# Patient Record
Sex: FEMALE | Race: WHITE | NOT HISPANIC OR LATINO | Employment: OTHER | URBAN - METROPOLITAN AREA
[De-identification: names, ages, dates, MRNs, and addresses within clinical notes are randomized per-mention and may not be internally consistent; named-entity substitution may affect disease eponyms.]

---

## 2017-01-11 ENCOUNTER — ALLSCRIPTS OFFICE VISIT (OUTPATIENT)
Dept: OTHER | Facility: OTHER | Age: 72
End: 2017-01-11

## 2017-01-11 LAB
BILIRUB UR QL STRIP: NEGATIVE
CLARITY UR: NORMAL
COLOR UR: YELLOW
GLUCOSE (HISTORICAL): NEGATIVE
HGB UR QL STRIP.AUTO: NEGATIVE
KETONES UR STRIP-MCNC: NEGATIVE MG/DL
LEUKOCYTE ESTERASE UR QL STRIP: NEGATIVE
NITRITE UR QL STRIP: NEGATIVE
PH UR STRIP.AUTO: 6.5 [PH]
PROT UR STRIP-MCNC: NEGATIVE MG/DL
SP GR UR STRIP.AUTO: 1.01
UROBILINOGEN UR QL STRIP.AUTO: 0.2

## 2017-01-26 ENCOUNTER — ALLSCRIPTS OFFICE VISIT (OUTPATIENT)
Dept: OTHER | Facility: OTHER | Age: 72
End: 2017-01-26

## 2017-01-26 ENCOUNTER — APPOINTMENT (OUTPATIENT)
Dept: LAB | Facility: HOSPITAL | Age: 72
End: 2017-01-26
Attending: INTERNAL MEDICINE
Payer: COMMERCIAL

## 2017-01-26 DIAGNOSIS — E87.6 HYPOKALEMIA: ICD-10-CM

## 2017-01-26 LAB
ANION GAP SERPL CALCULATED.3IONS-SCNC: 10 MMOL/L (ref 4–13)
BUN SERPL-MCNC: 24 MG/DL (ref 5–25)
CALCIUM SERPL-MCNC: 9.6 MG/DL (ref 8.3–10.1)
CHLORIDE SERPL-SCNC: 98 MMOL/L (ref 100–108)
CO2 SERPL-SCNC: 32 MMOL/L (ref 21–32)
CREAT SERPL-MCNC: 1.18 MG/DL (ref 0.6–1.3)
GFR SERPL CREATININE-BSD FRML MDRD: 45.3 ML/MIN/1.73SQ M
GLUCOSE SERPL-MCNC: 125 MG/DL (ref 65–140)
POTASSIUM SERPL-SCNC: 3.7 MMOL/L (ref 3.5–5.3)
SODIUM SERPL-SCNC: 140 MMOL/L (ref 136–145)

## 2017-01-26 PROCEDURE — 80048 BASIC METABOLIC PNL TOTAL CA: CPT

## 2017-01-26 PROCEDURE — 36415 COLL VENOUS BLD VENIPUNCTURE: CPT

## 2017-03-23 DIAGNOSIS — M54.31 SCIATICA OF RIGHT SIDE: ICD-10-CM

## 2017-03-23 DIAGNOSIS — E87.6 HYPOKALEMIA: ICD-10-CM

## 2017-03-23 DIAGNOSIS — M51.36 OTHER INTERVERTEBRAL DISC DEGENERATION, LUMBAR REGION: ICD-10-CM

## 2017-04-11 ENCOUNTER — APPOINTMENT (EMERGENCY)
Dept: CT IMAGING | Facility: HOSPITAL | Age: 72
End: 2017-04-11
Payer: COMMERCIAL

## 2017-04-11 ENCOUNTER — HOSPITAL ENCOUNTER (EMERGENCY)
Facility: HOSPITAL | Age: 72
Discharge: HOME | End: 2017-04-11
Attending: EMERGENCY MEDICINE | Admitting: EMERGENCY MEDICINE
Payer: COMMERCIAL

## 2017-04-11 VITALS
RESPIRATION RATE: 18 BRPM | HEART RATE: 99 BPM | WEIGHT: 274.25 LBS | SYSTOLIC BLOOD PRESSURE: 144 MMHG | OXYGEN SATURATION: 93 % | TEMPERATURE: 98.2 F | DIASTOLIC BLOOD PRESSURE: 88 MMHG

## 2017-04-11 DIAGNOSIS — M54.31 SCIATICA OF RIGHT SIDE: ICD-10-CM

## 2017-04-11 DIAGNOSIS — S39.012A STRAIN OF LUMBAR PARASPINAL MUSCLE, INITIAL ENCOUNTER: Primary | ICD-10-CM

## 2017-04-11 PROCEDURE — 99284 EMERGENCY DEPT VISIT MOD MDM: CPT

## 2017-04-11 PROCEDURE — 96374 THER/PROPH/DIAG INJ IV PUSH: CPT

## 2017-04-11 PROCEDURE — 72131 CT LUMBAR SPINE W/O DYE: CPT

## 2017-04-11 RX ORDER — OMEPRAZOLE 40 MG/1
40 CAPSULE, DELAYED RELEASE ORAL 2 TIMES DAILY
COMMUNITY
End: 2018-05-23 | Stop reason: SDUPTHER

## 2017-04-11 RX ORDER — SPIRONOLACTONE 25 MG/1
25 TABLET ORAL DAILY
COMMUNITY
End: 2018-05-23 | Stop reason: SDUPTHER

## 2017-04-11 RX ORDER — ALLOPURINOL 100 MG/1
100 TABLET ORAL 2 TIMES DAILY
COMMUNITY
End: 2018-05-23 | Stop reason: SDUPTHER

## 2017-04-11 RX ORDER — ATORVASTATIN CALCIUM 40 MG/1
40 TABLET, FILM COATED ORAL DAILY
COMMUNITY
End: 2018-05-23 | Stop reason: SDUPTHER

## 2017-04-11 RX ORDER — OXYCODONE HYDROCHLORIDE AND ACETAMINOPHEN 5; 325 MG/1; MG/1
1 TABLET ORAL EVERY 4 HOURS PRN
Qty: 10 TABLET | Refills: 0 | Status: SHIPPED | OUTPATIENT
Start: 2017-04-11 | End: 2017-04-11

## 2017-04-11 RX ORDER — WARFARIN SODIUM 5 MG/1
5 TABLET ORAL DAILY
COMMUNITY
Start: 2013-10-10 | End: 2019-12-30 | Stop reason: SDUPTHER

## 2017-04-11 RX ORDER — FUROSEMIDE 80 MG
1 TABLET ORAL DAILY
COMMUNITY
End: 2018-05-23 | Stop reason: SDUPTHER

## 2017-04-11 RX ORDER — PROPRANOLOL/HYDROCHLOROTHIAZID 40 MG-25MG
500 TABLET ORAL 4 TIMES DAILY
COMMUNITY
Start: 2016-04-15 | End: 2017-07-15 | Stop reason: HOSPADM

## 2017-04-11 RX ORDER — IRON POLYSACCHARIDE COMPLEX 150 MG
150 CAPSULE ORAL 2 TIMES DAILY
COMMUNITY
End: 2018-05-23 | Stop reason: SDUPTHER

## 2017-04-11 RX ORDER — OXYCODONE HYDROCHLORIDE AND ACETAMINOPHEN 5; 325 MG/1; MG/1
1 TABLET ORAL ONCE
Status: COMPLETED | OUTPATIENT
Start: 2017-04-11 | End: 2017-04-11

## 2017-04-11 RX ORDER — ALBUTEROL SULFATE 90 UG/1
2 AEROSOL, METERED RESPIRATORY (INHALATION) AS NEEDED
COMMUNITY
Start: 2014-01-23 | End: 2018-03-01 | Stop reason: SDUPTHER

## 2017-04-11 RX ORDER — METOLAZONE 5 MG/1
2.5 TABLET ORAL SEE ADMIN INSTRUCTIONS
COMMUNITY
End: 2017-07-15 | Stop reason: HOSPADM

## 2017-04-11 RX ORDER — MORPHINE SULFATE 10 MG/ML
6 INJECTION, SOLUTION INTRAMUSCULAR; INTRAVENOUS ONCE
Status: COMPLETED | OUTPATIENT
Start: 2017-04-11 | End: 2017-04-11

## 2017-04-11 RX ORDER — POTASSIUM CHLORIDE 20 MEQ/1
40 TABLET, EXTENDED RELEASE ORAL 2 TIMES DAILY
COMMUNITY
Start: 2013-10-10 | End: 2018-08-23 | Stop reason: SDDI

## 2017-04-11 RX ORDER — CITALOPRAM 20 MG/1
20 TABLET ORAL DAILY
COMMUNITY
End: 2018-05-23 | Stop reason: SDUPTHER

## 2017-04-11 RX ORDER — OXYCODONE HYDROCHLORIDE AND ACETAMINOPHEN 5; 325 MG/1; MG/1
1 TABLET ORAL EVERY 6 HOURS PRN
Qty: 15 TABLET | Refills: 0 | Status: SHIPPED | OUTPATIENT
Start: 2017-04-11 | End: 2017-04-21

## 2017-04-11 RX ORDER — ASCORBIC ACID 500 MG
500 TABLET ORAL DAILY
COMMUNITY
End: 2017-07-15 | Stop reason: HOSPADM

## 2017-04-11 RX ORDER — ERGOCALCIFEROL (VITAMIN D2) 1250 MCG
1 CAPSULE ORAL WEEKLY
COMMUNITY
End: 2018-02-08 | Stop reason: SDUPTHER

## 2017-04-11 RX ADMIN — OXYCODONE HYDROCHLORIDE AND ACETAMINOPHEN 1 TABLET: 5; 325 TABLET ORAL at 13:02

## 2017-04-11 RX ADMIN — MORPHINE SULFATE 6 MG: 10 INJECTION, SOLUTION INTRAMUSCULAR; INTRAVENOUS at 15:03

## 2017-04-13 ENCOUNTER — GENERIC CONVERSION - ENCOUNTER (OUTPATIENT)
Dept: OTHER | Facility: OTHER | Age: 72
End: 2017-04-13

## 2017-04-14 ENCOUNTER — ALLSCRIPTS OFFICE VISIT (OUTPATIENT)
Dept: OTHER | Facility: OTHER | Age: 72
End: 2017-04-14

## 2017-04-24 ENCOUNTER — APPOINTMENT (OUTPATIENT)
Dept: PHYSICAL THERAPY | Facility: REHABILITATION | Age: 72
End: 2017-04-24
Payer: COMMERCIAL

## 2017-04-24 ENCOUNTER — GENERIC CONVERSION - ENCOUNTER (OUTPATIENT)
Dept: OTHER | Facility: OTHER | Age: 72
End: 2017-04-24

## 2017-04-24 PROCEDURE — G8991 OTHER PT/OT GOAL STATUS: HCPCS

## 2017-04-24 PROCEDURE — G8990 OTHER PT/OT CURRENT STATUS: HCPCS

## 2017-04-24 PROCEDURE — 97162 PT EVAL MOD COMPLEX 30 MIN: CPT

## 2017-04-24 PROCEDURE — 97014 ELECTRIC STIMULATION THERAPY: CPT

## 2017-04-25 ENCOUNTER — GENERIC CONVERSION - ENCOUNTER (OUTPATIENT)
Dept: OTHER | Facility: OTHER | Age: 72
End: 2017-04-25

## 2017-04-27 ENCOUNTER — APPOINTMENT (EMERGENCY)
Dept: RADIOLOGY | Facility: HOSPITAL | Age: 72
DRG: 292 | End: 2017-04-27
Payer: COMMERCIAL

## 2017-04-27 ENCOUNTER — GENERIC CONVERSION - ENCOUNTER (OUTPATIENT)
Dept: OTHER | Facility: OTHER | Age: 72
End: 2017-04-27

## 2017-04-27 ENCOUNTER — HOSPITAL ENCOUNTER (INPATIENT)
Facility: HOSPITAL | Age: 72
LOS: 4 days | Discharge: HOME | DRG: 292 | End: 2017-05-01
Attending: EMERGENCY MEDICINE | Admitting: INTERNAL MEDICINE
Payer: COMMERCIAL

## 2017-04-27 DIAGNOSIS — I50.9 CHF (CONGESTIVE HEART FAILURE) (HCC): Primary | ICD-10-CM

## 2017-04-27 LAB
ANION GAP SERPL CALCULATED.3IONS-SCNC: 6 MMOL/L (ref 4–13)
ANISOCYTOSIS BLD QL SMEAR: PRESENT
APTT PPP: 66 SECONDS (ref 23–35)
BACTERIA UR QL AUTO: ABNORMAL /HPF
BASOPHILS # BLD MANUAL: 0 THOUSAND/UL (ref 0–0.1)
BASOPHILS NFR MAR MANUAL: 0 % (ref 0–1)
BILIRUB UR QL STRIP: NEGATIVE
BUN SERPL-MCNC: 13 MG/DL (ref 5–25)
CALCIUM SERPL-MCNC: 9.7 MG/DL (ref 8.3–10.1)
CAOX CRY URNS QL MICRO: ABNORMAL /HPF
CHLORIDE SERPL-SCNC: 99 MMOL/L (ref 100–108)
CLARITY UR: CLEAR
CO2 SERPL-SCNC: 29 MMOL/L (ref 21–32)
COLOR UR: YELLOW
CREAT SERPL-MCNC: 0.9 MG/DL (ref 0.6–1.3)
EOSINOPHIL # BLD MANUAL: 0.15 THOUSAND/UL (ref 0–0.4)
EOSINOPHIL NFR BLD MANUAL: 1 % (ref 0–6)
ERYTHROCYTE [DISTWIDTH] IN BLOOD BY AUTOMATED COUNT: 16.1 % (ref 11.6–15.1)
GFR SERPL CREATININE-BSD FRML MDRD: >60 ML/MIN/1.73SQ M
GLUCOSE SERPL-MCNC: 140 MG/DL (ref 65–140)
GLUCOSE SERPL-MCNC: 150 MG/DL (ref 65–140)
GLUCOSE UR STRIP-MCNC: NEGATIVE MG/DL
HCT VFR BLD AUTO: 30 % (ref 34.8–46.1)
HGB BLD-MCNC: 10 G/DL (ref 11.5–15.4)
HGB UR QL STRIP.AUTO: ABNORMAL
INR PPP: 2.11 (ref 0.86–1.16)
KETONES UR STRIP-MCNC: NEGATIVE MG/DL
LEUKOCYTE ESTERASE UR QL STRIP: NEGATIVE
LYMPHOCYTES # BLD AUTO: 0.46 THOUSAND/UL (ref 0.6–4.47)
LYMPHOCYTES # BLD AUTO: 3 % (ref 14–44)
MCH RBC QN AUTO: 31 PG (ref 26.8–34.3)
MCHC RBC AUTO-ENTMCNC: 33.3 G/DL (ref 31.4–37.4)
MCV RBC AUTO: 93 FL (ref 82–98)
MONOCYTES # BLD AUTO: 1.07 THOUSAND/UL (ref 0–1.22)
MONOCYTES NFR BLD: 7 % (ref 4–12)
MYELOCYTES NFR BLD MANUAL: 1 % (ref 0–1)
NEUTROPHILS # BLD MANUAL: 13.49 THOUSAND/UL (ref 1.85–7.62)
NEUTS BAND NFR BLD MANUAL: 2 % (ref 0–8)
NEUTS SEG NFR BLD AUTO: 86 % (ref 43–75)
NITRITE UR QL STRIP: NEGATIVE
NON-SQ EPI CELLS URNS QL MICRO: ABNORMAL /HPF
NRBC BLD AUTO-RTO: 0 /100 WBCS
NT-PROBNP SERPL-MCNC: 700 PG/ML
PH UR STRIP.AUTO: 5.5 [PH] (ref 4.5–8)
PLATELET # BLD AUTO: 350 THOUSANDS/UL (ref 149–390)
PLATELET BLD QL SMEAR: ADEQUATE
PMV BLD AUTO: 8.4 FL (ref 8.9–12.7)
POTASSIUM SERPL-SCNC: 4.4 MMOL/L (ref 3.5–5.3)
PROT UR STRIP-MCNC: ABNORMAL MG/DL
PROTHROMBIN TIME: 23.9 SECONDS (ref 12.1–14.4)
RBC # BLD AUTO: 3.23 MILLION/UL (ref 3.81–5.12)
RBC #/AREA URNS AUTO: ABNORMAL /HPF
SODIUM SERPL-SCNC: 134 MMOL/L (ref 136–145)
SP GR UR STRIP.AUTO: 1.01 (ref 1–1.03)
SPECIMEN SOURCE: NORMAL
TOTAL CELLS COUNTED SPEC: 100
TROPONIN I BLD-MCNC: 0 NG/ML (ref 0–0.08)
UROBILINOGEN UR QL STRIP.AUTO: 1 E.U./DL
WBC # BLD AUTO: 15.33 THOUSAND/UL (ref 4.31–10.16)
WBC #/AREA URNS AUTO: ABNORMAL /HPF

## 2017-04-27 PROCEDURE — 81001 URINALYSIS AUTO W/SCOPE: CPT

## 2017-04-27 PROCEDURE — 85610 PROTHROMBIN TIME: CPT | Performed by: EMERGENCY MEDICINE

## 2017-04-27 PROCEDURE — 84484 ASSAY OF TROPONIN QUANT: CPT

## 2017-04-27 PROCEDURE — 83880 ASSAY OF NATRIURETIC PEPTIDE: CPT | Performed by: EMERGENCY MEDICINE

## 2017-04-27 PROCEDURE — 80048 BASIC METABOLIC PNL TOTAL CA: CPT

## 2017-04-27 PROCEDURE — 82948 REAGENT STRIP/BLOOD GLUCOSE: CPT

## 2017-04-27 PROCEDURE — 36415 COLL VENOUS BLD VENIPUNCTURE: CPT | Performed by: EMERGENCY MEDICINE

## 2017-04-27 PROCEDURE — 81002 URINALYSIS NONAUTO W/O SCOPE: CPT | Performed by: EMERGENCY MEDICINE

## 2017-04-27 PROCEDURE — 85730 THROMBOPLASTIN TIME PARTIAL: CPT | Performed by: EMERGENCY MEDICINE

## 2017-04-27 PROCEDURE — 87086 URINE CULTURE/COLONY COUNT: CPT

## 2017-04-27 PROCEDURE — 99285 EMERGENCY DEPT VISIT HI MDM: CPT

## 2017-04-27 PROCEDURE — 85027 COMPLETE CBC AUTOMATED: CPT | Performed by: EMERGENCY MEDICINE

## 2017-04-27 PROCEDURE — 93005 ELECTROCARDIOGRAM TRACING: CPT

## 2017-04-27 PROCEDURE — 71020 HB CHEST X-RAY 2VW FRONTAL&LATL: CPT

## 2017-04-27 PROCEDURE — 85007 BL SMEAR W/DIFF WBC COUNT: CPT | Performed by: EMERGENCY MEDICINE

## 2017-04-27 RX ORDER — IRON POLYSACCHARIDE COMPLEX 150 MG
150 CAPSULE ORAL 2 TIMES DAILY
Status: DISCONTINUED | OUTPATIENT
Start: 2017-04-27 | End: 2017-04-28

## 2017-04-27 RX ORDER — WARFARIN SODIUM 5 MG/1
5 TABLET ORAL
Status: DISCONTINUED | OUTPATIENT
Start: 2017-04-27 | End: 2017-04-28

## 2017-04-27 RX ORDER — SPIRONOLACTONE 25 MG/1
25 TABLET ORAL DAILY
Status: DISCONTINUED | OUTPATIENT
Start: 2017-04-28 | End: 2017-05-01 | Stop reason: HOSPADM

## 2017-04-27 RX ORDER — MAGNESIUM HYDROXIDE/ALUMINUM HYDROXICE/SIMETHICONE 120; 1200; 1200 MG/30ML; MG/30ML; MG/30ML
30 SUSPENSION ORAL EVERY 6 HOURS PRN
Status: DISCONTINUED | OUTPATIENT
Start: 2017-04-27 | End: 2017-05-01 | Stop reason: HOSPADM

## 2017-04-27 RX ORDER — PANTOPRAZOLE SODIUM 40 MG/1
40 TABLET, DELAYED RELEASE ORAL
Status: DISCONTINUED | OUTPATIENT
Start: 2017-04-28 | End: 2017-05-01 | Stop reason: HOSPADM

## 2017-04-27 RX ORDER — CITALOPRAM 20 MG/1
20 TABLET ORAL DAILY
Status: DISCONTINUED | OUTPATIENT
Start: 2017-04-28 | End: 2017-05-01 | Stop reason: HOSPADM

## 2017-04-27 RX ORDER — FUROSEMIDE 10 MG/ML
40 INJECTION INTRAMUSCULAR; INTRAVENOUS 2 TIMES DAILY
Status: DISCONTINUED | OUTPATIENT
Start: 2017-04-27 | End: 2017-04-29

## 2017-04-27 RX ORDER — POTASSIUM CHLORIDE 20 MEQ/1
20 TABLET, EXTENDED RELEASE ORAL DAILY
Status: DISCONTINUED | OUTPATIENT
Start: 2017-04-28 | End: 2017-04-28

## 2017-04-27 RX ORDER — WARFARIN SODIUM 5 MG/1
5 TABLET ORAL DAILY
Status: DISCONTINUED | OUTPATIENT
Start: 2017-04-28 | End: 2017-04-27

## 2017-04-27 RX ORDER — ALBUTEROL SULFATE 90 UG/1
2 AEROSOL, METERED RESPIRATORY (INHALATION) EVERY 4 HOURS PRN
Status: DISCONTINUED | OUTPATIENT
Start: 2017-04-27 | End: 2017-05-01 | Stop reason: HOSPADM

## 2017-04-27 RX ORDER — ATORVASTATIN CALCIUM 40 MG/1
40 TABLET, FILM COATED ORAL
Status: DISCONTINUED | OUTPATIENT
Start: 2017-04-27 | End: 2017-05-01 | Stop reason: HOSPADM

## 2017-04-27 RX ORDER — DOCUSATE SODIUM 100 MG/1
100 CAPSULE, LIQUID FILLED ORAL 2 TIMES DAILY
Status: DISCONTINUED | OUTPATIENT
Start: 2017-04-27 | End: 2017-05-01 | Stop reason: HOSPADM

## 2017-04-27 RX ORDER — ASCORBIC ACID 500 MG
500 TABLET ORAL DAILY
Status: DISCONTINUED | OUTPATIENT
Start: 2017-04-28 | End: 2017-04-28

## 2017-04-27 RX ORDER — ACETAMINOPHEN 325 MG/1
650 TABLET ORAL EVERY 4 HOURS PRN
Status: DISCONTINUED | OUTPATIENT
Start: 2017-04-27 | End: 2017-05-01 | Stop reason: HOSPADM

## 2017-04-27 RX ORDER — ONDANSETRON 2 MG/ML
4 INJECTION INTRAMUSCULAR; INTRAVENOUS EVERY 6 HOURS PRN
Status: DISCONTINUED | OUTPATIENT
Start: 2017-04-27 | End: 2017-05-01 | Stop reason: HOSPADM

## 2017-04-27 RX ORDER — ALLOPURINOL 100 MG/1
100 TABLET ORAL 2 TIMES DAILY
Status: DISCONTINUED | OUTPATIENT
Start: 2017-04-27 | End: 2017-05-01 | Stop reason: HOSPADM

## 2017-04-27 RX ADMIN — ACETAMINOPHEN 650 MG: 325 TABLET ORAL at 21:40

## 2017-04-27 RX ADMIN — ATORVASTATIN CALCIUM 40 MG: 40 TABLET, FILM COATED ORAL at 21:40

## 2017-04-27 RX ADMIN — FUROSEMIDE 40 MG: 10 INJECTION, SOLUTION INTRAMUSCULAR; INTRAVENOUS at 21:39

## 2017-04-27 RX ADMIN — WARFARIN SODIUM 5 MG: 5 TABLET ORAL at 22:49

## 2017-04-27 RX ADMIN — DOCUSATE SODIUM 100 MG: 100 CAPSULE, LIQUID FILLED ORAL at 21:40

## 2017-04-27 RX ADMIN — Medication 150 MG: at 22:49

## 2017-04-27 RX ADMIN — ALLOPURINOL 100 MG: 100 TABLET ORAL at 21:40

## 2017-04-28 ENCOUNTER — GENERIC CONVERSION - ENCOUNTER (OUTPATIENT)
Dept: OTHER | Facility: OTHER | Age: 72
End: 2017-04-28

## 2017-04-28 ENCOUNTER — APPOINTMENT (INPATIENT)
Dept: NON INVASIVE DIAGNOSTICS | Facility: HOSPITAL | Age: 72
DRG: 292 | End: 2017-04-28
Payer: COMMERCIAL

## 2017-04-28 ENCOUNTER — APPOINTMENT (OUTPATIENT)
Dept: PHYSICAL THERAPY | Facility: REHABILITATION | Age: 72
End: 2017-04-28
Payer: COMMERCIAL

## 2017-04-28 PROBLEM — I50.31 ACUTE DIASTOLIC CONGESTIVE HEART FAILURE (HCC): Status: ACTIVE | Noted: 2017-04-27

## 2017-04-28 LAB
ANION GAP SERPL CALCULATED.3IONS-SCNC: 9 MMOL/L (ref 4–13)
ATRIAL RATE: 103 BPM
BACTERIA UR CULT: NORMAL
BUN SERPL-MCNC: 12 MG/DL (ref 5–25)
CALCIUM SERPL-MCNC: 8.9 MG/DL (ref 8.3–10.1)
CHLORIDE SERPL-SCNC: 102 MMOL/L (ref 100–108)
CO2 SERPL-SCNC: 27 MMOL/L (ref 21–32)
CREAT SERPL-MCNC: 0.83 MG/DL (ref 0.6–1.3)
EST. AVERAGE GLUCOSE BLD GHB EST-MCNC: 146 MG/DL
GFR SERPL CREATININE-BSD FRML MDRD: >60 ML/MIN/1.73SQ M
GLUCOSE SERPL-MCNC: 122 MG/DL (ref 65–140)
GLUCOSE SERPL-MCNC: 143 MG/DL (ref 65–140)
GLUCOSE SERPL-MCNC: 187 MG/DL (ref 65–140)
GLUCOSE SERPL-MCNC: 193 MG/DL (ref 65–140)
GLUCOSE SERPL-MCNC: 194 MG/DL (ref 65–140)
HBA1C MFR BLD: 6.7 % (ref 4.2–6.3)
INR PPP: 2.26 (ref 0.86–1.16)
P AXIS: 60 DEGREES
POTASSIUM SERPL-SCNC: 3.3 MMOL/L (ref 3.5–5.3)
PR INTERVAL: 176 MS
PROTHROMBIN TIME: 25.2 SECONDS (ref 12.1–14.4)
QRS AXIS: 26 DEGREES
QRSD INTERVAL: 146 MS
QT INTERVAL: 358 MS
QTC INTERVAL: 468 MS
SODIUM SERPL-SCNC: 138 MMOL/L (ref 136–145)
T WAVE AXIS: 31 DEGREES
TSH SERPL DL<=0.05 MIU/L-ACNC: 0.48 UIU/ML (ref 0.36–3.74)
VENTRICULAR RATE: 103 BPM

## 2017-04-28 PROCEDURE — 80048 BASIC METABOLIC PNL TOTAL CA: CPT | Performed by: PHYSICIAN ASSISTANT

## 2017-04-28 PROCEDURE — 82948 REAGENT STRIP/BLOOD GLUCOSE: CPT

## 2017-04-28 PROCEDURE — 97163 PT EVAL HIGH COMPLEX 45 MIN: CPT

## 2017-04-28 PROCEDURE — 97530 THERAPEUTIC ACTIVITIES: CPT

## 2017-04-28 PROCEDURE — G8978 MOBILITY CURRENT STATUS: HCPCS

## 2017-04-28 PROCEDURE — 83036 HEMOGLOBIN GLYCOSYLATED A1C: CPT | Performed by: PHYSICIAN ASSISTANT

## 2017-04-28 PROCEDURE — 93306 TTE W/DOPPLER COMPLETE: CPT

## 2017-04-28 PROCEDURE — 84443 ASSAY THYROID STIM HORMONE: CPT | Performed by: PHYSICIAN ASSISTANT

## 2017-04-28 PROCEDURE — G8979 MOBILITY GOAL STATUS: HCPCS

## 2017-04-28 PROCEDURE — 85610 PROTHROMBIN TIME: CPT | Performed by: PHYSICIAN ASSISTANT

## 2017-04-28 RX ORDER — WARFARIN SODIUM 7.5 MG/1
7.5 TABLET ORAL
Status: DISCONTINUED | OUTPATIENT
Start: 2017-04-28 | End: 2017-04-30

## 2017-04-28 RX ORDER — POTASSIUM CHLORIDE 20 MEQ/1
40 TABLET, EXTENDED RELEASE ORAL 2 TIMES DAILY WITH MEALS
Status: DISCONTINUED | OUTPATIENT
Start: 2017-04-28 | End: 2017-05-01 | Stop reason: HOSPADM

## 2017-04-28 RX ORDER — POTASSIUM CHLORIDE 20 MEQ/1
40 TABLET, EXTENDED RELEASE ORAL 2 TIMES DAILY WITH MEALS
Status: DISCONTINUED | OUTPATIENT
Start: 2017-04-29 | End: 2017-04-28

## 2017-04-28 RX ADMIN — FUROSEMIDE 40 MG: 10 INJECTION, SOLUTION INTRAMUSCULAR; INTRAVENOUS at 17:15

## 2017-04-28 RX ADMIN — POTASSIUM CHLORIDE 40 MEQ: 1500 TABLET, EXTENDED RELEASE ORAL at 18:41

## 2017-04-28 RX ADMIN — DOCUSATE SODIUM 100 MG: 100 CAPSULE, LIQUID FILLED ORAL at 09:33

## 2017-04-28 RX ADMIN — ATORVASTATIN CALCIUM 40 MG: 40 TABLET, FILM COATED ORAL at 17:15

## 2017-04-28 RX ADMIN — CITALOPRAM HYDROBROMIDE 20 MG: 20 TABLET ORAL at 09:33

## 2017-04-28 RX ADMIN — DOCUSATE SODIUM 100 MG: 100 CAPSULE, LIQUID FILLED ORAL at 17:15

## 2017-04-28 RX ADMIN — PANTOPRAZOLE SODIUM 40 MG: 40 TABLET, DELAYED RELEASE ORAL at 05:27

## 2017-04-28 RX ADMIN — Medication 150 MG: at 09:34

## 2017-04-28 RX ADMIN — SPIRONOLACTONE 25 MG: 25 TABLET, FILM COATED ORAL at 09:33

## 2017-04-28 RX ADMIN — Medication 1 TABLET: at 09:33

## 2017-04-28 RX ADMIN — INSULIN LISPRO 2 UNITS: 100 INJECTION, SOLUTION INTRAVENOUS; SUBCUTANEOUS at 22:02

## 2017-04-28 RX ADMIN — INSULIN LISPRO 2 UNITS: 100 INJECTION, SOLUTION INTRAVENOUS; SUBCUTANEOUS at 17:18

## 2017-04-28 RX ADMIN — OXYCODONE HYDROCHLORIDE AND ACETAMINOPHEN 500 MG: 500 TABLET ORAL at 09:33

## 2017-04-28 RX ADMIN — ACETAMINOPHEN 650 MG: 325 TABLET ORAL at 22:01

## 2017-04-28 RX ADMIN — INSULIN LISPRO 2 UNITS: 100 INJECTION, SOLUTION INTRAVENOUS; SUBCUTANEOUS at 09:39

## 2017-04-28 RX ADMIN — WARFARIN SODIUM 7.5 MG: 7.5 TABLET ORAL at 17:15

## 2017-04-28 RX ADMIN — FUROSEMIDE 40 MG: 10 INJECTION, SOLUTION INTRAMUSCULAR; INTRAVENOUS at 09:34

## 2017-04-28 RX ADMIN — POTASSIUM CHLORIDE 20 MEQ: 1500 TABLET, EXTENDED RELEASE ORAL at 09:33

## 2017-04-28 RX ADMIN — ACETAMINOPHEN 650 MG: 325 TABLET ORAL at 13:46

## 2017-04-28 RX ADMIN — ALLOPURINOL 100 MG: 100 TABLET ORAL at 17:15

## 2017-04-28 RX ADMIN — ALLOPURINOL 100 MG: 100 TABLET ORAL at 09:33

## 2017-04-29 PROBLEM — G57.11 MERALGIA PARESTHETICA OF RIGHT SIDE: Status: ACTIVE | Noted: 2017-04-29

## 2017-04-29 LAB
ANION GAP SERPL CALCULATED.3IONS-SCNC: 7 MMOL/L (ref 4–13)
BUN SERPL-MCNC: 14 MG/DL (ref 5–25)
CALCIUM SERPL-MCNC: 9.1 MG/DL (ref 8.3–10.1)
CHLORIDE SERPL-SCNC: 101 MMOL/L (ref 100–108)
CO2 SERPL-SCNC: 29 MMOL/L (ref 21–32)
CREAT SERPL-MCNC: 0.82 MG/DL (ref 0.6–1.3)
GFR SERPL CREATININE-BSD FRML MDRD: >60 ML/MIN/1.73SQ M
GLUCOSE SERPL-MCNC: 135 MG/DL (ref 65–140)
GLUCOSE SERPL-MCNC: 146 MG/DL (ref 65–140)
GLUCOSE SERPL-MCNC: 147 MG/DL (ref 65–140)
GLUCOSE SERPL-MCNC: 150 MG/DL (ref 65–140)
GLUCOSE SERPL-MCNC: 182 MG/DL (ref 65–140)
INR PPP: 2.21 (ref 0.86–1.16)
POTASSIUM SERPL-SCNC: 3.4 MMOL/L (ref 3.5–5.3)
PROTHROMBIN TIME: 24.8 SECONDS (ref 12.1–14.4)
SODIUM SERPL-SCNC: 137 MMOL/L (ref 136–145)

## 2017-04-29 PROCEDURE — 82948 REAGENT STRIP/BLOOD GLUCOSE: CPT

## 2017-04-29 PROCEDURE — 80048 BASIC METABOLIC PNL TOTAL CA: CPT | Performed by: INTERNAL MEDICINE

## 2017-04-29 PROCEDURE — 85610 PROTHROMBIN TIME: CPT | Performed by: INTERNAL MEDICINE

## 2017-04-29 RX ORDER — FUROSEMIDE 80 MG
80 TABLET ORAL DAILY
Status: DISCONTINUED | OUTPATIENT
Start: 2017-04-30 | End: 2017-05-01 | Stop reason: HOSPADM

## 2017-04-29 RX ADMIN — POTASSIUM CHLORIDE 40 MEQ: 1500 TABLET, EXTENDED RELEASE ORAL at 16:39

## 2017-04-29 RX ADMIN — ACETAMINOPHEN 650 MG: 325 TABLET ORAL at 06:12

## 2017-04-29 RX ADMIN — POTASSIUM CHLORIDE 40 MEQ: 1500 TABLET, EXTENDED RELEASE ORAL at 09:21

## 2017-04-29 RX ADMIN — ATORVASTATIN CALCIUM 40 MG: 40 TABLET, FILM COATED ORAL at 17:11

## 2017-04-29 RX ADMIN — WARFARIN SODIUM 7.5 MG: 7.5 TABLET ORAL at 17:11

## 2017-04-29 RX ADMIN — CITALOPRAM HYDROBROMIDE 20 MG: 20 TABLET ORAL at 09:21

## 2017-04-29 RX ADMIN — FUROSEMIDE 40 MG: 10 INJECTION, SOLUTION INTRAMUSCULAR; INTRAVENOUS at 09:21

## 2017-04-29 RX ADMIN — INSULIN LISPRO 2 UNITS: 100 INJECTION, SOLUTION INTRAVENOUS; SUBCUTANEOUS at 09:15

## 2017-04-29 RX ADMIN — ALLOPURINOL 100 MG: 100 TABLET ORAL at 09:21

## 2017-04-29 RX ADMIN — INSULIN LISPRO 2 UNITS: 100 INJECTION, SOLUTION INTRAVENOUS; SUBCUTANEOUS at 13:33

## 2017-04-29 RX ADMIN — DOCUSATE SODIUM 100 MG: 100 CAPSULE, LIQUID FILLED ORAL at 09:21

## 2017-04-29 RX ADMIN — DOCUSATE SODIUM 100 MG: 100 CAPSULE, LIQUID FILLED ORAL at 17:12

## 2017-04-29 RX ADMIN — ALLOPURINOL 100 MG: 100 TABLET ORAL at 17:12

## 2017-04-29 RX ADMIN — PANTOPRAZOLE SODIUM 40 MG: 40 TABLET, DELAYED RELEASE ORAL at 06:12

## 2017-04-29 RX ADMIN — ACETAMINOPHEN 650 MG: 325 TABLET ORAL at 16:36

## 2017-04-29 RX ADMIN — ACETAMINOPHEN 650 MG: 325 TABLET ORAL at 20:44

## 2017-04-29 RX ADMIN — SPIRONOLACTONE 25 MG: 25 TABLET, FILM COATED ORAL at 09:21

## 2017-04-29 RX ADMIN — FUROSEMIDE 40 MG: 10 INJECTION, SOLUTION INTRAMUSCULAR; INTRAVENOUS at 17:11

## 2017-04-30 ENCOUNTER — APPOINTMENT (INPATIENT)
Dept: RADIOLOGY | Facility: HOSPITAL | Age: 72
DRG: 292 | End: 2017-04-30
Payer: COMMERCIAL

## 2017-04-30 PROBLEM — M79.604 RIGHT LEG PAIN: Status: ACTIVE | Noted: 2017-04-30

## 2017-04-30 LAB
ANION GAP SERPL CALCULATED.3IONS-SCNC: 6 MMOL/L (ref 4–13)
BASOPHILS # BLD MANUAL: 0 THOUSAND/UL (ref 0–0.1)
BASOPHILS NFR MAR MANUAL: 0 % (ref 0–1)
BUN SERPL-MCNC: 15 MG/DL (ref 5–25)
CALCIUM SERPL-MCNC: 9.4 MG/DL (ref 8.3–10.1)
CHLORIDE SERPL-SCNC: 102 MMOL/L (ref 100–108)
CO2 SERPL-SCNC: 30 MMOL/L (ref 21–32)
CREAT SERPL-MCNC: 0.88 MG/DL (ref 0.6–1.3)
EOSINOPHIL # BLD MANUAL: 0.23 THOUSAND/UL (ref 0–0.4)
EOSINOPHIL NFR BLD MANUAL: 2 % (ref 0–6)
ERYTHROCYTE [DISTWIDTH] IN BLOOD BY AUTOMATED COUNT: 15.7 % (ref 11.6–15.1)
GFR SERPL CREATININE-BSD FRML MDRD: >60 ML/MIN/1.73SQ M
GLUCOSE SERPL-MCNC: 116 MG/DL (ref 65–140)
GLUCOSE SERPL-MCNC: 127 MG/DL (ref 65–140)
GLUCOSE SERPL-MCNC: 138 MG/DL (ref 65–140)
GLUCOSE SERPL-MCNC: 159 MG/DL (ref 65–140)
GLUCOSE SERPL-MCNC: 163 MG/DL (ref 65–140)
HCT VFR BLD AUTO: 30.8 % (ref 34.8–46.1)
HGB BLD-MCNC: 9.8 G/DL (ref 11.5–15.4)
LYMPHOCYTES # BLD AUTO: 0.7 THOUSAND/UL (ref 0.6–4.47)
LYMPHOCYTES # BLD AUTO: 6 % (ref 14–44)
MCH RBC QN AUTO: 30.2 PG (ref 26.8–34.3)
MCHC RBC AUTO-ENTMCNC: 31.8 G/DL (ref 31.4–37.4)
MCV RBC AUTO: 95 FL (ref 82–98)
METAMYELOCYTES NFR BLD MANUAL: 1 % (ref 0–1)
MONOCYTES # BLD AUTO: 0.81 THOUSAND/UL (ref 0–1.22)
MONOCYTES NFR BLD: 7 % (ref 4–12)
NEUTROPHILS # BLD MANUAL: 9.76 THOUSAND/UL (ref 1.85–7.62)
NEUTS BAND NFR BLD MANUAL: 13 % (ref 0–8)
NEUTS SEG NFR BLD AUTO: 71 % (ref 43–75)
PLATELET # BLD AUTO: 417 THOUSANDS/UL (ref 149–390)
PLATELET BLD QL SMEAR: ABNORMAL
PMV BLD AUTO: 8.3 FL (ref 8.9–12.7)
POTASSIUM SERPL-SCNC: 4.1 MMOL/L (ref 3.5–5.3)
RBC # BLD AUTO: 3.24 MILLION/UL (ref 3.81–5.12)
RBC MORPH BLD: NORMAL
SODIUM SERPL-SCNC: 138 MMOL/L (ref 136–145)
TOTAL CELLS COUNTED SPEC: 100
WBC # BLD AUTO: 11.62 THOUSAND/UL (ref 4.31–10.16)

## 2017-04-30 PROCEDURE — 85027 COMPLETE CBC AUTOMATED: CPT | Performed by: INTERNAL MEDICINE

## 2017-04-30 PROCEDURE — 72170 X-RAY EXAM OF PELVIS: CPT

## 2017-04-30 PROCEDURE — 82948 REAGENT STRIP/BLOOD GLUCOSE: CPT

## 2017-04-30 PROCEDURE — 80048 BASIC METABOLIC PNL TOTAL CA: CPT | Performed by: INTERNAL MEDICINE

## 2017-04-30 PROCEDURE — 73551 X-RAY EXAM OF FEMUR 1: CPT

## 2017-04-30 PROCEDURE — 85007 BL SMEAR W/DIFF WBC COUNT: CPT | Performed by: INTERNAL MEDICINE

## 2017-04-30 RX ORDER — WARFARIN SODIUM 5 MG/1
10 TABLET ORAL
Status: DISCONTINUED | OUTPATIENT
Start: 2017-04-30 | End: 2017-04-30

## 2017-04-30 RX ORDER — WARFARIN SODIUM 7.5 MG/1
7.5 TABLET ORAL
Status: DISCONTINUED | OUTPATIENT
Start: 2017-04-30 | End: 2017-05-01 | Stop reason: HOSPADM

## 2017-04-30 RX ADMIN — ALLOPURINOL 100 MG: 100 TABLET ORAL at 18:30

## 2017-04-30 RX ADMIN — ACETAMINOPHEN 650 MG: 325 TABLET ORAL at 02:36

## 2017-04-30 RX ADMIN — DOCUSATE SODIUM 100 MG: 100 CAPSULE, LIQUID FILLED ORAL at 09:15

## 2017-04-30 RX ADMIN — ACETAMINOPHEN 650 MG: 325 TABLET ORAL at 14:26

## 2017-04-30 RX ADMIN — DOCUSATE SODIUM 100 MG: 100 CAPSULE, LIQUID FILLED ORAL at 18:30

## 2017-04-30 RX ADMIN — ATORVASTATIN CALCIUM 40 MG: 40 TABLET, FILM COATED ORAL at 18:30

## 2017-04-30 RX ADMIN — PANTOPRAZOLE SODIUM 40 MG: 40 TABLET, DELAYED RELEASE ORAL at 05:58

## 2017-04-30 RX ADMIN — POTASSIUM CHLORIDE 40 MEQ: 1500 TABLET, EXTENDED RELEASE ORAL at 18:30

## 2017-04-30 RX ADMIN — INSULIN LISPRO 2 UNITS: 100 INJECTION, SOLUTION INTRAVENOUS; SUBCUTANEOUS at 11:44

## 2017-04-30 RX ADMIN — CITALOPRAM HYDROBROMIDE 20 MG: 20 TABLET ORAL at 09:15

## 2017-04-30 RX ADMIN — ACETAMINOPHEN 650 MG: 325 TABLET ORAL at 18:36

## 2017-04-30 RX ADMIN — SPIRONOLACTONE 25 MG: 25 TABLET, FILM COATED ORAL at 09:15

## 2017-04-30 RX ADMIN — INSULIN LISPRO 2 UNITS: 100 INJECTION, SOLUTION INTRAVENOUS; SUBCUTANEOUS at 22:07

## 2017-04-30 RX ADMIN — POTASSIUM CHLORIDE 40 MEQ: 1500 TABLET, EXTENDED RELEASE ORAL at 09:15

## 2017-04-30 RX ADMIN — ALLOPURINOL 100 MG: 100 TABLET ORAL at 09:15

## 2017-04-30 RX ADMIN — WARFARIN SODIUM 7.5 MG: 7.5 TABLET ORAL at 18:32

## 2017-04-30 RX ADMIN — FUROSEMIDE 80 MG: 80 TABLET ORAL at 09:15

## 2017-05-01 ENCOUNTER — APPOINTMENT (INPATIENT)
Dept: PHYSICAL THERAPY | Facility: HOSPITAL | Age: 72
DRG: 292 | End: 2017-05-01
Payer: COMMERCIAL

## 2017-05-01 VITALS
BODY MASS INDEX: 51.64 KG/M2 | WEIGHT: 263.01 LBS | SYSTOLIC BLOOD PRESSURE: 125 MMHG | RESPIRATION RATE: 18 BRPM | DIASTOLIC BLOOD PRESSURE: 88 MMHG | HEIGHT: 60 IN | HEART RATE: 91 BPM | TEMPERATURE: 97.8 F | OXYGEN SATURATION: 95 %

## 2017-05-01 LAB
ANION GAP SERPL CALCULATED.3IONS-SCNC: 6 MMOL/L (ref 4–13)
BUN SERPL-MCNC: 12 MG/DL (ref 5–25)
CALCIUM SERPL-MCNC: 9.7 MG/DL (ref 8.3–10.1)
CHLORIDE SERPL-SCNC: 101 MMOL/L (ref 100–108)
CO2 SERPL-SCNC: 29 MMOL/L (ref 21–32)
CREAT SERPL-MCNC: 0.83 MG/DL (ref 0.6–1.3)
GFR SERPL CREATININE-BSD FRML MDRD: >60 ML/MIN/1.73SQ M
GLUCOSE SERPL-MCNC: 137 MG/DL (ref 65–140)
GLUCOSE SERPL-MCNC: 150 MG/DL (ref 65–140)
GLUCOSE SERPL-MCNC: 165 MG/DL (ref 65–140)
INR PPP: 3.78 (ref 0.86–1.16)
POTASSIUM SERPL-SCNC: 4.9 MMOL/L (ref 3.5–5.3)
PROTHROMBIN TIME: 37.9 SECONDS (ref 12.1–14.4)
SODIUM SERPL-SCNC: 136 MMOL/L (ref 136–145)

## 2017-05-01 PROCEDURE — 97110 THERAPEUTIC EXERCISES: CPT

## 2017-05-01 PROCEDURE — G8988 SELF CARE GOAL STATUS: HCPCS

## 2017-05-01 PROCEDURE — 97530 THERAPEUTIC ACTIVITIES: CPT

## 2017-05-01 PROCEDURE — 85610 PROTHROMBIN TIME: CPT | Performed by: INTERNAL MEDICINE

## 2017-05-01 PROCEDURE — 97116 GAIT TRAINING THERAPY: CPT

## 2017-05-01 PROCEDURE — 80048 BASIC METABOLIC PNL TOTAL CA: CPT | Performed by: INTERNAL MEDICINE

## 2017-05-01 PROCEDURE — G8987 SELF CARE CURRENT STATUS: HCPCS

## 2017-05-01 PROCEDURE — 82948 REAGENT STRIP/BLOOD GLUCOSE: CPT

## 2017-05-01 PROCEDURE — 97166 OT EVAL MOD COMPLEX 45 MIN: CPT

## 2017-05-01 RX ADMIN — INSULIN LISPRO 2 UNITS: 100 INJECTION, SOLUTION INTRAVENOUS; SUBCUTANEOUS at 09:17

## 2017-05-01 RX ADMIN — SPIRONOLACTONE 25 MG: 25 TABLET, FILM COATED ORAL at 09:16

## 2017-05-01 RX ADMIN — DOCUSATE SODIUM 100 MG: 100 CAPSULE, LIQUID FILLED ORAL at 09:16

## 2017-05-01 RX ADMIN — POTASSIUM CHLORIDE 40 MEQ: 1500 TABLET, EXTENDED RELEASE ORAL at 09:16

## 2017-05-01 RX ADMIN — ACETAMINOPHEN 650 MG: 325 TABLET ORAL at 01:53

## 2017-05-01 RX ADMIN — FUROSEMIDE 80 MG: 80 TABLET ORAL at 09:16

## 2017-05-01 RX ADMIN — CITALOPRAM HYDROBROMIDE 20 MG: 20 TABLET ORAL at 09:16

## 2017-05-01 RX ADMIN — INSULIN LISPRO 2 UNITS: 100 INJECTION, SOLUTION INTRAVENOUS; SUBCUTANEOUS at 13:41

## 2017-05-01 RX ADMIN — ALLOPURINOL 100 MG: 100 TABLET ORAL at 09:16

## 2017-05-01 RX ADMIN — PANTOPRAZOLE SODIUM 40 MG: 40 TABLET, DELAYED RELEASE ORAL at 06:28

## 2017-05-01 RX ADMIN — ACETAMINOPHEN 650 MG: 325 TABLET ORAL at 06:28

## 2017-05-02 ENCOUNTER — GENERIC CONVERSION - ENCOUNTER (OUTPATIENT)
Dept: OTHER | Facility: OTHER | Age: 72
End: 2017-05-02

## 2017-05-15 ENCOUNTER — ALLSCRIPTS OFFICE VISIT (OUTPATIENT)
Dept: OTHER | Facility: OTHER | Age: 72
End: 2017-05-15

## 2017-05-17 ENCOUNTER — GENERIC CONVERSION - ENCOUNTER (OUTPATIENT)
Dept: OTHER | Facility: OTHER | Age: 72
End: 2017-05-17

## 2017-05-18 ENCOUNTER — TRANSCRIBE ORDERS (OUTPATIENT)
Dept: ADMINISTRATIVE | Facility: HOSPITAL | Age: 72
End: 2017-05-18

## 2017-05-18 DIAGNOSIS — M51.36 DEGENERATION OF LUMBAR INTERVERTEBRAL DISC: Primary | ICD-10-CM

## 2017-05-23 ENCOUNTER — HOSPITAL ENCOUNTER (INPATIENT)
Facility: HOSPITAL | Age: 72
LOS: 13 days | Discharge: HOME HEALTH | DRG: 871 | End: 2017-06-05
Attending: EMERGENCY MEDICINE | Admitting: INTERNAL MEDICINE
Payer: COMMERCIAL

## 2017-05-23 ENCOUNTER — APPOINTMENT (EMERGENCY)
Dept: RADIOLOGY | Facility: HOSPITAL | Age: 72
DRG: 871 | End: 2017-05-23
Payer: COMMERCIAL

## 2017-05-23 ENCOUNTER — GENERIC CONVERSION - ENCOUNTER (OUTPATIENT)
Dept: OTHER | Facility: OTHER | Age: 72
End: 2017-05-23

## 2017-05-23 ENCOUNTER — APPOINTMENT (EMERGENCY)
Dept: CT IMAGING | Facility: HOSPITAL | Age: 72
DRG: 871 | End: 2017-05-23
Payer: COMMERCIAL

## 2017-05-23 DIAGNOSIS — M54.9 BACK PAIN: ICD-10-CM

## 2017-05-23 DIAGNOSIS — D72.829 LEUKOCYTOSIS: ICD-10-CM

## 2017-05-23 DIAGNOSIS — S32.009A FRACTURE OF LUMBAR SPINE (HCC): ICD-10-CM

## 2017-05-23 DIAGNOSIS — R79.89 ELEVATED LACTIC ACID LEVEL: ICD-10-CM

## 2017-05-23 DIAGNOSIS — A41.9 SEPTIC SHOCK (HCC): Primary | ICD-10-CM

## 2017-05-23 DIAGNOSIS — A40.9 STREPTOCOCCAL SEPSIS (HCC): ICD-10-CM

## 2017-05-23 DIAGNOSIS — N17.9 ACUTE KIDNEY INJURY (HCC): ICD-10-CM

## 2017-05-23 DIAGNOSIS — R09.02 HYPOXIA: ICD-10-CM

## 2017-05-23 DIAGNOSIS — G93.40 ENCEPHALOPATHY ACUTE: ICD-10-CM

## 2017-05-23 DIAGNOSIS — S12.9XXA: ICD-10-CM

## 2017-05-23 DIAGNOSIS — R00.0 SINUS TACHYCARDIA: ICD-10-CM

## 2017-05-23 DIAGNOSIS — R65.21 SEPTIC SHOCK (HCC): Primary | ICD-10-CM

## 2017-05-23 PROBLEM — E87.2 LACTIC ACIDOSIS: Status: ACTIVE | Noted: 2017-05-23

## 2017-05-23 LAB
ALBUMIN SERPL BCP-MCNC: 2.2 G/DL (ref 3.5–5)
ALP SERPL-CCNC: 244 U/L (ref 46–116)
ALT SERPL W P-5'-P-CCNC: 35 U/L (ref 12–78)
ANION GAP SERPL CALCULATED.3IONS-SCNC: 14 MMOL/L (ref 4–13)
APTT PPP: 77 SECONDS (ref 23–35)
AST SERPL W P-5'-P-CCNC: 77 U/L (ref 5–45)
BACTERIA UR QL AUTO: ABNORMAL /HPF
BASOPHILS # BLD MANUAL: 0 THOUSAND/UL (ref 0–0.1)
BASOPHILS NFR MAR MANUAL: 0 % (ref 0–1)
BILIRUB DIRECT SERPL-MCNC: 0.46 MG/DL (ref 0–0.2)
BILIRUB SERPL-MCNC: 0.94 MG/DL (ref 0.2–1)
BILIRUB UR QL STRIP: ABNORMAL
BUN SERPL-MCNC: 14 MG/DL (ref 5–25)
CALCIUM SERPL-MCNC: 9.4 MG/DL (ref 8.3–10.1)
CHLORIDE SERPL-SCNC: 96 MMOL/L (ref 100–108)
CLARITY UR: ABNORMAL
CO2 SERPL-SCNC: 24 MMOL/L (ref 21–32)
COLOR UR: ABNORMAL
CREAT SERPL-MCNC: 1.29 MG/DL (ref 0.6–1.3)
EOSINOPHIL # BLD MANUAL: 0 THOUSAND/UL (ref 0–0.4)
EOSINOPHIL NFR BLD MANUAL: 0 % (ref 0–6)
ERYTHROCYTE [DISTWIDTH] IN BLOOD BY AUTOMATED COUNT: 16.9 % (ref 11.6–15.1)
FINE GRAN CASTS URNS QL MICRO: ABNORMAL /LPF
GFR SERPL CREATININE-BSD FRML MDRD: 40.9 ML/MIN/1.73SQ M
GLUCOSE SERPL-MCNC: 174 MG/DL (ref 65–140)
GLUCOSE UR STRIP-MCNC: NEGATIVE MG/DL
HCT VFR BLD AUTO: 35.3 % (ref 34.8–46.1)
HGB BLD-MCNC: 11.4 G/DL (ref 11.5–15.4)
HGB UR QL STRIP.AUTO: ABNORMAL
HYALINE CASTS #/AREA URNS LPF: ABNORMAL /LPF
INR PPP: 3.42 (ref 0.86–1.16)
KETONES UR STRIP-MCNC: NEGATIVE MG/DL
LACTATE SERPL-SCNC: 1.5 MMOL/L (ref 0.5–2)
LACTATE SERPL-SCNC: 2.5 MMOL/L (ref 0.5–2)
LACTATE SERPL-SCNC: 4.7 MMOL/L (ref 0.5–2)
LEUKOCYTE ESTERASE UR QL STRIP: NEGATIVE
LYMPHOCYTES # BLD AUTO: 0.22 THOUSAND/UL (ref 0.6–4.47)
LYMPHOCYTES # BLD AUTO: 1 % (ref 14–44)
MCH RBC QN AUTO: 29.2 PG (ref 26.8–34.3)
MCHC RBC AUTO-ENTMCNC: 32.3 G/DL (ref 31.4–37.4)
MCV RBC AUTO: 90 FL (ref 82–98)
MONOCYTES # BLD AUTO: 0.66 THOUSAND/UL (ref 0–1.22)
MONOCYTES NFR BLD: 3 % (ref 4–12)
MYELOCYTES NFR BLD MANUAL: 1 % (ref 0–1)
NEUTROPHILS # BLD MANUAL: 20.76 THOUSAND/UL (ref 1.85–7.62)
NEUTS BAND NFR BLD MANUAL: 12 % (ref 0–8)
NEUTS SEG NFR BLD AUTO: 83 % (ref 43–75)
NITRITE UR QL STRIP: NEGATIVE
NON-SQ EPI CELLS URNS QL MICRO: ABNORMAL /HPF
NRBC BLD AUTO-RTO: 0 /100 WBCS
NT-PROBNP SERPL-MCNC: 999 PG/ML
PH UR STRIP.AUTO: 5 [PH] (ref 4.5–8)
PLATELET # BLD AUTO: 273 THOUSANDS/UL (ref 149–390)
PLATELET BLD QL SMEAR: ADEQUATE
PMV BLD AUTO: 8.7 FL (ref 8.9–12.7)
POTASSIUM SERPL-SCNC: 3.7 MMOL/L (ref 3.5–5.3)
PROT SERPL-MCNC: 8.1 G/DL (ref 6.4–8.2)
PROT UR STRIP-MCNC: ABNORMAL MG/DL
PROTHROMBIN TIME: 35 SECONDS (ref 12.1–14.4)
RBC # BLD AUTO: 3.91 MILLION/UL (ref 3.81–5.12)
RBC #/AREA URNS AUTO: ABNORMAL /HPF
RBC MORPH BLD: NORMAL
SODIUM SERPL-SCNC: 134 MMOL/L (ref 136–145)
SP GR UR STRIP.AUTO: 1.01 (ref 1–1.03)
SPECIMEN SOURCE: ABNORMAL
TOTAL CELLS COUNTED SPEC: 100
TROPONIN I BLD-MCNC: 0.33 NG/ML (ref 0–0.08)
UROBILINOGEN UR QL STRIP.AUTO: 1 E.U./DL
WBC # BLD AUTO: 21.85 THOUSAND/UL (ref 4.31–10.16)
WBC #/AREA URNS AUTO: ABNORMAL /HPF

## 2017-05-23 PROCEDURE — 84484 ASSAY OF TROPONIN QUANT: CPT

## 2017-05-23 PROCEDURE — 74176 CT ABD & PELVIS W/O CONTRAST: CPT

## 2017-05-23 PROCEDURE — 96365 THER/PROPH/DIAG IV INF INIT: CPT

## 2017-05-23 PROCEDURE — 80076 HEPATIC FUNCTION PANEL: CPT | Performed by: EMERGENCY MEDICINE

## 2017-05-23 PROCEDURE — 80048 BASIC METABOLIC PNL TOTAL CA: CPT | Performed by: EMERGENCY MEDICINE

## 2017-05-23 PROCEDURE — 87077 CULTURE AEROBIC IDENTIFY: CPT | Performed by: EMERGENCY MEDICINE

## 2017-05-23 PROCEDURE — 85610 PROTHROMBIN TIME: CPT | Performed by: EMERGENCY MEDICINE

## 2017-05-23 PROCEDURE — 81001 URINALYSIS AUTO W/SCOPE: CPT

## 2017-05-23 PROCEDURE — 36415 COLL VENOUS BLD VENIPUNCTURE: CPT | Performed by: EMERGENCY MEDICINE

## 2017-05-23 PROCEDURE — 83880 ASSAY OF NATRIURETIC PEPTIDE: CPT | Performed by: EMERGENCY MEDICINE

## 2017-05-23 PROCEDURE — 93005 ELECTROCARDIOGRAM TRACING: CPT

## 2017-05-23 PROCEDURE — 70450 CT HEAD/BRAIN W/O DYE: CPT

## 2017-05-23 PROCEDURE — 71250 CT THORAX DX C-: CPT

## 2017-05-23 PROCEDURE — 72125 CT NECK SPINE W/O DYE: CPT

## 2017-05-23 PROCEDURE — 96361 HYDRATE IV INFUSION ADD-ON: CPT

## 2017-05-23 PROCEDURE — 71010 HB CHEST X-RAY 1 VIEW FRONTAL: CPT

## 2017-05-23 PROCEDURE — 85730 THROMBOPLASTIN TIME PARTIAL: CPT | Performed by: EMERGENCY MEDICINE

## 2017-05-23 PROCEDURE — 85007 BL SMEAR W/DIFF WBC COUNT: CPT | Performed by: EMERGENCY MEDICINE

## 2017-05-23 PROCEDURE — 87186 SC STD MICRODIL/AGAR DIL: CPT | Performed by: EMERGENCY MEDICINE

## 2017-05-23 PROCEDURE — 81002 URINALYSIS NONAUTO W/O SCOPE: CPT | Performed by: EMERGENCY MEDICINE

## 2017-05-23 PROCEDURE — 83605 ASSAY OF LACTIC ACID: CPT | Performed by: EMERGENCY MEDICINE

## 2017-05-23 PROCEDURE — 85027 COMPLETE CBC AUTOMATED: CPT | Performed by: EMERGENCY MEDICINE

## 2017-05-23 PROCEDURE — 87040 BLOOD CULTURE FOR BACTERIA: CPT | Performed by: EMERGENCY MEDICINE

## 2017-05-23 RX ORDER — WARFARIN SODIUM 5 MG/1
5 TABLET ORAL
Status: DISCONTINUED | OUTPATIENT
Start: 2017-05-24 | End: 2017-05-24

## 2017-05-23 RX ORDER — ACETAMINOPHEN 160 MG/5ML
650 SUSPENSION, ORAL (FINAL DOSE FORM) ORAL ONCE
Status: COMPLETED | OUTPATIENT
Start: 2017-05-23 | End: 2017-05-23

## 2017-05-23 RX ADMIN — ACETAMINOPHEN 650 MG: 160 SUSPENSION ORAL at 20:35

## 2017-05-23 RX ADMIN — CEFEPIME 2000 MG: 2 INJECTION, POWDER, FOR SOLUTION INTRAMUSCULAR; INTRAVENOUS at 20:35

## 2017-05-23 RX ADMIN — SODIUM CHLORIDE 1000 ML: 0.9 INJECTION, SOLUTION INTRAVENOUS at 20:14

## 2017-05-23 RX ADMIN — SODIUM CHLORIDE 1000 ML: 0.9 INJECTION, SOLUTION INTRAVENOUS at 20:31

## 2017-05-24 ENCOUNTER — ANESTHESIA EVENT (INPATIENT)
Dept: NON INVASIVE DIAGNOSTICS | Facility: HOSPITAL | Age: 72
DRG: 871 | End: 2017-05-24
Payer: COMMERCIAL

## 2017-05-24 LAB
ALBUMIN SERPL BCP-MCNC: 1.8 G/DL (ref 3.5–5)
ALP SERPL-CCNC: 204 U/L (ref 46–116)
ALT SERPL W P-5'-P-CCNC: 35 U/L (ref 12–78)
ANION GAP SERPL CALCULATED.3IONS-SCNC: 8 MMOL/L (ref 4–13)
AST SERPL W P-5'-P-CCNC: 116 U/L (ref 5–45)
ATRIAL RATE: 140 BPM
BASOPHILS # BLD MANUAL: 0 THOUSAND/UL (ref 0–0.1)
BASOPHILS NFR MAR MANUAL: 0 % (ref 0–1)
BILIRUB SERPL-MCNC: 0.49 MG/DL (ref 0.2–1)
BUN SERPL-MCNC: 13 MG/DL (ref 5–25)
CA-I BLD-SCNC: 1.06 MMOL/L (ref 1.12–1.32)
CALCIUM SERPL-MCNC: 8.8 MG/DL (ref 8.3–10.1)
CHLORIDE SERPL-SCNC: 102 MMOL/L (ref 100–108)
CO2 SERPL-SCNC: 27 MMOL/L (ref 21–32)
CREAT SERPL-MCNC: 0.91 MG/DL (ref 0.6–1.3)
EOSINOPHIL # BLD MANUAL: 0 THOUSAND/UL (ref 0–0.4)
EOSINOPHIL NFR BLD MANUAL: 0 % (ref 0–6)
ERYTHROCYTE [DISTWIDTH] IN BLOOD BY AUTOMATED COUNT: 16.9 % (ref 11.6–15.1)
GFR SERPL CREATININE-BSD FRML MDRD: >60 ML/MIN/1.73SQ M
GLUCOSE SERPL-MCNC: 110 MG/DL (ref 65–140)
GLUCOSE SERPL-MCNC: 126 MG/DL (ref 65–140)
GLUCOSE SERPL-MCNC: 132 MG/DL (ref 65–140)
GLUCOSE SERPL-MCNC: 142 MG/DL (ref 65–140)
HCT VFR BLD AUTO: 32.9 % (ref 34.8–46.1)
HGB BLD-MCNC: 10.2 G/DL (ref 11.5–15.4)
INR PPP: 3.5 (ref 0.86–1.16)
LYMPHOCYTES # BLD AUTO: 1.83 THOUSAND/UL (ref 0.6–4.47)
LYMPHOCYTES # BLD AUTO: 7 % (ref 14–44)
MAGNESIUM SERPL-MCNC: 1.9 MG/DL (ref 1.6–2.6)
MCH RBC QN AUTO: 28.7 PG (ref 26.8–34.3)
MCHC RBC AUTO-ENTMCNC: 31 G/DL (ref 31.4–37.4)
MCV RBC AUTO: 92 FL (ref 82–98)
MONOCYTES # BLD AUTO: 1.05 THOUSAND/UL (ref 0–1.22)
MONOCYTES NFR BLD: 4 % (ref 4–12)
NEUTROPHILS # BLD MANUAL: 22.76 THOUSAND/UL (ref 1.85–7.62)
NEUTS BAND NFR BLD MANUAL: 15 % (ref 0–8)
NEUTS SEG NFR BLD AUTO: 72 % (ref 43–75)
P AXIS: 56 DEGREES
PHOSPHATE SERPL-MCNC: 3.7 MG/DL (ref 2.3–4.1)
PLATELET # BLD AUTO: 285 THOUSANDS/UL (ref 149–390)
PLATELET BLD QL SMEAR: ADEQUATE
PMV BLD AUTO: 8.5 FL (ref 8.9–12.7)
POTASSIUM SERPL-SCNC: 3.4 MMOL/L (ref 3.5–5.3)
PR INTERVAL: 140 MS
PROT SERPL-MCNC: 7.1 G/DL (ref 6.4–8.2)
PROTHROMBIN TIME: 35.7 SECONDS (ref 12.1–14.4)
QRS AXIS: 89 DEGREES
QRSD INTERVAL: 132 MS
QT INTERVAL: 348 MS
QTC INTERVAL: 531 MS
RBC # BLD AUTO: 3.56 MILLION/UL (ref 3.81–5.12)
RBC MORPH BLD: NORMAL
SODIUM SERPL-SCNC: 137 MMOL/L (ref 136–145)
T WAVE AXIS: 26 DEGREES
TOTAL CELLS COUNTED SPEC: 100
VARIANT LYMPHS # BLD AUTO: 2 %
VENTRICULAR RATE: 140 BPM
WBC # BLD AUTO: 26.16 THOUSAND/UL (ref 4.31–10.16)

## 2017-05-24 PROCEDURE — 85610 PROTHROMBIN TIME: CPT | Performed by: NURSE PRACTITIONER

## 2017-05-24 PROCEDURE — 83735 ASSAY OF MAGNESIUM: CPT | Performed by: NURSE PRACTITIONER

## 2017-05-24 PROCEDURE — 84100 ASSAY OF PHOSPHORUS: CPT | Performed by: NURSE PRACTITIONER

## 2017-05-24 PROCEDURE — 80053 COMPREHEN METABOLIC PANEL: CPT | Performed by: NURSE PRACTITIONER

## 2017-05-24 PROCEDURE — 99285 EMERGENCY DEPT VISIT HI MDM: CPT

## 2017-05-24 PROCEDURE — 85007 BL SMEAR W/DIFF WBC COUNT: CPT | Performed by: NURSE PRACTITIONER

## 2017-05-24 PROCEDURE — 82948 REAGENT STRIP/BLOOD GLUCOSE: CPT

## 2017-05-24 PROCEDURE — 85027 COMPLETE CBC AUTOMATED: CPT | Performed by: NURSE PRACTITIONER

## 2017-05-24 PROCEDURE — 82330 ASSAY OF CALCIUM: CPT | Performed by: NURSE PRACTITIONER

## 2017-05-24 RX ORDER — ERGOCALCIFEROL 1.25 MG/1
50000 CAPSULE ORAL WEEKLY
Status: DISCONTINUED | OUTPATIENT
Start: 2017-05-24 | End: 2017-06-05 | Stop reason: HOSPADM

## 2017-05-24 RX ORDER — ALBUTEROL SULFATE 90 UG/1
2 AEROSOL, METERED RESPIRATORY (INHALATION) 4 TIMES DAILY
Status: DISCONTINUED | OUTPATIENT
Start: 2017-05-24 | End: 2017-05-28

## 2017-05-24 RX ORDER — POTASSIUM CHLORIDE 20 MEQ/1
20 TABLET, EXTENDED RELEASE ORAL ONCE
Status: COMPLETED | OUTPATIENT
Start: 2017-05-24 | End: 2017-05-24

## 2017-05-24 RX ORDER — MORPHINE SULFATE 2 MG/ML
1 INJECTION, SOLUTION INTRAMUSCULAR; INTRAVENOUS EVERY 4 HOURS PRN
Status: DISCONTINUED | OUTPATIENT
Start: 2017-05-24 | End: 2017-06-05 | Stop reason: HOSPADM

## 2017-05-24 RX ORDER — CITALOPRAM 20 MG/1
20 TABLET ORAL DAILY
Status: DISCONTINUED | OUTPATIENT
Start: 2017-05-24 | End: 2017-06-05 | Stop reason: HOSPADM

## 2017-05-24 RX ORDER — SODIUM CHLORIDE 9 MG/ML
75 INJECTION, SOLUTION INTRAVENOUS CONTINUOUS
Status: DISCONTINUED | OUTPATIENT
Start: 2017-05-24 | End: 2017-05-25

## 2017-05-24 RX ORDER — ALLOPURINOL 100 MG/1
100 TABLET ORAL 2 TIMES DAILY
Status: DISCONTINUED | OUTPATIENT
Start: 2017-05-24 | End: 2017-06-05 | Stop reason: HOSPADM

## 2017-05-24 RX ORDER — POTASSIUM CHLORIDE 14.9 MG/ML
20 INJECTION INTRAVENOUS ONCE
Status: DISCONTINUED | OUTPATIENT
Start: 2017-05-24 | End: 2017-05-24

## 2017-05-24 RX ORDER — ATORVASTATIN CALCIUM 40 MG/1
40 TABLET, FILM COATED ORAL
Status: DISCONTINUED | OUTPATIENT
Start: 2017-05-24 | End: 2017-06-05 | Stop reason: HOSPADM

## 2017-05-24 RX ORDER — NYSTATIN 100000 [USP'U]/G
1 POWDER TOPICAL
Status: DISCONTINUED | OUTPATIENT
Start: 2017-05-24 | End: 2017-06-05 | Stop reason: HOSPADM

## 2017-05-24 RX ORDER — IRON POLYSACCHARIDE COMPLEX 150 MG
150 CAPSULE ORAL 2 TIMES DAILY
Status: DISCONTINUED | OUTPATIENT
Start: 2017-05-24 | End: 2017-05-26

## 2017-05-24 RX ADMIN — CEFEPIME 2000 MG: 2 INJECTION, POWDER, FOR SOLUTION INTRAMUSCULAR; INTRAVENOUS at 20:30

## 2017-05-24 RX ADMIN — ALLOPURINOL 100 MG: 100 TABLET ORAL at 17:48

## 2017-05-24 RX ADMIN — ALBUTEROL SULFATE 2 PUFF: 90 AEROSOL, METERED RESPIRATORY (INHALATION) at 17:47

## 2017-05-24 RX ADMIN — ALBUTEROL SULFATE 2 PUFF: 90 AEROSOL, METERED RESPIRATORY (INHALATION) at 13:38

## 2017-05-24 RX ADMIN — Medication 150 MG: at 08:46

## 2017-05-24 RX ADMIN — ATORVASTATIN CALCIUM 40 MG: 40 TABLET, FILM COATED ORAL at 17:48

## 2017-05-24 RX ADMIN — SODIUM CHLORIDE 75 ML/HR: 0.9 INJECTION, SOLUTION INTRAVENOUS at 06:07

## 2017-05-24 RX ADMIN — Medication 150 MG: at 17:49

## 2017-05-24 RX ADMIN — CITALOPRAM HYDROBROMIDE 20 MG: 20 TABLET ORAL at 08:46

## 2017-05-24 RX ADMIN — ALLOPURINOL 100 MG: 100 TABLET ORAL at 08:46

## 2017-05-24 RX ADMIN — CEFEPIME 2000 MG: 2 INJECTION, POWDER, FOR SOLUTION INTRAMUSCULAR; INTRAVENOUS at 08:46

## 2017-05-24 RX ADMIN — SODIUM CHLORIDE 75 ML/HR: 0.9 INJECTION, SOLUTION INTRAVENOUS at 20:00

## 2017-05-24 RX ADMIN — VANCOMYCIN HYDROCHLORIDE 1750 MG: 1 INJECTION, POWDER, LYOPHILIZED, FOR SOLUTION INTRAVENOUS at 01:28

## 2017-05-24 RX ADMIN — MORPHINE SULFATE 1 MG: 2 INJECTION, SOLUTION INTRAMUSCULAR; INTRAVENOUS at 13:37

## 2017-05-24 RX ADMIN — ALBUTEROL SULFATE 2 PUFF: 90 AEROSOL, METERED RESPIRATORY (INHALATION) at 22:52

## 2017-05-24 RX ADMIN — ALBUTEROL SULFATE 2 PUFF: 90 AEROSOL, METERED RESPIRATORY (INHALATION) at 01:28

## 2017-05-24 RX ADMIN — ERGOCALCIFEROL 50000 UNITS: 1.25 CAPSULE ORAL at 08:46

## 2017-05-24 RX ADMIN — MORPHINE SULFATE 1 MG: 2 INJECTION, SOLUTION INTRAMUSCULAR; INTRAVENOUS at 20:30

## 2017-05-24 RX ADMIN — POTASSIUM CHLORIDE 20 MEQ: 1500 TABLET, EXTENDED RELEASE ORAL at 13:37

## 2017-05-25 ENCOUNTER — APPOINTMENT (INPATIENT)
Dept: NON INVASIVE DIAGNOSTICS | Facility: HOSPITAL | Age: 72
DRG: 871 | End: 2017-05-25
Payer: COMMERCIAL

## 2017-05-25 ENCOUNTER — GENERIC CONVERSION - ENCOUNTER (OUTPATIENT)
Dept: OTHER | Facility: OTHER | Age: 72
End: 2017-05-25

## 2017-05-25 LAB
ANION GAP SERPL CALCULATED.3IONS-SCNC: 7 MMOL/L (ref 4–13)
BUN SERPL-MCNC: 9 MG/DL (ref 5–25)
CA-I BLD-SCNC: 1.15 MMOL/L (ref 1.12–1.32)
CALCIUM SERPL-MCNC: 8.6 MG/DL (ref 8.3–10.1)
CHLORIDE SERPL-SCNC: 106 MMOL/L (ref 100–108)
CO2 SERPL-SCNC: 24 MMOL/L (ref 21–32)
CREAT SERPL-MCNC: 0.71 MG/DL (ref 0.6–1.3)
ERYTHROCYTE [DISTWIDTH] IN BLOOD BY AUTOMATED COUNT: 16.8 % (ref 11.6–15.1)
GFR SERPL CREATININE-BSD FRML MDRD: >60 ML/MIN/1.73SQ M
GLUCOSE SERPL-MCNC: 122 MG/DL (ref 65–140)
GLUCOSE SERPL-MCNC: 125 MG/DL (ref 65–140)
GLUCOSE SERPL-MCNC: 127 MG/DL (ref 65–140)
GLUCOSE SERPL-MCNC: 145 MG/DL (ref 65–140)
GLUCOSE SERPL-MCNC: 146 MG/DL (ref 65–140)
HCT VFR BLD AUTO: 29.5 % (ref 34.8–46.1)
HGB BLD-MCNC: 9.2 G/DL (ref 11.5–15.4)
INR PPP: 2.7 (ref 0.86–1.16)
MAGNESIUM SERPL-MCNC: 1.6 MG/DL (ref 1.6–2.6)
MCH RBC QN AUTO: 28.8 PG (ref 26.8–34.3)
MCHC RBC AUTO-ENTMCNC: 31.2 G/DL (ref 31.4–37.4)
MCV RBC AUTO: 92 FL (ref 82–98)
PHOSPHATE SERPL-MCNC: 2.8 MG/DL (ref 2.3–4.1)
PLATELET # BLD AUTO: 278 THOUSANDS/UL (ref 149–390)
PMV BLD AUTO: 8.3 FL (ref 8.9–12.7)
POTASSIUM SERPL-SCNC: 4 MMOL/L (ref 3.5–5.3)
PROTHROMBIN TIME: 29 SECONDS (ref 12.1–14.4)
RBC # BLD AUTO: 3.2 MILLION/UL (ref 3.81–5.12)
SODIUM SERPL-SCNC: 137 MMOL/L (ref 136–145)
WBC # BLD AUTO: 11.56 THOUSAND/UL (ref 4.31–10.16)

## 2017-05-25 PROCEDURE — 82948 REAGENT STRIP/BLOOD GLUCOSE: CPT

## 2017-05-25 PROCEDURE — 82330 ASSAY OF CALCIUM: CPT | Performed by: NURSE PRACTITIONER

## 2017-05-25 PROCEDURE — 83735 ASSAY OF MAGNESIUM: CPT | Performed by: NURSE PRACTITIONER

## 2017-05-25 PROCEDURE — 84100 ASSAY OF PHOSPHORUS: CPT | Performed by: NURSE PRACTITIONER

## 2017-05-25 PROCEDURE — 87040 BLOOD CULTURE FOR BACTERIA: CPT | Performed by: NURSE PRACTITIONER

## 2017-05-25 PROCEDURE — 85610 PROTHROMBIN TIME: CPT | Performed by: NURSE PRACTITIONER

## 2017-05-25 PROCEDURE — 85027 COMPLETE CBC AUTOMATED: CPT | Performed by: NURSE PRACTITIONER

## 2017-05-25 PROCEDURE — 93312 ECHO TRANSESOPHAGEAL: CPT

## 2017-05-25 PROCEDURE — 80048 BASIC METABOLIC PNL TOTAL CA: CPT | Performed by: NURSE PRACTITIONER

## 2017-05-25 RX ORDER — LIDOCAINE HYDROCHLORIDE 40 MG/ML
INJECTION, SOLUTION RETROBULBAR; TOPICAL AS NEEDED
Status: DISCONTINUED | OUTPATIENT
Start: 2017-05-25 | End: 2017-05-25 | Stop reason: SURG

## 2017-05-25 RX ORDER — PANTOPRAZOLE SODIUM 40 MG/1
40 TABLET, DELAYED RELEASE ORAL
Status: DISCONTINUED | OUTPATIENT
Start: 2017-05-25 | End: 2017-06-05 | Stop reason: HOSPADM

## 2017-05-25 RX ORDER — PROPOFOL 10 MG/ML
INJECTION, EMULSION INTRAVENOUS AS NEEDED
Status: DISCONTINUED | OUTPATIENT
Start: 2017-05-25 | End: 2017-05-25 | Stop reason: SURG

## 2017-05-25 RX ORDER — MAGNESIUM SULFATE HEPTAHYDRATE 40 MG/ML
2 INJECTION, SOLUTION INTRAVENOUS ONCE
Status: COMPLETED | OUTPATIENT
Start: 2017-05-25 | End: 2017-05-25

## 2017-05-25 RX ORDER — ASCORBIC ACID 500 MG
500 TABLET ORAL DAILY
Status: DISCONTINUED | OUTPATIENT
Start: 2017-05-25 | End: 2017-06-05 | Stop reason: HOSPADM

## 2017-05-25 RX ORDER — OXYCODONE HYDROCHLORIDE AND ACETAMINOPHEN 5; 325 MG/1; MG/1
2 TABLET ORAL EVERY 6 HOURS PRN
Status: DISCONTINUED | OUTPATIENT
Start: 2017-05-25 | End: 2017-05-29

## 2017-05-25 RX ADMIN — ATORVASTATIN CALCIUM 40 MG: 40 TABLET, FILM COATED ORAL at 17:06

## 2017-05-25 RX ADMIN — NYSTATIN 1 APPLICATION: 100000 POWDER TOPICAL at 17:06

## 2017-05-25 RX ADMIN — PANTOPRAZOLE SODIUM 40 MG: 40 TABLET, DELAYED RELEASE ORAL at 05:17

## 2017-05-25 RX ADMIN — OXYCODONE HYDROCHLORIDE AND ACETAMINOPHEN 2 TABLET: 5; 325 TABLET ORAL at 18:29

## 2017-05-25 RX ADMIN — CITALOPRAM HYDROBROMIDE 20 MG: 20 TABLET ORAL at 12:08

## 2017-05-25 RX ADMIN — VANCOMYCIN HYDROCHLORIDE 1750 MG: 1 INJECTION, POWDER, LYOPHILIZED, FOR SOLUTION INTRAVENOUS at 00:43

## 2017-05-25 RX ADMIN — PROPOFOL 20 MG: 10 INJECTION, EMULSION INTRAVENOUS at 11:31

## 2017-05-25 RX ADMIN — PROPOFOL 40 MG: 10 INJECTION, EMULSION INTRAVENOUS at 11:30

## 2017-05-25 RX ADMIN — LIDOCAINE HYDROCHLORIDE 2 ML: 40 INJECTION, SOLUTION RETROBULBAR; TOPICAL at 11:26

## 2017-05-25 RX ADMIN — ALBUTEROL SULFATE 2 PUFF: 90 AEROSOL, METERED RESPIRATORY (INHALATION) at 21:25

## 2017-05-25 RX ADMIN — OXYCODONE HYDROCHLORIDE AND ACETAMINOPHEN 500 MG: 500 TABLET ORAL at 12:08

## 2017-05-25 RX ADMIN — PROPOFOL 20 MG: 10 INJECTION, EMULSION INTRAVENOUS at 11:40

## 2017-05-25 RX ADMIN — ALBUTEROL SULFATE 2 PUFF: 90 AEROSOL, METERED RESPIRATORY (INHALATION) at 12:09

## 2017-05-25 RX ADMIN — PROPOFOL 10 MG: 10 INJECTION, EMULSION INTRAVENOUS at 11:42

## 2017-05-25 RX ADMIN — SODIUM CHLORIDE: 0.9 INJECTION, SOLUTION INTRAVENOUS at 11:21

## 2017-05-25 RX ADMIN — Medication 150 MG: at 17:06

## 2017-05-25 RX ADMIN — CEFAZOLIN SODIUM 1000 MG: 1 SOLUTION INTRAVENOUS at 21:25

## 2017-05-25 RX ADMIN — MORPHINE SULFATE 1 MG: 2 INJECTION, SOLUTION INTRAMUSCULAR; INTRAVENOUS at 14:20

## 2017-05-25 RX ADMIN — CEFEPIME 2000 MG: 2 INJECTION, POWDER, FOR SOLUTION INTRAMUSCULAR; INTRAVENOUS at 09:00

## 2017-05-25 RX ADMIN — PROPOFOL 20 MG: 10 INJECTION, EMULSION INTRAVENOUS at 11:35

## 2017-05-25 RX ADMIN — CEFAZOLIN SODIUM 1000 MG: 1 SOLUTION INTRAVENOUS at 14:27

## 2017-05-25 RX ADMIN — PROPOFOL 20 MG: 10 INJECTION, EMULSION INTRAVENOUS at 11:33

## 2017-05-25 RX ADMIN — MORPHINE SULFATE 1 MG: 2 INJECTION, SOLUTION INTRAMUSCULAR; INTRAVENOUS at 02:25

## 2017-05-25 RX ADMIN — ALBUTEROL SULFATE 2 PUFF: 90 AEROSOL, METERED RESPIRATORY (INHALATION) at 09:01

## 2017-05-25 RX ADMIN — ALBUTEROL SULFATE 2 PUFF: 90 AEROSOL, METERED RESPIRATORY (INHALATION) at 17:05

## 2017-05-25 RX ADMIN — MAGNESIUM SULFATE HEPTAHYDRATE 2 G: 40 INJECTION, SOLUTION INTRAVENOUS at 06:46

## 2017-05-25 RX ADMIN — NYSTATIN 1 APPLICATION: 100000 POWDER TOPICAL at 12:09

## 2017-05-25 RX ADMIN — ALLOPURINOL 100 MG: 100 TABLET ORAL at 17:08

## 2017-05-25 RX ADMIN — PROPOFOL 20 MG: 10 INJECTION, EMULSION INTRAVENOUS at 11:38

## 2017-05-25 RX ADMIN — ALLOPURINOL 100 MG: 100 TABLET ORAL at 12:08

## 2017-05-26 PROBLEM — A40.9 STREPTOCOCCAL SEPSIS (HCC): Status: ACTIVE | Noted: 2017-05-23

## 2017-05-26 PROBLEM — I50.32 CHRONIC DIASTOLIC CONGESTIVE HEART FAILURE (HCC): Status: ACTIVE | Noted: 2017-04-27

## 2017-05-26 LAB
ANION GAP SERPL CALCULATED.3IONS-SCNC: 5 MMOL/L (ref 4–13)
BACTERIA BLD CULT: NORMAL
BACTERIA BLD CULT: NORMAL
BUN SERPL-MCNC: 5 MG/DL (ref 5–25)
CA-I BLD-SCNC: 1.17 MMOL/L (ref 1.12–1.32)
CALCIUM SERPL-MCNC: 9 MG/DL (ref 8.3–10.1)
CHLORIDE SERPL-SCNC: 106 MMOL/L (ref 100–108)
CO2 SERPL-SCNC: 28 MMOL/L (ref 21–32)
CREAT SERPL-MCNC: 0.74 MG/DL (ref 0.6–1.3)
ERYTHROCYTE [DISTWIDTH] IN BLOOD BY AUTOMATED COUNT: 16.9 % (ref 11.6–15.1)
GFR SERPL CREATININE-BSD FRML MDRD: >60 ML/MIN/1.73SQ M
GLUCOSE SERPL-MCNC: 101 MG/DL (ref 65–140)
GLUCOSE SERPL-MCNC: 112 MG/DL (ref 65–140)
GLUCOSE SERPL-MCNC: 116 MG/DL (ref 65–140)
GLUCOSE SERPL-MCNC: 119 MG/DL (ref 65–140)
GRAM STN SPEC: NORMAL
GRAM STN SPEC: NORMAL
HCT VFR BLD AUTO: 29.3 % (ref 34.8–46.1)
HGB BLD-MCNC: 9.2 G/DL (ref 11.5–15.4)
INR PPP: 2.18 (ref 0.86–1.16)
MAGNESIUM SERPL-MCNC: 2.1 MG/DL (ref 1.6–2.6)
MCH RBC QN AUTO: 29.2 PG (ref 26.8–34.3)
MCHC RBC AUTO-ENTMCNC: 31.4 G/DL (ref 31.4–37.4)
MCV RBC AUTO: 93 FL (ref 82–98)
PHOSPHATE SERPL-MCNC: 3.1 MG/DL (ref 2.3–4.1)
PLATELET # BLD AUTO: 277 THOUSANDS/UL (ref 149–390)
PMV BLD AUTO: 8.2 FL (ref 8.9–12.7)
POTASSIUM SERPL-SCNC: 3.1 MMOL/L (ref 3.5–5.3)
PROTHROMBIN TIME: 24.5 SECONDS (ref 12.1–14.4)
RBC # BLD AUTO: 3.15 MILLION/UL (ref 3.81–5.12)
SODIUM SERPL-SCNC: 139 MMOL/L (ref 136–145)
WBC # BLD AUTO: 8.13 THOUSAND/UL (ref 4.31–10.16)

## 2017-05-26 PROCEDURE — G8979 MOBILITY GOAL STATUS: HCPCS

## 2017-05-26 PROCEDURE — G8978 MOBILITY CURRENT STATUS: HCPCS

## 2017-05-26 PROCEDURE — 82330 ASSAY OF CALCIUM: CPT | Performed by: NURSE PRACTITIONER

## 2017-05-26 PROCEDURE — 97167 OT EVAL HIGH COMPLEX 60 MIN: CPT

## 2017-05-26 PROCEDURE — 83735 ASSAY OF MAGNESIUM: CPT | Performed by: NURSE PRACTITIONER

## 2017-05-26 PROCEDURE — 97163 PT EVAL HIGH COMPLEX 45 MIN: CPT

## 2017-05-26 PROCEDURE — 85027 COMPLETE CBC AUTOMATED: CPT | Performed by: NURSE PRACTITIONER

## 2017-05-26 PROCEDURE — 84100 ASSAY OF PHOSPHORUS: CPT | Performed by: NURSE PRACTITIONER

## 2017-05-26 PROCEDURE — 85610 PROTHROMBIN TIME: CPT | Performed by: NURSE PRACTITIONER

## 2017-05-26 PROCEDURE — 82948 REAGENT STRIP/BLOOD GLUCOSE: CPT

## 2017-05-26 PROCEDURE — 80048 BASIC METABOLIC PNL TOTAL CA: CPT | Performed by: NURSE PRACTITIONER

## 2017-05-26 PROCEDURE — G8987 SELF CARE CURRENT STATUS: HCPCS

## 2017-05-26 PROCEDURE — G8988 SELF CARE GOAL STATUS: HCPCS

## 2017-05-26 RX ORDER — ONDANSETRON 2 MG/ML
4 INJECTION INTRAMUSCULAR; INTRAVENOUS EVERY 4 HOURS PRN
Status: DISCONTINUED | OUTPATIENT
Start: 2017-05-26 | End: 2017-06-05 | Stop reason: HOSPADM

## 2017-05-26 RX ORDER — WARFARIN SODIUM 2 MG/1
2 TABLET ORAL
Status: DISCONTINUED | OUTPATIENT
Start: 2017-05-26 | End: 2017-05-26

## 2017-05-26 RX ORDER — ACETAMINOPHEN 325 MG/1
650 TABLET ORAL EVERY 6 HOURS PRN
Status: DISCONTINUED | OUTPATIENT
Start: 2017-05-26 | End: 2017-06-05 | Stop reason: HOSPADM

## 2017-05-26 RX ORDER — WARFARIN SODIUM 5 MG/1
5 TABLET ORAL
Status: DISCONTINUED | OUTPATIENT
Start: 2017-05-26 | End: 2017-05-27

## 2017-05-26 RX ORDER — WARFARIN SODIUM 5 MG/1
5 TABLET ORAL
Status: DISCONTINUED | OUTPATIENT
Start: 2017-05-26 | End: 2017-05-26

## 2017-05-26 RX ADMIN — CEFAZOLIN SODIUM 1000 MG: 1 SOLUTION INTRAVENOUS at 12:13

## 2017-05-26 RX ADMIN — CITALOPRAM HYDROBROMIDE 20 MG: 20 TABLET ORAL at 08:50

## 2017-05-26 RX ADMIN — ATORVASTATIN CALCIUM 40 MG: 40 TABLET, FILM COATED ORAL at 16:09

## 2017-05-26 RX ADMIN — CEFAZOLIN SODIUM 1000 MG: 1 SOLUTION INTRAVENOUS at 05:08

## 2017-05-26 RX ADMIN — NYSTATIN 1 APPLICATION: 100000 POWDER TOPICAL at 08:50

## 2017-05-26 RX ADMIN — ALBUTEROL SULFATE 2 PUFF: 90 AEROSOL, METERED RESPIRATORY (INHALATION) at 12:13

## 2017-05-26 RX ADMIN — OXYCODONE HYDROCHLORIDE AND ACETAMINOPHEN 2 TABLET: 5; 325 TABLET ORAL at 05:07

## 2017-05-26 RX ADMIN — ALBUTEROL SULFATE 2 PUFF: 90 AEROSOL, METERED RESPIRATORY (INHALATION) at 18:03

## 2017-05-26 RX ADMIN — OXYCODONE HYDROCHLORIDE AND ACETAMINOPHEN 2 TABLET: 5; 325 TABLET ORAL at 18:40

## 2017-05-26 RX ADMIN — CEFTRIAXONE 2000 MG: 2 INJECTION, POWDER, FOR SOLUTION INTRAMUSCULAR; INTRAVENOUS at 15:49

## 2017-05-26 RX ADMIN — ALBUTEROL SULFATE 2 PUFF: 90 AEROSOL, METERED RESPIRATORY (INHALATION) at 21:20

## 2017-05-26 RX ADMIN — ALLOPURINOL 100 MG: 100 TABLET ORAL at 18:03

## 2017-05-26 RX ADMIN — NYSTATIN 1 APPLICATION: 100000 POWDER TOPICAL at 16:09

## 2017-05-26 RX ADMIN — PANTOPRAZOLE SODIUM 40 MG: 40 TABLET, DELAYED RELEASE ORAL at 05:07

## 2017-05-26 RX ADMIN — OXYCODONE HYDROCHLORIDE AND ACETAMINOPHEN 2 TABLET: 5; 325 TABLET ORAL at 12:13

## 2017-05-26 RX ADMIN — OXYCODONE HYDROCHLORIDE AND ACETAMINOPHEN 500 MG: 500 TABLET ORAL at 08:51

## 2017-05-26 RX ADMIN — Medication 150 MG: at 08:51

## 2017-05-26 RX ADMIN — WARFARIN SODIUM 5 MG: 5 TABLET ORAL at 18:03

## 2017-05-26 RX ADMIN — ALBUTEROL SULFATE 2 PUFF: 90 AEROSOL, METERED RESPIRATORY (INHALATION) at 08:50

## 2017-05-26 RX ADMIN — ALLOPURINOL 100 MG: 100 TABLET ORAL at 08:51

## 2017-05-27 PROBLEM — E87.2 LACTIC ACIDOSIS: Status: RESOLVED | Noted: 2017-05-23 | Resolved: 2017-05-27

## 2017-05-27 PROBLEM — E87.6 HYPOKALEMIA: Status: ACTIVE | Noted: 2017-05-27

## 2017-05-27 LAB
ANION GAP SERPL CALCULATED.3IONS-SCNC: 4 MMOL/L (ref 4–13)
BUN SERPL-MCNC: 4 MG/DL (ref 5–25)
CALCIUM SERPL-MCNC: 9 MG/DL (ref 8.3–10.1)
CHLORIDE SERPL-SCNC: 106 MMOL/L (ref 100–108)
CO2 SERPL-SCNC: 29 MMOL/L (ref 21–32)
CREAT SERPL-MCNC: 0.75 MG/DL (ref 0.6–1.3)
ERYTHROCYTE [DISTWIDTH] IN BLOOD BY AUTOMATED COUNT: 17.3 % (ref 11.6–15.1)
GFR SERPL CREATININE-BSD FRML MDRD: >60 ML/MIN/1.73SQ M
GLUCOSE SERPL-MCNC: 101 MG/DL (ref 65–140)
GLUCOSE SERPL-MCNC: 110 MG/DL (ref 65–140)
GLUCOSE SERPL-MCNC: 125 MG/DL (ref 65–140)
GLUCOSE SERPL-MCNC: 220 MG/DL (ref 65–140)
GLUCOSE SERPL-MCNC: 305 MG/DL (ref 65–140)
HCT VFR BLD AUTO: 31.3 % (ref 34.8–46.1)
HGB BLD-MCNC: 9.6 G/DL (ref 11.5–15.4)
INR PPP: 2.24 (ref 0.86–1.16)
MCH RBC QN AUTO: 28.3 PG (ref 26.8–34.3)
MCHC RBC AUTO-ENTMCNC: 30.7 G/DL (ref 31.4–37.4)
MCV RBC AUTO: 92 FL (ref 82–98)
PLATELET # BLD AUTO: 274 THOUSANDS/UL (ref 149–390)
PMV BLD AUTO: 9 FL (ref 8.9–12.7)
POTASSIUM SERPL-SCNC: 3.5 MMOL/L (ref 3.5–5.3)
PROTHROMBIN TIME: 25 SECONDS (ref 12.1–14.4)
RBC # BLD AUTO: 3.39 MILLION/UL (ref 3.81–5.12)
SODIUM SERPL-SCNC: 139 MMOL/L (ref 136–145)
WBC # BLD AUTO: 7.87 THOUSAND/UL (ref 4.31–10.16)

## 2017-05-27 PROCEDURE — 82948 REAGENT STRIP/BLOOD GLUCOSE: CPT

## 2017-05-27 PROCEDURE — 85027 COMPLETE CBC AUTOMATED: CPT | Performed by: INTERNAL MEDICINE

## 2017-05-27 PROCEDURE — 85610 PROTHROMBIN TIME: CPT | Performed by: INTERNAL MEDICINE

## 2017-05-27 PROCEDURE — 80048 BASIC METABOLIC PNL TOTAL CA: CPT | Performed by: INTERNAL MEDICINE

## 2017-05-27 RX ORDER — FUROSEMIDE 40 MG/1
80 TABLET ORAL DAILY
Status: DISCONTINUED | OUTPATIENT
Start: 2017-05-28 | End: 2017-06-05 | Stop reason: HOSPADM

## 2017-05-27 RX ADMIN — PANTOPRAZOLE SODIUM 40 MG: 40 TABLET, DELAYED RELEASE ORAL at 05:12

## 2017-05-27 RX ADMIN — CEFTRIAXONE 2000 MG: 2 INJECTION, POWDER, FOR SOLUTION INTRAMUSCULAR; INTRAVENOUS at 15:07

## 2017-05-27 RX ADMIN — OXYCODONE HYDROCHLORIDE AND ACETAMINOPHEN 2 TABLET: 5; 325 TABLET ORAL at 23:23

## 2017-05-27 RX ADMIN — ALBUTEROL SULFATE 2 PUFF: 90 AEROSOL, METERED RESPIRATORY (INHALATION) at 12:23

## 2017-05-27 RX ADMIN — CITALOPRAM HYDROBROMIDE 20 MG: 20 TABLET ORAL at 08:54

## 2017-05-27 RX ADMIN — OXYCODONE HYDROCHLORIDE AND ACETAMINOPHEN 2 TABLET: 5; 325 TABLET ORAL at 08:56

## 2017-05-27 RX ADMIN — ALLOPURINOL 100 MG: 100 TABLET ORAL at 08:54

## 2017-05-27 RX ADMIN — ATORVASTATIN CALCIUM 40 MG: 40 TABLET, FILM COATED ORAL at 16:35

## 2017-05-27 RX ADMIN — ALLOPURINOL 100 MG: 100 TABLET ORAL at 17:17

## 2017-05-27 RX ADMIN — ALBUTEROL SULFATE 2 PUFF: 90 AEROSOL, METERED RESPIRATORY (INHALATION) at 17:17

## 2017-05-27 RX ADMIN — OXYCODONE HYDROCHLORIDE AND ACETAMINOPHEN 2 TABLET: 5; 325 TABLET ORAL at 00:43

## 2017-05-27 RX ADMIN — INSULIN LISPRO 4 UNITS: 100 INJECTION, SOLUTION INTRAVENOUS; SUBCUTANEOUS at 12:23

## 2017-05-27 RX ADMIN — NYSTATIN 1 APPLICATION: 100000 POWDER TOPICAL at 08:53

## 2017-05-27 RX ADMIN — ALBUTEROL SULFATE 2 PUFF: 90 AEROSOL, METERED RESPIRATORY (INHALATION) at 21:02

## 2017-05-27 RX ADMIN — OXYCODONE HYDROCHLORIDE AND ACETAMINOPHEN 500 MG: 500 TABLET ORAL at 08:54

## 2017-05-27 RX ADMIN — OXYCODONE HYDROCHLORIDE AND ACETAMINOPHEN 2 TABLET: 5; 325 TABLET ORAL at 17:17

## 2017-05-27 RX ADMIN — INSULIN LISPRO 8 UNITS: 100 INJECTION, SOLUTION INTRAVENOUS; SUBCUTANEOUS at 17:17

## 2017-05-27 RX ADMIN — NYSTATIN 1 APPLICATION: 100000 POWDER TOPICAL at 16:36

## 2017-05-27 RX ADMIN — ALBUTEROL SULFATE 2 PUFF: 90 AEROSOL, METERED RESPIRATORY (INHALATION) at 08:54

## 2017-05-28 PROBLEM — N83.201 OVARIAN CYST, RIGHT: Status: ACTIVE | Noted: 2017-05-28

## 2017-05-28 LAB
ANION GAP SERPL CALCULATED.3IONS-SCNC: 6 MMOL/L (ref 4–13)
BUN SERPL-MCNC: 5 MG/DL (ref 5–25)
CALCIUM SERPL-MCNC: 9.2 MG/DL (ref 8.3–10.1)
CHLORIDE SERPL-SCNC: 105 MMOL/L (ref 100–108)
CO2 SERPL-SCNC: 30 MMOL/L (ref 21–32)
CREAT SERPL-MCNC: 0.76 MG/DL (ref 0.6–1.3)
ERYTHROCYTE [DISTWIDTH] IN BLOOD BY AUTOMATED COUNT: 17.5 % (ref 11.6–15.1)
GFR SERPL CREATININE-BSD FRML MDRD: >60 ML/MIN/1.73SQ M
GLUCOSE SERPL-MCNC: 108 MG/DL (ref 65–140)
GLUCOSE SERPL-MCNC: 149 MG/DL (ref 65–140)
GLUCOSE SERPL-MCNC: 189 MG/DL (ref 65–140)
GLUCOSE SERPL-MCNC: 96 MG/DL (ref 65–140)
HCT VFR BLD AUTO: 30.4 % (ref 34.8–46.1)
HGB BLD-MCNC: 9.3 G/DL (ref 11.5–15.4)
INR PPP: 2.24 (ref 0.86–1.16)
MCH RBC QN AUTO: 28.5 PG (ref 26.8–34.3)
MCHC RBC AUTO-ENTMCNC: 30.6 G/DL (ref 31.4–37.4)
MCV RBC AUTO: 93 FL (ref 82–98)
PLATELET # BLD AUTO: 304 THOUSANDS/UL (ref 149–390)
PMV BLD AUTO: 9 FL (ref 8.9–12.7)
POTASSIUM SERPL-SCNC: 3.8 MMOL/L (ref 3.5–5.3)
PROTHROMBIN TIME: 25 SECONDS (ref 12.1–14.4)
RBC # BLD AUTO: 3.26 MILLION/UL (ref 3.81–5.12)
SODIUM SERPL-SCNC: 141 MMOL/L (ref 136–145)
WBC # BLD AUTO: 10.19 THOUSAND/UL (ref 4.31–10.16)

## 2017-05-28 PROCEDURE — 80048 BASIC METABOLIC PNL TOTAL CA: CPT | Performed by: INTERNAL MEDICINE

## 2017-05-28 PROCEDURE — 85610 PROTHROMBIN TIME: CPT | Performed by: INTERNAL MEDICINE

## 2017-05-28 PROCEDURE — 82948 REAGENT STRIP/BLOOD GLUCOSE: CPT

## 2017-05-28 PROCEDURE — 85027 COMPLETE CBC AUTOMATED: CPT | Performed by: INTERNAL MEDICINE

## 2017-05-28 RX ORDER — ALBUTEROL SULFATE 90 UG/1
2 AEROSOL, METERED RESPIRATORY (INHALATION) EVERY 4 HOURS PRN
Status: DISCONTINUED | OUTPATIENT
Start: 2017-05-28 | End: 2017-06-05 | Stop reason: HOSPADM

## 2017-05-28 RX ADMIN — INSULIN LISPRO 2 UNITS: 100 INJECTION, SOLUTION INTRAVENOUS; SUBCUTANEOUS at 12:09

## 2017-05-28 RX ADMIN — OXYCODONE HYDROCHLORIDE AND ACETAMINOPHEN 2 TABLET: 5; 325 TABLET ORAL at 11:39

## 2017-05-28 RX ADMIN — CITALOPRAM HYDROBROMIDE 20 MG: 20 TABLET ORAL at 09:13

## 2017-05-28 RX ADMIN — ALLOPURINOL 100 MG: 100 TABLET ORAL at 09:12

## 2017-05-28 RX ADMIN — PANTOPRAZOLE SODIUM 40 MG: 40 TABLET, DELAYED RELEASE ORAL at 05:24

## 2017-05-28 RX ADMIN — ATORVASTATIN CALCIUM 40 MG: 40 TABLET, FILM COATED ORAL at 16:28

## 2017-05-28 RX ADMIN — ALBUTEROL SULFATE 2 PUFF: 90 AEROSOL, METERED RESPIRATORY (INHALATION) at 12:09

## 2017-05-28 RX ADMIN — OXYCODONE HYDROCHLORIDE AND ACETAMINOPHEN 2 TABLET: 5; 325 TABLET ORAL at 17:33

## 2017-05-28 RX ADMIN — CEFTRIAXONE 2000 MG: 2 INJECTION, POWDER, FOR SOLUTION INTRAMUSCULAR; INTRAVENOUS at 14:33

## 2017-05-28 RX ADMIN — OXYCODONE HYDROCHLORIDE AND ACETAMINOPHEN 500 MG: 500 TABLET ORAL at 09:12

## 2017-05-28 RX ADMIN — NYSTATIN 1 APPLICATION: 100000 POWDER TOPICAL at 16:28

## 2017-05-28 RX ADMIN — FUROSEMIDE 80 MG: 40 TABLET ORAL at 09:12

## 2017-05-28 RX ADMIN — MORPHINE SULFATE 1 MG: 2 INJECTION, SOLUTION INTRAMUSCULAR; INTRAVENOUS at 13:52

## 2017-05-28 RX ADMIN — MORPHINE SULFATE 1 MG: 2 INJECTION, SOLUTION INTRAMUSCULAR; INTRAVENOUS at 19:55

## 2017-05-28 RX ADMIN — NYSTATIN 1 APPLICATION: 100000 POWDER TOPICAL at 08:12

## 2017-05-28 RX ADMIN — OXYCODONE HYDROCHLORIDE AND ACETAMINOPHEN 2 TABLET: 5; 325 TABLET ORAL at 23:38

## 2017-05-28 RX ADMIN — ALLOPURINOL 100 MG: 100 TABLET ORAL at 17:31

## 2017-05-28 RX ADMIN — ALBUTEROL SULFATE 2 PUFF: 90 AEROSOL, METERED RESPIRATORY (INHALATION) at 09:12

## 2017-05-28 RX ADMIN — OXYCODONE HYDROCHLORIDE AND ACETAMINOPHEN 2 TABLET: 5; 325 TABLET ORAL at 05:24

## 2017-05-29 LAB
ANION GAP SERPL CALCULATED.3IONS-SCNC: 4 MMOL/L (ref 4–13)
BUN SERPL-MCNC: 7 MG/DL (ref 5–25)
CALCIUM SERPL-MCNC: 8.9 MG/DL (ref 8.3–10.1)
CHLORIDE SERPL-SCNC: 105 MMOL/L (ref 100–108)
CO2 SERPL-SCNC: 31 MMOL/L (ref 21–32)
CREAT SERPL-MCNC: 0.72 MG/DL (ref 0.6–1.3)
ERYTHROCYTE [DISTWIDTH] IN BLOOD BY AUTOMATED COUNT: 18.1 % (ref 11.6–15.1)
GFR SERPL CREATININE-BSD FRML MDRD: >60 ML/MIN/1.73SQ M
GLUCOSE SERPL-MCNC: 104 MG/DL (ref 65–140)
GLUCOSE SERPL-MCNC: 149 MG/DL (ref 65–140)
GLUCOSE SERPL-MCNC: 152 MG/DL (ref 65–140)
GLUCOSE SERPL-MCNC: 155 MG/DL (ref 65–140)
GLUCOSE SERPL-MCNC: 98 MG/DL (ref 65–140)
HCT VFR BLD AUTO: 29.9 % (ref 34.8–46.1)
HGB BLD-MCNC: 9.3 G/DL (ref 11.5–15.4)
INR PPP: 2.21 (ref 0.86–1.16)
MCH RBC QN AUTO: 28.9 PG (ref 26.8–34.3)
MCHC RBC AUTO-ENTMCNC: 31.1 G/DL (ref 31.4–37.4)
MCV RBC AUTO: 93 FL (ref 82–98)
PLATELET # BLD AUTO: 265 THOUSANDS/UL (ref 149–390)
PMV BLD AUTO: 8.5 FL (ref 8.9–12.7)
POTASSIUM SERPL-SCNC: 3.5 MMOL/L (ref 3.5–5.3)
PROTHROMBIN TIME: 24.8 SECONDS (ref 12.1–14.4)
RBC # BLD AUTO: 3.22 MILLION/UL (ref 3.81–5.12)
SODIUM SERPL-SCNC: 140 MMOL/L (ref 136–145)
WBC # BLD AUTO: 8.71 THOUSAND/UL (ref 4.31–10.16)

## 2017-05-29 PROCEDURE — 82948 REAGENT STRIP/BLOOD GLUCOSE: CPT

## 2017-05-29 PROCEDURE — 85027 COMPLETE CBC AUTOMATED: CPT | Performed by: INTERNAL MEDICINE

## 2017-05-29 PROCEDURE — 80048 BASIC METABOLIC PNL TOTAL CA: CPT | Performed by: INTERNAL MEDICINE

## 2017-05-29 PROCEDURE — 85610 PROTHROMBIN TIME: CPT | Performed by: INTERNAL MEDICINE

## 2017-05-29 RX ORDER — OXYCODONE HYDROCHLORIDE AND ACETAMINOPHEN 5; 325 MG/1; MG/1
2 TABLET ORAL EVERY 4 HOURS PRN
Status: DISCONTINUED | OUTPATIENT
Start: 2017-05-29 | End: 2017-06-05 | Stop reason: HOSPADM

## 2017-05-29 RX ADMIN — INSULIN LISPRO 2 UNITS: 100 INJECTION, SOLUTION INTRAVENOUS; SUBCUTANEOUS at 12:32

## 2017-05-29 RX ADMIN — OXYCODONE HYDROCHLORIDE AND ACETAMINOPHEN 500 MG: 500 TABLET ORAL at 08:33

## 2017-05-29 RX ADMIN — OXYCODONE HYDROCHLORIDE AND ACETAMINOPHEN 2 TABLET: 5; 325 TABLET ORAL at 05:38

## 2017-05-29 RX ADMIN — NYSTATIN 1 APPLICATION: 100000 POWDER TOPICAL at 16:15

## 2017-05-29 RX ADMIN — PANTOPRAZOLE SODIUM 40 MG: 40 TABLET, DELAYED RELEASE ORAL at 05:12

## 2017-05-29 RX ADMIN — OXYCODONE HYDROCHLORIDE AND ACETAMINOPHEN 2 TABLET: 5; 325 TABLET ORAL at 16:15

## 2017-05-29 RX ADMIN — NYSTATIN 1 APPLICATION: 100000 POWDER TOPICAL at 08:32

## 2017-05-29 RX ADMIN — INSULIN LISPRO 2 UNITS: 100 INJECTION, SOLUTION INTRAVENOUS; SUBCUTANEOUS at 16:15

## 2017-05-29 RX ADMIN — OXYCODONE HYDROCHLORIDE AND ACETAMINOPHEN 2 TABLET: 5; 325 TABLET ORAL at 20:40

## 2017-05-29 RX ADMIN — CITALOPRAM HYDROBROMIDE 20 MG: 20 TABLET ORAL at 08:32

## 2017-05-29 RX ADMIN — FUROSEMIDE 80 MG: 40 TABLET ORAL at 08:32

## 2017-05-29 RX ADMIN — ALBUTEROL SULFATE 2 PUFF: 90 AEROSOL, METERED RESPIRATORY (INHALATION) at 08:32

## 2017-05-29 RX ADMIN — OXYCODONE HYDROCHLORIDE AND ACETAMINOPHEN 2 TABLET: 5; 325 TABLET ORAL at 11:46

## 2017-05-29 RX ADMIN — ALLOPURINOL 100 MG: 100 TABLET ORAL at 08:32

## 2017-05-29 RX ADMIN — ATORVASTATIN CALCIUM 40 MG: 40 TABLET, FILM COATED ORAL at 16:15

## 2017-05-29 RX ADMIN — ALLOPURINOL 100 MG: 100 TABLET ORAL at 18:04

## 2017-05-29 RX ADMIN — MORPHINE SULFATE 1 MG: 2 INJECTION, SOLUTION INTRAMUSCULAR; INTRAVENOUS at 03:36

## 2017-05-29 RX ADMIN — CEFTRIAXONE 2000 MG: 2 INJECTION, POWDER, FOR SOLUTION INTRAMUSCULAR; INTRAVENOUS at 14:33

## 2017-05-30 ENCOUNTER — ANESTHESIA EVENT (INPATIENT)
Dept: GASTROENTEROLOGY | Facility: HOSPITAL | Age: 72
DRG: 871 | End: 2017-05-30
Payer: COMMERCIAL

## 2017-05-30 LAB
ANION GAP SERPL CALCULATED.3IONS-SCNC: 4 MMOL/L (ref 4–13)
APTT PPP: 44 SECONDS (ref 23–35)
APTT PPP: 47 SECONDS (ref 23–35)
BACTERIA BLD CULT: NORMAL
BACTERIA BLD CULT: NORMAL
BUN SERPL-MCNC: 7 MG/DL (ref 5–25)
CALCIUM SERPL-MCNC: 8.9 MG/DL (ref 8.3–10.1)
CHLORIDE SERPL-SCNC: 101 MMOL/L (ref 100–108)
CO2 SERPL-SCNC: 32 MMOL/L (ref 21–32)
CREAT SERPL-MCNC: 0.79 MG/DL (ref 0.6–1.3)
GFR SERPL CREATININE-BSD FRML MDRD: >60 ML/MIN/1.73SQ M
GLUCOSE SERPL-MCNC: 105 MG/DL (ref 65–140)
GLUCOSE SERPL-MCNC: 109 MG/DL (ref 65–140)
GLUCOSE SERPL-MCNC: 142 MG/DL (ref 65–140)
GLUCOSE SERPL-MCNC: 201 MG/DL (ref 65–140)
GLUCOSE SERPL-MCNC: 95 MG/DL (ref 65–140)
INR PPP: 1.73 (ref 0.86–1.16)
POTASSIUM SERPL-SCNC: 3 MMOL/L (ref 3.5–5.3)
PROTHROMBIN TIME: 20.4 SECONDS (ref 12.1–14.4)
SODIUM SERPL-SCNC: 137 MMOL/L (ref 136–145)

## 2017-05-30 PROCEDURE — 85610 PROTHROMBIN TIME: CPT | Performed by: HOSPITALIST

## 2017-05-30 PROCEDURE — 80048 BASIC METABOLIC PNL TOTAL CA: CPT | Performed by: HOSPITALIST

## 2017-05-30 PROCEDURE — 82948 REAGENT STRIP/BLOOD GLUCOSE: CPT

## 2017-05-30 PROCEDURE — 97116 GAIT TRAINING THERAPY: CPT

## 2017-05-30 PROCEDURE — 97110 THERAPEUTIC EXERCISES: CPT

## 2017-05-30 PROCEDURE — 97535 SELF CARE MNGMENT TRAINING: CPT

## 2017-05-30 PROCEDURE — 85730 THROMBOPLASTIN TIME PARTIAL: CPT | Performed by: HOSPITALIST

## 2017-05-30 PROCEDURE — 97530 THERAPEUTIC ACTIVITIES: CPT

## 2017-05-30 RX ORDER — POLYETHYLENE GLYCOL 3350 17 G/17G
238 POWDER, FOR SOLUTION ORAL ONCE
Status: COMPLETED | OUTPATIENT
Start: 2017-05-30 | End: 2017-05-30

## 2017-05-30 RX ORDER — HEPARIN SODIUM 10000 [USP'U]/100ML
3-20 INJECTION, SOLUTION INTRAVENOUS
Status: DISCONTINUED | OUTPATIENT
Start: 2017-05-30 | End: 2017-06-02

## 2017-05-30 RX ADMIN — OXYCODONE HYDROCHLORIDE AND ACETAMINOPHEN 2 TABLET: 5; 325 TABLET ORAL at 23:01

## 2017-05-30 RX ADMIN — CITALOPRAM HYDROBROMIDE 20 MG: 20 TABLET ORAL at 08:15

## 2017-05-30 RX ADMIN — OXYCODONE HYDROCHLORIDE AND ACETAMINOPHEN 2 TABLET: 5; 325 TABLET ORAL at 09:26

## 2017-05-30 RX ADMIN — BISACODYL 10 MG: 5 TABLET, COATED ORAL at 12:46

## 2017-05-30 RX ADMIN — FUROSEMIDE 40 MG: 40 TABLET ORAL at 12:53

## 2017-05-30 RX ADMIN — OXYCODONE HYDROCHLORIDE AND ACETAMINOPHEN 2 TABLET: 5; 325 TABLET ORAL at 18:16

## 2017-05-30 RX ADMIN — PANTOPRAZOLE SODIUM 40 MG: 40 TABLET, DELAYED RELEASE ORAL at 05:20

## 2017-05-30 RX ADMIN — OXYCODONE HYDROCHLORIDE AND ACETAMINOPHEN 2 TABLET: 5; 325 TABLET ORAL at 13:53

## 2017-05-30 RX ADMIN — CEFTRIAXONE 2000 MG: 2 INJECTION, POWDER, FOR SOLUTION INTRAMUSCULAR; INTRAVENOUS at 16:16

## 2017-05-30 RX ADMIN — ALLOPURINOL 100 MG: 100 TABLET ORAL at 08:15

## 2017-05-30 RX ADMIN — OXYCODONE HYDROCHLORIDE AND ACETAMINOPHEN 500 MG: 500 TABLET ORAL at 08:15

## 2017-05-30 RX ADMIN — HEPARIN SODIUM AND DEXTROSE 11.1 UNITS/KG/HR: 10000; 5 INJECTION INTRAVENOUS at 11:06

## 2017-05-30 RX ADMIN — INSULIN LISPRO 4 UNITS: 100 INJECTION, SOLUTION INTRAVENOUS; SUBCUTANEOUS at 12:36

## 2017-05-30 RX ADMIN — OXYCODONE HYDROCHLORIDE AND ACETAMINOPHEN 2 TABLET: 5; 325 TABLET ORAL at 00:40

## 2017-05-30 RX ADMIN — ATORVASTATIN CALCIUM 40 MG: 40 TABLET, FILM COATED ORAL at 16:18

## 2017-05-30 RX ADMIN — POLYETHYLENE GLYCOL 3350 238 G: 17 POWDER, FOR SOLUTION ORAL at 19:51

## 2017-05-30 RX ADMIN — NYSTATIN 1 APPLICATION: 100000 POWDER TOPICAL at 08:16

## 2017-05-30 RX ADMIN — ALLOPURINOL 100 MG: 100 TABLET ORAL at 17:36

## 2017-05-30 RX ADMIN — OXYCODONE HYDROCHLORIDE AND ACETAMINOPHEN 2 TABLET: 5; 325 TABLET ORAL at 05:21

## 2017-05-31 ENCOUNTER — ANESTHESIA (INPATIENT)
Dept: GASTROENTEROLOGY | Facility: HOSPITAL | Age: 72
DRG: 871 | End: 2017-05-31
Payer: COMMERCIAL

## 2017-05-31 LAB
ANION GAP SERPL CALCULATED.3IONS-SCNC: 5 MMOL/L (ref 4–13)
ANISOCYTOSIS BLD QL SMEAR: PRESENT
APTT PPP: 63 SECONDS (ref 23–35)
APTT PPP: 67 SECONDS (ref 23–35)
BASOPHILS # BLD MANUAL: 0 THOUSAND/UL (ref 0–0.1)
BASOPHILS NFR MAR MANUAL: 0 % (ref 0–1)
BUN SERPL-MCNC: 7 MG/DL (ref 5–25)
CALCIUM SERPL-MCNC: 8.7 MG/DL (ref 8.3–10.1)
CHLORIDE SERPL-SCNC: 102 MMOL/L (ref 100–108)
CO2 SERPL-SCNC: 32 MMOL/L (ref 21–32)
CREAT SERPL-MCNC: 0.71 MG/DL (ref 0.6–1.3)
EOSINOPHIL # BLD MANUAL: 0 THOUSAND/UL (ref 0–0.4)
EOSINOPHIL NFR BLD MANUAL: 0 % (ref 0–6)
ERYTHROCYTE [DISTWIDTH] IN BLOOD BY AUTOMATED COUNT: 18.5 % (ref 11.6–15.1)
GFR SERPL CREATININE-BSD FRML MDRD: >60 ML/MIN/1.73SQ M
GLUCOSE SERPL-MCNC: 121 MG/DL (ref 65–140)
GLUCOSE SERPL-MCNC: 125 MG/DL (ref 65–140)
GLUCOSE SERPL-MCNC: 180 MG/DL (ref 65–140)
GLUCOSE SERPL-MCNC: 87 MG/DL (ref 65–140)
GLUCOSE SERPL-MCNC: 92 MG/DL (ref 65–140)
HCT VFR BLD AUTO: 33.4 % (ref 34.8–46.1)
HGB BLD-MCNC: 10.3 G/DL (ref 11.5–15.4)
HYPERCHROMIA BLD QL SMEAR: PRESENT
INR PPP: 1.52 (ref 0.86–1.16)
LYMPHOCYTES # BLD AUTO: 0.5 THOUSAND/UL (ref 0.6–4.47)
LYMPHOCYTES # BLD AUTO: 6 % (ref 14–44)
MCH RBC QN AUTO: 28.9 PG (ref 26.8–34.3)
MCHC RBC AUTO-ENTMCNC: 30.8 G/DL (ref 31.4–37.4)
MCV RBC AUTO: 94 FL (ref 82–98)
MONOCYTES # BLD AUTO: 0.58 THOUSAND/UL (ref 0–1.22)
MONOCYTES NFR BLD: 7 % (ref 4–12)
MYELOCYTES NFR BLD MANUAL: 1 % (ref 0–1)
NEUTROPHILS # BLD MANUAL: 7.14 THOUSAND/UL (ref 1.85–7.62)
NEUTS BAND NFR BLD MANUAL: 4 % (ref 0–8)
NEUTS SEG NFR BLD AUTO: 82 % (ref 43–75)
NRBC BLD AUTO-RTO: 1 /100 WBCS
PLATELET # BLD AUTO: 279 THOUSANDS/UL (ref 149–390)
PLATELET BLD QL SMEAR: ADEQUATE
PMV BLD AUTO: 8.9 FL (ref 8.9–12.7)
POLYCHROMASIA BLD QL SMEAR: PRESENT
POTASSIUM SERPL-SCNC: 2.8 MMOL/L (ref 3.5–5.3)
PROTHROMBIN TIME: 18.4 SECONDS (ref 12.1–14.4)
RBC # BLD AUTO: 3.57 MILLION/UL (ref 3.81–5.12)
SODIUM SERPL-SCNC: 139 MMOL/L (ref 136–145)
TOTAL CELLS COUNTED SPEC: 100
WBC # BLD AUTO: 8.3 THOUSAND/UL (ref 4.31–10.16)

## 2017-05-31 PROCEDURE — 97535 SELF CARE MNGMENT TRAINING: CPT

## 2017-05-31 PROCEDURE — 85007 BL SMEAR W/DIFF WBC COUNT: CPT | Performed by: HOSPITALIST

## 2017-05-31 PROCEDURE — 80048 BASIC METABOLIC PNL TOTAL CA: CPT | Performed by: HOSPITALIST

## 2017-05-31 PROCEDURE — 85027 COMPLETE CBC AUTOMATED: CPT | Performed by: HOSPITALIST

## 2017-05-31 PROCEDURE — 85730 THROMBOPLASTIN TIME PARTIAL: CPT | Performed by: HOSPITALIST

## 2017-05-31 PROCEDURE — 97530 THERAPEUTIC ACTIVITIES: CPT

## 2017-05-31 PROCEDURE — 82948 REAGENT STRIP/BLOOD GLUCOSE: CPT

## 2017-05-31 PROCEDURE — 85610 PROTHROMBIN TIME: CPT | Performed by: HOSPITALIST

## 2017-05-31 PROCEDURE — 97116 GAIT TRAINING THERAPY: CPT

## 2017-05-31 PROCEDURE — 97110 THERAPEUTIC EXERCISES: CPT

## 2017-05-31 RX ADMIN — OXYCODONE HYDROCHLORIDE AND ACETAMINOPHEN 2 TABLET: 5; 325 TABLET ORAL at 02:48

## 2017-05-31 RX ADMIN — CEFTRIAXONE 2000 MG: 2 INJECTION, POWDER, FOR SOLUTION INTRAMUSCULAR; INTRAVENOUS at 14:41

## 2017-05-31 RX ADMIN — FUROSEMIDE 80 MG: 40 TABLET ORAL at 11:15

## 2017-05-31 RX ADMIN — INSULIN LISPRO 2 UNITS: 100 INJECTION, SOLUTION INTRAVENOUS; SUBCUTANEOUS at 16:53

## 2017-05-31 RX ADMIN — OXYCODONE HYDROCHLORIDE AND ACETAMINOPHEN 2 TABLET: 5; 325 TABLET ORAL at 06:40

## 2017-05-31 RX ADMIN — PANTOPRAZOLE SODIUM 40 MG: 40 TABLET, DELAYED RELEASE ORAL at 06:06

## 2017-05-31 RX ADMIN — ATORVASTATIN CALCIUM 40 MG: 40 TABLET, FILM COATED ORAL at 16:53

## 2017-05-31 RX ADMIN — OXYCODONE HYDROCHLORIDE AND ACETAMINOPHEN 2 TABLET: 5; 325 TABLET ORAL at 16:54

## 2017-05-31 RX ADMIN — OXYCODONE HYDROCHLORIDE AND ACETAMINOPHEN 2 TABLET: 5; 325 TABLET ORAL at 21:39

## 2017-05-31 RX ADMIN — ALLOPURINOL 100 MG: 100 TABLET ORAL at 08:33

## 2017-05-31 RX ADMIN — NYSTATIN 1 APPLICATION: 100000 POWDER TOPICAL at 08:37

## 2017-05-31 RX ADMIN — POLYETHYLENE GLYCOL 3350, SODIUM SULFATE ANHYDROUS, SODIUM BICARBONATE, SODIUM CHLORIDE, POTASSIUM CHLORIDE 4000 ML: 236; 22.74; 6.74; 5.86; 2.97 POWDER, FOR SOLUTION ORAL at 11:45

## 2017-05-31 RX ADMIN — ALLOPURINOL 100 MG: 100 TABLET ORAL at 17:33

## 2017-05-31 RX ADMIN — OXYCODONE HYDROCHLORIDE AND ACETAMINOPHEN 500 MG: 500 TABLET ORAL at 08:32

## 2017-05-31 RX ADMIN — OXYCODONE HYDROCHLORIDE AND ACETAMINOPHEN 2 TABLET: 5; 325 TABLET ORAL at 11:14

## 2017-05-31 RX ADMIN — ERGOCALCIFEROL 50000 UNITS: 1.25 CAPSULE ORAL at 08:33

## 2017-05-31 RX ADMIN — CITALOPRAM HYDROBROMIDE 20 MG: 20 TABLET ORAL at 08:33

## 2017-05-31 RX ADMIN — NYSTATIN 1 APPLICATION: 100000 POWDER TOPICAL at 16:54

## 2017-05-31 RX ADMIN — HEPARIN SODIUM AND DEXTROSE 13.1 UNITS/KG/HR: 10000; 5 INJECTION INTRAVENOUS at 10:04

## 2017-06-01 ENCOUNTER — GENERIC CONVERSION - ENCOUNTER (OUTPATIENT)
Dept: OTHER | Facility: OTHER | Age: 72
End: 2017-06-01

## 2017-06-01 LAB
APTT PPP: 42 SECONDS (ref 23–35)
APTT PPP: 47 SECONDS (ref 23–35)
APTT PPP: 80 SECONDS (ref 23–35)
GLUCOSE SERPL-MCNC: 118 MG/DL (ref 65–140)
GLUCOSE SERPL-MCNC: 120 MG/DL (ref 65–140)
GLUCOSE SERPL-MCNC: 125 MG/DL (ref 65–140)
GLUCOSE SERPL-MCNC: 195 MG/DL (ref 65–140)

## 2017-06-01 PROCEDURE — 82948 REAGENT STRIP/BLOOD GLUCOSE: CPT

## 2017-06-01 PROCEDURE — 85730 THROMBOPLASTIN TIME PARTIAL: CPT | Performed by: HOSPITALIST

## 2017-06-01 PROCEDURE — 0DBN8ZZ EXCISION OF SIGMOID COLON, VIA NATURAL OR ARTIFICIAL OPENING ENDOSCOPIC: ICD-10-PCS | Performed by: INTERNAL MEDICINE

## 2017-06-01 PROCEDURE — 0DBP8ZZ EXCISION OF RECTUM, VIA NATURAL OR ARTIFICIAL OPENING ENDOSCOPIC: ICD-10-PCS | Performed by: INTERNAL MEDICINE

## 2017-06-01 PROCEDURE — 88305 TISSUE EXAM BY PATHOLOGIST: CPT | Performed by: INTERNAL MEDICINE

## 2017-06-01 RX ORDER — ONDANSETRON 2 MG/ML
4 INJECTION INTRAMUSCULAR; INTRAVENOUS ONCE
Status: DISCONTINUED | OUTPATIENT
Start: 2017-06-01 | End: 2017-06-01 | Stop reason: HOSPADM

## 2017-06-01 RX ORDER — SODIUM CHLORIDE 9 MG/ML
INJECTION, SOLUTION INTRAVENOUS CONTINUOUS PRN
Status: DISCONTINUED | OUTPATIENT
Start: 2017-06-01 | End: 2017-06-01 | Stop reason: SURG

## 2017-06-01 RX ORDER — PROPOFOL 10 MG/ML
INJECTION, EMULSION INTRAVENOUS AS NEEDED
Status: DISCONTINUED | OUTPATIENT
Start: 2017-06-01 | End: 2017-06-01 | Stop reason: SURG

## 2017-06-01 RX ADMIN — CEFTRIAXONE 2000 MG: 2 INJECTION, POWDER, FOR SOLUTION INTRAMUSCULAR; INTRAVENOUS at 15:20

## 2017-06-01 RX ADMIN — NYSTATIN 1 APPLICATION: 100000 POWDER TOPICAL at 09:51

## 2017-06-01 RX ADMIN — ALLOPURINOL 100 MG: 100 TABLET ORAL at 17:14

## 2017-06-01 RX ADMIN — PROPOFOL 50 MG: 10 INJECTION, EMULSION INTRAVENOUS at 13:36

## 2017-06-01 RX ADMIN — OXYCODONE HYDROCHLORIDE AND ACETAMINOPHEN 2 TABLET: 5; 325 TABLET ORAL at 21:05

## 2017-06-01 RX ADMIN — PROPOFOL 50 MG: 10 INJECTION, EMULSION INTRAVENOUS at 13:47

## 2017-06-01 RX ADMIN — ATORVASTATIN CALCIUM 40 MG: 40 TABLET, FILM COATED ORAL at 16:30

## 2017-06-01 RX ADMIN — OXYCODONE HYDROCHLORIDE AND ACETAMINOPHEN 2 TABLET: 5; 325 TABLET ORAL at 15:20

## 2017-06-01 RX ADMIN — PROPOFOL 50 MG: 10 INJECTION, EMULSION INTRAVENOUS at 13:41

## 2017-06-01 RX ADMIN — OXYCODONE HYDROCHLORIDE AND ACETAMINOPHEN 2 TABLET: 5; 325 TABLET ORAL at 01:39

## 2017-06-01 RX ADMIN — SODIUM CHLORIDE: 0.9 INJECTION, SOLUTION INTRAVENOUS at 13:31

## 2017-06-01 RX ADMIN — PROPOFOL 50 MG: 10 INJECTION, EMULSION INTRAVENOUS at 13:50

## 2017-06-01 RX ADMIN — NYSTATIN 1 APPLICATION: 100000 POWDER TOPICAL at 15:21

## 2017-06-01 RX ADMIN — HEPARIN SODIUM AND DEXTROSE 19.1 UNITS/KG/HR: 10000; 5 INJECTION INTRAVENOUS at 16:53

## 2017-06-01 RX ADMIN — PROPOFOL 50 MG: 10 INJECTION, EMULSION INTRAVENOUS at 13:33

## 2017-06-02 ENCOUNTER — GENERIC CONVERSION - ENCOUNTER (OUTPATIENT)
Dept: OTHER | Facility: OTHER | Age: 72
End: 2017-06-02

## 2017-06-02 ENCOUNTER — APPOINTMENT (INPATIENT)
Dept: CT IMAGING | Facility: HOSPITAL | Age: 72
DRG: 871 | End: 2017-06-02
Payer: COMMERCIAL

## 2017-06-02 LAB
ALBUMIN SERPL BCP-MCNC: 1.5 G/DL (ref 3.5–5)
ALP SERPL-CCNC: 191 U/L (ref 46–116)
ALT SERPL W P-5'-P-CCNC: 30 U/L (ref 12–78)
ANION GAP SERPL CALCULATED.3IONS-SCNC: 4 MMOL/L (ref 4–13)
APTT PPP: 125 SECONDS (ref 23–35)
AST SERPL W P-5'-P-CCNC: 85 U/L (ref 5–45)
BILIRUB SERPL-MCNC: 0.46 MG/DL (ref 0.2–1)
BUN SERPL-MCNC: 5 MG/DL (ref 5–25)
CALCIUM SERPL-MCNC: 8.6 MG/DL (ref 8.3–10.1)
CHLORIDE SERPL-SCNC: 101 MMOL/L (ref 100–108)
CO2 SERPL-SCNC: 35 MMOL/L (ref 21–32)
CREAT SERPL-MCNC: 0.65 MG/DL (ref 0.6–1.3)
CRP SERPL QL: >90 MG/L
ERYTHROCYTE [SEDIMENTATION RATE] IN BLOOD: 110 MM/HOUR (ref 0–20)
GFR SERPL CREATININE-BSD FRML MDRD: >60 ML/MIN/1.73SQ M
GLUCOSE SERPL-MCNC: 111 MG/DL (ref 65–140)
GLUCOSE SERPL-MCNC: 118 MG/DL (ref 65–140)
GLUCOSE SERPL-MCNC: 123 MG/DL (ref 65–140)
GLUCOSE SERPL-MCNC: 124 MG/DL (ref 65–140)
GLUCOSE SERPL-MCNC: 195 MG/DL (ref 65–140)
HGB BLD-MCNC: 8.9 G/DL (ref 11.5–15.4)
POTASSIUM SERPL-SCNC: 3 MMOL/L (ref 3.5–5.3)
PROT SERPL-MCNC: 6.5 G/DL (ref 6.4–8.2)
SODIUM SERPL-SCNC: 140 MMOL/L (ref 136–145)

## 2017-06-02 PROCEDURE — 97535 SELF CARE MNGMENT TRAINING: CPT

## 2017-06-02 PROCEDURE — 85730 THROMBOPLASTIN TIME PARTIAL: CPT | Performed by: HOSPITALIST

## 2017-06-02 PROCEDURE — 05H333Z INSERTION OF INFUSION DEVICE INTO RIGHT INNOMINATE VEIN, PERCUTANEOUS APPROACH: ICD-10-PCS | Performed by: INTERNAL MEDICINE

## 2017-06-02 PROCEDURE — 86140 C-REACTIVE PROTEIN: CPT | Performed by: INTERNAL MEDICINE

## 2017-06-02 PROCEDURE — 80053 COMPREHEN METABOLIC PANEL: CPT | Performed by: INTERNAL MEDICINE

## 2017-06-02 PROCEDURE — C1751 CATH, INF, PER/CENT/MIDLINE: HCPCS

## 2017-06-02 PROCEDURE — 97530 THERAPEUTIC ACTIVITIES: CPT

## 2017-06-02 PROCEDURE — 36569 INSJ PICC 5 YR+ W/O IMAGING: CPT

## 2017-06-02 PROCEDURE — 85018 HEMOGLOBIN: CPT | Performed by: HOSPITALIST

## 2017-06-02 PROCEDURE — 85652 RBC SED RATE AUTOMATED: CPT | Performed by: INTERNAL MEDICINE

## 2017-06-02 PROCEDURE — 72132 CT LUMBAR SPINE W/DYE: CPT

## 2017-06-02 PROCEDURE — 82948 REAGENT STRIP/BLOOD GLUCOSE: CPT

## 2017-06-02 PROCEDURE — 97116 GAIT TRAINING THERAPY: CPT

## 2017-06-02 RX ORDER — WARFARIN SODIUM 5 MG/1
5 TABLET ORAL
Status: DISCONTINUED | OUTPATIENT
Start: 2017-06-02 | End: 2017-06-05 | Stop reason: HOSPADM

## 2017-06-02 RX ORDER — METOLAZONE 5 MG/1
2.5 TABLET ORAL SEE ADMIN INSTRUCTIONS
Status: DISCONTINUED | OUTPATIENT
Start: 2017-06-02 | End: 2017-06-02 | Stop reason: ALTCHOICE

## 2017-06-02 RX ADMIN — NYSTATIN 1 APPLICATION: 100000 POWDER TOPICAL at 16:10

## 2017-06-02 RX ADMIN — METFORMIN HYDROCHLORIDE 500 MG: 500 TABLET, FILM COATED ORAL at 17:23

## 2017-06-02 RX ADMIN — NYSTATIN 1 APPLICATION: 100000 POWDER TOPICAL at 08:31

## 2017-06-02 RX ADMIN — OXYCODONE HYDROCHLORIDE AND ACETAMINOPHEN 2 TABLET: 5; 325 TABLET ORAL at 10:19

## 2017-06-02 RX ADMIN — INSULIN LISPRO 2 UNITS: 100 INJECTION, SOLUTION INTRAVENOUS; SUBCUTANEOUS at 13:45

## 2017-06-02 RX ADMIN — OXYCODONE HYDROCHLORIDE AND ACETAMINOPHEN 2 TABLET: 5; 325 TABLET ORAL at 19:47

## 2017-06-02 RX ADMIN — ENOXAPARIN SODIUM 100 MG: 100 INJECTION SUBCUTANEOUS at 22:05

## 2017-06-02 RX ADMIN — ATORVASTATIN CALCIUM 40 MG: 40 TABLET, FILM COATED ORAL at 17:23

## 2017-06-02 RX ADMIN — OXYCODONE HYDROCHLORIDE AND ACETAMINOPHEN 2 TABLET: 5; 325 TABLET ORAL at 15:13

## 2017-06-02 RX ADMIN — WARFARIN SODIUM 5 MG: 5 TABLET ORAL at 17:23

## 2017-06-02 RX ADMIN — OXYCODONE HYDROCHLORIDE AND ACETAMINOPHEN 500 MG: 500 TABLET ORAL at 08:31

## 2017-06-02 RX ADMIN — PANTOPRAZOLE SODIUM 40 MG: 40 TABLET, DELAYED RELEASE ORAL at 06:42

## 2017-06-02 RX ADMIN — ALLOPURINOL 100 MG: 100 TABLET ORAL at 17:23

## 2017-06-02 RX ADMIN — CITALOPRAM HYDROBROMIDE 20 MG: 20 TABLET ORAL at 08:30

## 2017-06-02 RX ADMIN — METFORMIN HYDROCHLORIDE 500 MG: 500 TABLET, FILM COATED ORAL at 10:19

## 2017-06-02 RX ADMIN — FUROSEMIDE 80 MG: 40 TABLET ORAL at 08:30

## 2017-06-02 RX ADMIN — ALLOPURINOL 100 MG: 100 TABLET ORAL at 08:31

## 2017-06-02 RX ADMIN — CEFTRIAXONE 2000 MG: 2 INJECTION, POWDER, FOR SOLUTION INTRAMUSCULAR; INTRAVENOUS at 16:07

## 2017-06-02 RX ADMIN — IOHEXOL 100 ML: 350 INJECTION, SOLUTION INTRAVENOUS at 11:40

## 2017-06-03 LAB
ANION GAP SERPL CALCULATED.3IONS-SCNC: 2 MMOL/L (ref 4–13)
BASOPHILS # BLD AUTO: 0.01 THOUSANDS/ΜL (ref 0–0.1)
BASOPHILS NFR BLD AUTO: 0 % (ref 0–1)
BUN SERPL-MCNC: 5 MG/DL (ref 5–25)
CALCIUM SERPL-MCNC: 8.5 MG/DL (ref 8.3–10.1)
CHLORIDE SERPL-SCNC: 102 MMOL/L (ref 100–108)
CO2 SERPL-SCNC: 37 MMOL/L (ref 21–32)
CREAT SERPL-MCNC: 0.79 MG/DL (ref 0.6–1.3)
EOSINOPHIL # BLD AUTO: 0.19 THOUSAND/ΜL (ref 0–0.61)
EOSINOPHIL NFR BLD AUTO: 3 % (ref 0–6)
ERYTHROCYTE [DISTWIDTH] IN BLOOD BY AUTOMATED COUNT: 19.1 % (ref 11.6–15.1)
GFR SERPL CREATININE-BSD FRML MDRD: >60 ML/MIN/1.73SQ M
GLUCOSE SERPL-MCNC: 106 MG/DL (ref 65–140)
GLUCOSE SERPL-MCNC: 112 MG/DL (ref 65–140)
GLUCOSE SERPL-MCNC: 116 MG/DL (ref 65–140)
GLUCOSE SERPL-MCNC: 135 MG/DL (ref 65–140)
GLUCOSE SERPL-MCNC: 93 MG/DL (ref 65–140)
HCT VFR BLD AUTO: 29.2 % (ref 34.8–46.1)
HGB BLD-MCNC: 8.8 G/DL (ref 11.5–15.4)
INR PPP: 1.65 (ref 0.86–1.16)
LYMPHOCYTES # BLD AUTO: 0.48 THOUSANDS/ΜL (ref 0.6–4.47)
LYMPHOCYTES NFR BLD AUTO: 8 % (ref 14–44)
MCH RBC QN AUTO: 28.6 PG (ref 26.8–34.3)
MCHC RBC AUTO-ENTMCNC: 30.1 G/DL (ref 31.4–37.4)
MCV RBC AUTO: 95 FL (ref 82–98)
MONOCYTES # BLD AUTO: 0.95 THOUSAND/ΜL (ref 0.17–1.22)
MONOCYTES NFR BLD AUTO: 16 % (ref 4–12)
NEUTROPHILS # BLD AUTO: 4.24 THOUSANDS/ΜL (ref 1.85–7.62)
NEUTS SEG NFR BLD AUTO: 73 % (ref 43–75)
NRBC BLD AUTO-RTO: 0 /100 WBCS
PLATELET # BLD AUTO: 225 THOUSANDS/UL (ref 149–390)
PMV BLD AUTO: 8.9 FL (ref 8.9–12.7)
POTASSIUM SERPL-SCNC: 2.1 MMOL/L (ref 3.5–5.3)
PROTHROMBIN TIME: 19.6 SECONDS (ref 12.1–14.4)
RBC # BLD AUTO: 3.08 MILLION/UL (ref 3.81–5.12)
SODIUM SERPL-SCNC: 141 MMOL/L (ref 136–145)
WBC # BLD AUTO: 5.87 THOUSAND/UL (ref 4.31–10.16)

## 2017-06-03 PROCEDURE — 85025 COMPLETE CBC W/AUTO DIFF WBC: CPT | Performed by: HOSPITALIST

## 2017-06-03 PROCEDURE — 80048 BASIC METABOLIC PNL TOTAL CA: CPT | Performed by: HOSPITALIST

## 2017-06-03 PROCEDURE — 82948 REAGENT STRIP/BLOOD GLUCOSE: CPT

## 2017-06-03 PROCEDURE — 85610 PROTHROMBIN TIME: CPT | Performed by: HOSPITALIST

## 2017-06-03 RX ORDER — POTASSIUM CHLORIDE 29.8 MG/ML
40 INJECTION INTRAVENOUS ONCE
Status: COMPLETED | OUTPATIENT
Start: 2017-06-03 | End: 2017-06-03

## 2017-06-03 RX ORDER — POTASSIUM CHLORIDE 20 MEQ/1
40 TABLET, EXTENDED RELEASE ORAL ONCE
Status: COMPLETED | OUTPATIENT
Start: 2017-06-03 | End: 2017-06-03

## 2017-06-03 RX ADMIN — OXYCODONE HYDROCHLORIDE AND ACETAMINOPHEN 2 TABLET: 5; 325 TABLET ORAL at 02:16

## 2017-06-03 RX ADMIN — ALLOPURINOL 100 MG: 100 TABLET ORAL at 17:10

## 2017-06-03 RX ADMIN — POTASSIUM CHLORIDE 40 MEQ: 1500 TABLET, EXTENDED RELEASE ORAL at 10:02

## 2017-06-03 RX ADMIN — ALLOPURINOL 100 MG: 100 TABLET ORAL at 10:02

## 2017-06-03 RX ADMIN — OXYCODONE HYDROCHLORIDE AND ACETAMINOPHEN 2 TABLET: 5; 325 TABLET ORAL at 06:01

## 2017-06-03 RX ADMIN — ENOXAPARIN SODIUM 100 MG: 100 INJECTION SUBCUTANEOUS at 10:01

## 2017-06-03 RX ADMIN — OXYCODONE HYDROCHLORIDE AND ACETAMINOPHEN 2 TABLET: 5; 325 TABLET ORAL at 20:21

## 2017-06-03 RX ADMIN — FUROSEMIDE 80 MG: 40 TABLET ORAL at 10:02

## 2017-06-03 RX ADMIN — NYSTATIN 1 APPLICATION: 100000 POWDER TOPICAL at 10:03

## 2017-06-03 RX ADMIN — CEFTRIAXONE 2000 MG: 2 INJECTION, POWDER, FOR SOLUTION INTRAMUSCULAR; INTRAVENOUS at 14:31

## 2017-06-03 RX ADMIN — OXYCODONE HYDROCHLORIDE AND ACETAMINOPHEN 2 TABLET: 5; 325 TABLET ORAL at 13:43

## 2017-06-03 RX ADMIN — WARFARIN SODIUM 5 MG: 5 TABLET ORAL at 17:10

## 2017-06-03 RX ADMIN — CITALOPRAM HYDROBROMIDE 20 MG: 20 TABLET ORAL at 10:02

## 2017-06-03 RX ADMIN — METFORMIN HYDROCHLORIDE 500 MG: 500 TABLET, FILM COATED ORAL at 10:02

## 2017-06-03 RX ADMIN — METFORMIN HYDROCHLORIDE 500 MG: 500 TABLET, FILM COATED ORAL at 17:10

## 2017-06-03 RX ADMIN — OXYCODONE HYDROCHLORIDE AND ACETAMINOPHEN 500 MG: 500 TABLET ORAL at 10:02

## 2017-06-03 RX ADMIN — PANTOPRAZOLE SODIUM 40 MG: 40 TABLET, DELAYED RELEASE ORAL at 05:42

## 2017-06-03 RX ADMIN — POTASSIUM CHLORIDE 40 MEQ: 400 INJECTION, SOLUTION INTRAVENOUS at 10:39

## 2017-06-03 RX ADMIN — ATORVASTATIN CALCIUM 40 MG: 40 TABLET, FILM COATED ORAL at 17:10

## 2017-06-03 RX ADMIN — ENOXAPARIN SODIUM 100 MG: 100 INJECTION SUBCUTANEOUS at 20:21

## 2017-06-04 LAB
ANION GAP SERPL CALCULATED.3IONS-SCNC: 4 MMOL/L (ref 4–13)
BUN SERPL-MCNC: 6 MG/DL (ref 5–25)
CALCIUM SERPL-MCNC: 8.1 MG/DL (ref 8.3–10.1)
CHLORIDE SERPL-SCNC: 101 MMOL/L (ref 100–108)
CO2 SERPL-SCNC: 37 MMOL/L (ref 21–32)
CREAT SERPL-MCNC: 0.75 MG/DL (ref 0.6–1.3)
GFR SERPL CREATININE-BSD FRML MDRD: >60 ML/MIN/1.73SQ M
GLUCOSE SERPL-MCNC: 101 MG/DL (ref 65–140)
GLUCOSE SERPL-MCNC: 140 MG/DL (ref 65–140)
GLUCOSE SERPL-MCNC: 142 MG/DL (ref 65–140)
GLUCOSE SERPL-MCNC: 158 MG/DL (ref 65–140)
GLUCOSE SERPL-MCNC: 88 MG/DL (ref 65–140)
INR PPP: 2.06 (ref 0.86–1.16)
POTASSIUM SERPL-SCNC: 2.8 MMOL/L (ref 3.5–5.3)
PROTHROMBIN TIME: 23.4 SECONDS (ref 12.1–14.4)
SODIUM SERPL-SCNC: 142 MMOL/L (ref 136–145)

## 2017-06-04 PROCEDURE — 80048 BASIC METABOLIC PNL TOTAL CA: CPT | Performed by: HOSPITALIST

## 2017-06-04 PROCEDURE — 85610 PROTHROMBIN TIME: CPT | Performed by: HOSPITALIST

## 2017-06-04 PROCEDURE — 82948 REAGENT STRIP/BLOOD GLUCOSE: CPT

## 2017-06-04 RX ORDER — POTASSIUM CHLORIDE 29.8 MG/ML
40 INJECTION INTRAVENOUS ONCE
Status: COMPLETED | OUTPATIENT
Start: 2017-06-04 | End: 2017-06-04

## 2017-06-04 RX ORDER — POTASSIUM CHLORIDE 20 MEQ/1
40 TABLET, EXTENDED RELEASE ORAL ONCE
Status: COMPLETED | OUTPATIENT
Start: 2017-06-04 | End: 2017-06-04

## 2017-06-04 RX ADMIN — ALLOPURINOL 100 MG: 100 TABLET ORAL at 07:50

## 2017-06-04 RX ADMIN — WARFARIN SODIUM 5 MG: 5 TABLET ORAL at 16:58

## 2017-06-04 RX ADMIN — ENOXAPARIN SODIUM 100 MG: 100 INJECTION SUBCUTANEOUS at 07:50

## 2017-06-04 RX ADMIN — POTASSIUM CHLORIDE 40 MEQ: 1500 TABLET, EXTENDED RELEASE ORAL at 11:44

## 2017-06-04 RX ADMIN — ATORVASTATIN CALCIUM 40 MG: 40 TABLET, FILM COATED ORAL at 16:58

## 2017-06-04 RX ADMIN — CEFTRIAXONE 2000 MG: 2 INJECTION, POWDER, FOR SOLUTION INTRAMUSCULAR; INTRAVENOUS at 16:57

## 2017-06-04 RX ADMIN — OXYCODONE HYDROCHLORIDE AND ACETAMINOPHEN 2 TABLET: 5; 325 TABLET ORAL at 14:40

## 2017-06-04 RX ADMIN — METFORMIN HYDROCHLORIDE 500 MG: 500 TABLET, FILM COATED ORAL at 07:50

## 2017-06-04 RX ADMIN — OXYCODONE HYDROCHLORIDE AND ACETAMINOPHEN 2 TABLET: 5; 325 TABLET ORAL at 21:30

## 2017-06-04 RX ADMIN — ALLOPURINOL 100 MG: 100 TABLET ORAL at 16:58

## 2017-06-04 RX ADMIN — CITALOPRAM HYDROBROMIDE 20 MG: 20 TABLET ORAL at 07:50

## 2017-06-04 RX ADMIN — OXYCODONE HYDROCHLORIDE AND ACETAMINOPHEN 500 MG: 500 TABLET ORAL at 07:50

## 2017-06-04 RX ADMIN — PANTOPRAZOLE SODIUM 40 MG: 40 TABLET, DELAYED RELEASE ORAL at 05:05

## 2017-06-04 RX ADMIN — METFORMIN HYDROCHLORIDE 500 MG: 500 TABLET, FILM COATED ORAL at 16:58

## 2017-06-04 RX ADMIN — OXYCODONE HYDROCHLORIDE AND ACETAMINOPHEN 2 TABLET: 5; 325 TABLET ORAL at 05:18

## 2017-06-04 RX ADMIN — FUROSEMIDE 80 MG: 40 TABLET ORAL at 07:50

## 2017-06-04 RX ADMIN — OXYCODONE HYDROCHLORIDE AND ACETAMINOPHEN 2 TABLET: 5; 325 TABLET ORAL at 10:33

## 2017-06-04 RX ADMIN — ENOXAPARIN SODIUM 100 MG: 100 INJECTION SUBCUTANEOUS at 21:27

## 2017-06-04 RX ADMIN — POTASSIUM CHLORIDE 40 MEQ: 400 INJECTION, SOLUTION INTRAVENOUS at 12:47

## 2017-06-05 VITALS
DIASTOLIC BLOOD PRESSURE: 57 MMHG | RESPIRATION RATE: 18 BRPM | HEIGHT: 59 IN | SYSTOLIC BLOOD PRESSURE: 128 MMHG | OXYGEN SATURATION: 93 % | WEIGHT: 257.28 LBS | BODY MASS INDEX: 51.87 KG/M2 | HEART RATE: 93 BPM | TEMPERATURE: 98.1 F

## 2017-06-05 PROBLEM — M46.20 VERTEBRAL OSTEOMYELITIS (HCC): Status: ACTIVE | Noted: 2017-05-23

## 2017-06-05 LAB
ANION GAP SERPL CALCULATED.3IONS-SCNC: 5 MMOL/L (ref 4–13)
BUN SERPL-MCNC: 9 MG/DL (ref 5–25)
CALCIUM SERPL-MCNC: 8.1 MG/DL (ref 8.3–10.1)
CHLORIDE SERPL-SCNC: 102 MMOL/L (ref 100–108)
CO2 SERPL-SCNC: 35 MMOL/L (ref 21–32)
CREAT SERPL-MCNC: 0.85 MG/DL (ref 0.6–1.3)
GFR SERPL CREATININE-BSD FRML MDRD: >60 ML/MIN/1.73SQ M
GLUCOSE SERPL-MCNC: 104 MG/DL (ref 65–140)
GLUCOSE SERPL-MCNC: 139 MG/DL (ref 65–140)
GLUCOSE SERPL-MCNC: 170 MG/DL (ref 65–140)
GLUCOSE SERPL-MCNC: 99 MG/DL (ref 65–140)
INR PPP: 2.6 (ref 0.86–1.16)
POTASSIUM SERPL-SCNC: 3.2 MMOL/L (ref 3.5–5.3)
PROTHROMBIN TIME: 28.2 SECONDS (ref 12.1–14.4)
SODIUM SERPL-SCNC: 142 MMOL/L (ref 136–145)

## 2017-06-05 PROCEDURE — 97530 THERAPEUTIC ACTIVITIES: CPT

## 2017-06-05 PROCEDURE — 97110 THERAPEUTIC EXERCISES: CPT

## 2017-06-05 PROCEDURE — 97116 GAIT TRAINING THERAPY: CPT

## 2017-06-05 PROCEDURE — 82948 REAGENT STRIP/BLOOD GLUCOSE: CPT

## 2017-06-05 PROCEDURE — 85610 PROTHROMBIN TIME: CPT | Performed by: HOSPITALIST

## 2017-06-05 PROCEDURE — 80048 BASIC METABOLIC PNL TOTAL CA: CPT | Performed by: HOSPITALIST

## 2017-06-05 RX ORDER — OXYCODONE HYDROCHLORIDE 10 MG/1
10 TABLET ORAL EVERY 6 HOURS PRN
Qty: 30 TABLET | Refills: 0 | Status: ON HOLD | OUTPATIENT
Start: 2017-06-05 | End: 2017-07-15

## 2017-06-05 RX ADMIN — ENOXAPARIN SODIUM 100 MG: 100 INJECTION SUBCUTANEOUS at 08:22

## 2017-06-05 RX ADMIN — NYSTATIN 1 APPLICATION: 100000 POWDER TOPICAL at 08:22

## 2017-06-05 RX ADMIN — OXYCODONE HYDROCHLORIDE AND ACETAMINOPHEN 2 TABLET: 5; 325 TABLET ORAL at 06:20

## 2017-06-05 RX ADMIN — CEFTRIAXONE 2000 MG: 2 INJECTION, POWDER, FOR SOLUTION INTRAMUSCULAR; INTRAVENOUS at 14:45

## 2017-06-05 RX ADMIN — FUROSEMIDE 80 MG: 40 TABLET ORAL at 08:22

## 2017-06-05 RX ADMIN — OXYCODONE HYDROCHLORIDE AND ACETAMINOPHEN 2 TABLET: 5; 325 TABLET ORAL at 14:12

## 2017-06-05 RX ADMIN — METFORMIN HYDROCHLORIDE 500 MG: 500 TABLET, FILM COATED ORAL at 08:22

## 2017-06-05 RX ADMIN — PANTOPRAZOLE SODIUM 40 MG: 40 TABLET, DELAYED RELEASE ORAL at 06:20

## 2017-06-05 RX ADMIN — CITALOPRAM HYDROBROMIDE 20 MG: 20 TABLET ORAL at 08:22

## 2017-06-05 RX ADMIN — OXYCODONE HYDROCHLORIDE AND ACETAMINOPHEN 2 TABLET: 5; 325 TABLET ORAL at 01:25

## 2017-06-05 RX ADMIN — ALLOPURINOL 100 MG: 100 TABLET ORAL at 08:22

## 2017-06-05 RX ADMIN — OXYCODONE HYDROCHLORIDE AND ACETAMINOPHEN 500 MG: 500 TABLET ORAL at 08:22

## 2017-06-06 ENCOUNTER — GENERIC CONVERSION - ENCOUNTER (OUTPATIENT)
Dept: OTHER | Facility: OTHER | Age: 72
End: 2017-06-06

## 2017-06-07 ENCOUNTER — GENERIC CONVERSION - ENCOUNTER (OUTPATIENT)
Dept: OTHER | Facility: OTHER | Age: 72
End: 2017-06-07

## 2017-06-12 ENCOUNTER — ALLSCRIPTS OFFICE VISIT (OUTPATIENT)
Dept: OTHER | Facility: OTHER | Age: 72
End: 2017-06-12

## 2017-06-12 DIAGNOSIS — A40.9 STREPTOCOCCAL SEPSIS (HCC): ICD-10-CM

## 2017-06-12 DIAGNOSIS — M46.20 OSTEOMYELITIS OF VERTEBRA (HCC): ICD-10-CM

## 2017-06-12 DIAGNOSIS — A49.1 STREPTOCOCCUS INFECTION: ICD-10-CM

## 2017-06-12 DIAGNOSIS — E87.6 HYPOKALEMIA: ICD-10-CM

## 2017-06-12 DIAGNOSIS — N18.30 CHRONIC KIDNEY DISEASE, STAGE III (MODERATE) (HCC): ICD-10-CM

## 2017-06-12 DIAGNOSIS — E11.9 TYPE 2 DIABETES MELLITUS WITHOUT COMPLICATIONS (HCC): ICD-10-CM

## 2017-06-13 ENCOUNTER — GENERIC CONVERSION - ENCOUNTER (OUTPATIENT)
Dept: OTHER | Facility: OTHER | Age: 72
End: 2017-06-13

## 2017-06-19 ENCOUNTER — GENERIC CONVERSION - ENCOUNTER (OUTPATIENT)
Dept: OTHER | Facility: OTHER | Age: 72
End: 2017-06-19

## 2017-06-21 ENCOUNTER — GENERIC CONVERSION - ENCOUNTER (OUTPATIENT)
Dept: OTHER | Facility: OTHER | Age: 72
End: 2017-06-21

## 2017-06-22 ENCOUNTER — ALLSCRIPTS OFFICE VISIT (OUTPATIENT)
Dept: OTHER | Facility: OTHER | Age: 72
End: 2017-06-22

## 2017-06-23 ENCOUNTER — GENERIC CONVERSION - ENCOUNTER (OUTPATIENT)
Dept: OTHER | Facility: OTHER | Age: 72
End: 2017-06-23

## 2017-06-28 ENCOUNTER — GENERIC CONVERSION - ENCOUNTER (OUTPATIENT)
Dept: OTHER | Facility: OTHER | Age: 72
End: 2017-06-28

## 2017-06-28 ENCOUNTER — APPOINTMENT (EMERGENCY)
Dept: RADIOLOGY | Facility: HOSPITAL | Age: 72
DRG: 871 | End: 2017-06-28
Payer: COMMERCIAL

## 2017-06-28 ENCOUNTER — HOSPITAL ENCOUNTER (INPATIENT)
Facility: HOSPITAL | Age: 72
LOS: 17 days | Discharge: NURSING, REHAB OR LONG-TERM CARE | DRG: 871 | End: 2017-07-15
Attending: EMERGENCY MEDICINE | Admitting: INTERNAL MEDICINE
Payer: COMMERCIAL
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
## 2017-06-28 DIAGNOSIS — R26.2 AMBULATORY DYSFUNCTION: ICD-10-CM

## 2017-06-28 DIAGNOSIS — B35.1 ONYCHOMYCOSIS: ICD-10-CM

## 2017-06-28 DIAGNOSIS — A40.9 STREPTOCOCCAL SEPSIS (HCC): ICD-10-CM

## 2017-06-28 DIAGNOSIS — R79.89 ELEVATED BRAIN NATRIURETIC PEPTIDE (BNP) LEVEL: ICD-10-CM

## 2017-06-28 DIAGNOSIS — M86.9 OSTEOMYELITIS (HCC): ICD-10-CM

## 2017-06-28 DIAGNOSIS — I50.9 CHF EXACERBATION (HCC): ICD-10-CM

## 2017-06-28 PROBLEM — R53.1 ASTHENIA: Status: ACTIVE | Noted: 2017-06-28

## 2017-06-28 PROBLEM — I50.33 ACUTE ON CHRONIC DIASTOLIC CONGESTIVE HEART FAILURE (HCC): Status: ACTIVE | Noted: 2017-06-28

## 2017-06-28 LAB
ALBUMIN SERPL BCP-MCNC: 1.5 G/DL (ref 3.5–5)
ALP SERPL-CCNC: 214 U/L (ref 46–116)
ALT SERPL W P-5'-P-CCNC: 17 U/L (ref 12–78)
ANION GAP SERPL CALCULATED.3IONS-SCNC: 5 MMOL/L (ref 4–13)
APTT PPP: 71 SECONDS (ref 23–35)
AST SERPL W P-5'-P-CCNC: 36 U/L (ref 5–45)
ATRIAL RATE: 110 BPM
ATRIAL RATE: 92 BPM
ATRIAL RATE: 97 BPM
BACTERIA UR QL AUTO: ABNORMAL /HPF
BASOPHILS # BLD AUTO: 0.03 THOUSANDS/ΜL (ref 0–0.1)
BASOPHILS NFR BLD AUTO: 0 % (ref 0–1)
BILIRUB SERPL-MCNC: 0.52 MG/DL (ref 0.2–1)
BILIRUB UR QL STRIP: NEGATIVE
BUN SERPL-MCNC: 14 MG/DL (ref 5–25)
CALCIUM SERPL-MCNC: 8.8 MG/DL (ref 8.3–10.1)
CHLORIDE SERPL-SCNC: 100 MMOL/L (ref 100–108)
CLARITY UR: CLEAR
CO2 SERPL-SCNC: 29 MMOL/L (ref 21–32)
COARSE GRAN CASTS URNS QL MICRO: ABNORMAL /LPF
COLOR UR: YELLOW
CREAT SERPL-MCNC: 0.99 MG/DL (ref 0.6–1.3)
EOSINOPHIL # BLD AUTO: 0.16 THOUSAND/ΜL (ref 0–0.61)
EOSINOPHIL NFR BLD AUTO: 1 % (ref 0–6)
ERYTHROCYTE [DISTWIDTH] IN BLOOD BY AUTOMATED COUNT: 19.8 % (ref 11.6–15.1)
GFR SERPL CREATININE-BSD FRML MDRD: 55.3 ML/MIN/1.73SQ M
GLUCOSE SERPL-MCNC: 114 MG/DL (ref 65–140)
GLUCOSE SERPL-MCNC: 117 MG/DL (ref 65–140)
GLUCOSE UR STRIP-MCNC: NEGATIVE MG/DL
HCT VFR BLD AUTO: 31 % (ref 34.8–46.1)
HGB BLD-MCNC: 9.8 G/DL (ref 11.5–15.4)
HGB UR QL STRIP.AUTO: ABNORMAL
INR PPP: 2.01 (ref 0.86–1.16)
KETONES UR STRIP-MCNC: ABNORMAL MG/DL
LACTATE SERPL-SCNC: 1 MMOL/L (ref 0.5–2)
LEUKOCYTE ESTERASE UR QL STRIP: ABNORMAL
LIPASE SERPL-CCNC: 111 U/L (ref 73–393)
LYMPHOCYTES # BLD AUTO: 1.01 THOUSANDS/ΜL (ref 0.6–4.47)
LYMPHOCYTES NFR BLD AUTO: 8 % (ref 14–44)
MCH RBC QN AUTO: 29.6 PG (ref 26.8–34.3)
MCHC RBC AUTO-ENTMCNC: 31.6 G/DL (ref 31.4–37.4)
MCV RBC AUTO: 94 FL (ref 82–98)
MONOCYTES # BLD AUTO: 1.11 THOUSAND/ΜL (ref 0.17–1.22)
MONOCYTES NFR BLD AUTO: 9 % (ref 4–12)
NEUTROPHILS # BLD AUTO: 10.73 THOUSANDS/ΜL (ref 1.85–7.62)
NEUTS SEG NFR BLD AUTO: 82 % (ref 43–75)
NITRITE UR QL STRIP: NEGATIVE
NON-SQ EPI CELLS URNS QL MICRO: ABNORMAL /HPF
NRBC BLD AUTO-RTO: 0 /100 WBCS
NT-PROBNP SERPL-MCNC: 8005 PG/ML
P AXIS: 0 DEGREES
P AXIS: 29 DEGREES
P AXIS: 54 DEGREES
PH UR STRIP.AUTO: 6 [PH] (ref 4.5–8)
PLATELET # BLD AUTO: 406 THOUSANDS/UL (ref 149–390)
PMV BLD AUTO: 8.9 FL (ref 8.9–12.7)
POTASSIUM SERPL-SCNC: 3.6 MMOL/L (ref 3.5–5.3)
PR INTERVAL: 148 MS
PR INTERVAL: 176 MS
PR INTERVAL: 472 MS
PROT SERPL-MCNC: 7.9 G/DL (ref 6.4–8.2)
PROT UR STRIP-MCNC: ABNORMAL MG/DL
PROTHROMBIN TIME: 23 SECONDS (ref 12.1–14.4)
QRS AXIS: -55 DEGREES
QRS AXIS: -66 DEGREES
QRS AXIS: 231 DEGREES
QRSD INTERVAL: 124 MS
QRSD INTERVAL: 150 MS
QRSD INTERVAL: 150 MS
QT INTERVAL: 326 MS
QT INTERVAL: 360 MS
QT INTERVAL: 372 MS
QTC INTERVAL: 441 MS
QTC INTERVAL: 445 MS
QTC INTERVAL: 472 MS
RBC # BLD AUTO: 3.31 MILLION/UL (ref 3.81–5.12)
RBC #/AREA URNS AUTO: ABNORMAL /HPF
SODIUM SERPL-SCNC: 134 MMOL/L (ref 136–145)
SP GR UR STRIP.AUTO: 1.01 (ref 1–1.03)
SPECIMEN SOURCE: NORMAL
T WAVE AXIS: 15 DEGREES
T WAVE AXIS: 182 DEGREES
T WAVE AXIS: 21 DEGREES
TROPONIN I BLD-MCNC: 0.04 NG/ML (ref 0–0.08)
UROBILINOGEN UR QL STRIP.AUTO: 0.2 E.U./DL
VENTRICULAR RATE: 110 BPM
VENTRICULAR RATE: 92 BPM
VENTRICULAR RATE: 97 BPM
WBC # BLD AUTO: 13.04 THOUSAND/UL (ref 4.31–10.16)
WBC #/AREA URNS AUTO: ABNORMAL /HPF

## 2017-06-28 PROCEDURE — 81002 URINALYSIS NONAUTO W/O SCOPE: CPT | Performed by: EMERGENCY MEDICINE

## 2017-06-28 PROCEDURE — 80053 COMPREHEN METABOLIC PANEL: CPT | Performed by: EMERGENCY MEDICINE

## 2017-06-28 PROCEDURE — 83880 ASSAY OF NATRIURETIC PEPTIDE: CPT | Performed by: EMERGENCY MEDICINE

## 2017-06-28 PROCEDURE — 82948 REAGENT STRIP/BLOOD GLUCOSE: CPT

## 2017-06-28 PROCEDURE — 87086 URINE CULTURE/COLONY COUNT: CPT

## 2017-06-28 PROCEDURE — 36415 COLL VENOUS BLD VENIPUNCTURE: CPT | Performed by: EMERGENCY MEDICINE

## 2017-06-28 PROCEDURE — 85025 COMPLETE CBC W/AUTO DIFF WBC: CPT | Performed by: EMERGENCY MEDICINE

## 2017-06-28 PROCEDURE — 83690 ASSAY OF LIPASE: CPT | Performed by: EMERGENCY MEDICINE

## 2017-06-28 PROCEDURE — 83605 ASSAY OF LACTIC ACID: CPT | Performed by: EMERGENCY MEDICINE

## 2017-06-28 PROCEDURE — 93005 ELECTROCARDIOGRAM TRACING: CPT

## 2017-06-28 PROCEDURE — 81001 URINALYSIS AUTO W/SCOPE: CPT

## 2017-06-28 PROCEDURE — 85610 PROTHROMBIN TIME: CPT | Performed by: EMERGENCY MEDICINE

## 2017-06-28 PROCEDURE — 99285 EMERGENCY DEPT VISIT HI MDM: CPT

## 2017-06-28 PROCEDURE — 71020 HB CHEST X-RAY 2VW FRONTAL&LATL: CPT

## 2017-06-28 PROCEDURE — 85730 THROMBOPLASTIN TIME PARTIAL: CPT | Performed by: EMERGENCY MEDICINE

## 2017-06-28 PROCEDURE — 84484 ASSAY OF TROPONIN QUANT: CPT

## 2017-06-28 RX ORDER — IRON POLYSACCHARIDE COMPLEX 150 MG
150 CAPSULE ORAL 2 TIMES DAILY
Status: DISCONTINUED | OUTPATIENT
Start: 2017-06-28 | End: 2017-07-02

## 2017-06-28 RX ORDER — CITALOPRAM 20 MG/1
20 TABLET ORAL DAILY
Status: DISCONTINUED | OUTPATIENT
Start: 2017-06-29 | End: 2017-07-01

## 2017-06-28 RX ORDER — PROPRANOLOL/HYDROCHLOROTHIAZID 40 MG-25MG
500 TABLET ORAL 4 TIMES DAILY
Status: DISCONTINUED | OUTPATIENT
Start: 2017-06-28 | End: 2017-06-28 | Stop reason: RX

## 2017-06-28 RX ORDER — ALBUTEROL SULFATE 90 UG/1
2 AEROSOL, METERED RESPIRATORY (INHALATION) EVERY 4 HOURS PRN
Status: DISCONTINUED | OUTPATIENT
Start: 2017-06-28 | End: 2017-07-15 | Stop reason: HOSPADM

## 2017-06-28 RX ORDER — ASCORBIC ACID 500 MG
500 TABLET ORAL DAILY
Status: DISCONTINUED | OUTPATIENT
Start: 2017-06-29 | End: 2017-07-02

## 2017-06-28 RX ORDER — POTASSIUM CHLORIDE 20 MEQ/1
20 TABLET, EXTENDED RELEASE ORAL DAILY
Status: DISCONTINUED | OUTPATIENT
Start: 2017-06-29 | End: 2017-06-29

## 2017-06-28 RX ORDER — ONDANSETRON 2 MG/ML
4 INJECTION INTRAMUSCULAR; INTRAVENOUS EVERY 6 HOURS PRN
Status: DISCONTINUED | OUTPATIENT
Start: 2017-06-28 | End: 2017-07-04

## 2017-06-28 RX ORDER — ALLOPURINOL 100 MG/1
100 TABLET ORAL 2 TIMES DAILY
Status: DISCONTINUED | OUTPATIENT
Start: 2017-06-28 | End: 2017-07-15 | Stop reason: HOSPADM

## 2017-06-28 RX ORDER — ACETAMINOPHEN 325 MG/1
650 TABLET ORAL EVERY 4 HOURS PRN
Status: DISCONTINUED | OUTPATIENT
Start: 2017-06-28 | End: 2017-07-15 | Stop reason: HOSPADM

## 2017-06-28 RX ORDER — OXYCODONE HYDROCHLORIDE 5 MG/1
10 TABLET ORAL EVERY 6 HOURS PRN
Status: DISCONTINUED | OUTPATIENT
Start: 2017-06-28 | End: 2017-06-29

## 2017-06-28 RX ORDER — WARFARIN SODIUM 5 MG/1
5 TABLET ORAL
Status: DISCONTINUED | OUTPATIENT
Start: 2017-06-28 | End: 2017-06-29

## 2017-06-28 RX ORDER — MAGNESIUM HYDROXIDE/ALUMINUM HYDROXICE/SIMETHICONE 120; 1200; 1200 MG/30ML; MG/30ML; MG/30ML
30 SUSPENSION ORAL EVERY 6 HOURS PRN
Status: DISCONTINUED | OUTPATIENT
Start: 2017-06-28 | End: 2017-07-15 | Stop reason: HOSPADM

## 2017-06-28 RX ORDER — ATORVASTATIN CALCIUM 40 MG/1
40 TABLET, FILM COATED ORAL
Status: DISCONTINUED | OUTPATIENT
Start: 2017-06-29 | End: 2017-07-15 | Stop reason: HOSPADM

## 2017-06-28 RX ORDER — FUROSEMIDE 10 MG/ML
40 INJECTION INTRAMUSCULAR; INTRAVENOUS 2 TIMES DAILY
Status: DISCONTINUED | OUTPATIENT
Start: 2017-06-28 | End: 2017-06-29

## 2017-06-28 RX ORDER — PANTOPRAZOLE SODIUM 40 MG/1
40 TABLET, DELAYED RELEASE ORAL
Status: DISCONTINUED | OUTPATIENT
Start: 2017-06-29 | End: 2017-07-02

## 2017-06-28 RX ORDER — SPIRONOLACTONE 25 MG/1
25 TABLET ORAL DAILY
Status: DISCONTINUED | OUTPATIENT
Start: 2017-06-29 | End: 2017-07-08

## 2017-06-28 RX ORDER — ERGOCALCIFEROL 1.25 MG/1
50000 CAPSULE ORAL WEEKLY
Status: DISCONTINUED | OUTPATIENT
Start: 2017-06-28 | End: 2017-06-29

## 2017-06-28 RX ADMIN — WARFARIN SODIUM 5 MG: 5 TABLET ORAL at 20:38

## 2017-06-28 RX ADMIN — ERGOCALCIFEROL 50000 UNITS: 1.25 CAPSULE ORAL at 20:38

## 2017-06-28 RX ADMIN — CEFTRIAXONE 2000 MG: 2 INJECTION, POWDER, FOR SOLUTION INTRAMUSCULAR; INTRAVENOUS at 20:45

## 2017-06-28 RX ADMIN — ALLOPURINOL 100 MG: 100 TABLET ORAL at 20:38

## 2017-06-28 RX ADMIN — Medication 150 MG: at 20:38

## 2017-06-28 RX ADMIN — FUROSEMIDE 40 MG: 10 INJECTION, SOLUTION INTRAMUSCULAR; INTRAVENOUS at 20:38

## 2017-06-29 ENCOUNTER — APPOINTMENT (INPATIENT)
Dept: PHYSICAL THERAPY | Facility: HOSPITAL | Age: 72
DRG: 871 | End: 2017-06-29
Payer: COMMERCIAL

## 2017-06-29 ENCOUNTER — APPOINTMENT (INPATIENT)
Dept: NON INVASIVE DIAGNOSTICS | Facility: HOSPITAL | Age: 72
DRG: 871 | End: 2017-06-29
Payer: COMMERCIAL

## 2017-06-29 LAB
ANION GAP SERPL CALCULATED.3IONS-SCNC: 6 MMOL/L (ref 4–13)
BACTERIA UR CULT: NORMAL
BASOPHILS # BLD AUTO: 0.03 THOUSANDS/ΜL (ref 0–0.1)
BASOPHILS NFR BLD AUTO: 0 % (ref 0–1)
BUN SERPL-MCNC: 12 MG/DL (ref 5–25)
CALCIUM SERPL-MCNC: 8.5 MG/DL (ref 8.3–10.1)
CHLORIDE SERPL-SCNC: 99 MMOL/L (ref 100–108)
CO2 SERPL-SCNC: 29 MMOL/L (ref 21–32)
CREAT SERPL-MCNC: 0.85 MG/DL (ref 0.6–1.3)
EOSINOPHIL # BLD AUTO: 0.11 THOUSAND/ΜL (ref 0–0.61)
EOSINOPHIL NFR BLD AUTO: 1 % (ref 0–6)
ERYTHROCYTE [DISTWIDTH] IN BLOOD BY AUTOMATED COUNT: 19.4 % (ref 11.6–15.1)
ERYTHROCYTE [DISTWIDTH] IN BLOOD BY AUTOMATED COUNT: 19.6 % (ref 11.6–15.1)
GFR SERPL CREATININE-BSD FRML MDRD: >60 ML/MIN/1.73SQ M
GLUCOSE SERPL-MCNC: 111 MG/DL (ref 65–140)
GLUCOSE SERPL-MCNC: 114 MG/DL (ref 65–140)
GLUCOSE SERPL-MCNC: 140 MG/DL (ref 65–140)
GLUCOSE SERPL-MCNC: 171 MG/DL (ref 65–140)
GLUCOSE SERPL-MCNC: 98 MG/DL (ref 65–140)
HCT VFR BLD AUTO: 23.8 % (ref 34.8–46.1)
HCT VFR BLD AUTO: 28.7 % (ref 34.8–46.1)
HGB BLD-MCNC: 7.5 G/DL (ref 11.5–15.4)
HGB BLD-MCNC: 9.1 G/DL (ref 11.5–15.4)
INR PPP: 2.33 (ref 0.86–1.16)
LYMPHOCYTES # BLD AUTO: 0.62 THOUSANDS/ΜL (ref 0.6–4.47)
LYMPHOCYTES NFR BLD AUTO: 5 % (ref 14–44)
MAGNESIUM SERPL-MCNC: 1.6 MG/DL (ref 1.6–2.6)
MCH RBC QN AUTO: 29.4 PG (ref 26.8–34.3)
MCH RBC QN AUTO: 29.5 PG (ref 26.8–34.3)
MCHC RBC AUTO-ENTMCNC: 31.5 G/DL (ref 31.4–37.4)
MCHC RBC AUTO-ENTMCNC: 31.7 G/DL (ref 31.4–37.4)
MCV RBC AUTO: 93 FL (ref 82–98)
MCV RBC AUTO: 93 FL (ref 82–98)
MONOCYTES # BLD AUTO: 1.21 THOUSAND/ΜL (ref 0.17–1.22)
MONOCYTES NFR BLD AUTO: 9 % (ref 4–12)
NEUTROPHILS # BLD AUTO: 11.53 THOUSANDS/ΜL (ref 1.85–7.62)
NEUTS SEG NFR BLD AUTO: 85 % (ref 43–75)
NRBC BLD AUTO-RTO: 0 /100 WBCS
PLATELET # BLD AUTO: 339 THOUSANDS/UL (ref 149–390)
PLATELET # BLD AUTO: 341 THOUSANDS/UL (ref 149–390)
PMV BLD AUTO: 8.4 FL (ref 8.9–12.7)
PMV BLD AUTO: 8.7 FL (ref 8.9–12.7)
POTASSIUM SERPL-SCNC: 3.1 MMOL/L (ref 3.5–5.3)
PROTHROMBIN TIME: 25.8 SECONDS (ref 12.1–14.4)
RBC # BLD AUTO: 2.55 MILLION/UL (ref 3.81–5.12)
RBC # BLD AUTO: 3.08 MILLION/UL (ref 3.81–5.12)
SODIUM SERPL-SCNC: 134 MMOL/L (ref 136–145)
WBC # BLD AUTO: 13.5 THOUSAND/UL (ref 4.31–10.16)
WBC # BLD AUTO: 15.53 THOUSAND/UL (ref 4.31–10.16)

## 2017-06-29 PROCEDURE — G8978 MOBILITY CURRENT STATUS: HCPCS

## 2017-06-29 PROCEDURE — 80048 BASIC METABOLIC PNL TOTAL CA: CPT | Performed by: HOSPITALIST

## 2017-06-29 PROCEDURE — 97163 PT EVAL HIGH COMPLEX 45 MIN: CPT

## 2017-06-29 PROCEDURE — 82948 REAGENT STRIP/BLOOD GLUCOSE: CPT

## 2017-06-29 PROCEDURE — 85027 COMPLETE CBC AUTOMATED: CPT | Performed by: PHYSICIAN ASSISTANT

## 2017-06-29 PROCEDURE — 0HBRXZZ EXCISION OF TOE NAIL, EXTERNAL APPROACH: ICD-10-PCS | Performed by: PODIATRIST

## 2017-06-29 PROCEDURE — 85025 COMPLETE CBC W/AUTO DIFF WBC: CPT | Performed by: INTERNAL MEDICINE

## 2017-06-29 PROCEDURE — 85610 PROTHROMBIN TIME: CPT | Performed by: HOSPITALIST

## 2017-06-29 PROCEDURE — G8979 MOBILITY GOAL STATUS: HCPCS

## 2017-06-29 PROCEDURE — 83735 ASSAY OF MAGNESIUM: CPT | Performed by: INTERNAL MEDICINE

## 2017-06-29 RX ORDER — WARFARIN SODIUM 7.5 MG/1
7.5 TABLET ORAL
Status: COMPLETED | OUTPATIENT
Start: 2017-06-29 | End: 2017-06-29

## 2017-06-29 RX ORDER — WARFARIN SODIUM 5 MG/1
5 TABLET ORAL
Status: DISCONTINUED | OUTPATIENT
Start: 2017-06-30 | End: 2017-06-29

## 2017-06-29 RX ORDER — WARFARIN SODIUM 6 MG/1
6 TABLET ORAL
Status: DISCONTINUED | OUTPATIENT
Start: 2017-06-30 | End: 2017-07-02

## 2017-06-29 RX ORDER — POTASSIUM CHLORIDE 20 MEQ/1
40 TABLET, EXTENDED RELEASE ORAL 3 TIMES DAILY
Status: DISCONTINUED | OUTPATIENT
Start: 2017-06-29 | End: 2017-06-30

## 2017-06-29 RX ORDER — FUROSEMIDE 10 MG/ML
40 INJECTION INTRAMUSCULAR; INTRAVENOUS DAILY
Status: DISCONTINUED | OUTPATIENT
Start: 2017-06-30 | End: 2017-06-30

## 2017-06-29 RX ORDER — OXYCODONE HYDROCHLORIDE 5 MG/1
5 TABLET ORAL EVERY 6 HOURS PRN
Status: DISCONTINUED | OUTPATIENT
Start: 2017-06-29 | End: 2017-07-04

## 2017-06-29 RX ADMIN — SPIRONOLACTONE 25 MG: 25 TABLET, FILM COATED ORAL at 09:37

## 2017-06-29 RX ADMIN — OXYCODONE HYDROCHLORIDE AND ACETAMINOPHEN 500 MG: 500 TABLET ORAL at 09:37

## 2017-06-29 RX ADMIN — Medication 150 MG: at 10:57

## 2017-06-29 RX ADMIN — ALLOPURINOL 100 MG: 100 TABLET ORAL at 17:19

## 2017-06-29 RX ADMIN — Medication 150 MG: at 17:19

## 2017-06-29 RX ADMIN — POTASSIUM CHLORIDE 20 MEQ: 1500 TABLET, EXTENDED RELEASE ORAL at 09:37

## 2017-06-29 RX ADMIN — ALLOPURINOL 100 MG: 100 TABLET ORAL at 09:37

## 2017-06-29 RX ADMIN — POTASSIUM CHLORIDE 40 MEQ: 1500 TABLET, EXTENDED RELEASE ORAL at 22:19

## 2017-06-29 RX ADMIN — POTASSIUM CHLORIDE 40 MEQ: 1500 TABLET, EXTENDED RELEASE ORAL at 17:18

## 2017-06-29 RX ADMIN — ACETAMINOPHEN 650 MG: 325 TABLET, FILM COATED ORAL at 22:37

## 2017-06-29 RX ADMIN — PANTOPRAZOLE SODIUM 40 MG: 40 TABLET, DELAYED RELEASE ORAL at 05:24

## 2017-06-29 RX ADMIN — WARFARIN SODIUM 7.5 MG: 7.5 TABLET ORAL at 17:19

## 2017-06-29 RX ADMIN — OXYCODONE HYDROCHLORIDE 10 MG: 5 TABLET ORAL at 01:32

## 2017-06-29 RX ADMIN — INSULIN LISPRO 1 UNITS: 100 INJECTION, SOLUTION INTRAVENOUS; SUBCUTANEOUS at 22:14

## 2017-06-29 RX ADMIN — CITALOPRAM HYDROBROMIDE 20 MG: 20 TABLET ORAL at 09:37

## 2017-06-29 RX ADMIN — Medication 1 TABLET: at 09:37

## 2017-06-29 RX ADMIN — CEFTRIAXONE 2000 MG: 2 INJECTION, POWDER, FOR SOLUTION INTRAMUSCULAR; INTRAVENOUS at 20:06

## 2017-06-29 RX ADMIN — ATORVASTATIN CALCIUM 40 MG: 40 TABLET, FILM COATED ORAL at 17:19

## 2017-06-29 RX ADMIN — FUROSEMIDE 40 MG: 10 INJECTION, SOLUTION INTRAMUSCULAR; INTRAVENOUS at 09:37

## 2017-06-30 ENCOUNTER — APPOINTMENT (INPATIENT)
Dept: PHYSICAL THERAPY | Facility: HOSPITAL | Age: 72
DRG: 871 | End: 2017-06-30
Payer: COMMERCIAL

## 2017-06-30 LAB
ANION GAP SERPL CALCULATED.3IONS-SCNC: 5 MMOL/L (ref 4–13)
BUN SERPL-MCNC: 17 MG/DL (ref 5–25)
CALCIUM SERPL-MCNC: 8.8 MG/DL (ref 8.3–10.1)
CHLORIDE SERPL-SCNC: 100 MMOL/L (ref 100–108)
CO2 SERPL-SCNC: 30 MMOL/L (ref 21–32)
CREAT SERPL-MCNC: 0.93 MG/DL (ref 0.6–1.3)
GFR SERPL CREATININE-BSD FRML MDRD: 59.4 ML/MIN/1.73SQ M
GLUCOSE SERPL-MCNC: 101 MG/DL (ref 65–140)
GLUCOSE SERPL-MCNC: 111 MG/DL (ref 65–140)
GLUCOSE SERPL-MCNC: 116 MG/DL (ref 65–140)
GLUCOSE SERPL-MCNC: 119 MG/DL (ref 65–140)
GLUCOSE SERPL-MCNC: 248 MG/DL (ref 65–140)
IRON SATN MFR SERPL: 19 %
IRON SERPL-MCNC: 42 UG/DL (ref 50–170)
POTASSIUM SERPL-SCNC: 4.1 MMOL/L (ref 3.5–5.3)
SODIUM SERPL-SCNC: 135 MMOL/L (ref 136–145)
TIBC SERPL-MCNC: 224 UG/DL (ref 250–450)

## 2017-06-30 PROCEDURE — G8987 SELF CARE CURRENT STATUS: HCPCS

## 2017-06-30 PROCEDURE — G8988 SELF CARE GOAL STATUS: HCPCS

## 2017-06-30 PROCEDURE — 80048 BASIC METABOLIC PNL TOTAL CA: CPT | Performed by: INTERNAL MEDICINE

## 2017-06-30 PROCEDURE — 97112 NEUROMUSCULAR REEDUCATION: CPT

## 2017-06-30 PROCEDURE — 97167 OT EVAL HIGH COMPLEX 60 MIN: CPT

## 2017-06-30 PROCEDURE — 82948 REAGENT STRIP/BLOOD GLUCOSE: CPT

## 2017-06-30 PROCEDURE — 97530 THERAPEUTIC ACTIVITIES: CPT

## 2017-06-30 PROCEDURE — 83540 ASSAY OF IRON: CPT | Performed by: INTERNAL MEDICINE

## 2017-06-30 PROCEDURE — 83550 IRON BINDING TEST: CPT | Performed by: INTERNAL MEDICINE

## 2017-06-30 RX ORDER — POTASSIUM CHLORIDE 20 MEQ/1
20 TABLET, EXTENDED RELEASE ORAL DAILY
Status: DISCONTINUED | OUTPATIENT
Start: 2017-07-01 | End: 2017-07-04

## 2017-06-30 RX ORDER — FUROSEMIDE 40 MG/1
40 TABLET ORAL DAILY
Status: DISCONTINUED | OUTPATIENT
Start: 2017-06-30 | End: 2017-07-01

## 2017-06-30 RX ADMIN — ALLOPURINOL 100 MG: 100 TABLET ORAL at 09:49

## 2017-06-30 RX ADMIN — ATORVASTATIN CALCIUM 40 MG: 40 TABLET, FILM COATED ORAL at 17:08

## 2017-06-30 RX ADMIN — POTASSIUM CHLORIDE 40 MEQ: 1500 TABLET, EXTENDED RELEASE ORAL at 09:50

## 2017-06-30 RX ADMIN — ACETAMINOPHEN 650 MG: 325 TABLET, FILM COATED ORAL at 23:14

## 2017-06-30 RX ADMIN — WARFARIN SODIUM 6 MG: 6 TABLET ORAL at 17:08

## 2017-06-30 RX ADMIN — CEFTRIAXONE 2000 MG: 2 INJECTION, POWDER, FOR SOLUTION INTRAMUSCULAR; INTRAVENOUS at 22:08

## 2017-06-30 RX ADMIN — CITALOPRAM HYDROBROMIDE 20 MG: 20 TABLET ORAL at 09:50

## 2017-06-30 RX ADMIN — OXYCODONE HYDROCHLORIDE 5 MG: 5 TABLET ORAL at 17:08

## 2017-06-30 RX ADMIN — ACETAMINOPHEN 650 MG: 325 TABLET, FILM COATED ORAL at 03:40

## 2017-06-30 RX ADMIN — OXYCODONE HYDROCHLORIDE AND ACETAMINOPHEN 500 MG: 500 TABLET ORAL at 09:50

## 2017-06-30 RX ADMIN — SPIRONOLACTONE 25 MG: 25 TABLET, FILM COATED ORAL at 09:50

## 2017-06-30 RX ADMIN — Medication 150 MG: at 09:50

## 2017-06-30 RX ADMIN — INSULIN LISPRO 3 UNITS: 100 INJECTION, SOLUTION INTRAVENOUS; SUBCUTANEOUS at 12:31

## 2017-06-30 RX ADMIN — PANTOPRAZOLE SODIUM 40 MG: 40 TABLET, DELAYED RELEASE ORAL at 06:02

## 2017-06-30 RX ADMIN — OXYCODONE HYDROCHLORIDE 5 MG: 5 TABLET ORAL at 12:35

## 2017-06-30 RX ADMIN — ALLOPURINOL 100 MG: 100 TABLET ORAL at 17:08

## 2017-06-30 RX ADMIN — FUROSEMIDE 40 MG: 10 INJECTION, SOLUTION INTRAMUSCULAR; INTRAVENOUS at 09:50

## 2017-06-30 RX ADMIN — Medication 150 MG: at 17:08

## 2017-06-30 RX ADMIN — ACETAMINOPHEN 650 MG: 325 TABLET, FILM COATED ORAL at 09:49

## 2017-07-01 ENCOUNTER — APPOINTMENT (INPATIENT)
Dept: CT IMAGING | Facility: HOSPITAL | Age: 72
DRG: 871 | End: 2017-07-01
Payer: COMMERCIAL

## 2017-07-01 ENCOUNTER — GENERIC CONVERSION - ENCOUNTER (OUTPATIENT)
Dept: OTHER | Facility: OTHER | Age: 72
End: 2017-07-01

## 2017-07-01 LAB
ALBUMIN SERPL BCP-MCNC: 1.5 G/DL (ref 3.5–5)
ALP SERPL-CCNC: 222 U/L (ref 46–116)
ALT SERPL W P-5'-P-CCNC: 29 U/L (ref 12–78)
ANION GAP SERPL CALCULATED.3IONS-SCNC: 6 MMOL/L (ref 4–13)
ANISOCYTOSIS BLD QL SMEAR: PRESENT
AST SERPL W P-5'-P-CCNC: 67 U/L (ref 5–45)
BASOPHILS # BLD MANUAL: 0 THOUSAND/UL (ref 0–0.1)
BASOPHILS NFR MAR MANUAL: 0 % (ref 0–1)
BILIRUB SERPL-MCNC: 0.32 MG/DL (ref 0.2–1)
BUN SERPL-MCNC: 16 MG/DL (ref 5–25)
CALCIUM SERPL-MCNC: 8.7 MG/DL (ref 8.3–10.1)
CHLORIDE SERPL-SCNC: 97 MMOL/L (ref 100–108)
CO2 SERPL-SCNC: 30 MMOL/L (ref 21–32)
CREAT SERPL-MCNC: 1 MG/DL (ref 0.6–1.3)
EOSINOPHIL # BLD MANUAL: 0.13 THOUSAND/UL (ref 0–0.4)
EOSINOPHIL NFR BLD MANUAL: 1 % (ref 0–6)
ERYTHROCYTE [DISTWIDTH] IN BLOOD BY AUTOMATED COUNT: 19.6 % (ref 11.6–15.1)
GFR SERPL CREATININE-BSD FRML MDRD: 54.7 ML/MIN/1.73SQ M
GLUCOSE SERPL-MCNC: 120 MG/DL (ref 65–140)
GLUCOSE SERPL-MCNC: 125 MG/DL (ref 65–140)
GLUCOSE SERPL-MCNC: 163 MG/DL (ref 65–140)
GLUCOSE SERPL-MCNC: 169 MG/DL (ref 65–140)
GLUCOSE SERPL-MCNC: 224 MG/DL (ref 65–140)
HCT VFR BLD AUTO: 32.2 % (ref 34.8–46.1)
HGB BLD-MCNC: 10.1 G/DL (ref 11.5–15.4)
INR PPP: 3.38 (ref 0.86–1.16)
LG PLATELETS BLD QL SMEAR: PRESENT
LYMPHOCYTES # BLD AUTO: 1.13 THOUSAND/UL (ref 0.6–4.47)
LYMPHOCYTES # BLD AUTO: 9 % (ref 14–44)
MCH RBC QN AUTO: 29.4 PG (ref 26.8–34.3)
MCHC RBC AUTO-ENTMCNC: 31.4 G/DL (ref 31.4–37.4)
MCV RBC AUTO: 94 FL (ref 82–98)
METAMYELOCYTES NFR BLD MANUAL: 1 % (ref 0–1)
MONOCYTES # BLD AUTO: 0.5 THOUSAND/UL (ref 0–1.22)
MONOCYTES NFR BLD: 4 % (ref 4–12)
MYELOCYTES NFR BLD MANUAL: 3 % (ref 0–1)
NEUTROPHILS # BLD MANUAL: 10.29 THOUSAND/UL (ref 1.85–7.62)
NEUTS BAND NFR BLD MANUAL: 2 % (ref 0–8)
NEUTS SEG NFR BLD AUTO: 80 % (ref 43–75)
NRBC BLD AUTO-RTO: 0 /100 WBCS
PLATELET # BLD AUTO: 477 THOUSANDS/UL (ref 149–390)
PLATELET BLD QL SMEAR: ABNORMAL
PMV BLD AUTO: 8.9 FL (ref 8.9–12.7)
POLYCHROMASIA BLD QL SMEAR: PRESENT
POTASSIUM SERPL-SCNC: 4.3 MMOL/L (ref 3.5–5.3)
PROT SERPL-MCNC: 8 G/DL (ref 6.4–8.2)
PROTHROMBIN TIME: 34.7 SECONDS (ref 12.1–14.4)
RBC # BLD AUTO: 3.44 MILLION/UL (ref 3.81–5.12)
SODIUM SERPL-SCNC: 133 MMOL/L (ref 136–145)
TOTAL CELLS COUNTED SPEC: 100
WBC # BLD AUTO: 12.55 THOUSAND/UL (ref 4.31–10.16)

## 2017-07-01 PROCEDURE — 85610 PROTHROMBIN TIME: CPT | Performed by: INTERNAL MEDICINE

## 2017-07-01 PROCEDURE — 85027 COMPLETE CBC AUTOMATED: CPT | Performed by: INTERNAL MEDICINE

## 2017-07-01 PROCEDURE — 85007 BL SMEAR W/DIFF WBC COUNT: CPT | Performed by: INTERNAL MEDICINE

## 2017-07-01 PROCEDURE — 80053 COMPREHEN METABOLIC PANEL: CPT | Performed by: INTERNAL MEDICINE

## 2017-07-01 PROCEDURE — 82948 REAGENT STRIP/BLOOD GLUCOSE: CPT

## 2017-07-01 PROCEDURE — 72132 CT LUMBAR SPINE W/DYE: CPT

## 2017-07-01 PROCEDURE — 70450 CT HEAD/BRAIN W/O DYE: CPT

## 2017-07-01 PROCEDURE — 93005 ELECTROCARDIOGRAM TRACING: CPT | Performed by: INTERNAL MEDICINE

## 2017-07-01 RX ORDER — DILTIAZEM HYDROCHLORIDE 5 MG/ML
15 INJECTION INTRAVENOUS ONCE
Status: COMPLETED | OUTPATIENT
Start: 2017-07-01 | End: 2017-07-01

## 2017-07-01 RX ORDER — FUROSEMIDE 80 MG
80 TABLET ORAL DAILY
Status: DISCONTINUED | OUTPATIENT
Start: 2017-07-02 | End: 2017-07-01

## 2017-07-01 RX ORDER — DILTIAZEM HYDROCHLORIDE 5 MG/ML
INJECTION INTRAVENOUS
Status: COMPLETED
Start: 2017-07-01 | End: 2017-07-01

## 2017-07-01 RX ORDER — FUROSEMIDE 40 MG/1
80 TABLET ORAL DAILY
Status: DISCONTINUED | OUTPATIENT
Start: 2017-07-01 | End: 2017-07-02

## 2017-07-01 RX ADMIN — ACETAMINOPHEN 650 MG: 325 TABLET, FILM COATED ORAL at 05:30

## 2017-07-01 RX ADMIN — INSULIN LISPRO 1 UNITS: 100 INJECTION, SOLUTION INTRAVENOUS; SUBCUTANEOUS at 23:00

## 2017-07-01 RX ADMIN — OXYCODONE HYDROCHLORIDE 5 MG: 5 TABLET ORAL at 07:06

## 2017-07-01 RX ADMIN — AMIODARONE HYDROCHLORIDE 1 MG/MIN: 50 INJECTION, SOLUTION INTRAVENOUS at 18:33

## 2017-07-01 RX ADMIN — INSULIN LISPRO 2 UNITS: 100 INJECTION, SOLUTION INTRAVENOUS; SUBCUTANEOUS at 12:00

## 2017-07-01 RX ADMIN — Medication 150 MG: at 09:29

## 2017-07-01 RX ADMIN — DILTIAZEM HYDROCHLORIDE 15 MG: 5 INJECTION INTRAVENOUS at 16:04

## 2017-07-01 RX ADMIN — OXYCODONE HYDROCHLORIDE AND ACETAMINOPHEN 500 MG: 500 TABLET ORAL at 09:29

## 2017-07-01 RX ADMIN — OXYCODONE HYDROCHLORIDE 5 MG: 5 TABLET ORAL at 00:32

## 2017-07-01 RX ADMIN — IOHEXOL 100 ML: 350 INJECTION, SOLUTION INTRAVENOUS at 18:17

## 2017-07-01 RX ADMIN — SODIUM CHLORIDE 500 ML: 0.9 INJECTION, SOLUTION INTRAVENOUS at 18:33

## 2017-07-01 RX ADMIN — POTASSIUM CHLORIDE 20 MEQ: 1500 TABLET, EXTENDED RELEASE ORAL at 09:29

## 2017-07-01 RX ADMIN — CEFTRIAXONE 2000 MG: 2 INJECTION, POWDER, FOR SOLUTION INTRAMUSCULAR; INTRAVENOUS at 21:42

## 2017-07-01 RX ADMIN — SPIRONOLACTONE 25 MG: 25 TABLET, FILM COATED ORAL at 09:29

## 2017-07-01 RX ADMIN — CITALOPRAM HYDROBROMIDE 20 MG: 20 TABLET ORAL at 09:29

## 2017-07-01 RX ADMIN — ATORVASTATIN CALCIUM 40 MG: 40 TABLET, FILM COATED ORAL at 16:27

## 2017-07-01 RX ADMIN — DILTIAZEM HYDROCHLORIDE 5 MG/HR: 5 INJECTION INTRAVENOUS at 16:14

## 2017-07-01 RX ADMIN — PANTOPRAZOLE SODIUM 40 MG: 40 TABLET, DELAYED RELEASE ORAL at 05:30

## 2017-07-01 RX ADMIN — ALLOPURINOL 100 MG: 100 TABLET ORAL at 19:01

## 2017-07-01 RX ADMIN — WARFARIN SODIUM 6 MG: 6 TABLET ORAL at 19:01

## 2017-07-01 RX ADMIN — FUROSEMIDE 80 MG: 80 TABLET ORAL at 09:29

## 2017-07-01 RX ADMIN — OXYCODONE HYDROCHLORIDE 5 MG: 5 TABLET ORAL at 14:13

## 2017-07-01 RX ADMIN — ALLOPURINOL 100 MG: 100 TABLET ORAL at 09:29

## 2017-07-01 RX ADMIN — INSULIN LISPRO 2 UNITS: 100 INJECTION, SOLUTION INTRAVENOUS; SUBCUTANEOUS at 16:27

## 2017-07-01 RX ADMIN — Medication 150 MG: at 19:01

## 2017-07-02 LAB
AMMONIA PLAS-SCNC: 14 UMOL/L (ref 11–35)
CRP SERPL QL: >90 MG/L
ERYTHROCYTE [DISTWIDTH] IN BLOOD BY AUTOMATED COUNT: 19.3 % (ref 11.6–15.1)
ERYTHROCYTE [SEDIMENTATION RATE] IN BLOOD: 130 MM/HOUR (ref 0–20)
GLUCOSE SERPL-MCNC: 133 MG/DL (ref 65–140)
GLUCOSE SERPL-MCNC: 134 MG/DL (ref 65–140)
GLUCOSE SERPL-MCNC: 150 MG/DL (ref 65–140)
GLUCOSE SERPL-MCNC: 167 MG/DL (ref 65–140)
HCT VFR BLD AUTO: 30.9 % (ref 34.8–46.1)
HGB BLD-MCNC: 9.4 G/DL (ref 11.5–15.4)
INR PPP: 4.34 (ref 0.86–1.16)
LACTATE SERPL-SCNC: 1.3 MMOL/L (ref 0.5–2)
MCH RBC QN AUTO: 28.9 PG (ref 26.8–34.3)
MCHC RBC AUTO-ENTMCNC: 30.4 G/DL (ref 31.4–37.4)
MCV RBC AUTO: 95 FL (ref 82–98)
PLATELET # BLD AUTO: 510 THOUSANDS/UL (ref 149–390)
PMV BLD AUTO: 8.5 FL (ref 8.9–12.7)
PROTHROMBIN TIME: 42.3 SECONDS (ref 12.1–14.4)
RBC # BLD AUTO: 3.25 MILLION/UL (ref 3.81–5.12)
TSH SERPL DL<=0.05 MIU/L-ACNC: 2.14 UIU/ML (ref 0.36–3.74)
WBC # BLD AUTO: 14.41 THOUSAND/UL (ref 4.31–10.16)

## 2017-07-02 PROCEDURE — 83605 ASSAY OF LACTIC ACID: CPT | Performed by: INTERNAL MEDICINE

## 2017-07-02 PROCEDURE — 86140 C-REACTIVE PROTEIN: CPT | Performed by: INTERNAL MEDICINE

## 2017-07-02 PROCEDURE — 85027 COMPLETE CBC AUTOMATED: CPT | Performed by: INTERNAL MEDICINE

## 2017-07-02 PROCEDURE — 84443 ASSAY THYROID STIM HORMONE: CPT | Performed by: INTERNAL MEDICINE

## 2017-07-02 PROCEDURE — 82140 ASSAY OF AMMONIA: CPT | Performed by: NURSE PRACTITIONER

## 2017-07-02 PROCEDURE — 85652 RBC SED RATE AUTOMATED: CPT | Performed by: INTERNAL MEDICINE

## 2017-07-02 PROCEDURE — 82948 REAGENT STRIP/BLOOD GLUCOSE: CPT

## 2017-07-02 PROCEDURE — C9113 INJ PANTOPRAZOLE SODIUM, VIA: HCPCS | Performed by: INTERNAL MEDICINE

## 2017-07-02 PROCEDURE — 85610 PROTHROMBIN TIME: CPT | Performed by: INTERNAL MEDICINE

## 2017-07-02 PROCEDURE — 87040 BLOOD CULTURE FOR BACTERIA: CPT | Performed by: INTERNAL MEDICINE

## 2017-07-02 RX ORDER — LORAZEPAM 2 MG/ML
1 INJECTION INTRAMUSCULAR ONCE AS NEEDED
Status: COMPLETED | OUTPATIENT
Start: 2017-07-02 | End: 2017-07-03

## 2017-07-02 RX ORDER — PANTOPRAZOLE SODIUM 40 MG/1
40 INJECTION, POWDER, FOR SOLUTION INTRAVENOUS
Status: DISCONTINUED | OUTPATIENT
Start: 2017-07-02 | End: 2017-07-15 | Stop reason: HOSPADM

## 2017-07-02 RX ORDER — WARFARIN SODIUM 4 MG/1
4 TABLET ORAL
Status: DISCONTINUED | OUTPATIENT
Start: 2017-07-03 | End: 2017-07-02

## 2017-07-02 RX ORDER — FUROSEMIDE 10 MG/ML
80 INJECTION INTRAMUSCULAR; INTRAVENOUS ONCE
Status: COMPLETED | OUTPATIENT
Start: 2017-07-02 | End: 2017-07-02

## 2017-07-02 RX ADMIN — ACETAMINOPHEN 650 MG: 325 TABLET, FILM COATED ORAL at 06:13

## 2017-07-02 RX ADMIN — DILTIAZEM HYDROCHLORIDE 30 MG: 30 TABLET, FILM COATED ORAL at 07:40

## 2017-07-02 RX ADMIN — PANTOPRAZOLE SODIUM 40 MG: 40 INJECTION, POWDER, FOR SOLUTION INTRAVENOUS at 14:52

## 2017-07-02 RX ADMIN — INSULIN LISPRO 1 UNITS: 100 INJECTION, SOLUTION INTRAVENOUS; SUBCUTANEOUS at 16:57

## 2017-07-02 RX ADMIN — ACETAMINOPHEN 650 MG: 325 TABLET, FILM COATED ORAL at 15:09

## 2017-07-02 RX ADMIN — CEFTRIAXONE 2000 MG: 2 INJECTION, POWDER, FOR SOLUTION INTRAMUSCULAR; INTRAVENOUS at 20:56

## 2017-07-02 RX ADMIN — DILTIAZEM HYDROCHLORIDE 30 MG: 30 TABLET, FILM COATED ORAL at 11:56

## 2017-07-02 RX ADMIN — INSULIN LISPRO 1 UNITS: 100 INJECTION, SOLUTION INTRAVENOUS; SUBCUTANEOUS at 11:56

## 2017-07-02 RX ADMIN — FUROSEMIDE 80 MG: 10 INJECTION, SOLUTION INTRAMUSCULAR; INTRAVENOUS at 11:51

## 2017-07-02 RX ADMIN — SODIUM CHLORIDE 500 ML: 0.9 INJECTION, SOLUTION INTRAVENOUS at 19:37

## 2017-07-02 RX ADMIN — ACETAMINOPHEN 650 MG: 325 TABLET, FILM COATED ORAL at 21:54

## 2017-07-02 RX ADMIN — AMIODARONE HYDROCHLORIDE 150 MG: 50 INJECTION, SOLUTION INTRAVENOUS at 17:50

## 2017-07-02 RX ADMIN — PANTOPRAZOLE SODIUM 40 MG: 40 TABLET, DELAYED RELEASE ORAL at 06:05

## 2017-07-02 RX ADMIN — AMIODARONE HYDROCHLORIDE 150 MG: 50 INJECTION, SOLUTION INTRAVENOUS at 06:59

## 2017-07-02 RX ADMIN — AMIODARONE HYDROCHLORIDE 1 MG/MIN: 50 INJECTION, SOLUTION INTRAVENOUS at 10:43

## 2017-07-02 RX ADMIN — DILTIAZEM HYDROCHLORIDE 30 MG: 30 TABLET, FILM COATED ORAL at 17:57

## 2017-07-03 ENCOUNTER — APPOINTMENT (INPATIENT)
Dept: MRI IMAGING | Facility: HOSPITAL | Age: 72
DRG: 871 | End: 2017-07-03
Payer: COMMERCIAL

## 2017-07-03 ENCOUNTER — GENERIC CONVERSION - ENCOUNTER (OUTPATIENT)
Dept: OTHER | Facility: OTHER | Age: 72
End: 2017-07-03

## 2017-07-03 ENCOUNTER — APPOINTMENT (INPATIENT)
Dept: PHYSICAL THERAPY | Facility: HOSPITAL | Age: 72
DRG: 871 | End: 2017-07-03
Payer: COMMERCIAL

## 2017-07-03 LAB
ANION GAP SERPL CALCULATED.3IONS-SCNC: 4 MMOL/L (ref 4–13)
ATRIAL RATE: 133 BPM
ATRIAL RATE: 145 BPM
ATRIAL RATE: 149 BPM
BUN SERPL-MCNC: 16 MG/DL (ref 5–25)
CALCIUM SERPL-MCNC: 8.6 MG/DL (ref 8.3–10.1)
CHLORIDE SERPL-SCNC: 97 MMOL/L (ref 100–108)
CO2 SERPL-SCNC: 32 MMOL/L (ref 21–32)
CREAT SERPL-MCNC: 1.06 MG/DL (ref 0.6–1.3)
ERYTHROCYTE [DISTWIDTH] IN BLOOD BY AUTOMATED COUNT: 19.2 % (ref 11.6–15.1)
GFR SERPL CREATININE-BSD FRML MDRD: 51.1 ML/MIN/1.73SQ M
GLUCOSE SERPL-MCNC: 137 MG/DL (ref 65–140)
GLUCOSE SERPL-MCNC: 149 MG/DL (ref 65–140)
GLUCOSE SERPL-MCNC: 167 MG/DL (ref 65–140)
GLUCOSE SERPL-MCNC: 220 MG/DL (ref 65–140)
HCT VFR BLD AUTO: 29.1 % (ref 34.8–46.1)
HGB BLD-MCNC: 9.3 G/DL (ref 11.5–15.4)
INR PPP: 5.46 (ref 0.86–1.16)
MCH RBC QN AUTO: 29.5 PG (ref 26.8–34.3)
MCHC RBC AUTO-ENTMCNC: 32 G/DL (ref 31.4–37.4)
MCV RBC AUTO: 92 FL (ref 82–98)
PLATELET # BLD AUTO: 359 THOUSANDS/UL (ref 149–390)
PMV BLD AUTO: 8.3 FL (ref 8.9–12.7)
POTASSIUM SERPL-SCNC: 3.2 MMOL/L (ref 3.5–5.3)
PR INTERVAL: 112 MS
PR INTERVAL: 128 MS
PR INTERVAL: 70 MS
PROTHROMBIN TIME: 50.8 SECONDS (ref 12.1–14.4)
QRS AXIS: -86 DEGREES
QRS AXIS: 262 DEGREES
QRS AXIS: 264 DEGREES
QRSD INTERVAL: 146 MS
QRSD INTERVAL: 148 MS
QRSD INTERVAL: 158 MS
QT INTERVAL: 282 MS
QT INTERVAL: 316 MS
QT INTERVAL: 363 MS
QTC INTERVAL: 438 MS
QTC INTERVAL: 497 MS
QTC INTERVAL: 540 MS
RBC # BLD AUTO: 3.15 MILLION/UL (ref 3.81–5.12)
SODIUM SERPL-SCNC: 133 MMOL/L (ref 136–145)
T WAVE AXIS: -1 DEGREES
T WAVE AXIS: -15 DEGREES
T WAVE AXIS: 8 DEGREES
TROPONIN I SERPL-MCNC: 0.04 NG/ML
VENTRICULAR RATE: 133 BPM
VENTRICULAR RATE: 145 BPM
VENTRICULAR RATE: 149 BPM
WBC # BLD AUTO: 13.62 THOUSAND/UL (ref 4.31–10.16)

## 2017-07-03 PROCEDURE — 97110 THERAPEUTIC EXERCISES: CPT

## 2017-07-03 PROCEDURE — 93005 ELECTROCARDIOGRAM TRACING: CPT | Performed by: INTERNAL MEDICINE

## 2017-07-03 PROCEDURE — 85027 COMPLETE CBC AUTOMATED: CPT | Performed by: INTERNAL MEDICINE

## 2017-07-03 PROCEDURE — 97530 THERAPEUTIC ACTIVITIES: CPT

## 2017-07-03 PROCEDURE — C9113 INJ PANTOPRAZOLE SODIUM, VIA: HCPCS | Performed by: INTERNAL MEDICINE

## 2017-07-03 PROCEDURE — 85610 PROTHROMBIN TIME: CPT | Performed by: NURSE PRACTITIONER

## 2017-07-03 PROCEDURE — 80048 BASIC METABOLIC PNL TOTAL CA: CPT | Performed by: INTERNAL MEDICINE

## 2017-07-03 PROCEDURE — 82948 REAGENT STRIP/BLOOD GLUCOSE: CPT

## 2017-07-03 PROCEDURE — 72148 MRI LUMBAR SPINE W/O DYE: CPT

## 2017-07-03 PROCEDURE — 84484 ASSAY OF TROPONIN QUANT: CPT | Performed by: INTERNAL MEDICINE

## 2017-07-03 RX ORDER — DILTIAZEM HYDROCHLORIDE 60 MG/1
60 TABLET, FILM COATED ORAL EVERY 6 HOURS SCHEDULED
Status: DISCONTINUED | OUTPATIENT
Start: 2017-07-03 | End: 2017-07-04

## 2017-07-03 RX ADMIN — OXYCODONE HYDROCHLORIDE 5 MG: 5 TABLET ORAL at 08:51

## 2017-07-03 RX ADMIN — POTASSIUM CHLORIDE 20 MEQ: 1500 TABLET, EXTENDED RELEASE ORAL at 08:58

## 2017-07-03 RX ADMIN — DILTIAZEM HYDROCHLORIDE 60 MG: 60 TABLET, FILM COATED ORAL at 17:20

## 2017-07-03 RX ADMIN — OXYCODONE HYDROCHLORIDE 5 MG: 5 TABLET ORAL at 20:19

## 2017-07-03 RX ADMIN — OXYCODONE HYDROCHLORIDE 5 MG: 5 TABLET ORAL at 13:51

## 2017-07-03 RX ADMIN — ALLOPURINOL 100 MG: 100 TABLET ORAL at 17:21

## 2017-07-03 RX ADMIN — CEFTRIAXONE 2000 MG: 2 INJECTION, POWDER, FOR SOLUTION INTRAMUSCULAR; INTRAVENOUS at 16:17

## 2017-07-03 RX ADMIN — INSULIN LISPRO 1 UNITS: 100 INJECTION, SOLUTION INTRAVENOUS; SUBCUTANEOUS at 22:04

## 2017-07-03 RX ADMIN — ACETAMINOPHEN 650 MG: 325 TABLET, FILM COATED ORAL at 18:24

## 2017-07-03 RX ADMIN — ACETAMINOPHEN 650 MG: 325 TABLET, FILM COATED ORAL at 05:16

## 2017-07-03 RX ADMIN — INSULIN LISPRO 2 UNITS: 100 INJECTION, SOLUTION INTRAVENOUS; SUBCUTANEOUS at 12:05

## 2017-07-03 RX ADMIN — ATORVASTATIN CALCIUM 40 MG: 40 TABLET, FILM COATED ORAL at 17:20

## 2017-07-03 RX ADMIN — ACETAMINOPHEN 650 MG: 325 TABLET, FILM COATED ORAL at 13:51

## 2017-07-03 RX ADMIN — DILTIAZEM HYDROCHLORIDE 30 MG: 30 TABLET, FILM COATED ORAL at 05:17

## 2017-07-03 RX ADMIN — AMIODARONE HYDROCHLORIDE 1 MG/MIN: 50 INJECTION, SOLUTION INTRAVENOUS at 14:30

## 2017-07-03 RX ADMIN — PANTOPRAZOLE SODIUM 40 MG: 40 INJECTION, POWDER, FOR SOLUTION INTRAVENOUS at 08:58

## 2017-07-03 RX ADMIN — AMIODARONE HYDROCHLORIDE 1 MG/MIN: 50 INJECTION, SOLUTION INTRAVENOUS at 02:14

## 2017-07-03 RX ADMIN — SODIUM CHLORIDE 500 ML: 0.9 INJECTION, SOLUTION INTRAVENOUS at 12:00

## 2017-07-03 RX ADMIN — ACETAMINOPHEN 650 MG: 325 TABLET, FILM COATED ORAL at 09:48

## 2017-07-03 RX ADMIN — DILTIAZEM HYDROCHLORIDE 60 MG: 60 TABLET, FILM COATED ORAL at 12:01

## 2017-07-03 RX ADMIN — LORAZEPAM 1 MG: 2 INJECTION INTRAMUSCULAR; INTRAVENOUS at 14:15

## 2017-07-04 ENCOUNTER — GENERIC CONVERSION - ENCOUNTER (OUTPATIENT)
Dept: OTHER | Facility: OTHER | Age: 72
End: 2017-07-04

## 2017-07-04 ENCOUNTER — APPOINTMENT (INPATIENT)
Dept: NON INVASIVE DIAGNOSTICS | Facility: HOSPITAL | Age: 72
DRG: 871 | End: 2017-07-04
Payer: COMMERCIAL

## 2017-07-04 LAB
GLUCOSE SERPL-MCNC: 134 MG/DL (ref 65–140)
GLUCOSE SERPL-MCNC: 138 MG/DL (ref 65–140)
GLUCOSE SERPL-MCNC: 189 MG/DL (ref 65–140)
GLUCOSE SERPL-MCNC: 193 MG/DL (ref 65–140)
INR PPP: 5.27 (ref 0.86–1.16)
POTASSIUM SERPL-SCNC: 3.3 MMOL/L (ref 3.5–5.3)
PROTHROMBIN TIME: 48.6 SECONDS (ref 12.1–14.4)

## 2017-07-04 PROCEDURE — C9113 INJ PANTOPRAZOLE SODIUM, VIA: HCPCS | Performed by: INTERNAL MEDICINE

## 2017-07-04 PROCEDURE — 85610 PROTHROMBIN TIME: CPT | Performed by: INTERNAL MEDICINE

## 2017-07-04 PROCEDURE — 82948 REAGENT STRIP/BLOOD GLUCOSE: CPT

## 2017-07-04 PROCEDURE — 84132 ASSAY OF SERUM POTASSIUM: CPT | Performed by: NURSE PRACTITIONER

## 2017-07-04 PROCEDURE — 93005 ELECTROCARDIOGRAM TRACING: CPT

## 2017-07-04 PROCEDURE — 93308 TTE F-UP OR LMTD: CPT

## 2017-07-04 RX ORDER — FUROSEMIDE 40 MG/1
40 TABLET ORAL DAILY
Status: DISCONTINUED | OUTPATIENT
Start: 2017-07-04 | End: 2017-07-06

## 2017-07-04 RX ORDER — OXYCODONE HYDROCHLORIDE 5 MG/1
10 TABLET ORAL EVERY 6 HOURS PRN
Status: DISCONTINUED | OUTPATIENT
Start: 2017-07-04 | End: 2017-07-06

## 2017-07-04 RX ORDER — POTASSIUM CHLORIDE 20 MEQ/1
40 TABLET, EXTENDED RELEASE ORAL ONCE
Status: COMPLETED | OUTPATIENT
Start: 2017-07-04 | End: 2017-07-04

## 2017-07-04 RX ORDER — NYSTATIN 100000 [USP'U]/G
POWDER TOPICAL 2 TIMES DAILY
Status: DISCONTINUED | OUTPATIENT
Start: 2017-07-04 | End: 2017-07-15 | Stop reason: HOSPADM

## 2017-07-04 RX ORDER — AMIODARONE HYDROCHLORIDE 200 MG/1
400 TABLET ORAL 2 TIMES DAILY WITH MEALS
Status: DISCONTINUED | OUTPATIENT
Start: 2017-07-04 | End: 2017-07-12

## 2017-07-04 RX ORDER — POTASSIUM CHLORIDE 20 MEQ/1
20 TABLET, EXTENDED RELEASE ORAL 2 TIMES DAILY
Status: DISCONTINUED | OUTPATIENT
Start: 2017-07-04 | End: 2017-07-05

## 2017-07-04 RX ADMIN — FUROSEMIDE 40 MG: 40 TABLET ORAL at 11:19

## 2017-07-04 RX ADMIN — ATORVASTATIN CALCIUM 40 MG: 40 TABLET, FILM COATED ORAL at 17:28

## 2017-07-04 RX ADMIN — OXYCODONE HYDROCHLORIDE 5 MG: 5 TABLET ORAL at 07:17

## 2017-07-04 RX ADMIN — CEFTRIAXONE 2000 MG: 2 INJECTION, POWDER, FOR SOLUTION INTRAMUSCULAR; INTRAVENOUS at 14:21

## 2017-07-04 RX ADMIN — DILTIAZEM HYDROCHLORIDE 60 MG: 60 TABLET, FILM COATED ORAL at 00:08

## 2017-07-04 RX ADMIN — OXYCODONE HYDROCHLORIDE 10 MG: 5 TABLET ORAL at 20:23

## 2017-07-04 RX ADMIN — NYSTATIN 1 APPLICATION: 100000 POWDER TOPICAL at 19:47

## 2017-07-04 RX ADMIN — DILTIAZEM HYDROCHLORIDE 60 MG: 60 TABLET, FILM COATED ORAL at 11:20

## 2017-07-04 RX ADMIN — ALLOPURINOL 100 MG: 100 TABLET ORAL at 17:28

## 2017-07-04 RX ADMIN — ACETAMINOPHEN 650 MG: 325 TABLET, FILM COATED ORAL at 02:41

## 2017-07-04 RX ADMIN — METOPROLOL TARTRATE 25 MG: 25 TABLET ORAL at 18:31

## 2017-07-04 RX ADMIN — ACETAMINOPHEN 650 MG: 325 TABLET, FILM COATED ORAL at 07:24

## 2017-07-04 RX ADMIN — PANTOPRAZOLE SODIUM 40 MG: 40 INJECTION, POWDER, FOR SOLUTION INTRAVENOUS at 08:40

## 2017-07-04 RX ADMIN — DILTIAZEM HYDROCHLORIDE 60 MG: 60 TABLET, FILM COATED ORAL at 06:31

## 2017-07-04 RX ADMIN — METOPROLOL TARTRATE 25 MG: 25 TABLET ORAL at 11:20

## 2017-07-04 RX ADMIN — AMIODARONE HYDROCHLORIDE 400 MG: 200 TABLET ORAL at 18:31

## 2017-07-04 RX ADMIN — POTASSIUM CHLORIDE 20 MEQ: 1500 TABLET, EXTENDED RELEASE ORAL at 17:28

## 2017-07-04 RX ADMIN — SPIRONOLACTONE 25 MG: 25 TABLET, FILM COATED ORAL at 08:39

## 2017-07-04 RX ADMIN — INSULIN LISPRO 1 UNITS: 100 INJECTION, SOLUTION INTRAVENOUS; SUBCUTANEOUS at 21:14

## 2017-07-04 RX ADMIN — POTASSIUM CHLORIDE 20 MEQ: 1500 TABLET, EXTENDED RELEASE ORAL at 08:40

## 2017-07-04 RX ADMIN — INSULIN LISPRO 2 UNITS: 100 INJECTION, SOLUTION INTRAVENOUS; SUBCUTANEOUS at 12:34

## 2017-07-04 RX ADMIN — ALLOPURINOL 100 MG: 100 TABLET ORAL at 08:40

## 2017-07-04 RX ADMIN — OXYCODONE HYDROCHLORIDE 10 MG: 5 TABLET ORAL at 14:07

## 2017-07-04 RX ADMIN — ACETAMINOPHEN 650 MG: 325 TABLET, FILM COATED ORAL at 14:07

## 2017-07-04 RX ADMIN — POTASSIUM CHLORIDE 40 MEQ: 1500 TABLET, EXTENDED RELEASE ORAL at 14:21

## 2017-07-04 RX ADMIN — AMIODARONE HYDROCHLORIDE 1 MG/MIN: 50 INJECTION, SOLUTION INTRAVENOUS at 04:29

## 2017-07-05 ENCOUNTER — GENERIC CONVERSION - ENCOUNTER (OUTPATIENT)
Dept: OTHER | Facility: OTHER | Age: 72
End: 2017-07-05

## 2017-07-05 ENCOUNTER — APPOINTMENT (INPATIENT)
Dept: RADIOLOGY | Facility: HOSPITAL | Age: 72
DRG: 871 | End: 2017-07-05
Attending: INTERNAL MEDICINE
Payer: COMMERCIAL

## 2017-07-05 LAB
ABO GROUP BLD: NORMAL
ALBUMIN SERPL BCP-MCNC: 1.2 G/DL (ref 3.5–5)
ALP SERPL-CCNC: 222 U/L (ref 46–116)
ALT SERPL W P-5'-P-CCNC: 33 U/L (ref 12–78)
ANION GAP SERPL CALCULATED.3IONS-SCNC: 5 MMOL/L (ref 4–13)
AST SERPL W P-5'-P-CCNC: 49 U/L (ref 5–45)
ATRIAL RATE: 255 BPM
BILIRUB SERPL-MCNC: 0.19 MG/DL (ref 0.2–1)
BLD GP AB SCN SERPL QL: NEGATIVE
BUN SERPL-MCNC: 17 MG/DL (ref 5–25)
CALCIUM SERPL-MCNC: 8.4 MG/DL (ref 8.3–10.1)
CHLORIDE SERPL-SCNC: 99 MMOL/L (ref 100–108)
CO2 SERPL-SCNC: 30 MMOL/L (ref 21–32)
CREAT SERPL-MCNC: 0.99 MG/DL (ref 0.6–1.3)
ERYTHROCYTE [DISTWIDTH] IN BLOOD BY AUTOMATED COUNT: 19 % (ref 11.6–15.1)
ERYTHROCYTE [SEDIMENTATION RATE] IN BLOOD: 140 MM/HOUR (ref 0–20)
GFR SERPL CREATININE-BSD FRML MDRD: 55.3 ML/MIN/1.73SQ M
GLUCOSE SERPL-MCNC: 111 MG/DL (ref 65–140)
GLUCOSE SERPL-MCNC: 117 MG/DL (ref 65–140)
GLUCOSE SERPL-MCNC: 149 MG/DL (ref 65–140)
GLUCOSE SERPL-MCNC: 87 MG/DL (ref 65–140)
HCT VFR BLD AUTO: 28.5 % (ref 34.8–46.1)
HGB BLD-MCNC: 8.9 G/DL (ref 11.5–15.4)
INR PPP: 2.61 (ref 0.86–1.16)
INR PPP: 4.39 (ref 0.86–1.16)
MCH RBC QN AUTO: 29.6 PG (ref 26.8–34.3)
MCHC RBC AUTO-ENTMCNC: 31.2 G/DL (ref 31.4–37.4)
MCV RBC AUTO: 95 FL (ref 82–98)
PLATELET # BLD AUTO: 379 THOUSANDS/UL (ref 149–390)
PMV BLD AUTO: 8.5 FL (ref 8.9–12.7)
POTASSIUM SERPL-SCNC: 4.3 MMOL/L (ref 3.5–5.3)
PROT SERPL-MCNC: 6.9 G/DL (ref 6.4–8.2)
PROTHROMBIN TIME: 28.3 SECONDS (ref 12.1–14.4)
PROTHROMBIN TIME: 42.7 SECONDS (ref 12.1–14.4)
QRS AXIS: -76 DEGREES
QRSD INTERVAL: 167 MS
QT INTERVAL: 550 MS
QTC INTERVAL: 576 MS
RBC # BLD AUTO: 3.01 MILLION/UL (ref 3.81–5.12)
RH BLD: POSITIVE
SODIUM SERPL-SCNC: 134 MMOL/L (ref 136–145)
SPECIMEN EXPIRATION DATE: NORMAL
T WAVE AXIS: 7 DEGREES
VENTRICULAR RATE: 66 BPM
WBC # BLD AUTO: 13.68 THOUSAND/UL (ref 4.31–10.16)

## 2017-07-05 PROCEDURE — 86900 BLOOD TYPING SEROLOGIC ABO: CPT | Performed by: INTERNAL MEDICINE

## 2017-07-05 PROCEDURE — P9017 PLASMA 1 DONOR FRZ W/IN 8 HR: HCPCS

## 2017-07-05 PROCEDURE — 85652 RBC SED RATE AUTOMATED: CPT | Performed by: INTERNAL MEDICINE

## 2017-07-05 PROCEDURE — 62267 INTERDISCAL PERQ ASPIR DX: CPT

## 2017-07-05 PROCEDURE — 85610 PROTHROMBIN TIME: CPT | Performed by: INTERNAL MEDICINE

## 2017-07-05 PROCEDURE — 87070 CULTURE OTHR SPECIMN AEROBIC: CPT | Performed by: INTERNAL MEDICINE

## 2017-07-05 PROCEDURE — 3E1U38X IRRIGATION OF JOINTS USING IRRIGATING SUBSTANCE, PERCUTANEOUS APPROACH, DIAGNOSTIC: ICD-10-PCS | Performed by: RADIOLOGY

## 2017-07-05 PROCEDURE — C9113 INJ PANTOPRAZOLE SODIUM, VIA: HCPCS | Performed by: INTERNAL MEDICINE

## 2017-07-05 PROCEDURE — 77003 FLUOROGUIDE FOR SPINE INJECT: CPT

## 2017-07-05 PROCEDURE — 80053 COMPREHEN METABOLIC PANEL: CPT | Performed by: INTERNAL MEDICINE

## 2017-07-05 PROCEDURE — 85027 COMPLETE CBC AUTOMATED: CPT | Performed by: INTERNAL MEDICINE

## 2017-07-05 PROCEDURE — 87205 SMEAR GRAM STAIN: CPT | Performed by: INTERNAL MEDICINE

## 2017-07-05 PROCEDURE — 30243K1 TRANSFUSION OF NONAUTOLOGOUS FROZEN PLASMA INTO CENTRAL VEIN, PERCUTANEOUS APPROACH: ICD-10-PCS | Performed by: INTERNAL MEDICINE

## 2017-07-05 PROCEDURE — 86927 PLASMA FRESH FROZEN: CPT

## 2017-07-05 PROCEDURE — 86901 BLOOD TYPING SEROLOGIC RH(D): CPT | Performed by: INTERNAL MEDICINE

## 2017-07-05 PROCEDURE — 0S923ZX DRAINAGE OF LUMBAR VERTEBRAL DISC, PERCUTANEOUS APPROACH, DIAGNOSTIC: ICD-10-PCS | Performed by: RADIOLOGY

## 2017-07-05 PROCEDURE — 82948 REAGENT STRIP/BLOOD GLUCOSE: CPT

## 2017-07-05 PROCEDURE — 86850 RBC ANTIBODY SCREEN: CPT | Performed by: INTERNAL MEDICINE

## 2017-07-05 RX ORDER — WARFARIN SODIUM 4 MG/1
4 TABLET ORAL
Status: DISCONTINUED | OUTPATIENT
Start: 2017-07-05 | End: 2017-07-07

## 2017-07-05 RX ORDER — POTASSIUM CHLORIDE 20 MEQ/1
20 TABLET, EXTENDED RELEASE ORAL DAILY
Status: DISCONTINUED | OUTPATIENT
Start: 2017-07-06 | End: 2017-07-09

## 2017-07-05 RX ADMIN — DILTIAZEM HYDROCHLORIDE 30 MG: 60 TABLET, FILM COATED ORAL at 23:05

## 2017-07-05 RX ADMIN — POTASSIUM CHLORIDE 20 MEQ: 1500 TABLET, EXTENDED RELEASE ORAL at 17:06

## 2017-07-05 RX ADMIN — ALLOPURINOL 100 MG: 100 TABLET ORAL at 17:06

## 2017-07-05 RX ADMIN — FUROSEMIDE 40 MG: 40 TABLET ORAL at 08:00

## 2017-07-05 RX ADMIN — ALLOPURINOL 100 MG: 100 TABLET ORAL at 08:00

## 2017-07-05 RX ADMIN — AMIODARONE HYDROCHLORIDE 400 MG: 200 TABLET ORAL at 17:05

## 2017-07-05 RX ADMIN — ACETAMINOPHEN 650 MG: 325 TABLET, FILM COATED ORAL at 12:18

## 2017-07-05 RX ADMIN — WARFARIN SODIUM 4 MG: 4 TABLET ORAL at 17:06

## 2017-07-05 RX ADMIN — ATORVASTATIN CALCIUM 40 MG: 40 TABLET, FILM COATED ORAL at 17:06

## 2017-07-05 RX ADMIN — SPIRONOLACTONE 25 MG: 25 TABLET, FILM COATED ORAL at 08:00

## 2017-07-05 RX ADMIN — ACETAMINOPHEN 650 MG: 325 TABLET, FILM COATED ORAL at 21:28

## 2017-07-05 RX ADMIN — POTASSIUM CHLORIDE 20 MEQ: 1500 TABLET, EXTENDED RELEASE ORAL at 08:00

## 2017-07-05 RX ADMIN — PANTOPRAZOLE SODIUM 40 MG: 40 INJECTION, POWDER, FOR SOLUTION INTRAVENOUS at 08:00

## 2017-07-05 RX ADMIN — OXYCODONE HYDROCHLORIDE 10 MG: 5 TABLET ORAL at 07:52

## 2017-07-05 RX ADMIN — AMIODARONE HYDROCHLORIDE 400 MG: 200 TABLET ORAL at 07:53

## 2017-07-05 RX ADMIN — ACETAMINOPHEN 650 MG: 325 TABLET, FILM COATED ORAL at 17:06

## 2017-07-05 RX ADMIN — ACETAMINOPHEN 650 MG: 325 TABLET, FILM COATED ORAL at 04:33

## 2017-07-05 RX ADMIN — NYSTATIN: 100000 POWDER TOPICAL at 17:07

## 2017-07-05 RX ADMIN — CEFTRIAXONE 2000 MG: 2 INJECTION, POWDER, FOR SOLUTION INTRAMUSCULAR; INTRAVENOUS at 17:08

## 2017-07-05 RX ADMIN — METOPROLOL TARTRATE 25 MG: 25 TABLET ORAL at 08:00

## 2017-07-05 RX ADMIN — NYSTATIN: 100000 POWDER TOPICAL at 08:00

## 2017-07-05 RX ADMIN — OXYCODONE HYDROCHLORIDE 10 MG: 5 TABLET ORAL at 23:09

## 2017-07-05 RX ADMIN — OXYCODONE HYDROCHLORIDE 10 MG: 5 TABLET ORAL at 17:06

## 2017-07-05 RX ADMIN — INSULIN LISPRO 1 UNITS: 100 INJECTION, SOLUTION INTRAVENOUS; SUBCUTANEOUS at 07:54

## 2017-07-06 LAB
ANION GAP SERPL CALCULATED.3IONS-SCNC: 7 MMOL/L (ref 4–13)
BUN SERPL-MCNC: 15 MG/DL (ref 5–25)
CALCIUM SERPL-MCNC: 8.6 MG/DL (ref 8.3–10.1)
CHLORIDE SERPL-SCNC: 100 MMOL/L (ref 100–108)
CO2 SERPL-SCNC: 30 MMOL/L (ref 21–32)
CREAT SERPL-MCNC: 0.96 MG/DL (ref 0.6–1.3)
ERYTHROCYTE [DISTWIDTH] IN BLOOD BY AUTOMATED COUNT: 18.9 % (ref 11.6–15.1)
GFR SERPL CREATININE-BSD FRML MDRD: 57.3 ML/MIN/1.73SQ M
GLUCOSE SERPL-MCNC: 120 MG/DL (ref 65–140)
GLUCOSE SERPL-MCNC: 139 MG/DL (ref 65–140)
GLUCOSE SERPL-MCNC: 141 MG/DL (ref 65–140)
GLUCOSE SERPL-MCNC: 157 MG/DL (ref 65–140)
GLUCOSE SERPL-MCNC: 165 MG/DL (ref 65–140)
HCT VFR BLD AUTO: 29 % (ref 34.8–46.1)
HGB BLD-MCNC: 9.1 G/DL (ref 11.5–15.4)
INR PPP: 3.31 (ref 0.86–1.16)
MCH RBC QN AUTO: 29.6 PG (ref 26.8–34.3)
MCHC RBC AUTO-ENTMCNC: 31.4 G/DL (ref 31.4–37.4)
MCV RBC AUTO: 95 FL (ref 82–98)
NT-PROBNP SERPL-MCNC: 9130 PG/ML
PLATELET # BLD AUTO: 412 THOUSANDS/UL (ref 149–390)
PMV BLD AUTO: 8.7 FL (ref 8.9–12.7)
POTASSIUM SERPL-SCNC: 4 MMOL/L (ref 3.5–5.3)
PROTHROMBIN TIME: 34.1 SECONDS (ref 12.1–14.4)
RBC # BLD AUTO: 3.07 MILLION/UL (ref 3.81–5.12)
SODIUM SERPL-SCNC: 137 MMOL/L (ref 136–145)
WBC # BLD AUTO: 11.17 THOUSAND/UL (ref 4.31–10.16)

## 2017-07-06 PROCEDURE — 85610 PROTHROMBIN TIME: CPT | Performed by: INTERNAL MEDICINE

## 2017-07-06 PROCEDURE — 80048 BASIC METABOLIC PNL TOTAL CA: CPT | Performed by: INTERNAL MEDICINE

## 2017-07-06 PROCEDURE — 82948 REAGENT STRIP/BLOOD GLUCOSE: CPT

## 2017-07-06 PROCEDURE — C9113 INJ PANTOPRAZOLE SODIUM, VIA: HCPCS | Performed by: INTERNAL MEDICINE

## 2017-07-06 PROCEDURE — 85027 COMPLETE CBC AUTOMATED: CPT | Performed by: INTERNAL MEDICINE

## 2017-07-06 PROCEDURE — 83880 ASSAY OF NATRIURETIC PEPTIDE: CPT | Performed by: INTERNAL MEDICINE

## 2017-07-06 RX ORDER — DIGOXIN 125 MCG
250 TABLET ORAL EVERY 4 HOURS
Status: COMPLETED | OUTPATIENT
Start: 2017-07-06 | End: 2017-07-06

## 2017-07-06 RX ORDER — OXYCODONE HYDROCHLORIDE 5 MG/1
10 TABLET ORAL EVERY 4 HOURS PRN
Status: DISCONTINUED | OUTPATIENT
Start: 2017-07-06 | End: 2017-07-15 | Stop reason: HOSPADM

## 2017-07-06 RX ORDER — FUROSEMIDE 80 MG
80 TABLET ORAL DAILY
Status: DISCONTINUED | OUTPATIENT
Start: 2017-07-06 | End: 2017-07-08

## 2017-07-06 RX ORDER — POTASSIUM CHLORIDE 20 MEQ/1
20 TABLET, EXTENDED RELEASE ORAL DAILY
Status: DISCONTINUED | OUTPATIENT
Start: 2017-07-06 | End: 2017-07-06

## 2017-07-06 RX ORDER — DIGOXIN 125 MCG
125 TABLET ORAL DAILY
Status: DISCONTINUED | OUTPATIENT
Start: 2017-07-07 | End: 2017-07-15 | Stop reason: HOSPADM

## 2017-07-06 RX ADMIN — NYSTATIN: 100000 POWDER TOPICAL at 18:01

## 2017-07-06 RX ADMIN — SPIRONOLACTONE 25 MG: 25 TABLET, FILM COATED ORAL at 08:18

## 2017-07-06 RX ADMIN — OXYCODONE HYDROCHLORIDE 10 MG: 5 TABLET ORAL at 06:48

## 2017-07-06 RX ADMIN — DIGOXIN 250 MCG: 125 TABLET ORAL at 12:34

## 2017-07-06 RX ADMIN — METOPROLOL TARTRATE 25 MG: 25 TABLET ORAL at 21:56

## 2017-07-06 RX ADMIN — INSULIN LISPRO 1 UNITS: 100 INJECTION, SOLUTION INTRAVENOUS; SUBCUTANEOUS at 11:45

## 2017-07-06 RX ADMIN — INSULIN LISPRO 1 UNITS: 100 INJECTION, SOLUTION INTRAVENOUS; SUBCUTANEOUS at 21:56

## 2017-07-06 RX ADMIN — CEFTRIAXONE 2000 MG: 2 INJECTION, POWDER, FOR SOLUTION INTRAMUSCULAR; INTRAVENOUS at 13:44

## 2017-07-06 RX ADMIN — FUROSEMIDE 80 MG: 80 TABLET ORAL at 12:34

## 2017-07-06 RX ADMIN — PANTOPRAZOLE SODIUM 40 MG: 40 INJECTION, POWDER, FOR SOLUTION INTRAVENOUS at 08:18

## 2017-07-06 RX ADMIN — NYSTATIN: 100000 POWDER TOPICAL at 08:36

## 2017-07-06 RX ADMIN — DILTIAZEM HYDROCHLORIDE 30 MG: 60 TABLET, FILM COATED ORAL at 09:55

## 2017-07-06 RX ADMIN — FUROSEMIDE 40 MG: 40 TABLET ORAL at 08:18

## 2017-07-06 RX ADMIN — OXYCODONE HYDROCHLORIDE 10 MG: 5 TABLET ORAL at 21:56

## 2017-07-06 RX ADMIN — ACETAMINOPHEN 650 MG: 325 TABLET, FILM COATED ORAL at 11:01

## 2017-07-06 RX ADMIN — METOPROLOL TARTRATE 25 MG: 25 TABLET ORAL at 08:18

## 2017-07-06 RX ADMIN — AMIODARONE HYDROCHLORIDE 400 MG: 200 TABLET ORAL at 07:46

## 2017-07-06 RX ADMIN — AMIODARONE HYDROCHLORIDE 400 MG: 200 TABLET ORAL at 17:57

## 2017-07-06 RX ADMIN — ALLOPURINOL 100 MG: 100 TABLET ORAL at 08:17

## 2017-07-06 RX ADMIN — POTASSIUM CHLORIDE 20 MEQ: 1500 TABLET, EXTENDED RELEASE ORAL at 08:18

## 2017-07-06 RX ADMIN — DIGOXIN 250 MCG: 125 TABLET ORAL at 21:55

## 2017-07-06 RX ADMIN — OXYCODONE HYDROCHLORIDE 10 MG: 5 TABLET ORAL at 12:36

## 2017-07-06 RX ADMIN — ACETAMINOPHEN 650 MG: 325 TABLET, FILM COATED ORAL at 07:45

## 2017-07-06 RX ADMIN — ALLOPURINOL 100 MG: 100 TABLET ORAL at 17:57

## 2017-07-06 RX ADMIN — DIGOXIN 250 MCG: 125 TABLET ORAL at 17:58

## 2017-07-06 RX ADMIN — WARFARIN SODIUM 4 MG: 4 TABLET ORAL at 17:58

## 2017-07-06 RX ADMIN — ATORVASTATIN CALCIUM 40 MG: 40 TABLET, FILM COATED ORAL at 17:57

## 2017-07-07 LAB
ANION GAP SERPL CALCULATED.3IONS-SCNC: 5 MMOL/L (ref 4–13)
BACTERIA BLD CULT: NORMAL
BACTERIA BLD CULT: NORMAL
BUN SERPL-MCNC: 16 MG/DL (ref 5–25)
CALCIUM SERPL-MCNC: 8.6 MG/DL (ref 8.3–10.1)
CHLORIDE SERPL-SCNC: 100 MMOL/L (ref 100–108)
CO2 SERPL-SCNC: 32 MMOL/L (ref 21–32)
CREAT SERPL-MCNC: 1.04 MG/DL (ref 0.6–1.3)
GFR SERPL CREATININE-BSD FRML MDRD: 52.2 ML/MIN/1.73SQ M
GLUCOSE SERPL-MCNC: 103 MG/DL (ref 65–140)
GLUCOSE SERPL-MCNC: 107 MG/DL (ref 65–140)
GLUCOSE SERPL-MCNC: 133 MG/DL (ref 65–140)
GLUCOSE SERPL-MCNC: 143 MG/DL (ref 65–140)
GLUCOSE SERPL-MCNC: 170 MG/DL (ref 65–140)
INR PPP: 3.9 (ref 0.86–1.16)
POTASSIUM SERPL-SCNC: 3.3 MMOL/L (ref 3.5–5.3)
PROTHROMBIN TIME: 38.9 SECONDS (ref 12.1–14.4)
SODIUM SERPL-SCNC: 137 MMOL/L (ref 136–145)

## 2017-07-07 PROCEDURE — 85610 PROTHROMBIN TIME: CPT | Performed by: INTERNAL MEDICINE

## 2017-07-07 PROCEDURE — 82948 REAGENT STRIP/BLOOD GLUCOSE: CPT

## 2017-07-07 PROCEDURE — 80048 BASIC METABOLIC PNL TOTAL CA: CPT | Performed by: INTERNAL MEDICINE

## 2017-07-07 PROCEDURE — 97110 THERAPEUTIC EXERCISES: CPT

## 2017-07-07 PROCEDURE — C9113 INJ PANTOPRAZOLE SODIUM, VIA: HCPCS | Performed by: INTERNAL MEDICINE

## 2017-07-07 PROCEDURE — 97530 THERAPEUTIC ACTIVITIES: CPT

## 2017-07-07 RX ORDER — WARFARIN SODIUM 4 MG/1
2 TABLET ORAL
Status: DISCONTINUED | OUTPATIENT
Start: 2017-07-07 | End: 2017-07-08

## 2017-07-07 RX ADMIN — WARFARIN SODIUM 2 MG: 4 TABLET ORAL at 18:26

## 2017-07-07 RX ADMIN — ALLOPURINOL 100 MG: 100 TABLET ORAL at 09:37

## 2017-07-07 RX ADMIN — DIGOXIN 125 MCG: 125 TABLET ORAL at 09:37

## 2017-07-07 RX ADMIN — METOPROLOL TARTRATE 25 MG: 25 TABLET ORAL at 09:36

## 2017-07-07 RX ADMIN — ATORVASTATIN CALCIUM 40 MG: 40 TABLET, FILM COATED ORAL at 17:00

## 2017-07-07 RX ADMIN — ACETAMINOPHEN 650 MG: 325 TABLET, FILM COATED ORAL at 21:21

## 2017-07-07 RX ADMIN — NYSTATIN: 100000 POWDER TOPICAL at 09:42

## 2017-07-07 RX ADMIN — SPIRONOLACTONE 25 MG: 25 TABLET, FILM COATED ORAL at 09:37

## 2017-07-07 RX ADMIN — ACETAMINOPHEN 650 MG: 325 TABLET, FILM COATED ORAL at 16:52

## 2017-07-07 RX ADMIN — ACETAMINOPHEN 650 MG: 325 TABLET, FILM COATED ORAL at 07:43

## 2017-07-07 RX ADMIN — FUROSEMIDE 80 MG: 80 TABLET ORAL at 09:37

## 2017-07-07 RX ADMIN — METOPROLOL TARTRATE 25 MG: 25 TABLET ORAL at 21:16

## 2017-07-07 RX ADMIN — INSULIN LISPRO 1 UNITS: 100 INJECTION, SOLUTION INTRAVENOUS; SUBCUTANEOUS at 12:30

## 2017-07-07 RX ADMIN — NYSTATIN: 100000 POWDER TOPICAL at 18:27

## 2017-07-07 RX ADMIN — OXYCODONE HYDROCHLORIDE 10 MG: 5 TABLET ORAL at 13:34

## 2017-07-07 RX ADMIN — AMIODARONE HYDROCHLORIDE 400 MG: 200 TABLET ORAL at 16:52

## 2017-07-07 RX ADMIN — PANTOPRAZOLE SODIUM 40 MG: 40 INJECTION, POWDER, FOR SOLUTION INTRAVENOUS at 09:42

## 2017-07-07 RX ADMIN — CEFTRIAXONE 2000 MG: 2 INJECTION, POWDER, FOR SOLUTION INTRAMUSCULAR; INTRAVENOUS at 13:34

## 2017-07-07 RX ADMIN — OXYCODONE HYDROCHLORIDE 10 MG: 5 TABLET ORAL at 21:16

## 2017-07-07 RX ADMIN — POTASSIUM CHLORIDE 20 MEQ: 1500 TABLET, EXTENDED RELEASE ORAL at 09:36

## 2017-07-07 RX ADMIN — ALLOPURINOL 100 MG: 100 TABLET ORAL at 18:26

## 2017-07-07 RX ADMIN — AMIODARONE HYDROCHLORIDE 400 MG: 200 TABLET ORAL at 07:41

## 2017-07-08 LAB
BACTERIA SPEC BFLD CULT: NO GROWTH
DIGOXIN SERPL-MCNC: 1.2 NG/ML (ref 0.8–2)
ERYTHROCYTE [DISTWIDTH] IN BLOOD BY AUTOMATED COUNT: 19.1 % (ref 11.6–15.1)
ERYTHROCYTE [SEDIMENTATION RATE] IN BLOOD: 140 MM/HOUR (ref 0–20)
GLUCOSE SERPL-MCNC: 130 MG/DL (ref 65–140)
GLUCOSE SERPL-MCNC: 138 MG/DL (ref 65–140)
GLUCOSE SERPL-MCNC: 153 MG/DL (ref 65–140)
GLUCOSE SERPL-MCNC: 88 MG/DL (ref 65–140)
GRAM STN SPEC: NORMAL
HCT VFR BLD AUTO: 28.8 % (ref 34.8–46.1)
HGB BLD-MCNC: 8.8 G/DL (ref 11.5–15.4)
INR PPP: 3.99 (ref 0.86–1.16)
MCH RBC QN AUTO: 29.3 PG (ref 26.8–34.3)
MCHC RBC AUTO-ENTMCNC: 30.6 G/DL (ref 31.4–37.4)
MCV RBC AUTO: 96 FL (ref 82–98)
PLATELET # BLD AUTO: 389 THOUSANDS/UL (ref 149–390)
PMV BLD AUTO: 8.7 FL (ref 8.9–12.7)
PROTHROMBIN TIME: 39.6 SECONDS (ref 12.1–14.4)
RBC # BLD AUTO: 3 MILLION/UL (ref 3.81–5.12)
WBC # BLD AUTO: 9.61 THOUSAND/UL (ref 4.31–10.16)

## 2017-07-08 PROCEDURE — 85652 RBC SED RATE AUTOMATED: CPT | Performed by: INTERNAL MEDICINE

## 2017-07-08 PROCEDURE — C9113 INJ PANTOPRAZOLE SODIUM, VIA: HCPCS | Performed by: INTERNAL MEDICINE

## 2017-07-08 PROCEDURE — 85610 PROTHROMBIN TIME: CPT | Performed by: INTERNAL MEDICINE

## 2017-07-08 PROCEDURE — 82948 REAGENT STRIP/BLOOD GLUCOSE: CPT

## 2017-07-08 PROCEDURE — 85027 COMPLETE CBC AUTOMATED: CPT | Performed by: INTERNAL MEDICINE

## 2017-07-08 PROCEDURE — 80162 ASSAY OF DIGOXIN TOTAL: CPT | Performed by: INTERNAL MEDICINE

## 2017-07-08 RX ORDER — FUROSEMIDE 40 MG/1
40 TABLET ORAL DAILY
Status: DISCONTINUED | OUTPATIENT
Start: 2017-07-09 | End: 2017-07-12

## 2017-07-08 RX ADMIN — ACETAMINOPHEN 650 MG: 325 TABLET, FILM COATED ORAL at 14:33

## 2017-07-08 RX ADMIN — ACETAMINOPHEN 650 MG: 325 TABLET, FILM COATED ORAL at 08:29

## 2017-07-08 RX ADMIN — AMIODARONE HYDROCHLORIDE 400 MG: 200 TABLET ORAL at 17:41

## 2017-07-08 RX ADMIN — OXYCODONE HYDROCHLORIDE 10 MG: 5 TABLET ORAL at 08:29

## 2017-07-08 RX ADMIN — ALLOPURINOL 100 MG: 100 TABLET ORAL at 17:41

## 2017-07-08 RX ADMIN — ALLOPURINOL 100 MG: 100 TABLET ORAL at 08:31

## 2017-07-08 RX ADMIN — INSULIN LISPRO 1 UNITS: 100 INJECTION, SOLUTION INTRAVENOUS; SUBCUTANEOUS at 12:30

## 2017-07-08 RX ADMIN — OXYCODONE HYDROCHLORIDE 10 MG: 5 TABLET ORAL at 21:18

## 2017-07-08 RX ADMIN — ACETAMINOPHEN 650 MG: 325 TABLET, FILM COATED ORAL at 21:20

## 2017-07-08 RX ADMIN — OXYCODONE HYDROCHLORIDE 10 MG: 5 TABLET ORAL at 14:33

## 2017-07-08 RX ADMIN — METOPROLOL TARTRATE 25 MG: 25 TABLET ORAL at 21:19

## 2017-07-08 RX ADMIN — ATORVASTATIN CALCIUM 40 MG: 40 TABLET, FILM COATED ORAL at 17:41

## 2017-07-08 RX ADMIN — AMIODARONE HYDROCHLORIDE 400 MG: 200 TABLET ORAL at 08:30

## 2017-07-08 RX ADMIN — PANTOPRAZOLE SODIUM 40 MG: 40 INJECTION, POWDER, FOR SOLUTION INTRAVENOUS at 08:30

## 2017-07-08 RX ADMIN — OXYCODONE HYDROCHLORIDE 10 MG: 5 TABLET ORAL at 02:59

## 2017-07-08 RX ADMIN — POTASSIUM CHLORIDE 20 MEQ: 1500 TABLET, EXTENDED RELEASE ORAL at 08:29

## 2017-07-08 RX ADMIN — DIGOXIN 125 MCG: 125 TABLET ORAL at 08:29

## 2017-07-08 RX ADMIN — FUROSEMIDE 80 MG: 80 TABLET ORAL at 08:30

## 2017-07-08 RX ADMIN — ACETAMINOPHEN 650 MG: 325 TABLET, FILM COATED ORAL at 03:02

## 2017-07-08 RX ADMIN — NYSTATIN: 100000 POWDER TOPICAL at 08:31

## 2017-07-08 RX ADMIN — NYSTATIN: 100000 POWDER TOPICAL at 17:42

## 2017-07-09 LAB
ANION GAP SERPL CALCULATED.3IONS-SCNC: 5 MMOL/L (ref 4–13)
BUN SERPL-MCNC: 14 MG/DL (ref 5–25)
CALCIUM SERPL-MCNC: 8.5 MG/DL (ref 8.3–10.1)
CHLORIDE SERPL-SCNC: 100 MMOL/L (ref 100–108)
CO2 SERPL-SCNC: 32 MMOL/L (ref 21–32)
CREAT SERPL-MCNC: 0.98 MG/DL (ref 0.6–1.3)
GFR SERPL CREATININE-BSD FRML MDRD: 55.9 ML/MIN/1.73SQ M
GLUCOSE SERPL-MCNC: 105 MG/DL (ref 65–140)
GLUCOSE SERPL-MCNC: 108 MG/DL (ref 65–140)
GLUCOSE SERPL-MCNC: 116 MG/DL (ref 65–140)
GLUCOSE SERPL-MCNC: 125 MG/DL (ref 65–140)
GLUCOSE SERPL-MCNC: 73 MG/DL (ref 65–140)
INR PPP: 4.09 (ref 0.86–1.16)
POTASSIUM SERPL-SCNC: 3.4 MMOL/L (ref 3.5–5.3)
PROTHROMBIN TIME: 40.4 SECONDS (ref 12.1–14.4)
SODIUM SERPL-SCNC: 137 MMOL/L (ref 136–145)

## 2017-07-09 PROCEDURE — 85610 PROTHROMBIN TIME: CPT | Performed by: INTERNAL MEDICINE

## 2017-07-09 PROCEDURE — 80048 BASIC METABOLIC PNL TOTAL CA: CPT | Performed by: INTERNAL MEDICINE

## 2017-07-09 PROCEDURE — C9113 INJ PANTOPRAZOLE SODIUM, VIA: HCPCS | Performed by: INTERNAL MEDICINE

## 2017-07-09 PROCEDURE — 82948 REAGENT STRIP/BLOOD GLUCOSE: CPT

## 2017-07-09 RX ORDER — POTASSIUM CHLORIDE 20 MEQ/1
20 TABLET, EXTENDED RELEASE ORAL 2 TIMES DAILY
Status: DISCONTINUED | OUTPATIENT
Start: 2017-07-10 | End: 2017-07-12

## 2017-07-09 RX ADMIN — ACETAMINOPHEN 650 MG: 325 TABLET, FILM COATED ORAL at 03:01

## 2017-07-09 RX ADMIN — OXYCODONE HYDROCHLORIDE 10 MG: 5 TABLET ORAL at 03:01

## 2017-07-09 RX ADMIN — FUROSEMIDE 40 MG: 40 TABLET ORAL at 08:32

## 2017-07-09 RX ADMIN — ACETAMINOPHEN 650 MG: 325 TABLET, FILM COATED ORAL at 22:04

## 2017-07-09 RX ADMIN — NYSTATIN: 100000 POWDER TOPICAL at 17:22

## 2017-07-09 RX ADMIN — OXYCODONE HYDROCHLORIDE 10 MG: 5 TABLET ORAL at 17:18

## 2017-07-09 RX ADMIN — ALLOPURINOL 100 MG: 100 TABLET ORAL at 17:18

## 2017-07-09 RX ADMIN — ALLOPURINOL 100 MG: 100 TABLET ORAL at 08:32

## 2017-07-09 RX ADMIN — ATORVASTATIN CALCIUM 40 MG: 40 TABLET, FILM COATED ORAL at 17:18

## 2017-07-09 RX ADMIN — OXYCODONE HYDROCHLORIDE 10 MG: 5 TABLET ORAL at 23:54

## 2017-07-09 RX ADMIN — ACETAMINOPHEN 650 MG: 325 TABLET, FILM COATED ORAL at 17:25

## 2017-07-09 RX ADMIN — AMIODARONE HYDROCHLORIDE 400 MG: 200 TABLET ORAL at 08:32

## 2017-07-09 RX ADMIN — AMIODARONE HYDROCHLORIDE 400 MG: 200 TABLET ORAL at 17:18

## 2017-07-09 RX ADMIN — DIGOXIN 125 MCG: 125 TABLET ORAL at 08:32

## 2017-07-09 RX ADMIN — METOPROLOL TARTRATE 25 MG: 25 TABLET ORAL at 22:00

## 2017-07-09 RX ADMIN — OXYCODONE HYDROCHLORIDE 10 MG: 5 TABLET ORAL at 08:37

## 2017-07-09 RX ADMIN — PANTOPRAZOLE SODIUM 40 MG: 40 INJECTION, POWDER, FOR SOLUTION INTRAVENOUS at 08:32

## 2017-07-09 RX ADMIN — POTASSIUM CHLORIDE 20 MEQ: 1500 TABLET, EXTENDED RELEASE ORAL at 08:32

## 2017-07-09 RX ADMIN — NYSTATIN: 100000 POWDER TOPICAL at 08:35

## 2017-07-10 LAB
ABO GROUP BLD BPU: NORMAL
ABO GROUP BLD BPU: NORMAL
ANION GAP SERPL CALCULATED.3IONS-SCNC: 2 MMOL/L (ref 4–13)
BPU ID: NORMAL
BPU ID: NORMAL
BUN SERPL-MCNC: 16 MG/DL (ref 5–25)
CALCIUM SERPL-MCNC: 8.6 MG/DL (ref 8.3–10.1)
CHLORIDE SERPL-SCNC: 100 MMOL/L (ref 100–108)
CO2 SERPL-SCNC: 34 MMOL/L (ref 21–32)
CREAT SERPL-MCNC: 0.93 MG/DL (ref 0.6–1.3)
ERYTHROCYTE [DISTWIDTH] IN BLOOD BY AUTOMATED COUNT: 19 % (ref 11.6–15.1)
GFR SERPL CREATININE-BSD FRML MDRD: 59.4 ML/MIN/1.73SQ M
GLUCOSE SERPL-MCNC: 101 MG/DL (ref 65–140)
GLUCOSE SERPL-MCNC: 103 MG/DL (ref 65–140)
GLUCOSE SERPL-MCNC: 104 MG/DL (ref 65–140)
GLUCOSE SERPL-MCNC: 109 MG/DL (ref 65–140)
GLUCOSE SERPL-MCNC: 117 MG/DL (ref 65–140)
HCT VFR BLD AUTO: 30.9 % (ref 34.8–46.1)
HGB BLD-MCNC: 9.4 G/DL (ref 11.5–15.4)
INR PPP: 3.85 (ref 0.86–1.16)
MCH RBC QN AUTO: 29.2 PG (ref 26.8–34.3)
MCHC RBC AUTO-ENTMCNC: 30.4 G/DL (ref 31.4–37.4)
MCV RBC AUTO: 96 FL (ref 82–98)
PLATELET # BLD AUTO: 380 THOUSANDS/UL (ref 149–390)
PMV BLD AUTO: 8.6 FL (ref 8.9–12.7)
POTASSIUM SERPL-SCNC: 3.7 MMOL/L (ref 3.5–5.3)
PROTHROMBIN TIME: 38.5 SECONDS (ref 12.1–14.4)
RBC # BLD AUTO: 3.22 MILLION/UL (ref 3.81–5.12)
SODIUM SERPL-SCNC: 136 MMOL/L (ref 136–145)
UNIT DISPENSE STATUS: NORMAL
UNIT DISPENSE STATUS: NORMAL
UNIT PRODUCT CODE: NORMAL
UNIT PRODUCT CODE: NORMAL
UNIT RH: NORMAL
UNIT RH: NORMAL
WBC # BLD AUTO: 8.01 THOUSAND/UL (ref 4.31–10.16)

## 2017-07-10 PROCEDURE — 85027 COMPLETE CBC AUTOMATED: CPT | Performed by: INTERNAL MEDICINE

## 2017-07-10 PROCEDURE — C9113 INJ PANTOPRAZOLE SODIUM, VIA: HCPCS | Performed by: INTERNAL MEDICINE

## 2017-07-10 PROCEDURE — 82948 REAGENT STRIP/BLOOD GLUCOSE: CPT

## 2017-07-10 PROCEDURE — 80048 BASIC METABOLIC PNL TOTAL CA: CPT | Performed by: INTERNAL MEDICINE

## 2017-07-10 PROCEDURE — 85610 PROTHROMBIN TIME: CPT | Performed by: INTERNAL MEDICINE

## 2017-07-10 PROCEDURE — 97110 THERAPEUTIC EXERCISES: CPT

## 2017-07-10 PROCEDURE — P9017 PLASMA 1 DONOR FRZ W/IN 8 HR: HCPCS

## 2017-07-10 PROCEDURE — 86927 PLASMA FRESH FROZEN: CPT

## 2017-07-10 RX ORDER — PHYTONADIONE 5 MG/1
2.5 TABLET ORAL ONCE
Status: COMPLETED | OUTPATIENT
Start: 2017-07-10 | End: 2017-07-10

## 2017-07-10 RX ADMIN — AMIODARONE HYDROCHLORIDE 400 MG: 200 TABLET ORAL at 17:32

## 2017-07-10 RX ADMIN — ACETAMINOPHEN 650 MG: 325 TABLET, FILM COATED ORAL at 08:55

## 2017-07-10 RX ADMIN — ATORVASTATIN CALCIUM 40 MG: 40 TABLET, FILM COATED ORAL at 17:32

## 2017-07-10 RX ADMIN — ALLOPURINOL 100 MG: 100 TABLET ORAL at 08:54

## 2017-07-10 RX ADMIN — NYSTATIN: 100000 POWDER TOPICAL at 17:52

## 2017-07-10 RX ADMIN — FUROSEMIDE 40 MG: 40 TABLET ORAL at 08:55

## 2017-07-10 RX ADMIN — METOPROLOL TARTRATE 25 MG: 25 TABLET ORAL at 21:21

## 2017-07-10 RX ADMIN — OXYCODONE HYDROCHLORIDE 10 MG: 5 TABLET ORAL at 17:32

## 2017-07-10 RX ADMIN — POTASSIUM CHLORIDE 20 MEQ: 1500 TABLET, EXTENDED RELEASE ORAL at 08:54

## 2017-07-10 RX ADMIN — AMIODARONE HYDROCHLORIDE 400 MG: 200 TABLET ORAL at 08:54

## 2017-07-10 RX ADMIN — POTASSIUM CHLORIDE 20 MEQ: 1500 TABLET, EXTENDED RELEASE ORAL at 17:32

## 2017-07-10 RX ADMIN — PANTOPRAZOLE SODIUM 40 MG: 40 INJECTION, POWDER, FOR SOLUTION INTRAVENOUS at 08:57

## 2017-07-10 RX ADMIN — PHYTONADIONE 2.5 MG: 5 TABLET ORAL at 12:24

## 2017-07-10 RX ADMIN — METOPROLOL TARTRATE 25 MG: 25 TABLET ORAL at 08:55

## 2017-07-10 RX ADMIN — ALLOPURINOL 100 MG: 100 TABLET ORAL at 17:32

## 2017-07-10 RX ADMIN — DIGOXIN 125 MCG: 125 TABLET ORAL at 08:55

## 2017-07-10 RX ADMIN — NYSTATIN: 100000 POWDER TOPICAL at 08:57

## 2017-07-10 RX ADMIN — OXYCODONE HYDROCHLORIDE 10 MG: 5 TABLET ORAL at 09:56

## 2017-07-11 ENCOUNTER — GENERIC CONVERSION - ENCOUNTER (OUTPATIENT)
Dept: OTHER | Facility: OTHER | Age: 72
End: 2017-07-11

## 2017-07-11 ENCOUNTER — APPOINTMENT (INPATIENT)
Dept: CT IMAGING | Facility: HOSPITAL | Age: 72
DRG: 871 | End: 2017-07-11
Attending: INTERNAL MEDICINE
Payer: COMMERCIAL

## 2017-07-11 LAB
ABO GROUP BLD BPU: NORMAL
ABO GROUP BLD BPU: NORMAL
APTT PPP: 153 SECONDS (ref 23–35)
BPU ID: NORMAL
BPU ID: NORMAL
CRP SERPL QL: 48.1 MG/L
ERYTHROCYTE [SEDIMENTATION RATE] IN BLOOD: 114 MM/HOUR (ref 0–20)
GLUCOSE SERPL-MCNC: 102 MG/DL (ref 65–140)
GLUCOSE SERPL-MCNC: 104 MG/DL (ref 65–140)
GLUCOSE SERPL-MCNC: 123 MG/DL (ref 65–140)
GLUCOSE SERPL-MCNC: 99 MG/DL (ref 65–140)
INR PPP: 1.6 (ref 0.86–1.16)
PROTHROMBIN TIME: 19.2 SECONDS (ref 12.1–14.4)
UNIT DISPENSE STATUS: NORMAL
UNIT DISPENSE STATUS: NORMAL
UNIT PRODUCT CODE: NORMAL
UNIT PRODUCT CODE: NORMAL
UNIT RH: NORMAL
UNIT RH: NORMAL

## 2017-07-11 PROCEDURE — 85652 RBC SED RATE AUTOMATED: CPT | Performed by: INTERNAL MEDICINE

## 2017-07-11 PROCEDURE — 82948 REAGENT STRIP/BLOOD GLUCOSE: CPT

## 2017-07-11 PROCEDURE — 76380 CAT SCAN FOLLOW-UP STUDY: CPT

## 2017-07-11 PROCEDURE — 85730 THROMBOPLASTIN TIME PARTIAL: CPT | Performed by: PHYSICIAN ASSISTANT

## 2017-07-11 PROCEDURE — 97530 THERAPEUTIC ACTIVITIES: CPT | Performed by: PHYSICAL THERAPIST

## 2017-07-11 PROCEDURE — 85610 PROTHROMBIN TIME: CPT | Performed by: INTERNAL MEDICINE

## 2017-07-11 PROCEDURE — 97116 GAIT TRAINING THERAPY: CPT | Performed by: PHYSICAL THERAPIST

## 2017-07-11 PROCEDURE — C9113 INJ PANTOPRAZOLE SODIUM, VIA: HCPCS | Performed by: INTERNAL MEDICINE

## 2017-07-11 PROCEDURE — 86140 C-REACTIVE PROTEIN: CPT | Performed by: INTERNAL MEDICINE

## 2017-07-11 RX ORDER — HEPARIN SODIUM 1000 [USP'U]/ML
4000 INJECTION, SOLUTION INTRAVENOUS; SUBCUTANEOUS ONCE
Status: COMPLETED | OUTPATIENT
Start: 2017-07-11 | End: 2017-07-11

## 2017-07-11 RX ORDER — WARFARIN SODIUM 3 MG/1
3 TABLET ORAL
Status: DISCONTINUED | OUTPATIENT
Start: 2017-07-11 | End: 2017-07-11

## 2017-07-11 RX ORDER — HEPARIN SODIUM 10000 [USP'U]/100ML
11.1 INJECTION, SOLUTION INTRAVENOUS
Status: DISCONTINUED | OUTPATIENT
Start: 2017-07-11 | End: 2017-07-15

## 2017-07-11 RX ORDER — WARFARIN SODIUM 5 MG/1
5 TABLET ORAL
Status: COMPLETED | OUTPATIENT
Start: 2017-07-11 | End: 2017-07-11

## 2017-07-11 RX ADMIN — WARFARIN SODIUM 5 MG: 5 TABLET ORAL at 17:51

## 2017-07-11 RX ADMIN — NYSTATIN: 100000 POWDER TOPICAL at 17:49

## 2017-07-11 RX ADMIN — ACETAMINOPHEN 650 MG: 325 TABLET, FILM COATED ORAL at 18:00

## 2017-07-11 RX ADMIN — OXYCODONE HYDROCHLORIDE 10 MG: 5 TABLET ORAL at 22:03

## 2017-07-11 RX ADMIN — HEPARIN SODIUM AND DEXTROSE 11.1 UNITS/KG/HR: 10000; 5 INJECTION INTRAVENOUS at 15:16

## 2017-07-11 RX ADMIN — ALLOPURINOL 100 MG: 100 TABLET ORAL at 08:15

## 2017-07-11 RX ADMIN — FUROSEMIDE 40 MG: 40 TABLET ORAL at 08:17

## 2017-07-11 RX ADMIN — NYSTATIN: 100000 POWDER TOPICAL at 08:17

## 2017-07-11 RX ADMIN — POTASSIUM CHLORIDE 20 MEQ: 1500 TABLET, EXTENDED RELEASE ORAL at 17:48

## 2017-07-11 RX ADMIN — PANTOPRAZOLE SODIUM 40 MG: 40 INJECTION, POWDER, FOR SOLUTION INTRAVENOUS at 08:20

## 2017-07-11 RX ADMIN — ACETAMINOPHEN 650 MG: 325 TABLET, FILM COATED ORAL at 12:52

## 2017-07-11 RX ADMIN — ACETAMINOPHEN 650 MG: 325 TABLET, FILM COATED ORAL at 22:03

## 2017-07-11 RX ADMIN — POTASSIUM CHLORIDE 20 MEQ: 1500 TABLET, EXTENDED RELEASE ORAL at 08:16

## 2017-07-11 RX ADMIN — AMIODARONE HYDROCHLORIDE 400 MG: 200 TABLET ORAL at 08:17

## 2017-07-11 RX ADMIN — OXYCODONE HYDROCHLORIDE 10 MG: 5 TABLET ORAL at 05:48

## 2017-07-11 RX ADMIN — OXYCODONE HYDROCHLORIDE 10 MG: 5 TABLET ORAL at 12:51

## 2017-07-11 RX ADMIN — ACETAMINOPHEN 650 MG: 325 TABLET, FILM COATED ORAL at 08:16

## 2017-07-11 RX ADMIN — DIGOXIN 125 MCG: 125 TABLET ORAL at 08:16

## 2017-07-11 RX ADMIN — ALLOPURINOL 100 MG: 100 TABLET ORAL at 17:48

## 2017-07-11 RX ADMIN — METOPROLOL TARTRATE 25 MG: 25 TABLET ORAL at 08:15

## 2017-07-11 RX ADMIN — ATORVASTATIN CALCIUM 40 MG: 40 TABLET, FILM COATED ORAL at 17:47

## 2017-07-11 RX ADMIN — CEFTRIAXONE 2000 MG: 2 INJECTION, POWDER, FOR SOLUTION INTRAMUSCULAR; INTRAVENOUS at 12:39

## 2017-07-11 RX ADMIN — OXYCODONE HYDROCHLORIDE 10 MG: 5 TABLET ORAL at 18:00

## 2017-07-11 RX ADMIN — AMIODARONE HYDROCHLORIDE 400 MG: 200 TABLET ORAL at 17:48

## 2017-07-11 RX ADMIN — HEPARIN SODIUM 4000 UNITS: 1000 INJECTION, SOLUTION INTRAVENOUS; SUBCUTANEOUS at 15:11

## 2017-07-11 RX ADMIN — METOPROLOL TARTRATE 25 MG: 25 TABLET ORAL at 22:03

## 2017-07-12 ENCOUNTER — APPOINTMENT (INPATIENT)
Dept: PHYSICAL THERAPY | Facility: HOSPITAL | Age: 72
DRG: 871 | End: 2017-07-12
Payer: COMMERCIAL

## 2017-07-12 LAB
ANION GAP SERPL CALCULATED.3IONS-SCNC: 6 MMOL/L (ref 4–13)
ANISOCYTOSIS BLD QL SMEAR: PRESENT
APTT PPP: 43 SECONDS (ref 23–35)
APTT PPP: 67 SECONDS (ref 23–35)
BASOPHILS # BLD MANUAL: 0 THOUSAND/UL (ref 0–0.1)
BASOPHILS NFR MAR MANUAL: 0 % (ref 0–1)
BUN SERPL-MCNC: 15 MG/DL (ref 5–25)
CALCIUM SERPL-MCNC: 9.3 MG/DL (ref 8.3–10.1)
CHLORIDE SERPL-SCNC: 100 MMOL/L (ref 100–108)
CO2 SERPL-SCNC: 30 MMOL/L (ref 21–32)
CREAT SERPL-MCNC: 0.92 MG/DL (ref 0.6–1.3)
EOSINOPHIL # BLD MANUAL: 0.24 THOUSAND/UL (ref 0–0.4)
EOSINOPHIL NFR BLD MANUAL: 3 % (ref 0–6)
ERYTHROCYTE [DISTWIDTH] IN BLOOD BY AUTOMATED COUNT: 19.3 % (ref 11.6–15.1)
GFR SERPL CREATININE-BSD FRML MDRD: >60 ML/MIN/1.73SQ M
GLUCOSE SERPL-MCNC: 101 MG/DL (ref 65–140)
GLUCOSE SERPL-MCNC: 103 MG/DL (ref 65–140)
GLUCOSE SERPL-MCNC: 118 MG/DL (ref 65–140)
GLUCOSE SERPL-MCNC: 126 MG/DL (ref 65–140)
GLUCOSE SERPL-MCNC: 165 MG/DL (ref 65–140)
HCT VFR BLD AUTO: 36.9 % (ref 34.8–46.1)
HGB BLD-MCNC: 11.3 G/DL (ref 11.5–15.4)
INR PPP: 1.25 (ref 0.86–1.16)
LG PLATELETS BLD QL SMEAR: PRESENT
LYMPHOCYTES # BLD AUTO: 0.63 THOUSAND/UL (ref 0.6–4.47)
LYMPHOCYTES # BLD AUTO: 8 % (ref 14–44)
MCH RBC QN AUTO: 29.8 PG (ref 26.8–34.3)
MCHC RBC AUTO-ENTMCNC: 30.6 G/DL (ref 31.4–37.4)
MCV RBC AUTO: 97 FL (ref 82–98)
METAMYELOCYTES NFR BLD MANUAL: 1 % (ref 0–1)
MONOCYTES # BLD AUTO: 0.95 THOUSAND/UL (ref 0–1.22)
MONOCYTES NFR BLD: 12 % (ref 4–12)
MYELOCYTES NFR BLD MANUAL: 1 % (ref 0–1)
NEUTROPHILS # BLD MANUAL: 5.93 THOUSAND/UL (ref 1.85–7.62)
NEUTS BAND NFR BLD MANUAL: 3 % (ref 0–8)
NEUTS SEG NFR BLD AUTO: 72 % (ref 43–75)
NRBC BLD AUTO-RTO: 0 /100 WBCS
PLATELET # BLD AUTO: 312 THOUSANDS/UL (ref 149–390)
PLATELET BLD QL SMEAR: ADEQUATE
PMV BLD AUTO: 9 FL (ref 8.9–12.7)
POLYCHROMASIA BLD QL SMEAR: PRESENT
POTASSIUM SERPL-SCNC: 4.5 MMOL/L (ref 3.5–5.3)
PROTHROMBIN TIME: 15.7 SECONDS (ref 12.1–14.4)
RBC # BLD AUTO: 3.79 MILLION/UL (ref 3.81–5.12)
SODIUM SERPL-SCNC: 136 MMOL/L (ref 136–145)
TOTAL CELLS COUNTED SPEC: 100
WBC # BLD AUTO: 7.9 THOUSAND/UL (ref 4.31–10.16)

## 2017-07-12 PROCEDURE — 80048 BASIC METABOLIC PNL TOTAL CA: CPT | Performed by: INTERNAL MEDICINE

## 2017-07-12 PROCEDURE — 85730 THROMBOPLASTIN TIME PARTIAL: CPT | Performed by: INTERNAL MEDICINE

## 2017-07-12 PROCEDURE — 85610 PROTHROMBIN TIME: CPT | Performed by: INTERNAL MEDICINE

## 2017-07-12 PROCEDURE — 97530 THERAPEUTIC ACTIVITIES: CPT

## 2017-07-12 PROCEDURE — 85007 BL SMEAR W/DIFF WBC COUNT: CPT | Performed by: INTERNAL MEDICINE

## 2017-07-12 PROCEDURE — 97116 GAIT TRAINING THERAPY: CPT

## 2017-07-12 PROCEDURE — C9113 INJ PANTOPRAZOLE SODIUM, VIA: HCPCS | Performed by: INTERNAL MEDICINE

## 2017-07-12 PROCEDURE — 82948 REAGENT STRIP/BLOOD GLUCOSE: CPT

## 2017-07-12 PROCEDURE — 85027 COMPLETE CBC AUTOMATED: CPT | Performed by: INTERNAL MEDICINE

## 2017-07-12 RX ORDER — SPIRONOLACTONE 25 MG/1
25 TABLET ORAL DAILY
Status: DISCONTINUED | OUTPATIENT
Start: 2017-07-12 | End: 2017-07-15 | Stop reason: HOSPADM

## 2017-07-12 RX ORDER — POTASSIUM CHLORIDE 20 MEQ/1
20 TABLET, EXTENDED RELEASE ORAL DAILY
Status: DISCONTINUED | OUTPATIENT
Start: 2017-07-13 | End: 2017-07-15 | Stop reason: HOSPADM

## 2017-07-12 RX ORDER — FUROSEMIDE 80 MG
80 TABLET ORAL DAILY
Status: DISCONTINUED | OUTPATIENT
Start: 2017-07-13 | End: 2017-07-15 | Stop reason: HOSPADM

## 2017-07-12 RX ORDER — WARFARIN SODIUM 5 MG/1
5 TABLET ORAL
Status: DISCONTINUED | OUTPATIENT
Start: 2017-07-12 | End: 2017-07-15 | Stop reason: HOSPADM

## 2017-07-12 RX ORDER — AMIODARONE HYDROCHLORIDE 200 MG/1
200 TABLET ORAL 2 TIMES DAILY WITH MEALS
Status: DISCONTINUED | OUTPATIENT
Start: 2017-07-12 | End: 2017-07-15 | Stop reason: HOSPADM

## 2017-07-12 RX ADMIN — ATORVASTATIN CALCIUM 40 MG: 40 TABLET, FILM COATED ORAL at 17:17

## 2017-07-12 RX ADMIN — ACETAMINOPHEN 650 MG: 325 TABLET, FILM COATED ORAL at 17:12

## 2017-07-12 RX ADMIN — OXYCODONE HYDROCHLORIDE 10 MG: 5 TABLET ORAL at 08:35

## 2017-07-12 RX ADMIN — CEFTRIAXONE 2000 MG: 2 INJECTION, POWDER, FOR SOLUTION INTRAMUSCULAR; INTRAVENOUS at 12:30

## 2017-07-12 RX ADMIN — ACETAMINOPHEN 650 MG: 325 TABLET, FILM COATED ORAL at 08:35

## 2017-07-12 RX ADMIN — WARFARIN SODIUM 5 MG: 5 TABLET ORAL at 17:17

## 2017-07-12 RX ADMIN — AMIODARONE HYDROCHLORIDE 200 MG: 200 TABLET ORAL at 17:17

## 2017-07-12 RX ADMIN — ALLOPURINOL 100 MG: 100 TABLET ORAL at 08:30

## 2017-07-12 RX ADMIN — OXYCODONE HYDROCHLORIDE 10 MG: 5 TABLET ORAL at 17:12

## 2017-07-12 RX ADMIN — OXYCODONE HYDROCHLORIDE 10 MG: 5 TABLET ORAL at 22:15

## 2017-07-12 RX ADMIN — HEPARIN SODIUM AND DEXTROSE 12.1 UNITS/KG/HR: 10000; 5 INJECTION INTRAVENOUS at 19:03

## 2017-07-12 RX ADMIN — FUROSEMIDE 40 MG: 40 TABLET ORAL at 08:21

## 2017-07-12 RX ADMIN — POTASSIUM CHLORIDE 20 MEQ: 1500 TABLET, EXTENDED RELEASE ORAL at 08:30

## 2017-07-12 RX ADMIN — ALLOPURINOL 100 MG: 100 TABLET ORAL at 17:17

## 2017-07-12 RX ADMIN — NYSTATIN: 100000 POWDER TOPICAL at 08:30

## 2017-07-12 RX ADMIN — METOPROLOL TARTRATE 25 MG: 25 TABLET ORAL at 22:15

## 2017-07-12 RX ADMIN — ACETAMINOPHEN 650 MG: 325 TABLET, FILM COATED ORAL at 22:15

## 2017-07-12 RX ADMIN — AMIODARONE HYDROCHLORIDE 400 MG: 200 TABLET ORAL at 08:21

## 2017-07-12 RX ADMIN — DIGOXIN 125 MCG: 125 TABLET ORAL at 08:28

## 2017-07-12 RX ADMIN — INSULIN LISPRO 1 UNITS: 100 INJECTION, SOLUTION INTRAVENOUS; SUBCUTANEOUS at 14:21

## 2017-07-12 RX ADMIN — NYSTATIN: 100000 POWDER TOPICAL at 17:21

## 2017-07-12 RX ADMIN — SPIRONOLACTONE 25 MG: 25 TABLET, FILM COATED ORAL at 12:33

## 2017-07-12 RX ADMIN — PANTOPRAZOLE SODIUM 40 MG: 40 INJECTION, POWDER, FOR SOLUTION INTRAVENOUS at 08:22

## 2017-07-13 LAB
ANION GAP SERPL CALCULATED.3IONS-SCNC: 6 MMOL/L (ref 4–13)
APTT PPP: 79 SECONDS (ref 23–35)
BUN SERPL-MCNC: 14 MG/DL (ref 5–25)
CALCIUM SERPL-MCNC: 9 MG/DL (ref 8.3–10.1)
CHLORIDE SERPL-SCNC: 100 MMOL/L (ref 100–108)
CO2 SERPL-SCNC: 31 MMOL/L (ref 21–32)
CREAT SERPL-MCNC: 1 MG/DL (ref 0.6–1.3)
GFR SERPL CREATININE-BSD FRML MDRD: 54.7 ML/MIN/1.73SQ M
GLUCOSE SERPL-MCNC: 100 MG/DL (ref 65–140)
GLUCOSE SERPL-MCNC: 102 MG/DL (ref 65–140)
GLUCOSE SERPL-MCNC: 109 MG/DL (ref 65–140)
GLUCOSE SERPL-MCNC: 114 MG/DL (ref 65–140)
GLUCOSE SERPL-MCNC: 123 MG/DL (ref 65–140)
INR PPP: 1.69 (ref 0.86–1.16)
POTASSIUM SERPL-SCNC: 3.6 MMOL/L (ref 3.5–5.3)
PROTHROMBIN TIME: 20 SECONDS (ref 12.1–14.4)
SODIUM SERPL-SCNC: 137 MMOL/L (ref 136–145)

## 2017-07-13 PROCEDURE — C9113 INJ PANTOPRAZOLE SODIUM, VIA: HCPCS | Performed by: INTERNAL MEDICINE

## 2017-07-13 PROCEDURE — 85610 PROTHROMBIN TIME: CPT | Performed by: INTERNAL MEDICINE

## 2017-07-13 PROCEDURE — 80048 BASIC METABOLIC PNL TOTAL CA: CPT | Performed by: INTERNAL MEDICINE

## 2017-07-13 PROCEDURE — 82948 REAGENT STRIP/BLOOD GLUCOSE: CPT

## 2017-07-13 RX ADMIN — FUROSEMIDE 80 MG: 80 TABLET ORAL at 09:08

## 2017-07-13 RX ADMIN — HEPARIN SODIUM AND DEXTROSE 12.1 UNITS/KG/HR: 10000; 5 INJECTION INTRAVENOUS at 18:01

## 2017-07-13 RX ADMIN — DIGOXIN 125 MCG: 125 TABLET ORAL at 09:09

## 2017-07-13 RX ADMIN — SPIRONOLACTONE 25 MG: 25 TABLET, FILM COATED ORAL at 09:08

## 2017-07-13 RX ADMIN — PANTOPRAZOLE SODIUM 40 MG: 40 INJECTION, POWDER, FOR SOLUTION INTRAVENOUS at 09:09

## 2017-07-13 RX ADMIN — ALLOPURINOL 100 MG: 100 TABLET ORAL at 09:09

## 2017-07-13 RX ADMIN — ACETAMINOPHEN 650 MG: 325 TABLET, FILM COATED ORAL at 07:48

## 2017-07-13 RX ADMIN — ALLOPURINOL 100 MG: 100 TABLET ORAL at 17:58

## 2017-07-13 RX ADMIN — ATORVASTATIN CALCIUM 40 MG: 40 TABLET, FILM COATED ORAL at 15:28

## 2017-07-13 RX ADMIN — METOPROLOL TARTRATE 25 MG: 25 TABLET ORAL at 21:31

## 2017-07-13 RX ADMIN — AMIODARONE HYDROCHLORIDE 200 MG: 200 TABLET ORAL at 07:48

## 2017-07-13 RX ADMIN — CEFTRIAXONE 2000 MG: 2 INJECTION, POWDER, FOR SOLUTION INTRAMUSCULAR; INTRAVENOUS at 11:36

## 2017-07-13 RX ADMIN — OXYCODONE HYDROCHLORIDE 10 MG: 5 TABLET ORAL at 07:49

## 2017-07-13 RX ADMIN — METOPROLOL TARTRATE 25 MG: 25 TABLET ORAL at 09:09

## 2017-07-13 RX ADMIN — NYSTATIN: 100000 POWDER TOPICAL at 17:58

## 2017-07-13 RX ADMIN — AMIODARONE HYDROCHLORIDE 200 MG: 200 TABLET ORAL at 16:28

## 2017-07-13 RX ADMIN — POTASSIUM CHLORIDE 20 MEQ: 1500 TABLET, EXTENDED RELEASE ORAL at 09:09

## 2017-07-13 RX ADMIN — NYSTATIN: 100000 POWDER TOPICAL at 09:09

## 2017-07-13 RX ADMIN — OXYCODONE HYDROCHLORIDE 10 MG: 5 TABLET ORAL at 19:51

## 2017-07-13 RX ADMIN — WARFARIN SODIUM 5 MG: 5 TABLET ORAL at 17:58

## 2017-07-13 RX ADMIN — ACETAMINOPHEN 650 MG: 325 TABLET, FILM COATED ORAL at 21:32

## 2017-07-14 LAB
APTT PPP: 108 SECONDS (ref 23–35)
APTT PPP: 110 SECONDS (ref 23–35)
GLUCOSE SERPL-MCNC: 104 MG/DL (ref 65–140)
GLUCOSE SERPL-MCNC: 130 MG/DL (ref 65–140)
GLUCOSE SERPL-MCNC: 142 MG/DL (ref 65–140)
GLUCOSE SERPL-MCNC: 90 MG/DL (ref 65–140)
INR PPP: 2.16 (ref 0.86–1.16)
PROTHROMBIN TIME: 24.3 SECONDS (ref 12.1–14.4)

## 2017-07-14 PROCEDURE — 97532 HB COGNITIVE SKILLS DEVELOPMENT: CPT

## 2017-07-14 PROCEDURE — 97116 GAIT TRAINING THERAPY: CPT

## 2017-07-14 PROCEDURE — 97110 THERAPEUTIC EXERCISES: CPT

## 2017-07-14 PROCEDURE — C9113 INJ PANTOPRAZOLE SODIUM, VIA: HCPCS | Performed by: INTERNAL MEDICINE

## 2017-07-14 PROCEDURE — 85730 THROMBOPLASTIN TIME PARTIAL: CPT | Performed by: INTERNAL MEDICINE

## 2017-07-14 PROCEDURE — 85610 PROTHROMBIN TIME: CPT | Performed by: INTERNAL MEDICINE

## 2017-07-14 PROCEDURE — 82948 REAGENT STRIP/BLOOD GLUCOSE: CPT

## 2017-07-14 PROCEDURE — 97530 THERAPEUTIC ACTIVITIES: CPT

## 2017-07-14 PROCEDURE — 97535 SELF CARE MNGMENT TRAINING: CPT

## 2017-07-14 RX ADMIN — DIGOXIN 125 MCG: 125 TABLET ORAL at 08:24

## 2017-07-14 RX ADMIN — ACETAMINOPHEN 650 MG: 325 TABLET, FILM COATED ORAL at 21:54

## 2017-07-14 RX ADMIN — OXYCODONE HYDROCHLORIDE 10 MG: 5 TABLET ORAL at 08:25

## 2017-07-14 RX ADMIN — FUROSEMIDE 80 MG: 80 TABLET ORAL at 08:24

## 2017-07-14 RX ADMIN — WARFARIN SODIUM 5 MG: 5 TABLET ORAL at 17:37

## 2017-07-14 RX ADMIN — METOPROLOL TARTRATE 25 MG: 25 TABLET ORAL at 08:24

## 2017-07-14 RX ADMIN — ALLOPURINOL 100 MG: 100 TABLET ORAL at 08:24

## 2017-07-14 RX ADMIN — OXYCODONE HYDROCHLORIDE 10 MG: 5 TABLET ORAL at 21:54

## 2017-07-14 RX ADMIN — AMIODARONE HYDROCHLORIDE 200 MG: 200 TABLET ORAL at 17:37

## 2017-07-14 RX ADMIN — NYSTATIN: 100000 POWDER TOPICAL at 17:38

## 2017-07-14 RX ADMIN — ACETAMINOPHEN 650 MG: 325 TABLET, FILM COATED ORAL at 08:25

## 2017-07-14 RX ADMIN — SPIRONOLACTONE 25 MG: 25 TABLET, FILM COATED ORAL at 08:25

## 2017-07-14 RX ADMIN — METOPROLOL TARTRATE 25 MG: 25 TABLET ORAL at 21:55

## 2017-07-14 RX ADMIN — OXYCODONE HYDROCHLORIDE 10 MG: 5 TABLET ORAL at 14:18

## 2017-07-14 RX ADMIN — ACETAMINOPHEN 650 MG: 325 TABLET, FILM COATED ORAL at 14:22

## 2017-07-14 RX ADMIN — PANTOPRAZOLE SODIUM 40 MG: 40 INJECTION, POWDER, FOR SOLUTION INTRAVENOUS at 08:25

## 2017-07-14 RX ADMIN — HEPARIN SODIUM AND DEXTROSE 10 UNITS/KG/HR: 10000; 5 INJECTION INTRAVENOUS at 15:01

## 2017-07-14 RX ADMIN — CEFTRIAXONE 2000 MG: 2 INJECTION, POWDER, FOR SOLUTION INTRAMUSCULAR; INTRAVENOUS at 13:21

## 2017-07-14 RX ADMIN — NYSTATIN: 100000 POWDER TOPICAL at 09:33

## 2017-07-14 RX ADMIN — ALLOPURINOL 100 MG: 100 TABLET ORAL at 17:38

## 2017-07-14 RX ADMIN — ATORVASTATIN CALCIUM 40 MG: 40 TABLET, FILM COATED ORAL at 17:37

## 2017-07-14 RX ADMIN — POTASSIUM CHLORIDE 20 MEQ: 1500 TABLET, EXTENDED RELEASE ORAL at 08:25

## 2017-07-14 RX ADMIN — AMIODARONE HYDROCHLORIDE 200 MG: 200 TABLET ORAL at 08:24

## 2017-07-15 VITALS
RESPIRATION RATE: 18 BRPM | WEIGHT: 226.19 LBS | TEMPERATURE: 97 F | OXYGEN SATURATION: 96 % | DIASTOLIC BLOOD PRESSURE: 53 MMHG | BODY MASS INDEX: 45.6 KG/M2 | SYSTOLIC BLOOD PRESSURE: 108 MMHG | HEIGHT: 59 IN | HEART RATE: 93 BPM

## 2017-07-15 LAB
APTT PPP: 85 SECONDS (ref 23–35)
GLUCOSE SERPL-MCNC: 104 MG/DL (ref 65–140)
GLUCOSE SERPL-MCNC: 129 MG/DL (ref 65–140)
INR PPP: 2.88 (ref 0.86–1.16)
PROTHROMBIN TIME: 30.6 SECONDS (ref 12.1–14.4)

## 2017-07-15 PROCEDURE — 82948 REAGENT STRIP/BLOOD GLUCOSE: CPT

## 2017-07-15 PROCEDURE — 85610 PROTHROMBIN TIME: CPT | Performed by: INTERNAL MEDICINE

## 2017-07-15 PROCEDURE — C9113 INJ PANTOPRAZOLE SODIUM, VIA: HCPCS | Performed by: INTERNAL MEDICINE

## 2017-07-15 PROCEDURE — 85730 THROMBOPLASTIN TIME PARTIAL: CPT | Performed by: INTERNAL MEDICINE

## 2017-07-15 RX ORDER — DIGOXIN 125 MCG
125 TABLET ORAL DAILY
Refills: 0
Start: 2017-07-15 | End: 2018-07-13 | Stop reason: SDUPTHER

## 2017-07-15 RX ORDER — OXYCODONE HYDROCHLORIDE 10 MG/1
10 TABLET ORAL EVERY 6 HOURS PRN
Qty: 10 TABLET | Refills: 0 | Status: SHIPPED | OUTPATIENT
Start: 2017-07-15 | End: 2017-07-17

## 2017-07-15 RX ORDER — AMIODARONE HYDROCHLORIDE 200 MG/1
200 TABLET ORAL 2 TIMES DAILY WITH MEALS
Refills: 0
Start: 2017-07-15 | End: 2018-07-13 | Stop reason: SDUPTHER

## 2017-07-15 RX ADMIN — FUROSEMIDE 80 MG: 80 TABLET ORAL at 08:11

## 2017-07-15 RX ADMIN — ACETAMINOPHEN 650 MG: 325 TABLET, FILM COATED ORAL at 08:14

## 2017-07-15 RX ADMIN — PANTOPRAZOLE SODIUM 40 MG: 40 INJECTION, POWDER, FOR SOLUTION INTRAVENOUS at 08:12

## 2017-07-15 RX ADMIN — AMIODARONE HYDROCHLORIDE 200 MG: 200 TABLET ORAL at 08:12

## 2017-07-15 RX ADMIN — POTASSIUM CHLORIDE 20 MEQ: 1500 TABLET, EXTENDED RELEASE ORAL at 08:12

## 2017-07-15 RX ADMIN — OXYCODONE HYDROCHLORIDE 10 MG: 5 TABLET ORAL at 08:14

## 2017-07-15 RX ADMIN — NYSTATIN: 100000 POWDER TOPICAL at 08:12

## 2017-07-15 RX ADMIN — CEFTRIAXONE 2000 MG: 2 INJECTION, POWDER, FOR SOLUTION INTRAMUSCULAR; INTRAVENOUS at 12:29

## 2017-07-15 RX ADMIN — METOPROLOL TARTRATE 25 MG: 25 TABLET ORAL at 08:11

## 2017-07-15 RX ADMIN — SPIRONOLACTONE 25 MG: 25 TABLET, FILM COATED ORAL at 08:11

## 2017-07-15 RX ADMIN — DIGOXIN 125 MCG: 125 TABLET ORAL at 08:12

## 2017-07-15 RX ADMIN — ALLOPURINOL 100 MG: 100 TABLET ORAL at 08:11

## 2017-07-17 ENCOUNTER — GENERIC CONVERSION - ENCOUNTER (OUTPATIENT)
Dept: OTHER | Facility: OTHER | Age: 72
End: 2017-07-17

## 2017-07-18 ENCOUNTER — GENERIC CONVERSION - ENCOUNTER (OUTPATIENT)
Dept: OTHER | Facility: OTHER | Age: 72
End: 2017-07-18

## 2017-08-14 ENCOUNTER — ALLSCRIPTS OFFICE VISIT (OUTPATIENT)
Dept: OTHER | Facility: OTHER | Age: 72
End: 2017-08-14

## 2017-08-14 DIAGNOSIS — D50.9 IRON DEFICIENCY ANEMIA: ICD-10-CM

## 2017-08-14 DIAGNOSIS — I50.33 ACUTE ON CHRONIC DIASTOLIC CONGESTIVE HEART FAILURE (HCC): ICD-10-CM

## 2017-08-14 DIAGNOSIS — E78.2 MIXED HYPERLIPIDEMIA: ICD-10-CM

## 2017-08-14 DIAGNOSIS — E11.9 TYPE 2 DIABETES MELLITUS WITHOUT COMPLICATIONS (HCC): ICD-10-CM

## 2017-08-14 DIAGNOSIS — A49.1 STREPTOCOCCUS INFECTION: ICD-10-CM

## 2017-08-14 DIAGNOSIS — M46.20 OSTEOMYELITIS OF VERTEBRA (HCC): ICD-10-CM

## 2017-08-14 DIAGNOSIS — I25.10 ATHEROSCLEROTIC HEART DISEASE OF NATIVE CORONARY ARTERY WITHOUT ANGINA PECTORIS: ICD-10-CM

## 2017-08-14 DIAGNOSIS — E78.00 PURE HYPERCHOLESTEROLEMIA: ICD-10-CM

## 2017-08-14 DIAGNOSIS — A40.9 STREPTOCOCCAL SEPSIS (HCC): ICD-10-CM

## 2017-08-14 DIAGNOSIS — I49.9 CARDIAC ARRHYTHMIA: ICD-10-CM

## 2017-08-14 DIAGNOSIS — R78.89 FINDING OF OTHER SPECIFIED SUBSTANCES, NOT NORMALLY FOUND IN BLOOD: ICD-10-CM

## 2017-08-15 ENCOUNTER — TRANSCRIBE ORDERS (OUTPATIENT)
Dept: LAB | Facility: HOSPITAL | Age: 72
End: 2017-08-15

## 2017-08-15 DIAGNOSIS — R78.89 ABNORMAL BLOOD LEVEL OF LITHIUM: Primary | ICD-10-CM

## 2017-08-15 DIAGNOSIS — M46.20 SPINAL ABSCESS (HCC): ICD-10-CM

## 2017-08-16 ENCOUNTER — GENERIC CONVERSION - ENCOUNTER (OUTPATIENT)
Dept: OTHER | Facility: OTHER | Age: 72
End: 2017-08-16

## 2017-08-16 ENCOUNTER — APPOINTMENT (OUTPATIENT)
Dept: LAB | Facility: HOSPITAL | Age: 72
End: 2017-08-16
Payer: COMMERCIAL

## 2017-08-16 DIAGNOSIS — M46.20 OSTEOMYELITIS OF VERTEBRA (HCC): ICD-10-CM

## 2017-08-16 DIAGNOSIS — E78.00 PURE HYPERCHOLESTEROLEMIA: ICD-10-CM

## 2017-08-16 DIAGNOSIS — M46.20 SPINAL ABSCESS (HCC): ICD-10-CM

## 2017-08-16 DIAGNOSIS — I50.33 ACUTE ON CHRONIC DIASTOLIC CONGESTIVE HEART FAILURE (HCC): ICD-10-CM

## 2017-08-16 DIAGNOSIS — I25.10 ATHEROSCLEROTIC HEART DISEASE OF NATIVE CORONARY ARTERY WITHOUT ANGINA PECTORIS: ICD-10-CM

## 2017-08-16 DIAGNOSIS — I49.9 CARDIAC ARRHYTHMIA: ICD-10-CM

## 2017-08-16 DIAGNOSIS — A40.9 STREPTOCOCCAL SEPSIS (HCC): ICD-10-CM

## 2017-08-16 LAB
ALBUMIN SERPL BCP-MCNC: 2.6 G/DL (ref 3.5–5)
ALP SERPL-CCNC: 142 U/L (ref 46–116)
ALT SERPL W P-5'-P-CCNC: 10 U/L (ref 12–78)
ANION GAP SERPL CALCULATED.3IONS-SCNC: 8 MMOL/L (ref 4–13)
AST SERPL W P-5'-P-CCNC: 29 U/L (ref 5–45)
BASOPHILS # BLD AUTO: 0.02 THOUSANDS/ΜL (ref 0–0.1)
BASOPHILS NFR BLD AUTO: 0 % (ref 0–1)
BILIRUB SERPL-MCNC: 0.42 MG/DL (ref 0.2–1)
BUN SERPL-MCNC: 16 MG/DL (ref 5–25)
CALCIUM SERPL-MCNC: 9 MG/DL (ref 8.3–10.1)
CHLORIDE SERPL-SCNC: 101 MMOL/L (ref 100–108)
CHOLEST SERPL-MCNC: 136 MG/DL (ref 50–200)
CO2 SERPL-SCNC: 29 MMOL/L (ref 21–32)
CREAT SERPL-MCNC: 0.94 MG/DL (ref 0.6–1.3)
CRP SERPL QL: 10.5 MG/L
DIGOXIN SERPL-MCNC: 2.8 NG/ML (ref 0.8–2)
EOSINOPHIL # BLD AUTO: 0.13 THOUSAND/ΜL (ref 0–0.61)
EOSINOPHIL NFR BLD AUTO: 2 % (ref 0–6)
ERYTHROCYTE [DISTWIDTH] IN BLOOD BY AUTOMATED COUNT: 16.4 % (ref 11.6–15.1)
ERYTHROCYTE [SEDIMENTATION RATE] IN BLOOD: 66 MM/HOUR (ref 0–20)
GFR SERPL CREATININE-BSD FRML MDRD: 61 ML/MIN/1.73SQ M
GLUCOSE P FAST SERPL-MCNC: 81 MG/DL (ref 65–99)
HCT VFR BLD AUTO: 38 % (ref 34.8–46.1)
HDLC SERPL-MCNC: 44 MG/DL (ref 40–60)
HGB BLD-MCNC: 11.6 G/DL (ref 11.5–15.4)
LDLC SERPL CALC-MCNC: 46 MG/DL (ref 0–100)
LYMPHOCYTES # BLD AUTO: 1.59 THOUSANDS/ΜL (ref 0.6–4.47)
LYMPHOCYTES NFR BLD AUTO: 21 % (ref 14–44)
MAGNESIUM SERPL-MCNC: 1.8 MG/DL (ref 1.6–2.6)
MCH RBC QN AUTO: 28.9 PG (ref 26.8–34.3)
MCHC RBC AUTO-ENTMCNC: 30.5 G/DL (ref 31.4–37.4)
MCV RBC AUTO: 95 FL (ref 82–98)
MONOCYTES # BLD AUTO: 0.58 THOUSAND/ΜL (ref 0.17–1.22)
MONOCYTES NFR BLD AUTO: 8 % (ref 4–12)
NEUTROPHILS # BLD AUTO: 5.3 THOUSANDS/ΜL (ref 1.85–7.62)
NEUTS SEG NFR BLD AUTO: 69 % (ref 43–75)
NRBC BLD AUTO-RTO: 0 /100 WBCS
PLATELET # BLD AUTO: 284 THOUSANDS/UL (ref 149–390)
PMV BLD AUTO: 9.8 FL (ref 8.9–12.7)
POTASSIUM SERPL-SCNC: 3.9 MMOL/L (ref 3.5–5.3)
PROT SERPL-MCNC: 7.3 G/DL (ref 6.4–8.2)
RBC # BLD AUTO: 4.02 MILLION/UL (ref 3.81–5.12)
SODIUM SERPL-SCNC: 138 MMOL/L (ref 136–145)
TRIGL SERPL-MCNC: 230 MG/DL
VENIPUNCTURE: NORMAL
WBC # BLD AUTO: 7.7 THOUSAND/UL (ref 4.31–10.16)

## 2017-08-16 PROCEDURE — 80053 COMPREHEN METABOLIC PANEL: CPT

## 2017-08-16 PROCEDURE — 36415 COLL VENOUS BLD VENIPUNCTURE: CPT

## 2017-08-16 PROCEDURE — 85025 COMPLETE CBC W/AUTO DIFF WBC: CPT

## 2017-08-16 PROCEDURE — 85652 RBC SED RATE AUTOMATED: CPT

## 2017-08-16 PROCEDURE — 83735 ASSAY OF MAGNESIUM: CPT

## 2017-08-16 PROCEDURE — 80061 LIPID PANEL: CPT

## 2017-08-16 PROCEDURE — 86140 C-REACTIVE PROTEIN: CPT

## 2017-08-16 PROCEDURE — 80162 ASSAY OF DIGOXIN TOTAL: CPT

## 2017-08-17 ENCOUNTER — ALLSCRIPTS OFFICE VISIT (OUTPATIENT)
Dept: OTHER | Facility: OTHER | Age: 72
End: 2017-08-17

## 2017-08-21 ENCOUNTER — APPOINTMENT (OUTPATIENT)
Dept: LAB | Facility: HOSPITAL | Age: 72
End: 2017-08-21
Payer: COMMERCIAL

## 2017-08-21 ENCOUNTER — GENERIC CONVERSION - ENCOUNTER (OUTPATIENT)
Dept: OTHER | Facility: OTHER | Age: 72
End: 2017-08-21

## 2017-08-21 DIAGNOSIS — M46.20 OSTEOMYELITIS OF VERTEBRA (HCC): ICD-10-CM

## 2017-08-21 DIAGNOSIS — R78.89 FINDING OF OTHER SPECIFIED SUBSTANCES, NOT NORMALLY FOUND IN BLOOD: ICD-10-CM

## 2017-08-21 LAB
BASOPHILS # BLD AUTO: 0.02 THOUSANDS/ΜL (ref 0–0.1)
BASOPHILS NFR BLD AUTO: 0 % (ref 0–1)
CREAT SERPL-MCNC: 0.93 MG/DL (ref 0.6–1.3)
DIGOXIN SERPL-MCNC: 1 NG/ML (ref 0.8–2)
EOSINOPHIL # BLD AUTO: 0.18 THOUSAND/ΜL (ref 0–0.61)
EOSINOPHIL NFR BLD AUTO: 2 % (ref 0–6)
ERYTHROCYTE [DISTWIDTH] IN BLOOD BY AUTOMATED COUNT: 17.1 % (ref 11.6–15.1)
ERYTHROCYTE [SEDIMENTATION RATE] IN BLOOD: 78 MM/HOUR (ref 0–20)
GFR SERPL CREATININE-BSD FRML MDRD: 62 ML/MIN/1.73SQ M
HCT VFR BLD AUTO: 39.2 % (ref 34.8–46.1)
HGB BLD-MCNC: 12.5 G/DL (ref 11.5–15.4)
LYMPHOCYTES # BLD AUTO: 1.25 THOUSANDS/ΜL (ref 0.6–4.47)
LYMPHOCYTES NFR BLD AUTO: 13 % (ref 14–44)
MCH RBC QN AUTO: 29.8 PG (ref 26.8–34.3)
MCHC RBC AUTO-ENTMCNC: 31.9 G/DL (ref 31.4–37.4)
MCV RBC AUTO: 93 FL (ref 82–98)
MONOCYTES # BLD AUTO: 0.8 THOUSAND/ΜL (ref 0.17–1.22)
MONOCYTES NFR BLD AUTO: 8 % (ref 4–12)
NEUTROPHILS # BLD AUTO: 7.2 THOUSANDS/ΜL (ref 1.85–7.62)
NEUTS SEG NFR BLD AUTO: 77 % (ref 43–75)
NRBC BLD AUTO-RTO: 0 /100 WBCS
PLATELET # BLD AUTO: 330 THOUSANDS/UL (ref 149–390)
PMV BLD AUTO: 9.9 FL (ref 8.9–12.7)
RBC # BLD AUTO: 4.2 MILLION/UL (ref 3.81–5.12)
VENIPUNCTURE: NORMAL
WBC # BLD AUTO: 9.55 THOUSAND/UL (ref 4.31–10.16)

## 2017-08-21 PROCEDURE — 80162 ASSAY OF DIGOXIN TOTAL: CPT

## 2017-08-21 PROCEDURE — 85025 COMPLETE CBC W/AUTO DIFF WBC: CPT

## 2017-08-21 PROCEDURE — 85652 RBC SED RATE AUTOMATED: CPT

## 2017-08-21 PROCEDURE — 82565 ASSAY OF CREATININE: CPT

## 2017-08-21 PROCEDURE — 36415 COLL VENOUS BLD VENIPUNCTURE: CPT

## 2017-08-28 ENCOUNTER — GENERIC CONVERSION - ENCOUNTER (OUTPATIENT)
Dept: OTHER | Facility: OTHER | Age: 72
End: 2017-08-28

## 2017-08-29 ENCOUNTER — ALLSCRIPTS OFFICE VISIT (OUTPATIENT)
Dept: OTHER | Facility: OTHER | Age: 72
End: 2017-08-29

## 2017-09-28 ENCOUNTER — ALLSCRIPTS OFFICE VISIT (OUTPATIENT)
Dept: OTHER | Facility: OTHER | Age: 72
End: 2017-09-28

## 2017-09-28 DIAGNOSIS — E66.01 MORBID (SEVERE) OBESITY DUE TO EXCESS CALORIES (HCC): ICD-10-CM

## 2017-10-26 DIAGNOSIS — M46.20 OSTEOMYELITIS OF VERTEBRA (HCC): ICD-10-CM

## 2017-10-26 DIAGNOSIS — A49.1 STREPTOCOCCUS INFECTION: ICD-10-CM

## 2017-10-26 DIAGNOSIS — N18.30 CHRONIC KIDNEY DISEASE, STAGE III (MODERATE) (HCC): ICD-10-CM

## 2017-11-01 ENCOUNTER — GENERIC CONVERSION - ENCOUNTER (OUTPATIENT)
Dept: OTHER | Facility: OTHER | Age: 72
End: 2017-11-01

## 2017-11-01 ENCOUNTER — GENERIC CONVERSION - ENCOUNTER (OUTPATIENT)
Dept: INTERNAL MEDICINE CLINIC | Facility: CLINIC | Age: 72
End: 2017-11-01

## 2017-11-09 ENCOUNTER — ALLSCRIPTS OFFICE VISIT (OUTPATIENT)
Dept: OTHER | Facility: OTHER | Age: 72
End: 2017-11-09

## 2017-12-04 ENCOUNTER — GENERIC CONVERSION - ENCOUNTER (OUTPATIENT)
Dept: INTERNAL MEDICINE CLINIC | Facility: CLINIC | Age: 72
End: 2017-12-04

## 2017-12-12 ENCOUNTER — ALLSCRIPTS OFFICE VISIT (OUTPATIENT)
Dept: OTHER | Facility: OTHER | Age: 72
End: 2017-12-12

## 2017-12-12 DIAGNOSIS — Z11.59 ENCOUNTER FOR SCREENING FOR OTHER VIRAL DISEASES (CODE): ICD-10-CM

## 2017-12-12 DIAGNOSIS — M46.20 OSTEOMYELITIS OF VERTEBRA (HCC): ICD-10-CM

## 2017-12-12 DIAGNOSIS — I10 ESSENTIAL (PRIMARY) HYPERTENSION: ICD-10-CM

## 2017-12-12 DIAGNOSIS — D50.9 IRON DEFICIENCY ANEMIA: ICD-10-CM

## 2017-12-12 DIAGNOSIS — E03.9 HYPOTHYROIDISM: ICD-10-CM

## 2017-12-12 DIAGNOSIS — E78.2 MIXED HYPERLIPIDEMIA: ICD-10-CM

## 2017-12-12 DIAGNOSIS — E11.9 TYPE 2 DIABETES MELLITUS WITHOUT COMPLICATIONS (HCC): ICD-10-CM

## 2017-12-12 DIAGNOSIS — E55.9 VITAMIN D DEFICIENCY: ICD-10-CM

## 2017-12-12 DIAGNOSIS — I49.9 CARDIAC ARRHYTHMIA: ICD-10-CM

## 2017-12-13 NOTE — PROGRESS NOTES
Assessment    1  Benign essential hypertension (401 1) (I10)   2  GERD without esophagitis (530 81) (K21 9)   · GERD   3  Vitamin D deficiency (268 9) (E55 9)   4  Onychomycosis of toenail (110 1) (B35 1)   5  Mitral valve prolapse (424 0) (I34 1)   6  Mixed hyperlipidemia (272 2) (E78 2)   7  Non-insulin dependent type 2 diabetes mellitus (250 00) (E11 9)   8  Streptococcal sepsis (038 0,995 91) (A40 9)   9  Iron deficiency anemia (280 9) (D50 9)   10  Vertebral osteomyelitis (730 28) (M46 20)   11  Need for hepatitis C screening test (V73 89) (Z11 59)   12  Cardiac arrhythmia (427 9) (I49 9)    Plan  Benign essential hypertension    · (1) T4, FREE; Status:Active; Requested for:36Fle0699;    · (1) TSH; Status:Active; Requested for:95Zhd2849;   Cardiac arrhythmia    · (1) DIGOXIN; Status:Active; Requested for:12Gac3078;   Hypokalemia    · (1) BASIC METABOLIC PROFILE; Status:Canceled;   Iron deficiency anemia    · (1) CBC/PLT/DIFF; Status:Active; Requested for:56Zpz7057;    · (1) FERRITIN; Status:Active; Requested for:01Ahk1576;    · (1) IRON; Status:Active; Requested for:32Vqa3877;   Mixed hyperlipidemia    · (1) CK (CPK); Status:Active; Requested for:36Txw3579;    · (1) LIPID PANEL FASTING W DIRECT LDL REFLEX; Status:Active; Requested for:53Tsd6878;   Need for hepatitis C screening test    · (1) HEP C ANTIBODY; Status:Active; Requested for:04Dym2897;   Non-insulin dependent type 2 diabetes mellitus    · (1) BASIC METABOLIC PROFILE; Status:Active; Requested for:40Xla9868;    · (1) COMPREHENSIVE METABOLIC PANEL; Status:Active; Requested for:82Qxd4388;    · (1) HEMOGLOBIN A1C; Status:Active; Requested for:91Nkg4037;    · Follow-up visit in 4 Months Evaluation and Treatment  Follow-up  Status: Hold For - Scheduling Requested for: 07Kyb0550  Vertebral osteomyelitis    · (1) SED RATE; Status:Active; Requested for:82Akm5460;   Vitamin D deficiency    · (1) VITAMIN D 25-HYDROXY; Status:Active;  Requested for:78Cdd7751; Discussion/Summary  Discussion Summary:   Patient has ID follow up later this month  on amoxicillin per dr Che Davila   Additionally patient is to have colonoscopy scheduled tracy 3 2018  She had had colonoscopy prior and is aware she is to schedule follow up  Cardiology follows regarding prosthetic valve, Amio/dig/CHF/Coumadin- note reviewed  she is not sure if she will need to be on it long term or not  I advised her to check with dr Che Davila to see if she will need to extend her therapy  Advised her to make Coumadin clinic aware that she is stating abx as it will affect her INR and Coumadin dosing  Labs ordered for today  Advised low sodium diet  Discussed report any fever immediately due to h/o sepsis  ok to stop iron for now  try to avoid naprosyn due to recent kidney test, we will repeat today  Counseling Documentation With Imm: The patient was counseled regarding diagnostic results  Chief Complaint  Chief Complaint Free Text Note Form: pt here for f/u, has questions about upcoming testing, review blood work   Chief Complaint Chronic Condition Mark Fernandez: Patient is here today for follow up of chronic conditions described in HPI  History of Present Illness  Osteomyelitis (Brief): The patient is being seen for a routine clinic follow-up of and has ID eval later this week on thursday  No longer on oral abx since leaving [facility]  osteomyelitis  Symptoms:  no fever,-- no chills,-- no swelling-- and-- no redness--   The patient presents with complaints of pain (low back pain improved since hospital and nursing home  Weaned her pain medication)  Associated symptoms:  no weakness-- and-- no malaise  (on oral amoxicillan, off IV anbx x 8 weeks  following with dr Che Davila)   Congestive Heart Failure (Follow-Up): Comorbid Illnesses:  Weighs self  Lose weight while in hospital  Trying to eat more healthy  Cardio is following  Symptoms: stable lower extremity edema,-- stable dyspnea on exertion-- and-- stable fatigue  Arrhythmia (Brief): Symptoms:  no dizziness  Associated symptoms:  taking dig and amio which patient notes she was not on before her infection and hospital visits  Diabetes Type II (Follow-Up): The patient states she has been stable with her Type II Diabetes control since the last visit  Symptoms:      Review of Systems  Complete-Female:  Constitutional: recent --Ulb weight gain, but-- no fever,-- no chills-- and-- no recent weight loss  Cardiovascular: notes she follows with cardio  - last note from dr Maximo Mcneill reviewed, but-- no chest pain-- and-- no palpitations  Respiratory: no cough-- and-- no wheezing  Gastrointestinal: no vomiting,-- no diarrhea-- and-- no blood in stools  Musculoskeletal: Back pain improved  Integumentary: no rash over flank  Neurological: no limb weakness-- and-- no difficulty walking  Active Problems    1  Ambulatory dysfunction (719 7) (R26 2)   2  Anticoagulant long-term use (V58 61) (Z79 01)   3  Arthritis of knee (716 96) (M17 10)   4  Asthenia (780 79) (R53 1)   5  Benign essential hypertension (401 1) (I10)   6  Bilateral hearing loss due to cerumen impaction (389 8,380 4) (H61 23)   7  CAD (coronary artery disease) (414 00) (I25 10)   8  Cardiac arrhythmia (427 9) (I49 9)   9  Cardiac disease (429 9) (I51 9)   10  Chronic kidney disease, stage 3 (moderate) (585 3) (N18 3)   11  Elevated digoxin level (796 0) (R78 89)   12  Fall on same level from slipping, tripping or stumbling, initial encounter (E885 9) (W01  0XXA)   13  GERD without esophagitis (530 81) (K21 9)   14  Gout (274 9) (M10 9)   15  HLD (hyperlipidemia) (272 4) (E78 5)   16  Hypercholesterolemia (272 0) (E78 00)   17  Hypokalemia (276 8) (E87 6)   18  Iron deficiency anemia (280 9) (D50 9)   19  Lactic acidosis (276 2) (E87 2)   20  Lumbar degenerative disc disease (722 52) (M51 36)   21  Meralgia paresthetica of right side (355 1) (G57 11)   22  Mitral valve prolapse (424 0) (I34 1)   23   Mitral valve replaced (V43 3) (Z95 2)   24  Mixed hyperlipidemia (272 2) (E78 2)   25  Morbid obesity (278 01) (E66 01)   26  Non-insulin dependent type 2 diabetes mellitus (250 00) (E11 9)   27  Ovarian cyst, right (620 2) (N83 201)   28  Pain of right lower extremity (729 5) (M79 604)   29  Physical deconditioning (799 3) (R53 81)   30  Presence of prosthetic heart valve (V43 3) (Z95 2)   31  Psoriasis (696 1) (L40 9)   32  RBBB (right bundle branch block) (426 4) (I45 10)   33  Sepsis (038 9,995 91) (A41 9)   34  Streptococcal sepsis (038 0,995 91) (A40 9)   35  Streptococcus bovis infection (041 04) (A49 1)   36  Vertebral osteomyelitis (730 28) (M46 20)   37  Vitamin D deficiency (268 9) (E55 9)    Past Medical History    1  History of Abscess of abdominal wall (682 2) (L02 211)   2  History of Acute diastolic congestive heart failure (428 31,428 0) (I50 31)   3  History of Acute on chronic diastolic congestive heart failure (428 33,428 0) (I50 33)   4  History of Acute upper respiratory infection (465 9) (J06 9)   5  History of CHANTELL (acute kidney injury) (584 9) (N17 9)   6  History of Arteriovenous Malformation (CNS) (747 81)   7  History of Candidiasis of breast (112 89) (B37 89)   8  Denied: History of Carrier Of STD   9  History of Diabetes Mellitus (250 00)   10  History of Gastroparesis (536 3) (K31 84)   11  History of Generalized anxiety disorder (300 02) (F41 1)   12  History of acute bronchitis (V12 69) (Z87 09)   13  History of allergy (V15 09) (Z88 9)   14  History of blood coagulation disorder (V12 3) (Z86 2)   15  History of hypertension (V12 59) (Z86 79)   16  History of psoriasis (V13 3) (Z87 2)   17  History of Lymphedema (457 1) (I89 0)   18  History of Medicare annual wellness visit, subsequent (V70 0) (Z00 00)   19  Denied: History of Mental Status Change   20  History of Morbid or severe obesity due to excess calories (278 01) (E66 01)   21  History of Multiple joint pain (719 49) (M25 50)   22   History of Need for immunization against influenza (V04 81) (Z23)   23  History of Need for Tdap vaccination (V06 1) (Z23)   24  History of Need for vaccination with 13-polyvalent pneumococcal conjugate vaccine (V03 82) (Z23)   25  History of Osteoporosis screening (V82 81) (Z13 820)   26  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)   27  History of Sciatica of right side (724 3) (M54 31)   28  History of Screening for breast cancer (V76 10) (Z12 31)   29  History of Screening for depression (V79 0) (Z13 89)   30  History of Screening for genitourinary condition (V81 6) (Z13 89)   31  History of Screening for neurological condition (V80 09) (Z13 89)   32  History of Type 2 diabetes mellitus (250 00) (E11 9)   33  History of Urinary Symptoms (788 69)   34  History of Visit for screening mammogram (V76 12) (Z12 31)   35  History of Visit for suture removal (V58 32) (Z48 02)   36  History of Weight gain (783 1) (R63 5)   37  History of Well adult on routine health check (V70 0) (Z00 00)    Surgical History  1  History of  Section   2  History of Mitral Valve Replacement   3  History of Tonsillectomy With Adenoidectomy   4  History of Tonsillectomy With Adenoidectomy    Family History  Mother    1  Family history of Diabetes Mellitus (V18 0)  Father    2  Family history of Diabetes Mellitus (V18 0)   3  Family history of Type 2 Diabetes Mellitus  Sister    4  Family history of Carotid Artery Stenosis   5  Family history of Coronary Artery Disease (V17 49)  Family History    6  Family history of Family Health Status 2  Children Living   7  Family history of Stroke Syndrome (V17 1)    Social History     · Exercising Regularly   · Former smoker (Y69 46) (S07 652)   · Marital History - Currently    · No alcohol use   · No illicit drug use   · Occupation:    Current Meds   1  Acetaminophen-Codeine #2 300-15 MG Oral Tablet; TAKE 1  TABLETS EVERY 6 HOURS AS NEEDED for extremem pain;  Therapy: 59NSE4996 to ([facility]); Last Rx:38Fls3179 Ordered   2  Allopurinol 100 MG Oral Tablet; TAKE 1 TABLET TWICE DAILY  Requested for: 31Sti8865; Last Rx:45Zzn7358 Ordered   3  Amiodarone HCl - 200 MG Oral Tablet; TAKE 1 TABLET DAILY; Therapy: (Recorded:91Sve3779) to Recorded   4  Atorvastatin Calcium 40 MG Oral Tablet; TAKE 1 TABLET DAILY  Requested for: 02Kvb9689; Last Rx:11Fxh0431 Ordered   5  Calcitrene 0 005 % External Ointment; APPLY AND GENTLY MASSAGE INTO AFFECTED AREA(S) TWICE DAILY; Therapy: 98NLG6765 to (Last Puja Mcdaniel)  Requested for: 01Hmq0477 Ordered   6  Citalopram Hydrobromide 20 MG Oral Tablet; TAKE 1 TABLET DAILY  Requested for: 56Gdf0186; Last Rx:50Jrq3303 Ordered   7  Digoxin 125 MCG Oral Tablet; TAKE 1 TABLET EVERY OTHER DAY; Therapy: (Humberto Cheng) to Recorded   8  Folbic 2 5-25-2 MG Oral Tablet; TAKE 1 TABELT TWICE DAILY  Requested for: 05Dhm8006; Last Rx:66Wuj5340 Ordered   9  Furosemide 80 MG Oral Tablet; TAKE 1 TABLET DAILY  Requested for: 16Aug2017; Last Rx:67Jdi7313 Ordered   10  LORazepam 0 5 MG Oral Tablet; TAKE ONE TABLET AT NIGHT AS NEEDED; Last Rx:00Dki8408  Ordered   11  MetFORMIN HCl - 500 MG Oral Tablet; TAKE 1 TABLET TWICE DAILY  Requested for: 62Qui1940;  Last Rx:34Koq1494; Status: ACTIVE - Transmit to Pharmacy - Awaiting Verification Ordered   12  Metoprolol Tartrate 25 MG Oral Tablet; take 1 tablet every twelve hours; Therapy: ([facility]) to Recorded   13  Naproxen 500 MG Oral Tablet; TAKE 1 TABLET EVERY 12 HOURS DAILY PRN with food; Therapy: 90Pfp1554 to ([facility])  Requested for: 02Inh5406; Last Rx:46Ksk5988  Ordered   14  Nystop 358989 UNIT/GM External Powder; apply twice daily as needed under breast;  Therapy: 28Gcu3267 to (Last Rx:17Eus4223)  Requested for: 10Aug2017 Ordered   15  [facility] Formula Oral Capsule; TAKE 1 CAPSULE DAILY; Therapy: 83Gwb2316 to Recorded   16   Omeprazole 40 MG Oral Capsule Delayed Release; TAKE 1 CAPSULE DAILY Requested for:  20Kzi0424; Last Rx:66Cfp0273 Ordered   17  OneTouch Delica Lancets Fine Miscellaneous; TEST BLOOD GLUCOSE 1 TIME DAILY - DX  E11 9; Therapy: 59YXP7189 to (Evaluate:59Upw9034)  Requested for: 26WJC4399; Last Rx:13Jun2017  Ordered   18  OneTouch Verio In Vitro Strip; TEST BLOOD GLUCOSE 1 TIME DAILY - DX  E11 9; Therapy: 50WLS3312 to (Evaluate:64Ibb7095)  Requested for: 21YBR2038; Last Rx:13Jun2017  Ordered   19  OneTouch Verio w/Device Kit; 1 kit - DX  E11 9; Therapy: 11ZZN2471 to (Last Rx:13Jun2017)  Requested for: 13Jun2017 Ordered   20  Potassium Chloride Denise ER 20 MEQ Oral Tablet Extended Release; TAKE 1 TABLET TWICE DAILY; Therapy: (Recorded:28Gnb6840) to Recorded   21  ProAir  (90 Base) MCG/ACT Inhalation Aerosol Solution; INHALE 1 TO 2 PUFFS EVERY 4 TO  6 HOURS AS NEEDED; Therapy: 83ANP1633 to (Last Rx:26Jan2017)  Requested for: 26Jan2017 Ordered   22  Spironolactone 25 MG Oral Tablet; TAKE 1 TABLET DAILY; Therapy: 12Fjc4494 to (Evaluate:83Udu8343)  Requested for: 85QUX6261; Last Rx:16Aug2017  Ordered   23  Warfarin Sodium 5 MG Oral Tablet; TAKE AS DIRECTED; Therapy: (Recorded:27Rnf4215) to Recorded  Medication List Reviewed: The medication list was reviewed and updated today  Allergies  1  Tetanus Toxoids   2  Tetanus-Diphtheria Toxoids Td SUSP  3  Seasonal    Vitals  Vital Signs    Recorded: 12Dec2017 10:57AM   Temperature 97 6 F, Oral   Heart Rate 69   Systolic 550, LUE, Sitting   Diastolic 52, LUE, Sitting   Height 4 ft 11 5 in   Weight 238 lb    BMI Calculated 47 27   BSA Calculated 2   O2 Saturation 96, RA       Physical Exam  Socks and shoes removed, Right Foot Findings: swelling-- and-- dryness, but-- no erythema,-- no tenderness-- and-- no warmth  The toes were normal  The sensory exam showed normal vibratory sensation at the level of the toes-- and-- normal position sense at the level of the toes   Socks and Shoes removed, Left Foot Findings: swelling-- and-- dryness, but-- no erythema,-- no tenderness,-- no warmth-- and-- no maceration  The toes were normal  The sensory exam showed normal vibratory sensation at the level of the toes-- and-- normal position sense at the level of the toes  Monofilament Testing: normal tactile sensation with monofilament testing throughout both feet  Tactile sensation in the right foot (see image for location): number 1 was normal,-- number 4 was normal,-- number 5 was normal,-- number 6 was normal,-- number 2 was normal,-- number 3 was normal,-- number 7 was normal,-- number 8 was normal,-- number 9 was normal-- and-- number 10 was normal  Tactile sensation in the left foot (see image for location): number 1 was normal,-- number 4 was normal,-- number 5 was normal,-- number 6 was normal,-- number 2 was normal,-- number 3 was normal,-- number 7 was normal,-- number 8 was normal,-- number 9 was normal-- and-- number 10 was normal    Vascular: Capillary refills findings on the right were normal in the toes  Pulses: 2+ in the posterior tibialis-- and-- 2+ in the dorsalis pedis  Capillary refills findings on the left were normal in the toes  Pulses: 2+ in the posterior tibialis-- and-- 2+ in the dorsalis pedis  Assign Risk Category: 0: No loss of protective sensation, no deformity  No present risk  Constitutional  General appearance: No acute distress, well appearing and well nourished  -- Alert pleasant 72 yo female in room, family present  Seated in WC  Eyes  Pupils and irises: Equal, round and reactive to light  -- reactive  Ears, Nose, Mouth, and Throat  Oropharynx: Normal with no erythema, edema, exudate or lesions  -- MMM  Pulmonary  Respiratory effort: No increased work of breathing or signs of respiratory distress  -- CTA throughout  NO resp distress  Auscultation of lungs: Clear to auscultation  Cardiovascular  Auscultation of heart: Abnormal   The heart rate was normal  Heart sounds: normal S1-- and-- normal S2   Prosthetic valve: prosthetic mitral valve heard  Examination of extremities for edema and/or varicosities: Abnormal   bilateral ankle 1++ pitting edema  -- no foot ulcers  Musculoskeletal Able to stand  No focal lumbar TTP  Symmetric LE, UE strength but decreased  in [facility] today  Skin  Skin and subcutaneous tissue: Abnormal  -- venous stasis changes of LE  Psychiatric  Orientation to person, place, and time: Normal    Mood and affect: Normal          Results/Data  (1) BASIC METABOLIC PROFILE 42SVB6451 10:01AM Georgine Pro     Test Name Result Flag Reference   GLUCOSE,RANDM 129 A        Summary / No summary entered :    No summary entered  Documents attached :    189 Sud[address]ar  Work - \[facility]\"" pa, team; Rosey Becker: 30RST3252 - Image Encounter - \[facility]\"" pa, team -    (Interventional Medicine) (Additional Information Document)  Summary / No summary entered :    No summary entered  Documents attached :    189 Sudhakar Mckeon Work - \[facility]\"" pa, team; Enc: [id number] - Image Encounter - \[facility]\"" pa, team -    (Interventional Medicine) (Additional Information Document)  (1) LIPID PANEL FASTING W DIRECT LDL REFLEX 96AYD3165 12:00AM Georgine Pro     Test Name Result Flag Reference   LDL CHOLESTEROL CALCULATED 113         Summary / No summary entered :    No summary entered  Documents attached :    189 Sudhakar Mckeon Work - \[facility]\"" pa, team; Enc: [id number] - Image Encounter - \[facility]\"" pa, team -    (Interventional Medicine) (Additional Information Document)  (1) HEMOGLOBIN A1C 56LQZ1781 12:00AM Georgine Pro     Test Name Result Flag Reference   HEMOGLOBIN A1C 5 5     EST  AVG  GLUCOSE 111         Summary / No summary entered :    No summary entered  Documents attached :    189 Sudhakar Mckeon Work - \[facility]\"" pa, team; Enc: [id number] - Image Encounter - \[facility]\"" pa, team -    (Interventional Medicine) (Additional Information Document)  Health Management  History of Screening for depression   *VB-Depression Screening; every 1 year; Last 86UED0528; Next Due: 13BVS2071;  Overdue  History of Screening for genitourinary condition   *VB - Urinary Incontinence Screen (Dx Z13 89 Screen for UI); every 1 year; Last 20Vkb8029; NextDue: 31Njk7012; Active  History of Screening for neurological condition   *VB - Fall Risk Assessment  (Dx Z13 89 Screen for Neurologic Disorder); every 1 year; Gqxl18Rva6445; Next Due: 26Dgb2013; Active  Type 2 diabetes mellitus   (1) MICROALBUMIN CREATININE RATIO, RANDOM URINE; every 1 year;  Last 94MXI9117; Next Due:01Xnp6119; Near Due    Future Appointments    Date/Time Provider Specialty Site   12/21/2017 02:30 PM Suzan Little MD Infectious Disease [facility] INFECTIOUS DISEASE       Signatures   Electronically signed by : Jennifer Cabrera DO; Dec 12 2017 11:48AM EST                       (Author)

## 2017-12-21 ENCOUNTER — ALLSCRIPTS OFFICE VISIT (OUTPATIENT)
Dept: OTHER | Facility: OTHER | Age: 72
End: 2017-12-21

## 2018-01-10 NOTE — PROGRESS NOTES
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  She completed an 8 week course of intravenous antibiotics and has now been on oral amoxicillin for several months  She is tolerating the antibiotics well without difficulty  She continues to have some back pain but her ambulation is improving and she has decrease the amount of pain medication she is taking  She denies any fever chills or sweats, denies any nausea vomiting or diarrhea  Review of Systems  Complete-Female:   Constitutional: No fever, no chills, feels well, no tiredness, no recent weight gain or weight loss  Cardiovascular: No complaints of slow heart rate, no fast heart rate, no chest pain, no palpitations, no leg claudication, no lower extremity edema  Respiratory: No complaints of shortness of breath, no wheezing, no cough, no SOB on exertion, no orthopnea, no PND  Gastrointestinal: No complaints of abdominal pain, no constipation, no nausea or vomiting, no diarrhea, no bloody stools  Musculoskeletal: as noted in HPI  Active Problems    1  Ambulatory dysfunction (719 7) (R26 2)   2  Anticoagulant long-term use (V58 61) (Z79 01)   3  Arthritis of knee (716 96) (M17 10)   4  Asthenia (780 79) (R53 1)   5  Benign essential hypertension (401 1) (I10)   6  Bilateral hearing loss due to cerumen impaction (389 8,380 4) (H61 23)   7  CAD (coronary artery disease) (414 00) (I25 10)   8  Cardiac arrhythmia (427 9) (I49 9)   9  Cardiac disease (429 9) (I51 9)   10  Chronic kidney disease, stage 3 (moderate) (585 3) (N18 3)   11  Elevated digoxin level (796 0) (R78 89)   12  Fall on same level from slipping, tripping or stumbling, initial encounter (E885 9)    (W01  0XXA)   13  GERD without esophagitis (530 81) (K21 9)   14  Gout (274 9) (M10 9)   15  HLD (hyperlipidemia) (272 4) (E78 5)   16  Hypercholesterolemia (272 0) (E78 00)   17   Hypokalemia (276  8) (E87 6)   18  Iron deficiency anemia (280 9) (D50 9)   19  Lactic acidosis (276 2) (E87 2)   20  Lumbar degenerative disc disease (722 52) (M51 36)   21  Meralgia paresthetica of right side (355 1) (G57 11)   22  Mitral valve prolapse (424 0) (I34 1)   23  Mitral valve replaced (V43 3) (Z95 2)   24  Mixed hyperlipidemia (272 2) (E78 2)   25  Morbid obesity (278 01) (E66 01)   26  Non-insulin dependent type 2 diabetes mellitus (250 00) (E11 9)   27  Ovarian cyst, right (620 2) (N83 201)   28  Pain of right lower extremity (729 5) (M79 604)   29  Physical deconditioning (799 3) (R53 81)   30  Presence of prosthetic heart valve (V43 3) (Z95 2)   31  Psoriasis (696 1) (L40 9)   32  RBBB (right bundle branch block) (426 4) (I45 10)   33  Screening for depression (V79 0) (Z13 89)   34  Screening for genitourinary condition (V81 6) (Z13 89)   35  Screening for neurological condition (V80 09) (Z13 89)   36  Sepsis (038 9,995 91) (A41 9)   37  Streptococcal sepsis (038 0,995 91) (A40 9)   38  Streptococcus bovis infection (041 04) (A49 1)   39  Transition of care   40  Vertebral osteomyelitis (730 28) (M46 20)   41  Vitamin D deficiency (268 9) (E55 9)    Past Medical History    1  History of Abscess of abdominal wall (682 2) (L02 211)   2  History of Acute diastolic congestive heart failure (428 31,428 0) (I50 31)   3  History of Acute on chronic diastolic congestive heart failure (428 33,428 0) (I50 33)   4  History of Acute upper respiratory infection (465 9) (J06 9)   5  History of CHANTELL (acute kidney injury) (584 9) (N17 9)   6  History of Arteriovenous Malformation (CNS) (747 81)   7  History of Candidiasis of breast (112 89) (B37 89)   8  Denied: History of Carrier Of STD   9  History of Diabetes Mellitus (250 00)   10  History of Gastroparesis (536 3) (K31 84)   11  History of Generalized anxiety disorder (300 02) (F41 1)   12  History of acute bronchitis (V12 69) (Z87 09)   13   History of allergy (V15 09) (Z88 9)   14  History of blood coagulation disorder (V12 3) (Z86 2)   15  History of hypertension (V12 59) (Z86 79)   16  History of psoriasis (V13 3) (Z87 2)   17  History of Lymphedema (457 1) (I89 0)   18  History of Medicare annual wellness visit, subsequent (V70 0) (Z00 00)   19  Denied: History of Mental Status Change   20  History of Morbid or severe obesity due to excess calories (278 01) (E66 01)   21  History of Multiple joint pain (719 49) (M25 50)   22  History of Need for immunization against influenza (V04 81) (Z23)   23  History of Need for Tdap vaccination (V06 1) (Z23)   24  History of Need for vaccination with 13-polyvalent pneumococcal conjugate vaccine    (V03 82) (Z23)   25  History of Osteoporosis screening (V82 81) (Z13 820)   26  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)   27  History of Sciatica of right side (724 3) (M54 31)   28  History of Screening for breast cancer (V76 10) (Z12 31)   29  History of Screening for depression (V79 0) (Z13 89)   30  History of Screening for genitourinary condition (V81 6) (Z13 89)   31  History of Screening for neurological condition (V80 09) (Z13 89)   32  History of Type 2 diabetes mellitus (250 00) (E11 9)   33  History of Urinary Symptoms (788 69)   34  History of Visit for screening mammogram (V76 12) (Z12 31)   35  History of Visit for suture removal (V58 32) (Z48 02)   36  History of Weight gain (783 1) (R63 5)   37  History of Well adult on routine health check (V70 0) (Z00 00)    Surgical History    1  History of  Section   2  History of Mitral Valve Replacement   3  History of Tonsillectomy With Adenoidectomy   4  History of Tonsillectomy With Adenoidectomy    Family History    1  Family history of Diabetes Mellitus (V18 0)    2  Family history of Diabetes Mellitus (V18 0)   3  Family history of Type 2 Diabetes Mellitus    4  Family history of Carotid Artery Stenosis   5  Family history of Coronary Artery Disease (V17 49)    6  Family history of Family Health Status 2  Children Living   7  Family history of Stroke Syndrome (V17 1)    Social History    · Exercising Regularly   · Former smoker (Z14 29) (H28 017)   · Marital History - Currently    · No alcohol use   · No illicit drug use   · Occupation:    Current Meds   1  Acetaminophen-Codeine #2 300-15 MG Oral Tablet; TAKE 1  TABLETS EVERY 6 HOURS   AS NEEDED for extremem pain; Therapy: 57HXR1490 to (Evaluate:21Oct2017); Last Rx:16Oct2017 Ordered   2  Allopurinol 100 MG Oral Tablet; TAKE 1 TABLET TWICE DAILY  Requested for:   81Pvz4443; Last Rx:16Aug2017 Ordered   3  Amiodarone HCl - 200 MG Oral Tablet; take 1 tablet every twelve hours; Therapy: (Shana Chappell) to Recorded   4  Amoxicillin 500 MG Oral Tablet; TAKE 1 TABLET 3 TIMES DAILY; Therapy: 19CVR5861 to (Juaquin Arias)  Requested for: 04XBR2926; Last   Rx:45Hio2469 Ordered   5  Atorvastatin Calcium 40 MG Oral Tablet; TAKE 1 TABLET DAILY  Requested for:   05Ych8610; Last Rx:16Aug2017 Ordered   6  Calcitrene 0 005 % External Ointment; APPLY AND GENTLY MASSAGE INTO AFFECTED   AREA(S) TWICE DAILY; Therapy: 74DUQ0985 to (Sandro Stringer)  Requested for: 29Aug2017 Ordered   7  Citalopram Hydrobromide 20 MG Oral Tablet (CeleXA); TAKE 1 TABLET DAILY    Requested for: 15Rim7304; Last Rx:90Nkp1475 Ordered   8  Digoxin 125 MCG Oral Tablet; TAKE 1 TABLET EVERY OTHER DAY; Therapy: (Shana Chappell) to Recorded   9  Folbic 2 5-25-2 MG Oral Tablet; TAKE 1 TABELT TWICE DAILY  Requested for:   32Udw5541; Last Rx:79Zdj5148 Ordered   10  Furosemide 80 MG Oral Tablet (Lasix); TAKE 1 TABLET DAILY  Requested for:    30Voo7281; Last Rx:41Jrl6572 Ordered   11  LORazepam 0 5 MG Oral Tablet; TAKE ONE TABLET AT NIGHT AS NEEDED; Last    Rx:10Jzh9468 Ordered   12   MetFORMIN HCl - 500 MG Oral Tablet; TAKE 1 TABLET TWICE DAILY  Requested for:    78Dmm0855; Last Rx:94Rhm4631; Status: ACTIVE - Transmit to [facility] Verification Ordered   13  Metoprolol Tartrate 25 MG Oral Tablet; take 1 tablet every twelve hours; Therapy: (Cami Lie) to Recorded   14  Naproxen 500 MG Oral Tablet; TAKE 1 TABLET EVERY 12 HOURS DAILY PRN with food; Therapy: 46Llx4652 to ([id number] [id number])  Requested for: 88Axm3408; Last    Rx:61Mvc4767 Ordered   15  Nystop 338116 UNIT/GM External Powder; apply twice daily as needed under breast;    Therapy: 86Zdw7199 to (Last Rx:08Aug2017)  Requested for: 10Aug2017 Ordered   16  [facility] Formula Oral Capsule; TAKE 1 CAPSULE DAILY; Therapy: 60Nyx9598 to Recorded   17  Omeprazole 40 MG Oral Capsule Delayed Release; TAKE 1 CAPSULE DAILY     Requested for: 12Eta4850; Last Rx:16Aug2017 Ordered   18  OneTouch Delica Lancets Fine Miscellaneous; TEST BLOOD GLUCOSE 1 TIME DAILY -    DX  E11 9; Therapy: 90AXP8438 to (Evaluate:10Aor8401)  Requested for: 78THV7164; Last    Rx:13Jun2017 Ordered   19  OneTouch Verio In Vitro Strip; TEST BLOOD GLUCOSE 1 TIME DAILY - DX  E11 9; Therapy: 46SVD4046 to (Evaluate:75Wpc9380)  Requested for: 39XNB3823; Last    Rx:13Jun2017 Ordered   20  OneTouch Verio w/Device Kit; 1 kit - DX  E11 9; Therapy: 70VGP1496 to (Last Rx:13Jun2017)  Requested for: 13Jun2017 Ordered   21  Potassium Chloride Denise ER 20 MEQ Oral Tablet Extended Release; TAKE 1 TABLET    DAILY; Therapy: (Cami Lie) to Recorded   22  ProAir  (90 Base) MCG/ACT Inhalation Aerosol Solution; INHALE 1 TO 2 PUFFS    EVERY 4 TO 6 HOURS AS NEEDED; Therapy: 02TOZ2252 to (Last Rx:26Jan2017)  Requested for: 26Jan2017 Ordered   23  Spironolactone 25 MG Oral Tablet; TAKE 1 TABLET DAILY; Therapy: 41Zmj0442 to (Evaluate:17Lqz8710)  Requested for: 96SPX7483; Last    Rx:66Ukf1891 Ordered   24  Warfarin Sodium 5 MG Oral Tablet; TAKE 0 5 TABLET Daily PRN; Therapy: (Recorded:74Vyz5539) to Recorded    Allergies    1  Tetanus Toxoids   2  Tetanus-Diphtheria Toxoids Td SUSP    3  Seasonal    Vitals  Signs   Recorded: 51EVY1117 03:20PM   Temperature: 97 8 F  Heart Rate: 69  Respiration: 16  Systolic: 346  Diastolic: 52  BP Cuff Size: Large  Height: 4 ft 11 in  Weight: 243 lb 3 2 oz  BMI Calculated: 49 12  BSA Calculated: 2  O2 Saturation: 93    Physical Exam    Ears, Nose, Mouth, and Throat   Oropharynx: Normal with no erythema, edema, exudate or lesions  Pulmonary   Respiratory effort: No increased work of breathing or signs of respiratory distress  Auscultation of lungs: Clear to auscultation  Cardiovascular   Auscultation of heart: Normal rate and rhythm, normal S1 and S2, without murmurs  Examination of extremities for edema and/or varicosities: Normal     Abdomen   Abdomen: Non-tender, no masses  Liver and spleen: No hepatomegaly or splenomegaly  Lymphatic   Palpation of lymph nodes in neck: No lymphadenopathy  Assessment    1  Streptococcal sepsis (038 0,995 91) (A40 9)   2  Streptococcus bovis infection (041 04) (A49 1)   3  Vertebral osteomyelitis (730 28) (M46 20)   4  Chronic kidney disease, stage 3 (moderate) (585 3) (N18 3)    Plan    1  Follow-up visit in 6 weeks Evaluation and Treatment  Follow-up  Status: Hold For -   Scheduling  Requested for: 43QGH6851   2  (1) CBC/PLT/DIFF; Status:Active; Requested OZS:19DPU7457;    3  (1) CREATININE; Status:Active; Requested for:62Fpa8338;    4  (1) SED RATE; Status:Active; Requested ADQ:87NOC4122;     Discussion/Summary  Discussion Summary:   Streptococcus bovis sepsis-in a patient with a prosthetic valve  Cleared bacteremia is doing well  The BREANN was negative initially  The patient still needs her colonoscopy done which has been scheduled for early January  Continue the antibiotics as below  Streptococcal bovis vertebral osteomyelitis with psoas abscess-continues to improve clinically and with the following sedimentation rate  The sedimentation rate still remains elevated at 70   Will continue the amoxicillin for now, recheck labs in 1 month, and follow-up in 6 weeks  Once the sedimentation rate is normalized her stabilized will discontinue the antibiotics  Chronic kidney disease-stage III by report  Will continue to monitor the GFR  Counseling Documentation With Imm: The patient was counseled regarding diagnostic results, instructions for management, prognosis, risks and benefits of treatment options, importance of compliance with treatment  Goals and Barriers: The patient has the current Goals:   Patient's Capacity to Self-Care: Patient is able to Self-Care  Health Management  History of Screening for depression   *VB-Depression Screening; every 1 year; Last 33UTT3488; Next Due: 84DYC7409; Overdue  History of Screening for genitourinary condition   *VB - Urinary Incontinence Screen (Dx Z13 89 Screen for UI); every 1 year; Last 12Bxf0344; Next  Due: 48Tjy1677; Active  History of Screening for neurological condition   *VB - Fall Risk Assessment  (Dx Z13 89 Screen for Neurologic Disorder); every 1 year; Last  81Pzg4561; Next Due: 80Tre7973; Active  Type 2 diabetes mellitus   (1) MICROALBUMIN CREATININE RATIO, RANDOM URINE; every 1 year;  Last 09PEN4527; Next Due:  03Czv0337; Near Due    Future Appointments    Date/Time Provider Specialty Site   12/12/2017 10:45 AM Tri Chan DO Internal Medicine [facility]     Signatures   Electronically signed by : Eula Culp MD; Nov 9 2017  3:39PM EST                       (Author)

## 2018-01-10 NOTE — PROGRESS NOTES
History of Present Illness  Care Coordination Encounter Information:   Type of Encounter: Telephonic   Contact: Follow-Up        Care Coordination  Nurse Isaura Dates:   The reason for call is to discuss outreach for follow up/needed services  Pt called for clarification about her possible admission to rehab  Contact made with Barron Hernandez from [address] who is waiting on an auth for pt  Will continue to asses situation and keep communication open with pt  Pt states that she may also be needing [employer] transportation if the rehab stay does not go through  She would like a hospital bed as well  There needs to be a follow up colonoscopy scheduled  All this is pending whether or not she gets into a rehab  Active Problems    1  Acute diastolic congestive heart failure (428 31,428 0) (I50 31)   2  Acute on chronic diastolic congestive heart failure (428 33,428 0) (I50 33)   3  CHANTELL (acute kidney injury) (584 9) (N17 9)   4  Ambulatory dysfunction (719 7) (R26 2)   5  Arthritis of knee (716 96) (M17 10)   6  Benign essential hypertension (401 1) (I10)   7  Bilateral hearing loss due to cerumen impaction (389 8,380 4) (H61 23)   8  CAD (coronary artery disease) (414 00) (I25 10)   9  Chronic kidney disease, stage 3 (moderate) (585 3) (N18 3)   10  Fall on same level from slipping, tripping or stumbling, initial encounter (E885 9)    (W01  0XXA)   11  GERD without esophagitis (530 81) (K21 9)   12  Gout (274 9) (M10 9)   13  HLD (hyperlipidemia) (272 4) (E78 5)   14  Hypercholesterolemia (272 0) (E78 00)   15  Hypokalemia (276 8) (E87 6)   16  Iron deficiency anemia (280 9) (D50 9)   17  Lactic acidosis (276 2) (E87 2)   18  Lumbar degenerative disc disease (722 52) (M51 36)   19  Meralgia paresthetica of right side (355 1) (G57 11)   20  Mitral valve prolapse (424 0) (I34 1)   21  Mitral valve replaced (V43 3) (Z95 2)   22  Mixed hyperlipidemia (272 2) (E78 2)   23   Non-insulin dependent type 2 diabetes mellitus (250 00) (E11 9) 24  Pain of right lower extremity (729 5) (M79 604)   25  Psoriasis (696 1) (L40 9)   26  RBBB (right bundle branch block) (426 4) (I45 10)   27  Sepsis (038 9,995 91) (A41 9)   28  Streptococcal sepsis (038 0,995 91) (A40 9)   29  Streptococcus bovis infection (041 04) (A49 1)   30  Vertebral osteomyelitis (730 28) (M46 20)   31  Vitamin D deficiency (268 9) (E55 9)    Past Medical History    1  History of Abscess of abdominal wall (682 2) (L02 211)   2  History of Acute upper respiratory infection (465 9) (J06 9)   3  History of Arteriovenous Malformation (CNS) (747 81)   4  Denied: History of Carrier Of STD   5  History of Diabetes Mellitus (250 00)   6  History of Gastroparesis (536 3) (K31 84)   7  History of Generalized anxiety disorder (300 02) (F41 1)   8  History of acute bronchitis (V12 69) (Z87 09)   9  History of allergy (V15 09) (Z88 9)   10  History of blood coagulation disorder (V12 3) (Z86 2)   11  History of hypertension (V12 59) (Z86 79)   12  History of psoriasis (V13 3) (Z87 2)   13  History of Lymphedema (457 1) (I89 0)   14  History of Medicare annual wellness visit, subsequent (V70 0) (Z00 00)   15  Denied: History of Mental Status Change   16  History of Morbid or severe obesity due to excess calories (278 01) (E66 01)   17  History of Multiple joint pain (719 49) (M25 50)   18  History of Need for immunization against influenza (V04 81) (Z23)   19  History of Need for Tdap vaccination (V06 1) (Z23)   20  History of Need for vaccination with 13-polyvalent pneumococcal conjugate vaccine    (V03 82) (Z23)   21  History of Osteoporosis screening (V82 81) (Z13 820)   22  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)   23  History of Sciatica of right side (724 3) (M54 31)   24  History of Screening for breast cancer (V76 10) (Z12 39)   25  History of Screening for depression (V79 0) (Z13 89)   26  History of Screening for depression (V79 0) (Z13 89)   27   History of Screening for genitourinary condition (V81 6) (Z13 89)   28  History of Screening for genitourinary condition (V81 6) (Z13 89)   29  History of Screening for neurological condition (V80 09) (Z13 89)   30  History of Screening for neurological condition (V80 09) (Z13 89)   31  History of Type 2 diabetes mellitus (250 00) (E11 9)   32  History of Urinary Symptoms (788 69)   33  History of Visit for screening mammogram (V76 12) (Z12 31)   34  History of Visit for suture removal (V58 32) (Z48 02)   35  History of Weight gain (783 1) (R63 5)   36  History of Well adult on routine health check (V70 0) (Z00 00)    Surgical History    1  History of  Section   2  History of Mitral Valve Replacement   3  History of Tonsillectomy With Adenoidectomy   4  History of Tonsillectomy With Adenoidectomy    Family History  Mother    1  Family history of Diabetes Mellitus (V18 0)  Father    2  Family history of Diabetes Mellitus (V18 0)   3  Family history of Type 2 Diabetes Mellitus  Sister    4  Family history of Carotid Artery Stenosis   5  Family history of Coronary Artery Disease (V17 49)  Family History    6  Family history of Family Health Status 2  Children Living   7  Family history of Stroke Syndrome (V17 1)    Social History    · Exercising Regularly   · Former smoker (A97 70) (P77 105)   · Marital History - Currently    · No alcohol use   · No illicit drug use   · Occupation:    Current Meds    1  Furosemide 80 MG Oral Tablet (Lasix); TAKE 1 TABLET DAILY  Requested for:   37IPK8958; Last Rx:57Zrm0568 Ordered   2  Spironolactone 25 MG Oral Tablet; TAKE 1 TABLET DAILY; Therapy: 57Xix6076 to (Evaluate:18Bcp7260); Last Rx:05Nsr8903 Ordered    3  Omeprazole 40 MG Oral Capsule Delayed Release; TAKE 1 CAPSULE DAILY    Requested for: 80WHG1509; Last LEIF:62SJA5505 Ordered    4  Folbic 2 5-25-2 MG Oral Tablet; TAKE 1 TABELT TWICE DAILY  Requested for:   98Rrj6021; Last XD:28CIY0318 Ordered    5   Atorvastatin Calcium 40 MG Oral Tablet; TAKE 1 TABLET DAILY  Requested for:   58UAP4903; Last EO:26VCM7398 Ordered    6  Klor-Con M20 20 MEQ Oral Tablet Extended Release; TAKE 2 TABLETS 4 TIMES DAILY; Therapy: 63FXM4552 to [phone]  Requested for: 61HRF7942; Last   Rx:06Apr2017 Ordered    7  MetOLazone 5 MG Oral Tablet (Zaroxolyn); TAKE 1 TABLET BY MOUTH ON MONDAY,   SUNDAY  Requested for: 71RYV6349; Last Rx:26Jan2017 Ordered    8  Acetaminophen-Codeine #2 300-15 MG Oral Tablet; TAKE 1 TO 2 TABLETS EVERY 4   HOURS AS NEEDED; Therapy: 75TUF1983 to (Evaluate:51Asr0168); Last Rx:15May2017 Ordered   9  DiazePAM 5 MG Oral Tablet; TAKE 1 TABLET AT BEDTIME AS NEEDED; Therapy: 69NYH0325 to (Evaluate:11Gry4973); Last Rx:15May2017 Ordered   10  LC-4 Lidocaine 4 % External Cream; apply cream 3-4 times daily as needed for pain    (may patch if preferred); Therapy: 88MLW2801 to (Last Rx:16May2017) Ordered    11  TraMADol HCl - 50 MG Oral Tablet; take 1 tablet daily as needed; Therapy: 14Apr2017 to (Last Rx:14Apr2017) Ordered    12  OneTouch Delica Lancets Fine Miscellaneous; TEST BLOOD GLUCOSE 1 TIME DAILY -    DX  E11 9; Therapy: 69MWP1935 to (Evaluate:32Tqa0249)  Requested for: 80SSI4488; Last    Rx:13Jun2017 Ordered   13  OneTouch Verio In Vitro Strip; TEST BLOOD GLUCOSE 1 TIME DAILY - DX  E11 9; Therapy: 78BXK7729 to (Evaluate:87Tjz3873)  Requested for: 65RJB7998; Last    Rx:13Jun2017 Ordered   14  OneTouch Verio w/Device Kit; 1 kit - DX  E11 9; Therapy: 14PIB2414 to (Last Rx:13Jun2017)  Requested for: 13Jun2017 Ordered    15  Citalopram Hydrobromide 20 MG Oral Tablet (CeleXA); TAKE 1 TABLET DAILY     Requested for: 85NCS8935; Last Rx:26Jan2017 Ordered    16  ProAir  (90 Base) MCG/ACT Inhalation Aerosol Solution; INHALE 1 TO 2 PUFFS    EVERY 4 TO 6 HOURS AS NEEDED;     Therapy: 91ADJ8489 to (Last Rx:26Jan2017)  Requested for: 26Jan2017 Ordered    17  Warfarin Sodium 5 MG Oral Tablet (Coumadin); TAKE 1 TABLET DAILY AS DIRECTED; Therapy: 87LJV3796 to ([facility])  Requested for: 26Jan2017; Last    Rx:26Jan2017 Ordered    18  Triamcinolone Acetonide 0 5 % External Cream; APPLY SPARINGLY AND MASSAGE IN    TWICE DAILY; Therapy: 60Skf3891 to (Last Rx:29Dec2016) Ordered    19  Calcitrene 0 005 % External Ointment; APPLY AND GENTLY MASSAGE INTO AFFECTED    AREA(S) TWICE DAILY; Therapy: 09XOK6483 to (Last Rx:30Nov2016)  Requested for: 63NKH9713 Ordered   20  Clobetasol Propionate 0 05 % External Ointment; APPLY AND GENTLY MASSAGE INTO    AFFECTED AREA(S) TWICE DAILY; Therapy: 03Apr2014 to (Laurel Maria Isabel)  Requested for: 87HVK1822; Last    Rx:30Nov2016 Ordered   21  Otezla 30 MG Oral Tablet; One po BID; Therapy: 39ZKL1324 to (Last Rx:92Gyo1160) Ordered    22  Allopurinol 100 MG Oral Tablet; TAKE 1 TABLET TWICE DAILY  Requested for:    22JSE7241; Last RD:31WJV4449 Ordered   23  MetFORMIN HCl - 500 MG Oral Tablet; TAKE 1 TABLET TWICE DAILY  Requested for:    73STB5786; Last Rx:26Jan2017 Ordered    24  Amoxicillin 500 MG Oral Capsule; TAKE 1 CAPSULE 3 TIMES DAILY; Therapy: 55JSC7930 to (YWEZIOUP:97BZN2963)  Requested for: 21VDC3483; Last    Rx:22Jun2017 Ordered   25  CefTRIAXone Sodium 2 GM Intravenous Solution Reconstituted; INFUSE 2 GM Daily    Recorded    26  Vitamin D (Ergocalciferol) 25446 UNIT Oral Capsule; TAKE 1 CAPSULE WEEKLY     Requested for: 80Wfu7165; Last FM:64XJW9592 Ordered    27  Ferrex 150 150 MG Oral Capsule; TAKE 1 CAPSULE BY MOUTH TWICE DAILY; Therapy: ([phone]) to Recorded   28  Ocuvite Eye Health Formula Oral Capsule; TAKE 1 CAPSULE DAILY; Therapy: 15Apr2016 to Recorded   29  Turmeric 500 MG Oral Capsule; take 1 capsule 4 times daily; Therapy: 15Apr2016 to Recorded   30  Vitamin C 500 MG Oral Tablet; TAKE 1 TABLET DAILY; Therapy: (Recorded:49Dej0275) to Recorded    Allergies    1  Tetanus Toxoids   2  Tetanus-Diphtheria Toxoids Td SUSP    3   Seasonal    Health Management *VB-Depression Screening; every 1 year; Last 42SMJ4410; Next Due: 10LGM7564; Overdue   *VB - Urinary Incontinence Screen (Dx Z13 89 Screen for UI); every 1 year; Last 42QUY4159; Next  Due: 35JZE9343; Overdue   *VB - Fall Risk Assessment  (Dx Z13 89 Screen for Neurologic Disorder); every 1 year; Last  50PDX8420; Next Due: 93GKN7756; Overdue   (1) MICROALBUMIN CREATININE RATIO, RANDOM URINE; every 1 year; Last 89RAY7626; Next Due:  63Yka2335; Active    End of Encounter Meds    1  Furosemide 80 MG Oral Tablet (Lasix); TAKE 1 TABLET DAILY  Requested for:   08FJK0435; Last Rx:26Jan2017 Ordered   2  Spironolactone 25 MG Oral Tablet; TAKE 1 TABLET DAILY; Therapy: 92Lbb7993 to (Evaluate:00Tmc8552); Last Rx:72Gcn9922 Ordered    3  Omeprazole 40 MG Oral Capsule Delayed Release; TAKE 1 CAPSULE DAILY    Requested for: 24YMO1946; Last DY:75CFQ7679 Ordered    4  Folbic 2 5-25-2 MG Oral Tablet; TAKE 1 TABELT TWICE DAILY  Requested for:   01Tjn0376; Last MD:44UDW7843 Ordered    5  Atorvastatin Calcium 40 MG Oral Tablet; TAKE 1 TABLET DAILY  Requested for:   34RBW2887; Last AZ:40SUE0414 Ordered    6  Klor-Con M20 20 MEQ Oral Tablet Extended Release; TAKE 2 TABLETS 4 TIMES DAILY; Therapy: 23VKR9625 to ([phone])  Requested for: 82UHT1455; Last   Rx:06Apr2017 Ordered    7  MetOLazone 5 MG Oral Tablet (Zaroxolyn); TAKE 1 TABLET BY MOUTH ON MONDAY,   SUNDAY  Requested for: 53VEV6562; Last Rx:26Jan2017 Ordered    8  Acetaminophen-Codeine #2 300-15 MG Oral Tablet; TAKE 1 TO 2 TABLETS EVERY 4   HOURS AS NEEDED; Therapy: 01VYX1258 to (Evaluate:38Kos9012); Last Rx:15May2017 Ordered   9  DiazePAM 5 MG Oral Tablet; TAKE 1 TABLET AT BEDTIME AS NEEDED; Therapy: 84THF6019 to (Evaluate:03Dng0704); Last Rx:15May2017 Ordered   10  LC-4 Lidocaine 4 % External Cream; apply cream 3-4 times daily as needed for pain    (may patch if preferred); Therapy: 49XOB9792 to (Last Rx:16May2017) Ordered    11   TraMADol HCl - 50 MG Oral Tablet; take 1 tablet daily as needed; Therapy: 59Kct4486 to (Last Rx:76Pts8420) Ordered    12  OneTouch Delica Lancets Fine Miscellaneous; TEST BLOOD GLUCOSE 1 TIME DAILY -    DX  E11 9; Therapy: 43PSA1892 to (Evaluate:11Ebs2716)  Requested for: 97KOF4546; Last    Rx:13Jun2017 Ordered   13  OneTouch Verio In Vitro Strip; TEST BLOOD GLUCOSE 1 TIME DAILY - DX  E11 9; Therapy: 02ZEU7595 to (Evaluate:47Kdu3714)  Requested for: 97BOT5438; Last    Rx:13Jun2017 Ordered   14  OneTouch Verio w/Device Kit; 1 kit - DX  E11 9; Therapy: 09WEW8316 to (Last Rx:13Jun2017)  Requested for: 13Jun2017 Ordered    15  Citalopram Hydrobromide 20 MG Oral Tablet (CeleXA); TAKE 1 TABLET DAILY     Requested for: 87QWP3651; Last Rx:26Jan2017 Ordered    16  ProAir  (90 Base) MCG/ACT Inhalation Aerosol Solution; INHALE 1 TO 2 PUFFS    EVERY 4 TO 6 HOURS AS NEEDED; Therapy: 83FKP7623 to (Last Rx:26Jan2017)  Requested for: 26Jan2017 Ordered    17  Warfarin Sodium 5 MG Oral Tablet (Coumadin); TAKE 1 TABLET DAILY AS DIRECTED; Therapy: 18DEI1760 to (Elver Davis)  Requested for: 26Jan2017; Last    Rx:26Jan2017 Ordered    18  Triamcinolone Acetonide 0 5 % External Cream; APPLY SPARINGLY AND MASSAGE IN    TWICE DAILY; Therapy: 23Ocd9122 to (Last Rx:29Dec2016) Ordered    19  Calcitrene 0 005 % External Ointment; APPLY AND GENTLY MASSAGE INTO AFFECTED    AREA(S) TWICE DAILY; Therapy: 12PVZ4729 to (Last Rx:30Nov2016)  Requested for: 59VXQ8565 Ordered   20  Clobetasol Propionate 0 05 % External Ointment; APPLY AND GENTLY MASSAGE INTO    AFFECTED AREA(S) TWICE DAILY; Therapy: 03Apr2014 to (Aileen Colon)  Requested for: 12WZM6377; Last    Rx:30Nov2016 Ordered   21  Otezla 30 MG Oral Tablet; One po BID; Therapy: 39WAT2376 to (Last Rx:19Qpo6247) Ordered    22  Allopurinol 100 MG Oral Tablet; TAKE 1 TABLET TWICE DAILY  Requested for:    98BGY7475; Last XP:08WWT7937 Ordered   23   MetFORMIN HCl - 500 MG Oral Tablet; TAKE 1 TABLET TWICE DAILY  Requested for:    77AQN2925; Last Rx:2017 Ordered    24  Amoxicillin 500 MG Oral Capsule; TAKE 1 CAPSULE 3 TIMES DAILY; Therapy: 91DXX5389 to ([facility]:00OJI9759)  Requested for: 47FPT2982; Last    Rx:2017 Ordered   25  CefTRIAXone Sodium 2 GM Intravenous Solution Reconstituted; INFUSE 2 GM Daily    Recorded    26  Vitamin D (Ergocalciferol) 47323 UNIT Oral Capsule; TAKE 1 CAPSULE WEEKLY     Requested for: 17Abo2775; Last O74IIE6979 Ordered    27  Ferrex 150 150 MG Oral Capsule; TAKE 1 CAPSULE BY MOUTH TWICE DAILY; Therapy: [phone] to Recorded   28  Ocuvite Eye Health Formula Oral Capsule; TAKE 1 CAPSULE DAILY; Therapy: 2016 to Recorded   29  Turmeric 500 MG Oral Capsule; take 1 capsule 4 times daily; Therapy: 2016 to Recorded   30  Vitamin C 500 MG Oral Tablet; TAKE 1 TABLET DAILY; Therapy: (Jaguar Liu) to Recorded    Future Appointments    Date/Time Provider Specialty Site   2017 03:15 PM Emilee Little MD Infectious Disease [facility] INFECTIOUS DISEASE   2017 01:30 PM [facility]     Patient Care Team    Care Team Member Role Specialty Office Number   Ted VILLAVICENCIO    Nephrology [phone]     Signatures   Electronically signed by : Edgardo Mcgowan RN; 2017  1:14PM EST                       (Author)

## 2018-01-10 NOTE — PROGRESS NOTES
History of Present Illness  HPI: Routine follow-up for Streptococcus bovis sepsis with vertebral osteomyelitis and a small so as abscess  Patient has a history of a mechanical MVR and therefore is being treated as if she has endocarditis  The BREANN was negative  She underwent IR aspiration of the small cell as abscess but it was too small to place a drain  She is on day #31 of 42 of ceftriaxone  Her creatinine and white cell count is normal  Her sedimentation rate remains quite high  Review of Systems  Complete-Female:   Constitutional: feeling poorly and feeling tired, but no fever  Cardiovascular: No complaints of slow heart rate, no fast heart rate, no chest pain, no palpitations, no leg claudication, no lower extremity edema  Respiratory: No complaints of shortness of breath, no wheezing, no cough, no SOB on exertion, no orthopnea, no PND  Gastrointestinal: No complaints of abdominal pain, no constipation, no nausea or vomiting, no diarrhea, no bloody stools  Neurological: no difficulty walking  Active Problems    1  Acute diastolic congestive heart failure (428 31,428 0) (I50 31)   2  Acute on chronic diastolic congestive heart failure (428 33,428 0) (I50 33)   3  CHANTELL (acute kidney injury) (584 9) (N17 9)   4  Arthritis of knee (716 96) (M17 10)   5  Benign essential hypertension (401 1) (I10)   6  Bilateral hearing loss due to cerumen impaction (389 8,380 4) (H61 23)   7  CAD (coronary artery disease) (414 00) (I25 10)   8  Chronic kidney disease, stage 3 (moderate) (585 3) (N18 3)   9  Fall on same level from slipping, tripping or stumbling, initial encounter (E885 9)   (W01  0XXA)   10  GERD without esophagitis (530 81) (K21 9)   11  Gout (274 9) (M10 9)   12  HLD (hyperlipidemia) (272 4) (E78 5)   13  Hypercholesterolemia (272 0) (E78 00)   14  Hypokalemia (276 8) (E87 6)   15  Iron deficiency anemia (280 9) (D50 9)   16  Lactic acidosis (276 2) (E87 2)   17   Lumbar degenerative disc disease (722 52) (M51 36)   18  Meralgia paresthetica of right side (355 1) (G57 11)   19  Mitral valve prolapse (424 0) (I34 1)   20  Mitral valve replaced (V43 3) (Z95 2)   21  Mixed hyperlipidemia (272 2) (E78 2)   22  Non-insulin dependent type 2 diabetes mellitus (250 00) (E11 9)   23  Pain of right lower extremity (729 5) (M79 604)   24  Psoriasis (696 1) (L40 9)   25  RBBB (right bundle branch block) (426 4) (I45 10)   26  Streptococcal sepsis (038 0,995 91) (A40 9)   27  Streptococcus bovis infection (041 04) (A49 1)   28  Vertebral osteomyelitis (730 28) (M46 20)   29  Vitamin D deficiency (268 9) (E55 9)    Past Medical History    1  History of Abscess of abdominal wall (682 2) (L02 211)   2  History of Acute upper respiratory infection (465 9) (J06 9)   3  History of Arteriovenous Malformation (CNS) (747 81)   4  Denied: History of Carrier Of STD   5  History of Diabetes Mellitus (250 00)   6  History of Gastroparesis (536 3) (K31 84)   7  History of Generalized anxiety disorder (300 02) (F41 1)   8  History of acute bronchitis (V12 69) (Z87 09)   9  History of allergy (V15 09) (Z88 9)   10  History of blood coagulation disorder (V12 3) (Z86 2)   11  History of hypertension (V12 59) (Z86 79)   12  History of psoriasis (V13 3) (Z87 2)   13  History of Lymphedema (457 1) (I89 0)   14  History of Medicare annual wellness visit, subsequent (V70 0) (Z00 00)   15  Denied: History of Mental Status Change   16  History of Morbid or severe obesity due to excess calories (278 01) (E66 01)   17  History of Multiple joint pain (719 49) (M25 50)   18  History of Need for immunization against influenza (V04 81) (Z23)   19  History of Need for Tdap vaccination (V06 1) (Z23)   20  History of Need for vaccination with 13-polyvalent pneumococcal conjugate vaccine    (V03 82) (Z23)   21  History of Osteoporosis screening (V82 81) (Z13 820)   22  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)   23   History of Sciatica of right side (724 3) (M54 31)   24  History of Screening for breast cancer (V76 10) (Z12 39)   25  History of Screening for depression (V79 0) (Z13 89)   26  History of Screening for depression (V79 0) (Z13 89)   27  History of Screening for genitourinary condition (V81 6) (Z13 89)   28  History of Screening for genitourinary condition (V81 6) (Z13 89)   29  History of Screening for neurological condition (V80 09) (Z13 89)   30  History of Screening for neurological condition (V80 09) (Z13 89)   31  History of Type 2 diabetes mellitus (250 00) (E11 9)   32  History of Urinary Symptoms (788 69)   33  History of Visit for screening mammogram (V76 12) (Z12 31)   34  History of Visit for suture removal (V58 32) (Z48 02)   35  History of Weight gain (783 1) (R63 5)   36  History of Well adult on routine health check (V70 0) (Z00 00)    Surgical History    1  History of  Section   2  History of Mitral Valve Replacement   3  History of Tonsillectomy With Adenoidectomy   4  History of Tonsillectomy With Adenoidectomy    Family History    1  Family history of Diabetes Mellitus (V18 0)    2  Family history of Diabetes Mellitus (V18 0)   3  Family history of Type 2 Diabetes Mellitus    4  Family history of Carotid Artery Stenosis   5  Family history of Coronary Artery Disease (V17 49)    6  Family history of Family Health Status 2  Children Living   7  Family history of Stroke Syndrome (V17 1)    Social History    · Exercising Regularly   · Former smoker (S63 61) (A63 632)   · Marital History - Currently    · No alcohol use   · No illicit drug use   · Occupation:    Current Meds   1  Acetaminophen-Codeine #2 300-15 MG Oral Tablet; TAKE 1 TO 2 TABLETS EVERY 4   HOURS AS NEEDED; Therapy: 13ASJ6205 to (Evaluate:09Yna2116); Last Rx:39Bul0337 Ordered   2  Allopurinol 100 MG Oral Tablet; TAKE 1 TABLET TWICE DAILY  Requested for:   18UJR6095; Last PB:22UFK4412 Ordered   3   Atorvastatin Calcium 40 MG Oral Tablet; TAKE 1 TABLET DAILY  Requested for:   01FVH6735; Last Rx:26Jan2017 Ordered   4  Calcitrene 0 005 % External Ointment; APPLY AND GENTLY MASSAGE INTO AFFECTED   AREA(S) TWICE DAILY; Therapy: 03DDS6633 to (Last Rx:30Nov2016)  Requested for: 40GGY9191 Ordered   5  CefTRIAXone Sodium 2 GM Intravenous Solution Reconstituted; INFUSE 2 GM Daily; Therapy: (Diane Castillo) to Recorded   6  Citalopram Hydrobromide 20 MG Oral Tablet (CeleXA); TAKE 1 TABLET DAILY    Requested for: 21HTU1256; Last Rx:26Jan2017 Ordered   7  Clobetasol Propionate 0 05 % External Ointment; APPLY AND GENTLY MASSAGE INTO   AFFECTED AREA(S) TWICE DAILY; Therapy: 03Apr2014 to ([place])  Requested for: 60TOZ6249; Last   Rx:30Nov2016 Ordered   8  DiazePAM 5 MG Oral Tablet; TAKE 1 TABLET AT BEDTIME AS NEEDED; Therapy: 63YPM4128 to (Evaluate:00Ldp3989); Last Rx:56Zbl7967 Ordered   9  Ferrex 150 150 MG Oral Capsule; TAKE 1 CAPSULE BY MOUTH TWICE DAILY; Therapy: [phone]) to Recorded   10  Folbic 2 5-25-2 MG Oral Tablet; TAKE 1 TABELT TWICE DAILY  Requested for:    30Sqr0126; Last Rx:26Jan2017 Ordered   11  Furosemide 80 MG Oral Tablet (Lasix); TAKE 1 TABLET DAILY  Requested for:    39SPA5011; Last Rx:26Jan2017 Ordered   12  Klor-Con M20 20 MEQ Oral Tablet Extended Release; TAKE 2 TABLETS 4 TIMES DAILY; Therapy: 92XMX2217 to (Joe Yadav)  Requested for: 02UVJ9360; Last    Rx:06Apr2017 Ordered   13  LC-4 Lidocaine 4 % External Cream; apply cream 3-4 times daily as needed for pain    (may patch if preferred); Therapy: 41HDX1158 to (Last Rx:46Frt0388) Ordered   14  MetFORMIN HCl - 500 MG Oral Tablet; TAKE 1 TABLET TWICE DAILY  Requested for:    28HQI8075; Last Rx:26Jan2017 Ordered   15  MetOLazone 5 MG Oral Tablet (Zaroxolyn); TAKE 1 TABLET BY MOUTH ON MONDAY,    SUNDAY  Requested for: 72HSZ9332; Last Rx:26Jan2017 Ordered   16  [facility] Formula Oral Capsule; TAKE 1 CAPSULE DAILY;     Therapy: 15Apr2016 to Recorded   17  Omeprazole 40 MG Oral Capsule Delayed Release; TAKE 1 CAPSULE DAILY     Requested for: 69ZFA6843; Last Rx:26Jan2017 Ordered   18  OneTouch Delica Lancets Fine Miscellaneous; TEST BLOOD GLUCOSE 1 TIME DAILY -    DX  E11 9; Therapy: 63LFO2022 to (Evaluate:80Yax7504)  Requested for: 89SNP7820; Last    Rx:13Jun2017 Ordered   19  OneTouch Verio In Vitro Strip; TEST BLOOD GLUCOSE 1 TIME DAILY - DX  E11 9; Therapy: 77IMT3264 to (Evaluate:61Ibg3059)  Requested for: 47EWN1197; Last    Rx:13Jun2017 Ordered   20  OneTouch Verio w/Device Kit; 1 kit - DX  E11 9; Therapy: 20OIS4375 to (Last Rx:13Jun2017)  Requested for: 13Jun2017 Ordered   21  Otezla 30 MG Oral Tablet; One po BID; Therapy: 31TVZ1154 to (Last Rx:29Lsm8684) Ordered   22  ProAir  (90 Base) MCG/ACT Inhalation Aerosol Solution; INHALE 1 TO 2 PUFFS    EVERY 4 TO 6 HOURS AS NEEDED; Therapy: 95IYH9633 to (Last Rx:26Jan2017)  Requested for: 26Jan2017 Ordered   23  Spironolactone 25 MG Oral Tablet; TAKE 1 TABLET DAILY; Therapy: 91Wwz9795 to (Evaluate:78Rmh2579); Last Rx:36Zxm3823 Ordered   24  TraMADol HCl - 50 MG Oral Tablet; take 1 tablet daily as needed; Therapy: 14Apr2017 to (Last Rx:14Apr2017) Ordered   25  Triamcinolone Acetonide 0 5 % External Cream; APPLY SPARINGLY AND MASSAGE IN    TWICE DAILY; Therapy: 45Dhn8317 to (Last Rx:42Zsd6048) Ordered   26  Turmeric 500 MG Oral Capsule; take 1 capsule 4 times daily; Therapy: 15Apr2016 to Recorded   27  Vitamin C 500 MG Oral Tablet; TAKE 1 TABLET DAILY; Therapy: (Naomia March) to Recorded   28  Vitamin D (Ergocalciferol) 03384 UNIT Oral Capsule; TAKE 1 CAPSULE WEEKLY     Requested for: 10Mxa1980; Last JQ:77FTB2745 Ordered   29  Warfarin Sodium 5 MG Oral Tablet (Coumadin); TAKE 1 TABLET DAILY AS DIRECTED; Therapy: 83UVW0498 to (Evaluate:34Ran9506)  Requested for: 26Jan2017; Last    Rx:26Jan2017 Ordered    Allergies    1  Tetanus Toxoids   2   Tetanus-Diphtheria Toxoids Td SUSP    3  Seasonal    Vitals  Signs   Recorded: 15NWT0359 04:15PM   Temperature: 97 F  Heart Rate: 101  Respiration: 18  Systolic: 280  Diastolic: 58  Height: 4 ft 11 in  Weight Unobtainable: Yes    Physical Exam    Constitutional   General appearance: No acute distress, well appearing and well nourished  Ears, Nose, Mouth, and Throat   Oropharynx: Normal with no erythema, edema, exudate or lesions  Pulmonary   Respiratory effort: No increased work of breathing or signs of respiratory distress  Auscultation of lungs: Clear to auscultation  Cardiovascular   Auscultation of heart: Normal rate and rhythm, normal S1 and S2, without murmurs  Examination of extremities for edema and/or varicosities: Normal     Abdomen   Abdomen: Non-tender, no masses  Liver and spleen: No hepatomegaly or splenomegaly  Lymphatic   Palpation of lymph nodes in neck: No lymphadenopathy  Additional Exam:  She has good proximal and lower extremity strength however the exam is limited by pain  Assessment    1  Streptococcus bovis infection (041 04) (A49 1)   2  Sepsis (038 9,995 91) (A41 9)   3  Ambulatory dysfunction (719 7) (R26 2)    Discussion/Summary  Discussion Summary:   Streptococcus bovis sepsis-possible prostatic valve endocarditis  The patient cleared the bacteremia and seems to be tolerating the ceftriaxone without difficulty    -Continue ceftriaxone through 7/3/17   -Monitor CBC with differential and CMP weekly   -We'll transition to amoxicillin 500 mg p o  q 8 hours 7/4/17   -Patient still needs colonoscopy to be done as an outpatient    Vertebral osteomyelitis/small psoas abscesses-secondary to #1  The patient had IR aspiration accomplished  Drain could not be placed due to the small size of the collection  She continues to have some pain and a high sedimentation rate  Neurologically she seems stable      -Antibiotics as above   -Monitor sedimentation rate and CRP   -Will need rehabilitation    Ambulatory dysfunction-patient has significant failure to thrive and is unable to ambulate well  She feels she unable to care for self at home and does not have the support necessary  I discuss with her primary care physician Dr Carla Bell  They had tried to get her in short-term rehabilitation, but I think that she needs to seek care sooner than can be accomplished to the usual means  If the situation does not improve, she will go to the ER for admission  Follow-up with me in one month or p r n  sooner  Discussed with the patient and her son  Counseling Documentation With Imm: The patient, patient's family was counseled regarding diagnostic results, instructions for management, prognosis, risks and benefits of treatment options, importance of compliance with treatment  Patient's Capacity to Self-Care: Patient is unable to Self-Care:      Health Management  History of Screening for depression   *VB-Depression Screening; every 1 year; Last 60CEA0186; Next Due: 72OEV6386; Overdue  History of Screening for genitourinary condition   *VB - Urinary Incontinence Screen (Dx Z13 89 Screen for UI); every 1 year; Last 65ZUI7731; Next  Due: 45QKY4962; Overdue  History of Screening for neurological condition   *VB - Fall Risk Assessment  (Dx Z13 89 Screen for Neurologic Disorder); every 1 year; Last  42ZXB6106; Next Due: 36NPG1657; Overdue  Type 2 diabetes mellitus   (1) MICROALBUMIN CREATININE RATIO, RANDOM URINE; every 1 year; Last 82RBM2625; Next Due:  98Eov9781;  Active    Future Appointments    Date/Time Provider Specialty Site   06/23/2017 09:30 AM Antonio Rodas [facility] Internal Medicine [facility] MIRIAM Gonzalez   07/31/2017 01:30 PM Jacinta Acosta [facility] Gastroenterology Adult [facility]     Signatures   Electronically signed by : Juana Glasgow MD; Jun 22 2017  4:48PM EST                       (Author)

## 2018-01-10 NOTE — PROGRESS NOTES
History of Present Illness  HPI: Routine follow-up for Streptococcus bovis sepsis with vertebral osteomyelitis  She completed an 8 week course of intravenous antibiotics and now has been on a prolonged course of oral antibiotics  Her back pain is improving and she is ambulating better  She has not yet gotten the colonoscopy that was recommended  She is feeling well overall  She is not having any nausea vomiting or diarrhea      Review of Systems  Complete-Female:   Constitutional: No fever, no chills, feels well, no tiredness, no recent weight gain or weight loss  ENT: no complaints of earache, no loss of hearing, no nose bleeds, no nasal discharge, no sore throat, no hoarseness  Cardiovascular: No complaints of slow heart rate, no fast heart rate, no chest pain, no palpitations, no leg claudication, no lower extremity edema  Respiratory: No complaints of shortness of breath, no wheezing, no cough, no SOB on exertion, no orthopnea, no PND  Gastrointestinal: No complaints of abdominal pain, no constipation, no nausea or vomiting, no diarrhea, no bloody stools  Genitourinary: No complaints of dysuria, no incontinence, no pelvic pain, no dysmenorrhea, no vaginal discharge or bleeding  Musculoskeletal: as noted in HPI  Active Problems    1  Ambulatory dysfunction (719 7) (R26 2)   2  Anticoagulant long-term use (V58 61) (Z79 01)   3  Arthritis of knee (716 96) (M17 10)   4  Asthenia (780 79) (R53 1)   5  Benign essential hypertension (401 1) (I10)   6  Bilateral hearing loss due to cerumen impaction (389 8,380 4) (H61 23)   7  CAD (coronary artery disease) (414 00) (I25 10)   8  Cardiac arrhythmia (427 9) (I49 9)   9  Cardiac disease (429 9) (I51 9)   10  Chronic kidney disease, stage 3 (moderate) (585 3) (N18 3)   11  Elevated digoxin level (796 0) (R78 89)   12  Fall on same level from slipping, tripping or stumbling, initial encounter (E885 9)    (W01  0XXA)   13   GERD without esophagitis (530 81) (K21 9)   14  Gout (274 9) (M10 9)   15  HLD (hyperlipidemia) (272 4) (E78 5)   16  Hypercholesterolemia (272 0) (E78 00)   17  Hypokalemia (276 8) (E87 6)   18  Iron deficiency anemia (280 9) (D50 9)   19  Lactic acidosis (276 2) (E87 2)   20  Lumbar degenerative disc disease (722 52) (M51 36)   21  Meralgia paresthetica of right side (355 1) (G57 11)   22  Mitral valve prolapse (424 0) (I34 1)   23  Mitral valve replaced (V43 3) (Z95 2)   24  Mixed hyperlipidemia (272 2) (E78 2)   25  Morbid obesity (278 01) (E66 01)   26  Non-insulin dependent type 2 diabetes mellitus (250 00) (E11 9)   27  Ovarian cyst, right (620 2) (N83 201)   28  Pain of right lower extremity (729 5) (M79 604)   29  Physical deconditioning (799 3) (R53 81)   30  Presence of prosthetic heart valve (V43 3) (Z95 2)   31  Psoriasis (696 1) (L40 9)   32  RBBB (right bundle branch block) (426 4) (I45 10)   33  Screening for depression (V79 0) (Z13 89)   34  Screening for genitourinary condition (V81 6) (Z13 89)   35  Screening for neurological condition (V80 09) (Z13 89)   36  Sepsis (038 9,995 91) (A41 9)   37  Streptococcal sepsis (038 0,995 91) (A40 9)   38  Streptococcus bovis infection (041 04) (A49 1)   39  Transition of care   40  Vertebral osteomyelitis (730 28) (M46 20)   41  Vitamin D deficiency (268 9) (E55 9)    Past Medical History    1  History of Abscess of abdominal wall (682 2) (L02 211)   2  History of Acute diastolic congestive heart failure (428 31,428 0) (I50 31)   3  History of Acute on chronic diastolic congestive heart failure (428 33,428 0) (I50 33)   4  History of Acute upper respiratory infection (465 9) (J06 9)   5  History of CHANTELL (acute kidney injury) (584 9) (N17 9)   6  History of Arteriovenous Malformation (CNS) (747 81)   7  History of Candidiasis of breast (112 89) (B37 89)   8  Denied: History of Carrier Of STD   9  History of Diabetes Mellitus (250 00)   10  History of Gastroparesis (536 3) (K31 84)   11   History of Generalized anxiety disorder (300 02) (F41 1)   12  History of acute bronchitis (V12 69) (Z87 09)   13  History of allergy (V15 09) (Z88 9)   14  History of blood coagulation disorder (V12 3) (Z86 2)   15  History of hypertension (V12 59) (Z86 79)   16  History of psoriasis (V13 3) (Z87 2)   17  History of Lymphedema (457 1) (I89 0)   18  History of Medicare annual wellness visit, subsequent (V70 0) (Z00 00)   19  Denied: History of Mental Status Change   20  History of Morbid or severe obesity due to excess calories (278 01) (E66 01)   21  History of Multiple joint pain (719 49) (M25 50)   22  History of Need for immunization against influenza (V04 81) (Z23)   23  History of Need for Tdap vaccination (V06 1) (Z23)   24  History of Need for vaccination with 13-polyvalent pneumococcal conjugate vaccine    (V03 82) (Z23)   25  History of Osteoporosis screening (V82 81) (Z13 820)   26  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)   27  History of Sciatica of right side (724 3) (M54 31)   28  History of Screening for breast cancer (V76 10) (Z12 31)   29  History of Screening for depression (V79 0) (Z13 89)   30  History of Screening for genitourinary condition (V81 6) (Z13 89)   31  History of Screening for neurological condition (V80 09) (Z13 89)   32  History of Type 2 diabetes mellitus (250 00) (E11 9)   33  History of Urinary Symptoms (788 69)   34  History of Visit for screening mammogram (V76 12) (Z12 31)   35  History of Visit for suture removal (V58 32) (Z48 02)   36  History of Weight gain (783 1) (R63 5)   37  History of Well adult on routine health check (V70 0) (Z00 00)    Surgical History    1  History of  Section   2  History of Mitral Valve Replacement   3  History of Tonsillectomy With Adenoidectomy   4  History of Tonsillectomy With Adenoidectomy    Family History    1  Family history of Diabetes Mellitus (V18 0)    2  Family history of Diabetes Mellitus (V18 0)   3   Family history of Type 2 Diabetes Mellitus    4  Family history of Carotid Artery Stenosis   5  Family history of Coronary Artery Disease (V17 49)    6  Family history of Family Health Status 2  Children Living   7  Family history of Stroke Syndrome (V17 1)    Social History    · Exercising Regularly   · Former smoker (O28 58) (P88 229)   · Marital History - Currently    · No alcohol use   · No illicit drug use   · Occupation:    Current Meds   1  Acetaminophen-Codeine #2 300-15 MG Oral Tablet; TAKE 1  TABLETS EVERY 6 HOURS   AS NEEDED for extremem pain; Therapy: 07OYZ7676 to (Evaluate:05Ylp1212); Last Rx:03Fpt4711 Ordered   2  Allopurinol 100 MG Oral Tablet; TAKE 1 TABLET TWICE DAILY  Requested for:   38Xjk3518; Last Rx:16Aug2017 Ordered   3  Amiodarone HCl - 200 MG Oral Tablet; take 1 tablet every twelve hours; Therapy: (Figueroa Nagel) to Recorded   4  Amoxicillin 500 MG Oral Tablet; TAKE 1 TABLET 3 TIMES DAILY; Therapy: 18WOW9873 to (701-230-170)  Requested for: 85ZTR7846; Last   Rx:06Sep2017 Ordered   5  Atorvastatin Calcium 40 MG Oral Tablet; TAKE 1 TABLET DAILY  Requested for:   55Ksi2234; Last Rx:16Aug2017 Ordered   6  Calcitrene 0 005 % External Ointment; APPLY AND GENTLY MASSAGE INTO AFFECTED   AREA(S) TWICE DAILY; Therapy: 39UAP5822 to (Last Love Rued)  Requested for: 29Aug2017 Ordered   7  Citalopram Hydrobromide 20 MG Oral Tablet (CeleXA); TAKE 1 TABLET DAILY    Requested for: 16Aug2017; Last Rx:16Aug2017 Ordered   8  Digoxin 125 MCG Oral Tablet; TAKE 1 TABLET EVERY OTHER DAY; Therapy: (Rosita Cotton) to Recorded   9  Folbic 2 5-25-2 MG Oral Tablet; TAKE 1 TABELT TWICE DAILY  Requested for:   90Xzp4579; Last Rx:29Aug2017 Ordered   10  Furosemide 80 MG Oral Tablet (Lasix); TAKE 1 TABLET DAILY  Requested for:    18Wsh5596; Last Rx:16Aug2017 Ordered   11  LORazepam 0 5 MG Oral Tablet; TAKE ONE TABLET AT NIGHT AS NEEDED; Last    Rx:93Hms5326 Ordered   12   MetFORMIN HCl - 500 MG Oral Tablet; TAKE 1 TABLET TWICE DAILY  Requested for:    73Glo6147; Last Rx:01Dow7723; Status: ACTIVE - [facility]    Verification Ordered   13  Metoprolol Tartrate 25 MG Oral Tablet; take 1 tablet every twelve hours; Therapy: (Destinee Mano) to Recorded   14  Naproxen 500 MG Oral Tablet; TAKE 1 TABLET EVERY 12 HOURS DAILY PRN with food; Therapy: 78Kpc0406 to ([id number])  Requested for: 67Htx1078; Last    Rx:29Aug2017 Ordered   15  Nystop 683809 UNIT/GM External Powder; apply twice daily as needed under breast;    Therapy: 58Rox5181 to (Last Rx:08Aug2017)  Requested for: 10Aug2017 Ordered   16  [facility] Formula Oral Capsule; TAKE 1 CAPSULE DAILY; Therapy: 15Apr2016 to Recorded   17  Omeprazole 40 MG Oral Capsule Delayed Release; TAKE 1 CAPSULE DAILY     Requested for: 16Aug2017; Last Rx:16Aug2017 Ordered   18  OneTouch Delica Lancets Fine Miscellaneous; TEST BLOOD GLUCOSE 1 TIME DAILY -    DX  E11 9; Therapy: 06HUB0409 to (Evaluate:71Zlb0508)  Requested for: 43NGV4698; Last    Rx:13Jun2017 Ordered   19  OneTouch Verio In Vitro Strip; TEST BLOOD GLUCOSE 1 TIME DAILY - DX  E11 9; Therapy: 60RYU7110 to (Evaluate:34Iga4024)  Requested for: 95LUR5102; Last    Rx:13Jun2017 Ordered   20  OneTouch Verio w/Device Kit; 1 kit - DX  E11 9; Therapy: 01IED9257 to (Last Rx:13Jun2017)  Requested for: 13Jun2017 Ordered   21  Potassium Chloride Denise ER 20 MEQ Oral Tablet Extended Release; TAKE 1 TABLET    DAILY; Therapy: (Destinee Mano) to Recorded   22  ProAir  (90 Base) MCG/ACT Inhalation Aerosol Solution; INHALE 1 TO 2 PUFFS    EVERY 4 TO 6 HOURS AS NEEDED; Therapy: 23DMG2593 to (Last Rx:26Jan2017)  Requested for: 26Jan2017 Ordered   23  Spironolactone 25 MG Oral Tablet; TAKE 1 TABLET DAILY; Therapy: 61Wrm3579 to (Evaluate:35Xxt1083)  Requested for: 20WQX1184; Last    Rx:16Aug2017 Ordered   24   Warfarin Sodium 5 MG Oral Tablet; TAKE 0 5 TABLET Daily PRN; Therapy: (Recorded:02Shr5588) to Recorded    Allergies    1  Tetanus Toxoids   2  Tetanus-Diphtheria Toxoids Td SUSP    3  Seasonal    Vitals  Signs   Recorded: 87ZIG1692 02:01PM   Temperature: 97 8 F  Heart Rate: 70  Respiration: 18  Systolic: 334  Diastolic: 52  BP Cuff Size: Large  Height: 4 ft 11 in  Weight: 225 lb 3 2 oz  BMI Calculated: 45 49  BSA Calculated: 1 94  O2 Saturation: 94    Physical Exam    Constitutional   General appearance: No acute distress, well appearing and well nourished  Ears, Nose, Mouth, and Throat   Oropharynx: Normal with no erythema, edema, exudate or lesions  Pulmonary   Auscultation of lungs: Clear to auscultation  Cardiovascular   Auscultation of heart: Normal rate and rhythm, normal S1 and S2, without murmurs  Examination of extremities for edema and/or varicosities: Normal     Abdomen   Abdomen: Non-tender, no masses  Liver and spleen: No hepatomegaly or splenomegaly  Lymphatic   Palpation of lymph nodes in neck: No lymphadenopathy  Musculoskeletal   Gait and station: Normal          Assessment    1  Streptococcal sepsis (038 0,995 91) (A40 9)   2  Streptococcus bovis infection (041 04) (A49 1)   3  Vertebral osteomyelitis (730 28) (M46 20)   4  Chronic kidney disease, stage 3 (moderate) (585 3) (N18 3)   5  Morbid obesity (278 01) (E66 01)    Plan    1  Follow-up visit in 6 weeks Evaluation and Treatment  Follow-up  Status: Hold For -   Scheduling  Requested for: 20SXN2534   2  (1) CBC/PLT/DIFF; Status:Active; Requested CI10QON2940;    3  (1) CREATININE; Status:Active; Requested for:20Vrr6391;     Discussion/Summary  Discussion Summary:   #1  Streptococcus bovis sepsis-in a patient with persistent neck valve  She completed an 8 week course of intravenous antibiotics with clearance of the bacteremia  Stressed again the importance of her getting outpatient colonoscopy  She will make sure that she gets this accomplished      #2  Vertebral osteomyelitis-status post 8+ weeks of intravenous antibiotics and now on a prolonged course of oral antibiotics  The patient has improved substantially with improved ambulation  Her sedimentation rate remains elevated and restricted up into the 80s  We'll continue to monitor the sedimentation rate  If the sedimentation rate is stable or normalizes at the next visit, we'll discontinue the amoxicillin  We'll also check a CBC with a creatinine in 4 weeks    #3  Chronic kidney disease-stage III by report  The creatinine is drifted up a bit  Asked her to call her nephrologist for follow-up  Follow-up with me in 6 weeks  Counseling Documentation With Imm: The patient was counseled regarding diagnostic results, instructions for management, prognosis, risks and benefits of treatment options, importance of compliance with treatment  Patient's Capacity to Self-Care: Patient is able to Self-Care  Medication SE Review and Pt Understands Tx: Possible side effects of new medications were reviewed with the patient/guardian today  The treatment plan was reviewed with the patient/guardian  The patient/guardian understands and agrees with the treatment plan      Health Management  History of Screening for depression   *VB-Depression Screening; every 1 year; Last 45PIY2830; Next Due: 04SLB4724; Overdue  History of Screening for genitourinary condition   *VB - Urinary Incontinence Screen (Dx Z13 89 Screen for UI); every 1 year; Last 27Fwe9775; Next  Due: 82Sad8849; Active  History of Screening for neurological condition   *VB - Fall Risk Assessment  (Dx Z13 89 Screen for Neurologic Disorder); every 1 year; Last  13Dsm3963; Next Due: 91Qji4679; Active  Type 2 diabetes mellitus   (1) MICROALBUMIN CREATININE RATIO, RANDOM URINE; every 1 year; Last 17CYZ1511; Next Due:  24Naz6325;  Active    Future Appointments    Date/Time Provider Specialty Site   12/12/2017 10:45 AM Sebastian Negron DO Internal Medicine [facility] Signatures   Electronically signed by : Romel Fontenot MD; Sep 28 2017  2:34PM EST                       (Author)

## 2018-01-10 NOTE — MISCELLANEOUS
History of Present Illness    The patient is being contacted for follow-up after hospitalization  Hospitalization The hospitalization was not a readmission, The patient was discharged on 7/15/17  She has a moderate risk for readmission  She was discharged to a SNF for rehabilitation  The patient's status is short term  The [facility] name is: [facility]  The patient is on HF pathway  Treatment Questions:   HFCC Additional Notes:   Email to facility to establish collaboration per protocol  Current Meds   1  Acetaminophen-Codeine #2 300-15 MG Oral Tablet; TAKE 1 TO 2 TABLETS EVERY 4   HOURS AS NEEDED; Therapy: 56PXD6450 to (Evaluate:02Yqj0110); Last Rx:15May2017 Ordered   2  Allopurinol 100 MG Oral Tablet; TAKE 1 TABLET TWICE DAILY  Requested for:   87YSJ7648; Last SQ:42CPH1412 Ordered   3  Amoxicillin 500 MG Oral Capsule; TAKE 1 CAPSULE 3 TIMES DAILY; Therapy: 50OEQ9570 to (03 17 74 30 53)  Requested for: 98XND9598; Last   Rx:22Jun2017 Ordered   4  Atorvastatin Calcium 40 MG Oral Tablet; TAKE 1 TABLET DAILY  Requested for:   85EXD6744; Last IY:04CBH3412 Ordered   5  Calcitrene 0 005 % External Ointment; APPLY AND GENTLY MASSAGE INTO AFFECTED   AREA(S) TWICE DAILY; Therapy: 76CNS5750 to (Last Rx:30Nov2016)  Requested for: 84PEE6343 Ordered   6  Citalopram Hydrobromide 20 MG Oral Tablet (CeleXA); TAKE 1 TABLET DAILY    Requested for: 82JQA8128; Last Rx:26Jan2017 Ordered   7  Clobetasol Propionate 0 05 % External Ointment; APPLY AND GENTLY MASSAGE INTO   AFFECTED AREA(S) TWICE DAILY; Therapy: 96Ttr9706 to (Tor Suzanne)  Requested for: 12TUN4311; Last   Rx:30Nov2016 Ordered   8  DiazePAM 5 MG Oral Tablet; TAKE 1 TABLET AT BEDTIME AS NEEDED; Therapy: 28NIO2599 to (Evaluate:08Dfc3084); Last Rx:15May2017 Ordered   9  Ferrex 150 150 MG Oral Capsule; TAKE 1 CAPSULE BY MOUTH TWICE DAILY; Therapy: (0472 51 11 42) to Recorded   10   Folbic 2 5-25-2 MG Oral Tablet; TAKE 1 TABELT TWICE DAILY Requested for:    26Fge3186; Last Rx:26Jan2017 Ordered   11  Furosemide 80 MG Oral Tablet (Lasix); TAKE 1 TABLET DAILY  Requested for:    26GKF5696; Last Rx:26Jan2017 Ordered   12  Klor-Con M20 20 MEQ Oral Tablet Extended Release; TAKE 2 TABLETS 4 TIMES DAILY; Therapy: 82OPQ5227 to (Nils Nino)  Requested for: 12NPQ7972; Last    Rx:06Apr2017 Ordered   13  LC-4 Lidocaine 4 % External Cream; apply cream 3-4 times daily as needed for pain    (may patch if preferred); Therapy: 26OSN1664 to (Last Rx:64Oqp9694) Ordered   14  MetFORMIN HCl - 500 MG Oral Tablet; TAKE 1 TABLET TWICE DAILY  Requested for:    72EDM4919; Last Rx:26Jan2017 Ordered   15  MetOLazone 5 MG Oral Tablet (Zaroxolyn); TAKE 1 TABLET BY MOUTH ON MONDAY,    SUNDAY  Requested for: 91WIH6566; Last Rx:26Jan2017 Ordered   16  [facility] Formula Oral Capsule; TAKE 1 CAPSULE DAILY; Therapy: 26Sbr9696 to Recorded   17  Omeprazole 40 MG Oral Capsule Delayed Release; TAKE 1 CAPSULE DAILY     Requested for: 26JDJ4266; Last Rx:26Jan2017 Ordered   18  OneTouch Delica Lancets Fine Miscellaneous; TEST BLOOD GLUCOSE 1 TIME DAILY -    DX  E11 9; Therapy: 95OCH7359 to (Evaluate:86Zpp7560)  Requested for: 96TOW9904; Last    Rx:13Jun2017 Ordered   19  OneTouch Verio In Vitro Strip; TEST BLOOD GLUCOSE 1 TIME DAILY - DX  E11 9; Therapy: 58ZXN2489 to (Evaluate:44Vej4367)  Requested for: 73MSY8661; Last    Rx:13Jun2017 Ordered   20  OneTouch Verio w/Device Kit; 1 kit - DX  E11 9; Therapy: 30OPE9974 to (Last Rx:13Jun2017)  Requested for: 13Jun2017 Ordered   21  Otezla 30 MG Oral Tablet; One po BID; Therapy: 06RSU8454 to (Last Rx:40Vbc7496) Ordered   22  ProAir  (90 Base) MCG/ACT Inhalation Aerosol Solution; INHALE 1 TO 2 PUFFS    EVERY 4 TO 6 HOURS AS NEEDED; Therapy: 90VMM4763 to (Last Rx:26Jan2017)  Requested for: 26Jan2017 Ordered   23  Spironolactone 25 MG Oral Tablet; TAKE 1 TABLET DAILY;     Therapy: 08Ezx8792 to (Evaluate:10Rvy9354); Last Rx:65Esw6716 Ordered   24  TraMADol HCl - 50 MG Oral Tablet; take 1 tablet daily as needed; Therapy: 00Sxo7846 to (Last Rx:93Mwr6232) Ordered   25  Triamcinolone Acetonide 0 5 % External Cream; APPLY SPARINGLY AND MASSAGE IN    TWICE DAILY; Therapy: 61Kvd6541 to (Last Rx:59Qkx9514) Ordered   26  Turmeric 500 MG Oral Capsule; take 1 capsule 4 times daily; Therapy: 10Szm6164 to Recorded   27  Vitamin C 500 MG Oral Tablet; TAKE 1 TABLET DAILY; Therapy: (Dulce Noble) to Recorded   28  Vitamin D (Ergocalciferol) 31795 UNIT Oral Capsule; TAKE 1 CAPSULE WEEKLY     Requested for: 23Wih0322; Last MR:[id number] Ordered   29  Warfarin Sodium 5 MG Oral Tablet (Coumadin); TAKE 1 TABLET DAILY AS DIRECTED; Therapy: 47PRG1212 to (Evaluate:35Dyk0761)  Requested for: 26Jan2017; Last    Rx:26Jan2017 Ordered    Allergies    1  Tetanus Toxoids   2  Tetanus-Diphtheria Toxoids Td SUSP    3   Seasonal    Future Appointments    Date/Time Provider Specialty Site   07/27/2017 03:15 PM Nathan Little MD Infectious Disease [facility] INFECTIOUS DISEASE   07/31/2017 01:30 PM Lindsay Stevens [facility]     Signatures   Electronically signed by : Nehemiah Horton, ; Jul 18 2017  3:11PM EST                       (Author)

## 2018-01-11 NOTE — RESULT NOTES
Verified Results  (1) CBC/PLT/DIFF 21Aug2017 07:35AM Ginna Benz Order Number: [id number]     Test Name Result Flag Reference   WBC COUNT 9 55 Thousand/uL  4 31-10 16   RBC COUNT 4 20 Million/uL  3 81-5 12   HEMOGLOBIN 12 5 g/dL  11 5-15 4   HEMATOCRIT 39 2 %  34 8-46  1   MCV 93 fL  82-98   MCH 29 8 pg  26 8-34 3   MCHC 31 9 g/dL  31 4-37 4   RDW 17 1 % H 11 6-15 1   MPV 9 9 fL  8 9-12 7   PLATELET COUNT 785 Thousands/uL  149-390   nRBC AUTOMATED 0 /100 WBCs     NEUTROPHILS RELATIVE PERCENT 77 % H 43-75   LYMPHOCYTES RELATIVE PERCENT 13 % L 14-44   MONOCYTES RELATIVE PERCENT 8 %  4-12   EOSINOPHILS RELATIVE PERCENT 2 %  0-6   BASOPHILS RELATIVE PERCENT 0 %  0-1   NEUTROPHILS ABSOLUTE COUNT 7 20 Thousands/? ??L  1 85-7 62   LYMPHOCYTES ABSOLUTE COUNT 1 25 Thousands/? ??L  0 60-4 47   MONOCYTES ABSOLUTE COUNT 0 80 Thousand/? ??L  0 17-1 22   EOSINOPHILS ABSOLUTE COUNT 0 18 Thousand/? ??L  0 00-0 61   BASOPHILS ABSOLUTE COUNT 0 02 Thousands/? ??L  0 00-0 10     (1) CREATININE 73Jks4894 07:35AM [facility] [place] Order Number: [id number]     Test Name Result Flag Reference   CREATININE 0 93 mg/dL  0 60-1 30   Standardized to IDMS reference method   eGFR 62 ml/min/1 73sq m     National Kidney Disease Education Program recommendations are as follows:  GFR calculation is accurate only with a steady state creatinine  Chronic Kidney disease less than 60 ml/min/1 73 sq  meters  Kidney failure less than 15 ml/min/1 73 sq  meters

## 2018-01-11 NOTE — MISCELLANEOUS
Assessment    1  Acute diastolic congestive heart failure (428 31,428 0) (I50 31)   2  Mitral valve replaced (V43 3) (Z95 2)   3  CAD (coronary artery disease) (414 00) (I25 10)   4  HLD (hyperlipidemia) (272 4) (E78 5)   5  Gout (274 9) (M10 9)   6  Meralgia paresthetica of right side (355 1) (G57 11)   7  Pain of right lower extremity (729 5) (M79 604)    History of Present Illness  TCM Communication [facility]: The patient is being contacted for follow-up after hospitalization and PT declined to schedule JOSEPH and hospital follow up at this time  She stated that she will call back  She was hospitalized at [facility]  The date of admission: 4/27/17, date of discharge: 5/1/17  Diagnosis: SOB, acute diastolic CHF, mitral valve replacement, GERD, DM, CAD, HLD, gout, meralgia paresthetica, right leg pain,  She did not schedule a follow up appointment  Symptoms: weakness, but no fatigue  The patient is currently experiencing symptoms  Communication performed and completed by guilherme      Active Problems    1  Acute bronchitis (466 0) (J20 9)   2  Acute upper respiratory infection (465 9) (J06 9)   3  CHANTELL (acute kidney injury) (584 9) (N17 9)   4  Benign essential hypertension (401 1) (I10)   5  Bilateral hearing loss due to cerumen impaction (389 8,380 4) (H61 23)   6  Chronic kidney disease, stage 3 (moderate) (585 3) (N18 3)   7  Fall on same level from slipping, tripping or stumbling, initial encounter (E885 9)   (W01  0XXA)   8  GERD without esophagitis (530 81) (K21 9)   9  Hypercholesterolemia (272 0) (E78 00)   10  Hypokalemia (276 8) (E87 6)   11  Iron deficiency anemia (280 9) (D50 9)   12  Iron deficiency anemia due to chronic blood loss (280 0) (D50 0)   13  Lumbar degenerative disc disease (722 52) (M51 36)   14  Mitral valve prolapse (424 0) (I34 1)   15  Mixed hyperlipidemia (272 2) (E78 2)   16  Need for influenza vaccination (V04 81) (Z23)   17  Psoriasis (696 1) (L40 9)   18   RBBB (right bundle branch block) (426  4) (I45 10)   19  Sciatica of right side (724 3) (M54 31)   20  Type 2 diabetes mellitus (250 00) (E11 9)   21  Vitamin D deficiency (268 9) (E55 9)   22  Weight gain (783 1) (R63 5)    Past Medical History    1  History of Abscess of abdominal wall (682 2) (L02 211)   2  History of Arteriovenous Malformation (CNS) (747 81)   3  Denied: History of Carrier Of STD   4  History of Diabetes Mellitus (250 00)   5  History of Gastroparesis (536 3) (K31 84)   6  History of Generalized anxiety disorder (300 02) (F41 1)   7  History of allergy (V15 09) (Z88 9)   8  History of blood coagulation disorder (V12 3) (Z86 2)   9  History of hypertension (V12 59) (Z86 79)   10  History of psoriasis (V13 3) (Z87 2)   11  History of Lymphedema (457 1) (I89 0)   12  History of Medicare annual wellness visit, subsequent (V70 0) (Z00 00)   13  Denied: History of Mental Status Change   14  History of Morbid or severe obesity due to excess calories (278 01) (E66 01)   15  History of Multiple joint pain (719 49) (M25 50)   16  History of Need for immunization against influenza (V04 81) (Z23)   17  History of Need for Tdap vaccination (V06 1) (Z23)   18  History of Need for vaccination with 13-polyvalent pneumococcal conjugate vaccine    (V03 82) (Z23)   19  History of Osteoporosis screening (V82 81) (Z13 820)   20  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)   21  History of Screening for breast cancer (V76 10) (Z12 39)   22  History of Screening for depression (V79 0) (Z13 89)   23  History of Screening for depression (V79 0) (Z13 89)   24  History of Screening for genitourinary condition (V81 6) (Z13 89)   25  History of Screening for genitourinary condition (V81 6) (Z13 89)   26  History of Screening for neurological condition (V80 09) (Z13 89)   27  History of Screening for neurological condition (V80 09) (Z13 89)   28  History of Type 2 diabetes mellitus (250 00) (E11 9)   29  History of Urinary Symptoms (346 98)   30   History of Visit for screening mammogram (V76 12) (Z12 31)   31  History of Visit for suture removal (V58 32) (Z48 02)   32  History of Well adult on routine health check (V70 0) (Z00 00)    Surgical History    1  History of  Section   2  History of Mitral Valve Replacement   3  History of Tonsillectomy With Adenoidectomy   4  History of Tonsillectomy With Adenoidectomy    Family History  Mother    1  Family history of Diabetes Mellitus (V18 0)  Father    2  Family history of Diabetes Mellitus (V18 0)   3  Family history of Type 2 Diabetes Mellitus  Sister    4  Family history of Carotid Artery Stenosis   5  Family history of Coronary Artery Disease (V17 49)  Family History    6  Family history of Family Health Status 2  Children Living   7  Family history of Stroke Syndrome (V17 1)    Social History    · Denied: History of Alcohol Use (History)   · Denied: History of Drug Use   · Exercising Regularly   · Former smoker (Y62 63) (Y32 304)   · Marital History - Currently    · Occupation:    Current Meds   1  Accu-Chek Multiclix Lancets Miscellaneous; test 2-3 times daily; Therapy: ([id number]) to Recorded   2  Allopurinol 100 MG Oral Tablet; TAKE 1 TABLET TWICE DAILY  Requested for:   47QCN6421; Last YJ:06XLG5117 Ordered   3  Atorvastatin Calcium 40 MG Oral Tablet; TAKE 1 TABLET DAILY  Requested for:   50UIK2668; Last EL:59YZT2660 Ordered   4  Calcitrene 0 005 % External Ointment; APPLY AND GENTLY MASSAGE INTO AFFECTED   AREA(S) TWICE DAILY; Therapy: 01LWK9783 to (Last Rx:2016)  Requested for: 18NGP9558 Ordered   5  Citalopram Hydrobromide 20 MG Oral Tablet; TAKE 1 TABLET DAILY  Requested for:   27BRU8269; Last PW:59NFO6549 Ordered   6  Clobetasol Propionate 0 05 % External Ointment; APPLY AND GENTLY MASSAGE INTO   AFFECTED AREA(S) TWICE DAILY; Therapy: 2014 to (Christina Pride)  Requested for: 08ONF6695; Last   Rx:2016 Ordered   7   Ferrex 150 150 MG Oral Capsule; TAKE 1 CAPSULE BY MOUTH TWICE DAILY; Therapy: [phone] to Recorded   8  Folbic 2 5-25-2 MG Oral Tablet; TAKE 1 TABELT TWICE DAILY  Requested for:   59Akz1170; Last EU:38LLU0455 Ordered   9  Furosemide 80 MG Oral Tablet; TAKE 1 TABLET DAILY  Requested for: 72FKJ9212; Last   EO:16JAA5875 Ordered   10  Klor-Con M20 20 MEQ Oral Tablet Extended Release; TAKE 2 TABLETS 4 TIMES DAILY; Therapy: 02UVP9600 to (Abdi Gar)  Requested for: 18WTF9327; Last    Rx:06Apr2017 Ordered   11  MetFORMIN HCl - 500 MG Oral Tablet; TAKE 1 TABLET TWICE DAILY  Requested for:    96WJE4587; Last Rx:26Jan2017 Ordered   12  MetOLazone 5 MG Oral Tablet; TAKE 1 TABLET BY MOUTH ON MONDAY, SUNDAY     Requested for: 56JNA8494; Last Rx:26Jan2017 Ordered   15  Ocuvite Eye Health Formula Oral Capsule; TAKE 1 CAPSULE DAILY; Therapy: 15Apr2016 to Recorded   14  Omeprazole 40 MG Oral Capsule Delayed Release; TAKE 1 CAPSULE DAILY     Requested for: 38GDF4626; Last DUNCAN:79ASK0121 Ordered   15  Otezla 30 MG Oral Tablet; One po BID; Therapy: 27JQY5795 to (Last Rx:50Aup8631) Ordered   16  ProAir  (90 Base) MCG/ACT Inhalation Aerosol Solution; INHALE 1 TO 2 PUFFS    EVERY 4 TO 6 HOURS AS NEEDED; Therapy: 65DZK3594 to (Last Rx:26Jan2017)  Requested for: 26Jan2017 Ordered   17  Spironolactone 25 MG Oral Tablet; TAKE 1 TABLET DAILY; Therapy: 36Doz8271 to (Evaluate:54Yao8995); Last Rx:12Ull9909 Ordered   18  TraMADol HCl - 50 MG Oral Tablet; take 1 tablet daily as needed; Therapy: 14Apr2017 to (Last Rx:14Apr2017) Ordered   19  Triamcinolone Acetonide 0 5 % External Cream; APPLY SPARINGLY AND MASSAGE IN    TWICE DAILY; Therapy: 34Azb2467 to (Last Rx:26Cki4270) Ordered   20  Turmeric 500 MG Oral Capsule; take 1 capsule 4 times daily; Therapy: 15Apr2016 to Recorded   21  Vitamin C 500 MG Oral Tablet; TAKE 1 TABLET DAILY; Therapy: (Tamiko Sanchez) to Recorded   22   Vitamin D (Ergocalciferol) 01502 UNIT Oral Capsule; TAKE 1 CAPSULE WEEKLY     Requested for: 18OJN3760; Last IV:46GFH2443 Ordered   23  Warfarin Sodium 5 MG Oral Tablet; TAKE 1 TABLET DAILY AS DIRECTED; Therapy: 07DFU0560 to (Evaluate:86Bnh6864)  Requested for: 26Jan2017; Last    Rx:26Jan2017 Ordered    Allergies    1  Tetanus Toxoids   2  Tetanus-Diphtheria Toxoids Td SUSP    3  Seasonal    Health Management  History of Screening for depression   *VB-Depression Screening; every 1 year; Last 37PNO8730; Next Due: 73KYR1379; Overdue  History of Screening for genitourinary condition   *VB - Urinary Incontinence Screen (Dx Z13 89 Screen for UI); every 1 year; Last 10CYN8977; Next  Due: 81YES4423; Overdue  History of Screening for neurological condition   *VB - Fall Risk Assessment  (Dx Z13 89 Screen for Neurologic Disorder); every 1 year; Last  79MRY5460; Next Due: 69DIO8209; Overdue  Type 2 diabetes mellitus   (1) MICROALBUMIN CREATININE RATIO, RANDOM URINE; every 1 year; Last 12TTS9685; Next Due:  71Tjy0874;  Active    Future Appointments    Date/Time Provider Specialty Site   05/17/2017 01:15 PM Rubina Goode [facility] Internal Medicine [facility]   06/23/2017 09:30 AM Rubina Goode [facility] Internal Medicine [facility]     Signatures   Electronically signed by : Byron Braden, ; May  2 2017 12:28PM EST                       (Co-author)    Electronically signed by : Tai Savage [facility]; May  8 2017  8:50AM EST                       (Author)    Electronically signed by : Claudean Perry, MD; May 12 2017 11:48AM EST

## 2018-01-11 NOTE — PROCEDURES
Procedures by Lorene Edwards MD at 7/11/2017  10:58 AM      Author:  Lorene Edwards MD Service:  Interventional Radiology  Author Type:  Physician    Filed:  7/11/2017 11:03 AM Date of Service:  7/11/2017 10:58 AM Status:  Signed    :  Lorene Edwards MD (Physician)         Procedures    Patient brought to CT scan for aspiration of psoas abscess  This was only poorly visualized on CT, and appeared to be decreased in size as compared to most recent previous imaging of about 1 week ago  After attempts at reaching primary service were not  successful, the patients floor nurse was contacted and she did not know who the current attending was  After reviewing the imaging, patient is afebrile with decreasing size of poorly visualized abscess, no intervention was undertaken              Received for:Provider  EPIC   Jul 11 2017 11:03AM [facility] Standard Time

## 2018-01-12 VITALS
RESPIRATION RATE: 18 BRPM | OXYGEN SATURATION: 94 % | BODY MASS INDEX: 45.4 KG/M2 | TEMPERATURE: 97.8 F | HEIGHT: 59 IN | DIASTOLIC BLOOD PRESSURE: 52 MMHG | HEART RATE: 70 BPM | SYSTOLIC BLOOD PRESSURE: 116 MMHG | WEIGHT: 225.2 LBS

## 2018-01-12 NOTE — PROGRESS NOTES
History of Present Illness  Care Coordination Encounter Information:   Type of Encounter: Telephonic   Contact: Initial Contact    Spoke to Patient  Care Coordination  Nurse [address]:   The reason for call is to discuss outreach for follow up/needed services  Spoke with pt at length  Encouraged her to change positions often from her sofa to her recliner and alternating from side lying with feet up ,etc  She also does not have a big appetite  Encouraged her to try having a Glucerna  Pt is requesting a new glucometer also, since hers 'is old and has not worked in a long time" She admits to not checking her glucose prior to being admitted  Task sent to [facility], [facility] regarding a new order  Pt has my contact info should she change her mind regarding a rehab  I have been in touch with [address]  in case she would need to be involved in the process  Pt currently has the [facility]NA 1x a week for assist with PICC line   has been taught infusion of antibiotics  Also, OT and PT will be coming into the home  Active Problems    1  Acute diastolic congestive heart failure (428 31,428 0) (I50 31)   2  Acute on chronic diastolic congestive heart failure (428 33,428 0) (I50 33)   3  CHANTELL (acute kidney injury) (584 9) (N17 9)   4  Arthritis of knee (716 96) (M17 10)   5  Benign essential hypertension (401 1) (I10)   6  Bilateral hearing loss due to cerumen impaction (389 8,380 4) (H61 23)   7  CAD (coronary artery disease) (414 00) (I25 10)   8  Chronic kidney disease, stage 3 (moderate) (585 3) (N18 3)   9  Fall on same level from slipping, tripping or stumbling, initial encounter (E885 9)   (W01  0XXA)   10  GERD without esophagitis (530 81) (K21 9)   11  Gout (274 9) (M10 9)   12  HLD (hyperlipidemia) (272 4) (E78 5)   13  Hypercholesterolemia (272 0) (E78 00)   14  Hypokalemia (276 8) (E87 6)   15  Iron deficiency anemia (280 9) (D50 9)   16   Iron deficiency anemia due to chronic blood loss (280 0) (D50 0)   17  Lactic acidosis (276 2) (E87 2)   18  Lumbar degenerative disc disease (722 52) (M51 36)   19  Meralgia paresthetica of right side (355 1) (G57 11)   20  Mitral valve prolapse (424 0) (I34 1)   21  Mitral valve replaced (V43 3) (Z95 2)   22  Mixed hyperlipidemia (272 2) (E78 2)   23  Need for influenza vaccination (V04 81) (Z23)   24  Pain of right lower extremity (729 5) (M79 604)   25  Psoriasis (696 1) (L40 9)   26  RBBB (right bundle branch block) (426 4) (I45 10)   27  Streptococcal sepsis (038 0,995 91) (A40 9)   28  Streptococcus bovis infection (041 04) (A49 1)   29  Type 2 diabetes mellitus (250 00) (E11 9)   30  Vertebral osteomyelitis (730 28) (M46 20)   31  Vitamin D deficiency (268 9) (E55 9)    Past Medical History    1  History of Abscess of abdominal wall (682 2) (L02 211)   2  History of Acute upper respiratory infection (465 9) (J06 9)   3  History of Arteriovenous Malformation (CNS) (747 81)   4  Denied: History of Carrier Of STD   5  History of Diabetes Mellitus (250 00)   6  History of Gastroparesis (536 3) (K31 84)   7  History of Generalized anxiety disorder (300 02) (F41 1)   8  History of acute bronchitis (V12 69) (Z87 09)   9  History of allergy (V15 09) (Z88 9)   10  History of blood coagulation disorder (V12 3) (Z86 2)   11  History of hypertension (V12 59) (Z86 79)   12  History of psoriasis (V13 3) (Z87 2)   13  History of Lymphedema (457 1) (I89 0)   14  History of Medicare annual wellness visit, subsequent (V70 0) (Z00 00)   15  Denied: History of Mental Status Change   16  History of Morbid or severe obesity due to excess calories (278 01) (E66 01)   17  History of Multiple joint pain (719 49) (M25 50)   18  History of Need for immunization against influenza (V04 81) (Z23)   19  History of Need for Tdap vaccination (V06 1) (Z23)   20  History of Need for vaccination with 13-polyvalent pneumococcal conjugate vaccine    (V03 82) (Z23)   21   History of Osteoporosis screening (V82 81) (Z13 820)   22  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)   23  History of Sciatica of right side (724 3) (M54 31)   24  History of Screening for breast cancer (V76 10) (Z12 39)   25  History of Screening for depression (V79 0) (Z13 89)   26  History of Screening for depression (V79 0) (Z13 89)   27  History of Screening for genitourinary condition (V81 6) (Z13 89)   28  History of Screening for genitourinary condition (V81 6) (Z13 89)   29  History of Screening for neurological condition (V80 09) (Z13 89)   30  History of Screening for neurological condition (V80 09) (Z13 89)   31  History of Type 2 diabetes mellitus (250 00) (E11 9)   32  History of Urinary Symptoms (788 69)   33  History of Visit for screening mammogram (V76 12) (Z12 31)   34  History of Visit for suture removal (V58 32) (Z48 02)   35  History of Weight gain (783 1) (R63 5)   36  History of Well adult on routine health check (V70 0) (Z00 00)    Surgical History    1  History of  Section   2  History of Mitral Valve Replacement   3  History of Tonsillectomy With Adenoidectomy   4  History of Tonsillectomy With Adenoidectomy    Family History  Mother    1  Family history of Diabetes Mellitus (V18 0)  Father    2  Family history of Diabetes Mellitus (V18 0)   3  Family history of Type 2 Diabetes Mellitus  Sister    4  Family history of Carotid Artery Stenosis   5  Family history of Coronary Artery Disease (V17 49)  Family History    6  Family history of Family Health Status 2  Children Living   7  Family history of Stroke Syndrome (V17 1)    Social History    · Exercising Regularly   · Former smoker (Q01 20) (O04 817)   · Marital History - Currently    · No alcohol use   · No illicit drug use   · Occupation:    Current Meds    1  Furosemide 80 MG Oral Tablet (Lasix); TAKE 1 TABLET DAILY  Requested for:   77EVK5793; Last Rx:2017 Ordered   2   Spironolactone 25 MG Oral Tablet; TAKE 1 TABLET DAILY; Therapy: 75Org7597 to (Evaluate:13Vdn7545); Last Rx:15Xmw0317 Ordered    3  Omeprazole 40 MG Oral Capsule Delayed Release; TAKE 1 CAPSULE DAILY    Requested for: 38DQP3318; Last PY:81OVN0904 Ordered    4  Folbic 2 5-25-2 MG Oral Tablet; TAKE 1 TABELT TWICE DAILY  Requested for:   81Xzb2002; Last ID:20DPN7458 Ordered    5  Atorvastatin Calcium 40 MG Oral Tablet; TAKE 1 TABLET DAILY  Requested for:   02KDP5112; Last FV:17GJI3746 Ordered    6  Klor-Con M20 20 MEQ Oral Tablet Extended Release; TAKE 2 TABLETS 4 TIMES DAILY; Therapy: 69NWT8967 to (03 17 74 30 53)  Requested for: 05SDK3353; Last   Rx:06Apr2017 Ordered    7  MetOLazone 5 MG Oral Tablet (Zaroxolyn); TAKE 1 TABLET BY MOUTH ON MONDAY,   SUNDAY  Requested for: 10SFA5365; Last Rx:26Jan2017 Ordered    8  Acetaminophen-Codeine #2 300-15 MG Oral Tablet; TAKE 1 TO 2 TABLETS EVERY 4   HOURS AS NEEDED; Therapy: 48DCU2981 to (Evaluate:39Fac8661); Last Rx:15May2017 Ordered   9  DiazePAM 5 MG Oral Tablet; TAKE 1 TABLET AT BEDTIME AS NEEDED; Therapy: 76FCQ5846 to (Evaluate:97Xet6077); Last Rx:15May2017 Ordered   10  LC-4 Lidocaine 4 % External Cream; apply cream 3-4 times daily as needed for pain    (may patch if preferred); Therapy: 45SBJ9295 to (Last Rx:50Lnd9643) Ordered    11  TraMADol HCl - 50 MG Oral Tablet; take 1 tablet daily as needed; Therapy: 14Ebo5524 to (Last Rx:14Apr2017) Ordered    12  Citalopram Hydrobromide 20 MG Oral Tablet (CeleXA); TAKE 1 TABLET DAILY     Requested for: 93IBX2633; Last Rx:26Jan2017 Ordered    13  ProAir  (90 Base) MCG/ACT Inhalation Aerosol Solution; INHALE 1 TO 2 PUFFS    EVERY 4 TO 6 HOURS AS NEEDED; Therapy: 03MFL2609 to (Last Rx:26Jan2017)  Requested for: 26Jan2017 Ordered    14  Warfarin Sodium 5 MG Oral Tablet (Coumadin); TAKE 1 TABLET DAILY AS DIRECTED; Therapy: 06KQE6160 to (Herve Larson)  Requested for: 26Jan2017; Last    Rx:26Jan2017 Ordered    15   Triamcinolone Acetonide 0 5 % External Cream; APPLY SPARINGLY AND MASSAGE IN    TWICE DAILY; Therapy: 63Iwv5248 to (Last Rx:25Brb9461) Ordered    16  Calcitrene 0 005 % External Ointment; APPLY AND GENTLY MASSAGE INTO AFFECTED    AREA(S) TWICE DAILY; Therapy: 40KTS7058 to (Last Rx:30Nov2016)  Requested for: 06AYR1665 Ordered   17  Clobetasol Propionate 0 05 % External Ointment; APPLY AND GENTLY MASSAGE INTO    AFFECTED AREA(S) TWICE DAILY; Therapy: 81Onk6641 to (Kassie Jack)  Requested for: 65VEH2596; Last    Rx:30Nov2016 Ordered   18  Otezla 30 MG Oral Tablet; One po BID; Therapy: 48QXB3658 to (Last Rx:90Nkh4053) Ordered    19  Allopurinol 100 MG Oral Tablet; TAKE 1 TABLET TWICE DAILY  Requested for:    74URM7077; Last HD:76ZHI7895 Ordered   20  MetFORMIN HCl - 500 MG Oral Tablet; TAKE 1 TABLET TWICE DAILY  Requested for:    24LCT7823; Last JW:91NES6324 Ordered    21  Vitamin D (Ergocalciferol) 75298 UNIT Oral Capsule; TAKE 1 CAPSULE WEEKLY     Requested for: 63Klk8819; Last HJ:70BKA0135 Ordered    22  Accu-Chek Multiclix Lancets Miscellaneous; test 2-3 times daily; Therapy: [phone]) to Recorded   23  Ferrex 150 150 MG Oral Capsule; TAKE 1 CAPSULE BY MOUTH TWICE DAILY; Therapy: [phone]) to Recorded   24  Ocuvite Eye Health Formula Oral Capsule; TAKE 1 CAPSULE DAILY; Therapy: 57Gxx5952 to Recorded   25  Turmeric 500 MG Oral Capsule; take 1 capsule 4 times daily; Therapy: 98Zww7651 to Recorded   26  Vitamin C 500 MG Oral Tablet; TAKE 1 TABLET DAILY; Therapy: (Recorded:99Sry3488) to Recorded    Allergies    1  Tetanus Toxoids   2  Tetanus-Diphtheria Toxoids Td SUSP    3  Seasonal    Health Management   *VB-Depression Screening; every 1 year; Last 03BTW8664; Next Due: 89NOQ0808; Overdue   *VB - Urinary Incontinence Screen (Dx Z13 89 Screen for UI); every 1 year; Last 52QXJ8843; Next  Due: 54LXR7911;  Overdue   *VB - Fall Risk Assessment  (Dx Z13 89 Screen for Neurologic Disorder); every 1 year; Last  40EAJ2894; Next Due: 76Dex6910; Overdue   (1) MICROALBUMIN CREATININE RATIO, RANDOM URINE; every 1 year; Last 72QME8214; Next Due:  06Xrx3531; Active    End of Encounter Meds    1  Furosemide 80 MG Oral Tablet (Lasix); TAKE 1 TABLET DAILY  Requested for:   41CHB8901; Last Rx:26Jan2017 Ordered   2  Spironolactone 25 MG Oral Tablet; TAKE 1 TABLET DAILY; Therapy: 36Gjc1389 to (Evaluate:32Dvs0742); Last Rx:27Feb2017 Ordered    3  Omeprazole 40 MG Oral Capsule Delayed Release; TAKE 1 CAPSULE DAILY    Requested for: 43DRM5348; Last JR:20KDL8736 Ordered    4  Folbic 2 5-25-2 MG Oral Tablet; TAKE 1 TABELT TWICE DAILY  Requested for:   83Lpn9493; Last SF:55HEL0641 Ordered    5  Atorvastatin Calcium 40 MG Oral Tablet; TAKE 1 TABLET DAILY  Requested for:   72DMO1158; Last GJ:71LAO3762 Ordered    6  Klor-Con M20 20 MEQ Oral Tablet Extended Release; TAKE 2 TABLETS 4 TIMES DAILY; Therapy: 64ABE2357 to (Shefali Race)  Requested for: 97EMN9546; Last   Rx:06Apr2017 Ordered    7  MetOLazone 5 MG Oral Tablet (Zaroxolyn); TAKE 1 TABLET BY MOUTH ON MONDAY,   SUNDAY  Requested for: 00NNA4862; Last Rx:26Jan2017 Ordered    8  Acetaminophen-Codeine #2 300-15 MG Oral Tablet; TAKE 1 TO 2 TABLETS EVERY 4   HOURS AS NEEDED; Therapy: 63XIW6527 to (Evaluate:39Way3102); Last Rx:15May2017 Ordered   9  DiazePAM 5 MG Oral Tablet; TAKE 1 TABLET AT BEDTIME AS NEEDED; Therapy: 46QNX0822 to (Evaluate:81Vnd8694); Last Rx:15May2017 Ordered   10  LC-4 Lidocaine 4 % External Cream; apply cream 3-4 times daily as needed for pain    (may patch if preferred); Therapy: 12HBF5642 to (Last Rx:16May2017) Ordered    11  TraMADol HCl - 50 MG Oral Tablet; take 1 tablet daily as needed; Therapy: 58Lph4555 to (Last Rx:14Apr2017) Ordered    12  Citalopram Hydrobromide 20 MG Oral Tablet (CeleXA); TAKE 1 TABLET DAILY     Requested for: 27XYS8634; Last Rx:26Jan2017 Ordered    13   ProAir  (90 Base) MCG/ACT Inhalation Aerosol Solution; INHALE 1 TO 2 PUFFS    EVERY 4 TO 6 HOURS AS NEEDED; Therapy: 06EAH9361 to (Last Rx:26Jan2017)  Requested for: 26Jan2017 Ordered    14  Warfarin Sodium 5 MG Oral Tablet (Coumadin); TAKE 1 TABLET DAILY AS DIRECTED; Therapy: 01WMJ5950 to (Mosley Goodpasture)  Requested for: 26Jan2017; Last    Rx:26Jan2017 Ordered    15  Triamcinolone Acetonide 0 5 % External Cream; APPLY SPARINGLY AND MASSAGE IN    TWICE DAILY; Therapy: 76Tfu9433 to (Last Rx:35Rfo1060) Ordered    16  Calcitrene 0 005 % External Ointment; APPLY AND GENTLY MASSAGE INTO AFFECTED    AREA(S) TWICE DAILY; Therapy: 57SAH6523 to (Last Rx:30Nov2016)  Requested for: 03RVX5171 Ordered   17  Clobetasol Propionate 0 05 % External Ointment; APPLY AND GENTLY MASSAGE INTO    AFFECTED AREA(S) TWICE DAILY; Therapy: 03Apr2014 to (Raffi Macedo)  Requested for: 37KXU9473; Last    Rx:30Nov2016 Ordered   18  Otezla 30 MG Oral Tablet; One po BID; Therapy: 80UMA0449 to (Last Rx:72Rbq3562) Ordered    19  Allopurinol 100 MG Oral Tablet; TAKE 1 TABLET TWICE DAILY  Requested for:    93FSC8670; Last VH:26XJH1479 Ordered   20  MetFORMIN HCl - 500 MG Oral Tablet; TAKE 1 TABLET TWICE DAILY  Requested for:    15AQA9585; Last HK:63MDL1247 Ordered    21  Vitamin D (Ergocalciferol) 10425 UNIT Oral Capsule; TAKE 1 CAPSULE WEEKLY     Requested for: 89Bql2661; Last WY:75SVL6741 Ordered    22  Accu-Chek Multiclix Lancets Miscellaneous; test 2-3 times daily; Therapy: [phone]) to Recorded   23  Ferrex 150 150 MG Oral Capsule; TAKE 1 CAPSULE BY MOUTH TWICE DAILY; Therapy: [phone]) to Recorded   24  Ocuvite Eye Health Formula Oral Capsule; TAKE 1 CAPSULE DAILY; Therapy: 15Apr2016 to Recorded   25  Turmeric 500 MG Oral Capsule; take 1 capsule 4 times daily; Therapy: 15Apr2016 to Recorded   26  Vitamin C 500 MG Oral Tablet; TAKE 1 TABLET DAILY;     Therapy: (Recorded:47Rcg8686) to Recorded    Future Appointments    Date/Time Provider Specialty Site   06/19/2017 10:00 AM Kimberly Giang MD Internal Medicine [facility]   06/22/2017 04:00 PM Alonso Patel MD Infectious Disease [facility] INFECTIOUS DISEASE   [facility]2017 09:30 [facility]   07/31/2017 01:30 PM [facility] [address] [place] [address]     Patient Care Team    Care Team Member Role Specialty Office Number   Tejal VILLAVICENCIO    Nephrology [phone]     Signatures   Electronically signed by : Adele Hurt RN; Jun 13 2017  2:20PM EST                       (Author)

## 2018-01-13 VITALS
TEMPERATURE: 97.6 F | OXYGEN SATURATION: 95 % | BODY MASS INDEX: 54.84 KG/M2 | WEIGHT: 272 LBS | HEART RATE: 105 BPM | DIASTOLIC BLOOD PRESSURE: 64 MMHG | SYSTOLIC BLOOD PRESSURE: 100 MMHG | HEIGHT: 59 IN

## 2018-01-13 VITALS
DIASTOLIC BLOOD PRESSURE: 82 MMHG | HEART RATE: 94 BPM | TEMPERATURE: 99.1 F | OXYGEN SATURATION: 96 % | SYSTOLIC BLOOD PRESSURE: 120 MMHG

## 2018-01-14 VITALS
RESPIRATION RATE: 16 BRPM | TEMPERATURE: 97.8 F | BODY MASS INDEX: 49.03 KG/M2 | WEIGHT: 243.2 LBS | HEIGHT: 59 IN | OXYGEN SATURATION: 93 % | HEART RATE: 69 BPM | DIASTOLIC BLOOD PRESSURE: 52 MMHG | SYSTOLIC BLOOD PRESSURE: 130 MMHG

## 2018-01-14 VITALS
BODY MASS INDEX: 44.66 KG/M2 | OXYGEN SATURATION: 98 % | SYSTOLIC BLOOD PRESSURE: 104 MMHG | HEART RATE: 60 BPM | DIASTOLIC BLOOD PRESSURE: 58 MMHG | TEMPERATURE: 96.7 F | WEIGHT: 221.13 LBS

## 2018-01-14 VITALS
SYSTOLIC BLOOD PRESSURE: 100 MMHG | TEMPERATURE: 98.5 F | OXYGEN SATURATION: 98 % | DIASTOLIC BLOOD PRESSURE: 62 MMHG | HEART RATE: 104 BPM | HEIGHT: 59 IN | BODY MASS INDEX: 50.5 KG/M2 | WEIGHT: 250.5 LBS

## 2018-01-14 VITALS
TEMPERATURE: 97.2 F | SYSTOLIC BLOOD PRESSURE: 138 MMHG | DIASTOLIC BLOOD PRESSURE: 68 MMHG | HEART RATE: 85 BPM | OXYGEN SATURATION: 98 %

## 2018-01-14 VITALS
TEMPERATURE: 97 F | HEART RATE: 101 BPM | RESPIRATION RATE: 18 BRPM | HEIGHT: 59 IN | DIASTOLIC BLOOD PRESSURE: 58 MMHG | SYSTOLIC BLOOD PRESSURE: 118 MMHG

## 2018-01-14 NOTE — RESULT NOTES
Verified Results  (1) BASIC METABOLIC PROFILE [id number] 01:37PM Sabiha Meredith   TW Order Number: [id number]     Test Name Result Flag Reference   GLUCOSE,RANDM 131 mg/dL     If the patient is fasting, the ADA then defines impaired fasting glucose as > 100 mg/dL and diabetes as > or equal to 123 mg/dL  SODIUM 138 mmol/L  136-145   POTASSIUM 3 0 mmol/L L 3 5-5 3   CHLORIDE 94 mmol/L L 100-108   CARBON DIOXIDE 34 mmol/L H 21-32   ANION GAP (CALC) 10 mmol/L  4-13   BLOOD UREA NITROGEN 31 mg/dL H 5-25   CREATININE 1 10 mg/dL  0 60-1 30   Standardized to IDMS reference method   CALCIUM 9 9 mg/dL  8 3-10 1   eGFR Non-African American 49 1 ml/min/1 73sq [facility] [employer] Disease Education Program recommendations are as follows:  GFR calculation is accurate only with a steady state creatinine  Chronic Kidney disease less than 60 ml/min/1 73 sq  meters  Kidney failure less than 15 ml/min/1 73 sq  meters

## 2018-01-15 VITALS
WEIGHT: 274 LBS | SYSTOLIC BLOOD PRESSURE: 124 MMHG | RESPIRATION RATE: 18 BRPM | BODY MASS INDEX: 55.24 KG/M2 | DIASTOLIC BLOOD PRESSURE: 68 MMHG | HEART RATE: 88 BPM | HEIGHT: 59 IN

## 2018-01-15 NOTE — PROGRESS NOTES
History of Present Illness  Care Coordination Encounter Information:   Type of Encounter: Telephonic   Contact: Follow-Up    Spoke to Patient  Care Coordination SL Nurse [facility]:   The reason for call is to discuss outreach for follow up/needed services  Pt contacted after receiving a task from Charli Ross  Pt cancelled her JOSEPH appt and when contacted, she said that she would like to go into a rehab that she does not feel as though she is making progress at home  Called [facility]  who is going to look into [facility] as per pt  request  This process may take a few days to complete, but JONELW is starting the paperwork process  Pt contacted as to the progress of situation and she is concerned about her upcoming follow up appts with her specialist,etc  Will continue to monitor progress of situation  Active Problems    1  Acute diastolic congestive heart failure (428 31,428 0) (I50 31)   2  Acute on chronic diastolic congestive heart failure (428 33,428 0) (I50 33)   3  CHANTELL (acute kidney injury) (584 9) (N17 9)   4  Arthritis of knee (716 96) (M17 10)   5  Benign essential hypertension (401 1) (I10)   6  Bilateral hearing loss due to cerumen impaction (389 8,380 4) (H61 23)   7  CAD (coronary artery disease) (414 00) (I25 10)   8  Chronic kidney disease, stage 3 (moderate) (585 3) (N18 3)   9  Fall on same level from slipping, tripping or stumbling, initial encounter (E885 9)   (W01  0XXA)   10  GERD without esophagitis (530 81) (K21 9)   11  Gout (274 9) (M10 9)   12  HLD (hyperlipidemia) (272 4) (E78 5)   13  Hypercholesterolemia (272 0) (E78 00)   14  Hypokalemia (276 8) (E87 6)   15  Iron deficiency anemia (280 9) (D50 9)   16  Iron deficiency anemia due to chronic blood loss (280 0) (D50 0)   17  Lactic acidosis (276 2) (E87 2)   18  Lumbar degenerative disc disease (722 52) (M51 36)   19  Meralgia paresthetica of right side (355 1) (G57 11)   20   Mitral valve prolapse (424 0) (I34 1)   21  Mitral valve replaced (V43 3) (Z95 2)   22  Mixed hyperlipidemia (272 2) (E78 2)   23  Need for influenza vaccination (V04 81) (Z23)   24  Non-insulin dependent type 2 diabetes mellitus (250 00) (E11 9)   25  Pain of right lower extremity (729 5) (M79 604)   26  Psoriasis (696 1) (L40 9)   27  RBBB (right bundle branch block) (426 4) (I45 10)   28  Streptococcal sepsis (038 0,995 91) (A40 9)   29  Streptococcus bovis infection (041 04) (A49 1)   30  Type 2 diabetes mellitus (250 00) (E11 9)   31  Vertebral osteomyelitis (730 28) (M46 20)   32  Vitamin D deficiency (268 9) (E55 9)    Past Medical History    1  History of Abscess of abdominal wall (682 2) (L02 211)   2  History of Acute upper respiratory infection (465 9) (J06 9)   3  History of Arteriovenous Malformation (CNS) (747 81)   4  Denied: History of Carrier Of STD   5  History of Diabetes Mellitus (250 00)   6  History of Gastroparesis (536 3) (K31 84)   7  History of Generalized anxiety disorder (300 02) (F41 1)   8  History of acute bronchitis (V12 69) (Z87 09)   9  History of allergy (V15 09) (Z88 9)   10  History of blood coagulation disorder (V12 3) (Z86 2)   11  History of hypertension (V12 59) (Z86 79)   12  History of psoriasis (V13 3) (Z87 2)   13  History of Lymphedema (457 1) (I89 0)   14  History of Medicare annual wellness visit, subsequent (V70 0) (Z00 00)   15  Denied: History of Mental Status Change   16  History of Morbid or severe obesity due to excess calories (278 01) (E66 01)   17  History of Multiple joint pain (719 49) (M25 50)   18  History of Need for immunization against influenza (V04 81) (Z23)   19  History of Need for Tdap vaccination (V06 1) (Z23)   20  History of Need for vaccination with 13-polyvalent pneumococcal conjugate vaccine    (V03 82) (Z23)   21  History of Osteoporosis screening (V82 81) (Z13 820)   22  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)   23   History of Sciatica of right side (724 3) (M54 31)   24  History of Screening for breast cancer (V76 10) (Z12 39)   25  History of Screening for depression (V79 0) (Z13 89)   26  History of Screening for depression (V79 0) (Z13 89)   27  History of Screening for genitourinary condition (V81 6) (Z13 89)   28  History of Screening for genitourinary condition (V81 6) (Z13 89)   29  History of Screening for neurological condition (V80 09) (Z13 89)   30  History of Screening for neurological condition (V80 09) (Z13 89)   31  History of Type 2 diabetes mellitus (250 00) (E11 9)   32  History of Urinary Symptoms (788 69)   33  History of Visit for screening mammogram (V76 12) (Z12 31)   34  History of Visit for suture removal (V58 32) (Z48 02)   35  History of Weight gain (783 1) (R63 5)   36  History of Well adult on routine health check (V70 0) (Z00 00)    Surgical History    1  History of  Section   2  History of Mitral Valve Replacement   3  History of Tonsillectomy With Adenoidectomy   4  History of Tonsillectomy With Adenoidectomy    Family History  Mother    1  Family history of Diabetes Mellitus (V18 0)  Father    2  Family history of Diabetes Mellitus (V18 0)   3  Family history of Type 2 Diabetes Mellitus  Sister    4  Family history of Carotid Artery Stenosis   5  Family history of Coronary Artery Disease (V17 49)  Family History    6  Family history of Family Health Status 2  Children Living   7  Family history of Stroke Syndrome (V17 1)    Social History    · Exercising Regularly   · Former smoker (R26 49) (E23 277)   · Marital History - Currently    · No alcohol use   · No illicit drug use   · Occupation:    Current Meds    1  Furosemide 80 MG Oral Tablet (Lasix); TAKE 1 TABLET DAILY  Requested for:   86VUI9480; Last Rx:2017 Ordered   2  Spironolactone 25 MG Oral Tablet; TAKE 1 TABLET DAILY; Therapy: 12Xwx7550 to (Evaluate:97Mvd2456); Last Rx:31Ybp6747 Ordered    3   Omeprazole 40 MG Oral Capsule Delayed Release; TAKE 1 CAPSULE DAILY    Requested for: 56QZX8804; Last Rx:26Jan2017 Ordered    4  Folbic 2 5-25-2 MG Oral Tablet; TAKE 1 TABELT TWICE DAILY  Requested for:   22Zfs6892; Last DS:44YFQ6756 Ordered    5  Atorvastatin Calcium 40 MG Oral Tablet; TAKE 1 TABLET DAILY  Requested for:   61YHZ6566; Last EE:09HSG7534 Ordered    6  Klor-Con M20 20 MEQ Oral Tablet Extended Release; TAKE 2 TABLETS 4 TIMES DAILY; Therapy: 26MJE6513 to ([phone])  Requested for: 35GLC7593; Last   Rx:06Apr2017 Ordered    7  MetOLazone 5 MG Oral Tablet (Zaroxolyn); TAKE 1 TABLET BY MOUTH ON MONDAY,   SUNDAY  Requested for: 98NXP3172; Last Rx:26Jan2017 Ordered    8  Acetaminophen-Codeine #2 300-15 MG Oral Tablet; TAKE 1 TO 2 TABLETS EVERY 4   HOURS AS NEEDED; Therapy: 40IFP4980 to (Evaluate:20Ehc7611); Last Rx:15May2017 Ordered   9  DiazePAM 5 MG Oral Tablet; TAKE 1 TABLET AT BEDTIME AS NEEDED; Therapy: 06JJO2864 to (Evaluate:86Ksu1275); Last Rx:15May2017 Ordered   10  LC-4 Lidocaine 4 % External Cream; apply cream 3-4 times daily as needed for pain    (may patch if preferred); Therapy: 53VYI3634 to (Last Rx:60Hnk5814) Ordered    11  TraMADol HCl - 50 MG Oral Tablet; take 1 tablet daily as needed; Therapy: 76Axl7968 to (Last Rx:14Apr2017) Ordered    12  OneTouch Delica Lancets Fine Miscellaneous; TEST BLOOD GLUCOSE 1 TIME DAILY -    DX  E11 9; Therapy: 96SMY5970 to (Evaluate:07Iyr4513)  Requested for: 66VZP2972; Last    Rx:13Jun2017 Ordered   13  OneTouch Verio In Vitro Strip; TEST BLOOD GLUCOSE 1 TIME DAILY - DX  E11 9; Therapy: 80WXV3081 to (Evaluate:76Emf8598)  Requested for: 67JJF1052; Last    Rx:13Jun2017 Ordered   14  OneTouch Verio w/Device Kit; 1 kit - DX  E11 9; Therapy: 07OOM5620 to (Last Rx:13Jun2017)  Requested for: 06Gad2145 Ordered    15  Citalopram Hydrobromide 20 MG Oral Tablet (CeleXA); TAKE 1 TABLET DAILY     Requested for: 33ZLO3317; Last Rx:26Jan2017 Ordered    16   ProAir  (90 Base) MCG/ACT Inhalation Aerosol Solution; INHALE 1 TO 2 PUFFS    EVERY 4 TO 6 HOURS AS NEEDED; Therapy: 01NNK4183 to (Last Rx:26Jan2017)  Requested for: 26Jan2017 Ordered    17  Warfarin Sodium 5 MG Oral Tablet (Coumadin); TAKE 1 TABLET DAILY AS DIRECTED; Therapy: 84QZP7906 to (Lucilleshannon Gonzalez)  Requested for: 26Jan2017; Last    Rx:26Jan2017 Ordered    18  Triamcinolone Acetonide 0 5 % External Cream; APPLY SPARINGLY AND MASSAGE IN    TWICE DAILY; Therapy: 49Saa4908 to (Last Rx:29Dec2016) Ordered    19  Calcitrene 0 005 % External Ointment; APPLY AND GENTLY MASSAGE INTO AFFECTED    AREA(S) TWICE DAILY; Therapy: 26MJX4168 to (Last Rx:30Nov2016)  Requested for: 92ETP0585 Ordered   20  Clobetasol Propionate 0 05 % External Ointment; APPLY AND GENTLY MASSAGE INTO    AFFECTED AREA(S) TWICE DAILY; Therapy: 03Apr2014 to (Di Yarbrough)  Requested for: 75AWO7680; Last    Rx:30Nov2016 Ordered   21  Otezla 30 MG Oral Tablet; One po BID; Therapy: 75IRJ7747 to (Last Rx:25Ume0989) Ordered    22  Allopurinol 100 MG Oral Tablet; TAKE 1 TABLET TWICE DAILY  Requested for:    65TCR8333; Last WM:30HZK6674 Ordered   23  MetFORMIN HCl - 500 MG Oral Tablet; TAKE 1 TABLET TWICE DAILY  Requested for:    16HUR7901; Last Rx:26Jan2017 Ordered    24  Vitamin D (Ergocalciferol) 66432 UNIT Oral Capsule; TAKE 1 CAPSULE WEEKLY     Requested for: 76Wui3828; Last KX:77ORY0596 Ordered    25  Ferrex 150 150 MG Oral Capsule; TAKE 1 CAPSULE BY MOUTH TWICE DAILY; Therapy: [phone]) to Recorded   26  Ocuvite Eye Health Formula Oral Capsule; TAKE 1 CAPSULE DAILY; Therapy: 15Apr2016 to Recorded   27  Turmeric 500 MG Oral Capsule; take 1 capsule 4 times daily; Therapy: 15Apr2016 to Recorded   28  Vitamin C 500 MG Oral Tablet; TAKE 1 TABLET DAILY; Therapy: (Recorded:48Ywc2427) to Recorded    Allergies    1  Tetanus Toxoids   2  Tetanus-Diphtheria Toxoids Td SUSP    3   Seasonal    Health Management   *VB-Depression Screening; every 1 year; Last 03WKP5864; Next Due: 28TPZ5351; Overdue   *VB - Urinary Incontinence Screen (Dx Z13 89 Screen for UI); every 1 year; Last 94ELW2573; Next  Due: 55ZIR5067; Overdue   *VB - Fall Risk Assessment  (Dx Z13 89 Screen for Neurologic Disorder); every 1 year; Last  72AMC4328; Next Due: 51JGP7522; Overdue   (1) MICROALBUMIN CREATININE RATIO, RANDOM URINE; every 1 year; Last 48MDR4617; Next Due:  48Qvw0141; Active    End of Encounter Meds    1  Furosemide 80 MG Oral Tablet (Lasix); TAKE 1 TABLET DAILY  Requested for:   83VEX2303; Last Rx:26Jan2017 Ordered   2  Spironolactone 25 MG Oral Tablet; TAKE 1 TABLET DAILY; Therapy: 81Oel4483 to (Evaluate:52Bqt8621); Last Rx:76Bnl5175 Ordered    3  Omeprazole 40 MG Oral Capsule Delayed Release; TAKE 1 CAPSULE DAILY    Requested for: 81RQO9969; Last MM:43YGP0972 Ordered    4  Folbic 2 5-25-2 MG Oral Tablet; TAKE 1 TABELT TWICE DAILY  Requested for:   22Toh2195; Last VT:42ZYU4943 Ordered    5  Atorvastatin Calcium 40 MG Oral Tablet; TAKE 1 TABLET DAILY  Requested for:   93XEX7569; Last YT:76LEG6011 Ordered    6  Klor-Con M20 20 MEQ Oral Tablet Extended Release; TAKE 2 TABLETS 4 TIMES DAILY; Therapy: 55JHJ7090 to (Caesar Rape)  Requested for: 55RVR6187; Last   Rx:06Apr2017 Ordered    7  MetOLazone 5 MG Oral Tablet (Zaroxolyn); TAKE 1 TABLET BY MOUTH ON MONDAY,   SUNDAY  Requested for: 39NQO1470; Last Rx:26Jan2017 Ordered    8  Acetaminophen-Codeine #2 300-15 MG Oral Tablet; TAKE 1 TO 2 TABLETS EVERY 4   HOURS AS NEEDED; Therapy: 78SOW2529 to (Evaluate:85Wvt1188); Last Rx:15May2017 Ordered   9  DiazePAM 5 MG Oral Tablet; TAKE 1 TABLET AT BEDTIME AS NEEDED; Therapy: 33XNZ2777 to (Evaluate:09Hgy3386); Last Rx:15May2017 Ordered   10  LC-4 Lidocaine 4 % External Cream; apply cream 3-4 times daily as needed for pain    (may patch if preferred); Therapy: 43OXI2057 to (Last Rx:16May2017) Ordered    11   TraMADol HCl - 50 MG Oral Tablet; take 1 tablet daily as needed; Therapy: 01Xcf5729 to (Last Rx:14Apr2017) Ordered    12  OneTouch Delica Lancets Fine Miscellaneous; TEST BLOOD GLUCOSE 1 TIME DAILY -    DX  E11 9; Therapy: 71TON3252 to (Evaluate:79Lyh2492)  Requested for: 01VXW0198; Last    Rx:13Jun2017 Ordered   13  OneTouch Verio In Vitro Strip; TEST BLOOD GLUCOSE 1 TIME DAILY - DX  E11 9; Therapy: 55SQL3468 to (Evaluate:59Dzn4116)  Requested for: 95FNB7953; Last    Rx:13Jun2017 Ordered   14  OneTouch Verio w/Device Kit; 1 kit - DX  E11 9; Therapy: 77JRN0571 to (Last Rx:13Jun2017)  Requested for: 13Jun2017 Ordered    15  Citalopram Hydrobromide 20 MG Oral Tablet (CeleXA); TAKE 1 TABLET DAILY     Requested for: 92JZE1687; Last Rx:26Jan2017 Ordered    16  ProAir  (90 Base) MCG/ACT Inhalation Aerosol Solution; INHALE 1 TO 2 PUFFS    EVERY 4 TO 6 HOURS AS NEEDED; Therapy: 29ISF7877 to (Last Rx:26Jan2017)  Requested for: 26Jan2017 Ordered    17  Warfarin Sodium 5 MG Oral Tablet (Coumadin); TAKE 1 TABLET DAILY AS DIRECTED; Therapy: 96WQR0395 to (Klever Pencil)  Requested for: 26Jan2017; Last    Rx:26Jan2017 Ordered    18  Triamcinolone Acetonide 0 5 % External Cream; APPLY SPARINGLY AND MASSAGE IN    TWICE DAILY; Therapy: 58Xbq2212 to (Last Rx:29Dec2016) Ordered    19  Calcitrene 0 005 % External Ointment; APPLY AND GENTLY MASSAGE INTO AFFECTED    AREA(S) TWICE DAILY; Therapy: 69JNZ6671 to (Last Rx:30Nov2016)  Requested for: 84IOG2093 Ordered   20  Clobetasol Propionate 0 05 % External Ointment; APPLY AND GENTLY MASSAGE INTO    AFFECTED AREA(S) TWICE DAILY; Therapy: 03Apr2014 to (Rodolfo Carrel)  Requested for: 65YUN1802; Last    Rx:30Nov2016 Ordered   21  Otezla 30 MG Oral Tablet; One po BID; Therapy: 89SFX2034 to (Last Rx:92Sot8925) Ordered    22  Allopurinol 100 MG Oral Tablet; TAKE 1 TABLET TWICE DAILY  Requested for:    60IDR2995; Last OI:17PTZ6095 Ordered   23   MetFORMIN HCl - 500 MG Oral Tablet; TAKE 1 TABLET TWICE DAILY  Requested for:    93AFW5880; Last Rx:26Jan2017 Ordered    24  Vitamin D (Ergocalciferol) 21297 UNIT Oral Capsule; TAKE 1 CAPSULE WEEKLY     Requested for: 93Paz7060; Last SD:83FGU6770 Ordered    25  Ferrex 150 150 MG Oral Capsule; TAKE 1 CAPSULE BY MOUTH TWICE DAILY; Therapy: ([phone]) to Recorded   26  Ocuvite Eye Health Formula Oral Capsule; TAKE 1 CAPSULE DAILY; Therapy: 90Evw4044 to Recorded   27  Turmeric 500 MG Oral Capsule; take 1 capsule 4 times daily; Therapy: 94Bzd7523 to Recorded   28  Vitamin C 500 MG Oral Tablet; TAKE 1 TABLET DAILY; Therapy: (Recorded:57Ttc2324) to Recorded    Future Appointments    Date/Time Provider Specialty Site   06/22/2017 04:00 PM Soco Little MD Infectious Disease [facility] INFECTIOUS DISEASE   [facility]   07/31/2017 01:30 PM Cassidy Meyer, [facility] G[facility] Adult [facility] 9     Message   Recorded as Task   Date: 06/19/2017 10:45 AM, Created By: Michael Farnsworth   Task Name: Care Coordination   Assigned To: Xu Ac   Regarding Patient: Namita Santana, Status: Active   Comment:    Lala Garza - 19 Jun 2017 10:45 AM     TASK CREATED  Valdemar Yung,   I spoke with the patient today  Her labs are all off and she cancelled her appt for today with us  She has an appt with Dr Bertin Lehman (ID) on Thursday and again with us on Friday  After speaking with her at length, she is now willing to go to rehab, but has questions, like where she would go and if she could still go to her appts with specialists  I told her to keep her phone nearby (the home number) for you to discuss this with her again  Thank you so much   Julieth Ryan - 19 Jun 2017 1:11 PM     TASK EDITED  pt called back and states she needs to know when this will be set up needs to let other people know when going to rehab       Patient Care Team    Care Team Member Role Specialty Office Number Vaishnavi VILLAVICENCIO    Nephrology [phone]     Signatures   Electronically signed by : Radha Jasso RN; Jun 19 2017  7:39PM EST                       (Author)

## 2018-01-15 NOTE — PROGRESS NOTES
History of Present Illness  Care Coordination Encounter Information:   Type of Encounter: Telephonic   Contact: Follow-Up    Spoke to Patient  Care Coordination SL Nurse Dayanara Davila:   The reason for call is to discuss outreach for follow up/needed services  [facility]  Pt is home now and still has some complaints of pain in her thighs  She does check her weight daily and weighed 215 today  She also checks pulse ox and was 98% today  Lastly, she states that she was given a new glucometer and doesn't know how to use it but will ask the visiting nurse on Friday  Using a walker to ambulate around the house, but minimally and mostly with PT when she does  pt coming in 2x a week and she is still not sleeping all night in her hospital bed because of pain in her back when she does  Has appt tomorrow to see Dr Little      Active Problems    1  Acute diastolic congestive heart failure (428 31,428 0) (I50 31)   2  Acute on chronic diastolic congestive heart failure (428 33,428 0) (I50 33)   3  CHANTELL (acute kidney injury) (584 9) (N17 9)   4  Ambulatory dysfunction (719 7) (R26 2)   5  Anticoagulant long-term use (V58 61) (Z79 01)   6  Arthritis of knee (716 96) (M17 10)   7  Asthenia (780 79) (R53 1)   8  Benign essential hypertension (401 1) (I10)   9  Bilateral hearing loss due to cerumen impaction (389 8,380 4) (H61 23)   10  CAD (coronary artery disease) (414 00) (I25 10)   11  Candidiasis of breast (112 89) (B37 89)   12  Cardiac arrhythmia (427 9) (I49 9)   13  Cardiac disease (429 9) (I51 9)   14  Chronic kidney disease, stage 3 (moderate) (585 3) (N18 3)   15  Fall on same level from slipping, tripping or stumbling, initial encounter (E885 9)    (W01  0XXA)   16  GERD without esophagitis (530 81) (K21 9)   17  Gout (274 9) (M10 9)   18  HLD (hyperlipidemia) (272 4) (E78 5)   19  Hypercholesterolemia (272 0) (E78 00)   20  Hypokalemia (276 8) (E87 6)   21   Iron deficiency anemia (280 9) (D50 9) 22  Lactic acidosis (276 2) (E87 2)   23  Lumbar degenerative disc disease (722 52) (M51 36)   24  Meralgia paresthetica of right side (355 1) (G57 11)   25  Mitral valve prolapse (424 0) (I34 1)   26  Mitral valve replaced (V43 3) (Z95 2)   27  Mixed hyperlipidemia (272 2) (E78 2)   28  Non-insulin dependent type 2 diabetes mellitus (250 00) (E11 9)   29  Ovarian cyst, right (620 2) (N83 201)   30  Pain of right lower extremity (729 5) (M79 604)   31  Physical deconditioning (799 3) (R53 81)   32  Presence of prosthetic heart valve (V43 3) (Z95 2)   33  Psoriasis (696 1) (L40 9)   34  RBBB (right bundle branch block) (426 4) (I45 10)   35  Sepsis (038 9,995 91) (A41 9)   36  Streptococcal sepsis (038 0,995 91) (A40 9)   37  Streptococcus bovis infection (041 04) (A49 1)   38  Transition of care   39  Vertebral osteomyelitis (730 28) (M46 20)   40  Vitamin D deficiency (268 9) (E55 9)    Past Medical History    1  History of Abscess of abdominal wall (682 2) (L02 211)   2  History of Acute upper respiratory infection (465 9) (J06 9)   3  History of Arteriovenous Malformation (CNS) (747 81)   4  Denied: History of Carrier Of STD   5  History of Diabetes Mellitus (250 00)   6  History of Gastroparesis (536 3) (K31 84)   7  History of Generalized anxiety disorder (300 02) (F41 1)   8  History of acute bronchitis (V12 69) (Z87 09)   9  History of allergy (V15 09) (Z88 9)   10  History of blood coagulation disorder (V12 3) (Z86 2)   11  History of hypertension (V12 59) (Z86 79)   12  History of psoriasis (V13 3) (Z87 2)   13  History of Lymphedema (457 1) (I89 0)   14  History of Medicare annual wellness visit, subsequent (V70 0) (Z00 00)   15  Denied: History of Mental Status Change   16  History of Morbid or severe obesity due to excess calories (278 01) (E66 01)   17  History of Multiple joint pain (719 49) (M25 50)   18  History of Need for immunization against influenza (V04 81) (Z23)   19   History of Need for Tdap vaccination (V06 1) (Z23)   20  History of Need for vaccination with 13-polyvalent pneumococcal conjugate vaccine    (V03 82) (Z23)   21  History of Osteoporosis screening (V82 81) (Z13 820)   22  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)   23  History of Sciatica of right side (724 3) (M54 31)   24  History of Screening for breast cancer (V76 10) (Z12 31)   25  History of Screening for depression (V79 0) (Z13 89)   26  History of Screening for depression (V79 0) (Z13 89)   27  History of Screening for genitourinary condition (V81 6) (Z13 89)   28  History of Screening for genitourinary condition (V81 6) (Z13 89)   29  History of Screening for neurological condition (V80 09) (Z13 89)   30  History of Screening for neurological condition (V80 09) (Z13 89)   31  History of Type 2 diabetes mellitus (250 00) (E11 9)   32  History of Urinary Symptoms (788 69)   33  History of Visit for screening mammogram (V76 12) (Z12 31)   34  History of Visit for suture removal (V58 32) (Z48 02)   35  History of Weight gain (783 1) (R63 5)   36  History of Well adult on routine health check (V70 0) (Z00 00)    Surgical History    1  History of  Section   2  History of Mitral Valve Replacement   3  History of Tonsillectomy With Adenoidectomy   4  History of Tonsillectomy With Adenoidectomy    Family History  Mother    1  Family history of Diabetes Mellitus (V18 0)  Father    2  Family history of Diabetes Mellitus (V18 0)   3  Family history of Type 2 Diabetes Mellitus  Sister    4  Family history of Carotid Artery Stenosis   5  Family history of Coronary Artery Disease (V17 49)  Family History    6  Family history of Family Health Status 2  Children Living   7  Family history of Stroke Syndrome (V17 1)    Social History    · Exercising Regularly   · Former smoker (V45 61) (I11 003)   · Marital History - Currently    · No alcohol use   · No illicit drug use   · Occupation:    Current Meds    1   Furosemide 80 MG Oral Tablet (Lasix); TAKE 1 TABLET DAILY  Requested for:   10Kqh0425; Last Rx:21Qho3564 Ordered   2  Spironolactone 25 MG Oral Tablet; TAKE 1 TABLET DAILY; Therapy: 22Evy7877 to (Evaluate:22Nud0296)  Requested for: 58Snf0841; Last   Rx:28Wct0049 Ordered    3  Nystop 456012 UNIT/GM External Powder; apply twice daily as needed under breast;   Therapy: 94Mzx6585 to (Last Rx:99Esu2485)  Requested for: 55Ggu9624 Ordered    4  Omeprazole 40 MG Oral Capsule Delayed Release; TAKE 1 CAPSULE DAILY    Requested for: 17Mnk0657; Last Rx:77Yid6894 Ordered    5  Folbic 2 5-25-2 MG Oral Tablet; TAKE 1 TABELT TWICE DAILY  Requested for:   21Dsy7527; Last EI:90FVA9994 Ordered    6  Atorvastatin Calcium 40 MG Oral Tablet; TAKE 1 TABLET DAILY  Requested for:   89Qtl2507; Last Rx:59Cls2589 Ordered    7  Acetaminophen-Codeine #2 300-15 MG Oral Tablet; TAKE 1  TABLETS EVERY 4 HOURS   AS NEEDED for extremem pain; Therapy: 95ZSC8084 to (Evaluate:16Hsp2150); Last Rx:03Won4160 Ordered   8  DiazePAM 5 MG Oral Tablet; TAKE 1 TABLET AT BEDTIME AS NEEDED; Therapy: 31KKL7034 to (Evaluate:87Bzm9343); Last Rx:46Zvn0564 Ordered   9  LC-4 Lidocaine 4 % External Cream; apply cream 3-4 times daily as needed for pain   (may patch if preferred); Therapy: 65QNW9640 to (Last Rx:42Snd1899) Ordered    10  Allopurinol 100 MG Oral Tablet; TAKE 1 TABLET TWICE DAILY  Requested for:    49Qzq5189; Last Rx:45Agp8505 Ordered   11  MetFORMIN HCl - 500 MG Oral Tablet; TAKE 1 TABLET TWICE DAILY  Requested for:    70Gub9292; Last Rx:52Emq2814; Status: ACTIVE - Transmit to Pharmacy - Awaiting    Verification Ordered   12  OneTouch Delica Lancets Fine Miscellaneous; TEST BLOOD GLUCOSE 1 TIME DAILY -    DX  E11 9; Therapy: 11EVJ5349 to (Evaluate:35Zyd6899)  Requested for: 36IUI8144; Last    Rx:13Jun2017 Ordered   13  OneTouch Verio In Vitro Strip; TEST BLOOD GLUCOSE 1 TIME DAILY - DX  E11 9;     Therapy: 60YJH0770 to (Evaluate:37Slp1687)  Requested for: 68MYZ3058; Last    Rx:13Jun2017 Ordered   14  OneTouch Verio w/Device Kit; 1 kit - DX  E11 9; Therapy: 91KJA1072 to (Last Rx:13Jun2017)  Requested for: 13Jun2017 Ordered    15  Citalopram Hydrobromide 20 MG Oral Tablet (CeleXA); TAKE 1 TABLET DAILY     Requested for: 54Jti7746; Last Rx:16Aug2017 Ordered    16  ProAir  (90 Base) MCG/ACT Inhalation Aerosol Solution; INHALE 1 TO 2 PUFFS    EVERY 4 TO 6 HOURS AS NEEDED; Therapy: 67IBX4284 to (Last Rx:26Jan2017)  Requested for: 26Jan2017 Ordered    17  Warfarin Sodium 5 MG Oral Tablet (Coumadin); TAKE 1 TABLET DAILY AS DIRECTED; Therapy: 05MKT8272 to (Radhames Humphrey)  Requested for: 26Jan2017; Last    Rx:26Jan2017 Ordered    18  Triamcinolone Acetonide 0 5 % External Cream; APPLY SPARINGLY AND MASSAGE IN    TWICE DAILY; Therapy: 11Zkb5161 to (Last Rx:29Dec2016) Ordered    19  Calcitrene 0 005 % External Ointment; APPLY AND GENTLY MASSAGE INTO AFFECTED    AREA(S) TWICE DAILY; Therapy: 78NAF8438 to (Last Rx:30Nov2016)  Requested for: 65RLY1003 Ordered   20  Clobetasol Propionate 0 05 % External Ointment; APPLY AND GENTLY MASSAGE INTO    AFFECTED AREA(S) TWICE DAILY; Therapy: 03Apr2014 to (Jeffrey Foreman)  Requested for: 14MNU9675; Last    Rx:30Nov2016 Ordered    21  Amoxicillin 500 MG Oral Tablet; TAKE 1 TABLET 3 TIMES DAILY; Therapy: 91HWF0629 to (Evaluate:86Ngs8246)  Requested for: 57WIU7960; Last    Rx:91Gpr8259 Ordered    22  Vitamin D (Ergocalciferol) 46368 UNIT Oral Capsule; TAKE 1 CAPSULE WEEKLY     Requested for: 89Bsd2886; Last Rx:16Aug2017 Ordered    23  Amiodarone HCl - 200 MG Oral Tablet; take 1 tablet every twelve hours; Therapy: (Aubree Crowder) to Recorded   24  Digoxin 125 MCG Oral Tablet; TAKE 1 TABLET DAILY; Therapy: (Aubree Crowder) to Recorded   25  Ferrex 150 150 MG Oral Capsule; TAKE 1 CAPSULE BY MOUTH TWICE DAILY; Therapy: ([phone]) to Recorded   26   LORazepam 0 5 MG Oral Tablet; TAKE 1 TABLET Every 8 hours PRN; Therapy: (Gladys Carmen) to Recorded   27  Metoprolol Tartrate 25 MG Oral Tablet; take 1 tablet every twelve hours; Therapy: (Gladys Carmen) to Recorded   28  Ocuvite Eye Health Formula Oral Capsule; TAKE 1 CAPSULE DAILY; Therapy: 15Apr2016 to Recorded   29  Potassium Chloride Denise ER 20 MEQ Oral Tablet Extended Release; TAKE 1 TABLET    DAILY; Therapy: (Gladys Carmen) to Recorded   30  Turmeric 500 MG Oral Capsule; take 1 capsule 4 times daily; Therapy: 15Apr2016 to Recorded   31  Vitamin C 500 MG Oral Tablet; TAKE 1 TABLET DAILY; Therapy: (Joan Ponce) to Recorded   32  Warfarin Sodium 1 MG Oral Tablet; TAKE 1 TABLET DAILY; Therapy: (Recorded:07Maj5079) to Recorded    Allergies    1  Tetanus Toxoids   2  Tetanus-Diphtheria Toxoids Td SUSP    3  Seasonal    Health Management   *VB-Depression Screening; every 1 year; Last 87OMJ5993; Next Due: 96QFE8436; Overdue   *VB - Urinary Incontinence Screen (Dx Z13 89 Screen for UI); every 1 year; Last 29VNX0203; Next  Due: 58WYF0650; Overdue   *VB - Fall Risk Assessment  (Dx Z13 89 Screen for Neurologic Disorder); every 1 year; Last  63EEG5776; Next Due: 69ELE9278; Overdue   (1) MICROALBUMIN CREATININE RATIO, RANDOM URINE; every 1 year; Last 63DKN6923; Next Due:  39Dlm6159; Active    End of Encounter Meds    1  Furosemide 80 MG Oral Tablet (Lasix); TAKE 1 TABLET DAILY  Requested for:   17Vub0558; Last Rx:70Hkn9193 Ordered   2  Spironolactone 25 MG Oral Tablet; TAKE 1 TABLET DAILY; Therapy: 75Gsf2033 to (Evaluate:67Lum3850)  Requested for: 79Amo9622; Last   Rx:16Aug2017 Ordered    3  Nystop 216089 UNIT/GM External Powder; apply twice daily as needed under breast;   Therapy: 08Aug2017 to (Last Rx:63Omx1105)  Requested for: 10Aug2017 Ordered    4  Omeprazole 40 MG Oral Capsule Delayed Release; TAKE 1 CAPSULE DAILY    Requested for: 22Ywz2490; Last Rx:31Pgs3006 Ordered    5   Folbic 2 5-25-2 MG Oral Tablet; TAKE 1 TABELT TWICE DAILY  Requested for:   79Doh6255; Last FP:41XDA5278 Ordered    6  Atorvastatin Calcium 40 MG Oral Tablet; TAKE 1 TABLET DAILY  Requested for:   88Csr4257; Last Rx:16Aug2017 Ordered    7  Acetaminophen-Codeine #2 300-15 MG Oral Tablet; TAKE 1  TABLETS EVERY 4 HOURS   AS NEEDED for extremem pain; Therapy: 90NII0988 to (Evaluate:63Swk9631); Last Rx:26Mbw8926 Ordered   8  DiazePAM 5 MG Oral Tablet; TAKE 1 TABLET AT BEDTIME AS NEEDED; Therapy: 43KOB1836 to (Evaluate:15Zrx2405); Last Rx:15May2017 Ordered   9  LC-4 Lidocaine 4 % External Cream; apply cream 3-4 times daily as needed for pain   (may patch if preferred); Therapy: 24KHL5674 to (Last Rx:16May2017) Ordered    10  Allopurinol 100 MG Oral Tablet; TAKE 1 TABLET TWICE DAILY  Requested for:    23Dnk0371; Last Rx:16Aug2017 Ordered   11  MetFORMIN HCl - 500 MG Oral Tablet; TAKE 1 TABLET TWICE DAILY  Requested for:    16Aug2017; Last Rx:16Aug2017; Status: ACTIVE - Transmit to Pharmacy - Awaiting    Verification Ordered   12  OneTouch Delica Lancets Fine Miscellaneous; TEST BLOOD GLUCOSE 1 TIME DAILY -    DX  E11 9; Therapy: 73VOO8445 to (Evaluate:66Xrz9530)  Requested for: 30YQW3123; Last    Rx:13Jun2017 Ordered   13  OneTouch Verio In Vitro Strip; TEST BLOOD GLUCOSE 1 TIME DAILY - DX  E11 9; Therapy: 56CDA5983 to (Evaluate:42Qev5344)  Requested for: 60SPA2267; Last    Rx:13Jun2017 Ordered   14  OneTouch Verio w/Device Kit; 1 kit - DX  E11 9; Therapy: 02GHI6954 to (Last Rx:13Jun2017)  Requested for: 13Jun2017 Ordered    15  Citalopram Hydrobromide 20 MG Oral Tablet (CeleXA); TAKE 1 TABLET DAILY     Requested for: 59Uzl4828; Last Rx:16Aug2017 Ordered    16  ProAir  (90 Base) MCG/ACT Inhalation Aerosol Solution; INHALE 1 TO 2 PUFFS    EVERY 4 TO 6 HOURS AS NEEDED; Therapy: 90RSV0230 to (Last Rx:26Jan2017)  Requested for: 26Jan2017 Ordered    17  Warfarin Sodium 5 MG Oral Tablet (Coumadin); TAKE 1 TABLET DAILY AS DIRECTED;     Therapy: 75MHW0332 to [phone]  Requested for: 19LJE4724; Last    Rx:26Jan2017 Ordered    18  Triamcinolone Acetonide 0 5 % External Cream; APPLY SPARINGLY AND MASSAGE IN    TWICE DAILY; Therapy: 78Ybm8741 to (Last Rx:88Cqo3525) Ordered    19  Calcitrene 0 005 % External Ointment; APPLY AND GENTLY MASSAGE INTO AFFECTED    AREA(S) TWICE DAILY; Therapy: 24NRB8019 to (Last Rx:30Nov2016)  Requested for: 19VWC3019 Ordered   20  Clobetasol Propionate 0 05 % External Ointment; APPLY AND GENTLY MASSAGE INTO    AFFECTED AREA(S) TWICE DAILY; Therapy: 03Apr2014 to (Alexandra Webster)  Requested for: 54BUU8609; Last    Rx:30Nov2016 Ordered    21  Amoxicillin 500 MG Oral Tablet; TAKE 1 TABLET 3 TIMES DAILY; Therapy: 70RGC9948 to (Evaluate:33Wbb3037)  Requested for: 88JRA7629; Last    Rx:51Fnl5875 Ordered    22  Vitamin D (Ergocalciferol) 83140 UNIT Oral Capsule; TAKE 1 CAPSULE WEEKLY     Requested for: 37Vwf4092; Last Rx:06Vwk0279 Ordered    23  Amiodarone HCl - 200 MG Oral Tablet; take 1 tablet every twelve hours; Therapy: (Bell Julien) to Recorded   24  Digoxin 125 MCG Oral Tablet; TAKE 1 TABLET DAILY; Therapy: (Bell Julien) to Recorded   25  Ferrex 150 150 MG Oral Capsule; TAKE 1 CAPSULE BY MOUTH TWICE DAILY; Therapy: [phone]) to Recorded   26  LORazepam 0 5 MG Oral Tablet; TAKE 1 TABLET Every 8 hours PRN; Therapy: (Bell Julien) to Recorded   27  Metoprolol Tartrate 25 MG Oral Tablet; take 1 tablet every twelve hours; Therapy: (Bell Julien) to Recorded   28  Ocuvite Eye Health Formula Oral Capsule; TAKE 1 CAPSULE DAILY; Therapy: 15Apr2016 to Recorded   29  Potassium Chloride Denise ER 20 MEQ Oral Tablet Extended Release; TAKE 1 TABLET    DAILY; Therapy: (Bell Julien) to Recorded   30  Turmeric 500 MG Oral Capsule; take 1 capsule 4 times daily; Therapy: 15Apr2016 to Recorded   31  Vitamin C 500 MG Oral Tablet; TAKE 1 TABLET DAILY;     Therapy: (Liset Santamaria) to Recorded   32  Warfarin Sodium 1 MG Oral Tablet; TAKE 1 TABLET DAILY; Therapy: (Denver Im) to Recorded    Future Appointments    Date/Time Provider Specialty Site   08/17/2017 02:00 PM Armond Little MD Infectious Disease [facility] INFECTIOUS DISEASE   08/29/2017 03:30 PM Tierra Rushing DO Internal Medicine [address] [place] [address]     Patient Care Team    Care Team Member Role Specialty Office Number   Maybelle Rosanajanusz [facility]    Nephrology [phone]     Signatures   Electronically signed by : Chaparrita Davila RN; Aug 16 2017  3:36PM EST                       (Author)

## 2018-01-15 NOTE — PROGRESS NOTES
History of Present Illness  Care Coordination Encounter Information:   Type of Encounter: Telephonic    Spoke to Other   [facility]   Care Coordination SL Nurse Lars Villalpando:   The reason for call is to discuss coordination of meeting care plan treatment goals  After leaving a voice mail and an email, I received communication back as a phone call from [facility]   She states that she is familiar with the pt from her inpt stay and will assist in getting her into a rehab, probably [facility], once insurance [employer] is obtained  Gave her the contact information for Rodrigo Gaytan at [facility] and Dayanara Gonzalez states that she will follow up with me later as to the progress of the situation  Active Problems    1  Acute diastolic congestive heart failure (428 31,428 0) (I50 31)   2  Acute on chronic diastolic congestive heart failure (428 33,428 0) (I50 33)   3  CHANTELL (acute kidney injury) (584 9) (N17 9)   4  Arthritis of knee (716 96) (M17 10)   5  Benign essential hypertension (401 1) (I10)   6  Bilateral hearing loss due to cerumen impaction (389 8,380 4) (H61 23)   7  CAD (coronary artery disease) (414 00) (I25 10)   8  Chronic kidney disease, stage 3 (moderate) (585 3) (N18 3)   9  Fall on same level from slipping, tripping or stumbling, initial encounter (E885 9)   (W01  0XXA)   10  GERD without esophagitis (530 81) (K21 9)   11  Gout (274 9) (M10 9)   12  HLD (hyperlipidemia) (272 4) (E78 5)   13  Hypercholesterolemia (272 0) (E78 00)   14  Hypokalemia (276 8) (E87 6)   15  Iron deficiency anemia (280 9) (D50 9)   16  Lactic acidosis (276 2) (E87 2)   17  Lumbar degenerative disc disease (722 52) (M51 36)   18  Meralgia paresthetica of right side (355 1) (G57 11)   19  Mitral valve prolapse (424 0) (I34 1)   20  Mitral valve replaced (V43 3) (Z95 2)   21  Mixed hyperlipidemia (272 2) (E78 2)   22  Non-insulin dependent type 2 diabetes mellitus (250 00) (E11 9)   23   Pain of right lower extremity (729 5) (M79 604)   24  Psoriasis (696 1) (L40 9)   25  RBBB (right bundle branch block) (426 4) (I45 10)   26  Streptococcal sepsis (038 0,995 91) (A40 9)   27  Streptococcus bovis infection (041 04) (A49 1)   28  Vertebral osteomyelitis (730 28) (M46 20)   29  Vitamin D deficiency (268 9) (E55 9)    Past Medical History    1  History of Abscess of abdominal wall (682 2) (L02 211)   2  History of Acute upper respiratory infection (465 9) (J06 9)   3  History of Arteriovenous Malformation (CNS) (747 81)   4  Denied: History of Carrier Of STD   5  History of Diabetes Mellitus (250 00)   6  History of Gastroparesis (536 3) (K31 84)   7  History of Generalized anxiety disorder (300 02) (F41 1)   8  History of acute bronchitis (V12 69) (Z87 09)   9  History of allergy (V15 09) (Z88 9)   10  History of blood coagulation disorder (V12 3) (Z86 2)   11  History of hypertension (V12 59) (Z86 79)   12  History of psoriasis (V13 3) (Z87 2)   13  History of Lymphedema (457 1) (I89 0)   14  History of Medicare annual wellness visit, subsequent (V70 0) (Z00 00)   15  Denied: History of Mental Status Change   16  History of Morbid or severe obesity due to excess calories (278 01) (E66 01)   17  History of Multiple joint pain (719 49) (M25 50)   18  History of Need for immunization against influenza (V04 81) (Z23)   19  History of Need for Tdap vaccination (V06 1) (Z23)   20  History of Need for vaccination with 13-polyvalent pneumococcal conjugate vaccine    (V03 82) (Z23)   21  History of Osteoporosis screening (V82 81) (Z13 820)   22  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)   23  History of Sciatica of right side (724 3) (M54 31)   24  History of Screening for breast cancer (V76 10) (Z12 39)   25  History of Screening for depression (V79 0) (Z13 89)   26  History of Screening for depression (V79 0) (Z13 89)   27  History of Screening for genitourinary condition (V81 6) (Z13 73)   28   History of Screening for genitourinary condition (V81 6) (Z13 89)   29  History of Screening for neurological condition (V80 09) (Z13 89)   30  History of Screening for neurological condition (V80 09) (Z13 89)   31  History of Type 2 diabetes mellitus (250 00) (E11 9)   32  History of Urinary Symptoms (788 69)   33  History of Visit for screening mammogram (V76 12) (Z12 31)   34  History of Visit for suture removal (V58 32) (Z48 02)   35  History of Weight gain (783 1) (R63 5)   36  History of Well adult on routine health check (V70 0) (Z00 00)    Surgical History    1  History of  Section   2  History of Mitral Valve Replacement   3  History of Tonsillectomy With Adenoidectomy   4  History of Tonsillectomy With Adenoidectomy    Family History  Mother    1  Family history of Diabetes Mellitus (V18 0)  Father    2  Family history of Diabetes Mellitus (V18 0)   3  Family history of Type 2 Diabetes Mellitus  Sister    4  Family history of Carotid Artery Stenosis   5  Family history of Coronary Artery Disease (V17 49)  Family History    6  Family history of Family Health Status 2  Children Living   7  Family history of Stroke Syndrome (V17 1)    Social History    · Exercising Regularly   · Former smoker (D45 04) (M43 110)   · Marital History - Currently    · No alcohol use   · No illicit drug use   · Occupation:    Current Meds    1  Furosemide 80 MG Oral Tablet (Lasix); TAKE 1 TABLET DAILY  Requested for:   35YWU9336; Last Rx:16Lqp5407 Ordered   2  Spironolactone 25 MG Oral Tablet; TAKE 1 TABLET DAILY; Therapy: 10Efu4945 to (Evaluate:51Frc3670); Last Rx:41Lgu5650 Ordered    3  Omeprazole 40 MG Oral Capsule Delayed Release; TAKE 1 CAPSULE DAILY    Requested for: 43XPE7915; Last IR:79ODW4648 Ordered    4  Folbic 2 5-25-2 MG Oral Tablet; TAKE 1 TABELT TWICE DAILY  Requested for:   46Vbl7189; Last SH:87ROV0116 Ordered    5   Atorvastatin Calcium 40 MG Oral Tablet; TAKE 1 TABLET DAILY  Requested for:   09MGF1152; Last NK:18AKR3129 Ordered    6  Klor-Con M20 20 MEQ Oral Tablet Extended Release; TAKE 2 TABLETS 4 TIMES DAILY; Therapy: 69QCV3421 to ()  Requested for: 67SLU3822; Last   Rx:06Apr2017 Ordered    7  MetOLazone 5 MG Oral Tablet (Zaroxolyn); TAKE 1 TABLET BY MOUTH ON MONDAY,   SUNDAY  Requested for: 32UJC5406; Last Rx:26Jan2017 Ordered    8  Acetaminophen-Codeine #2 300-15 MG Oral Tablet; TAKE 1 TO 2 TABLETS EVERY 4   HOURS AS NEEDED; Therapy: 32PKW1095 to (Evaluate:59Dva1515); Last Rx:15May2017 Ordered   9  DiazePAM 5 MG Oral Tablet; TAKE 1 TABLET AT BEDTIME AS NEEDED; Therapy: 76JFQ6052 to (Evaluate:07Qjj8374); Last Rx:15May2017 Ordered   10  LC-4 Lidocaine 4 % External Cream; apply cream 3-4 times daily as needed for pain    (may patch if preferred); Therapy: 89AMI1039 to (Last Rx:16May2017) Ordered    11  TraMADol HCl - 50 MG Oral Tablet; take 1 tablet daily as needed; Therapy: 14Apr2017 to (Last Rx:14Apr2017) Ordered    12  OneTouch Delica Lancets Fine Miscellaneous; TEST BLOOD GLUCOSE 1 TIME DAILY -    DX  E11 9; Therapy: 47UEB6007 to (Evaluate:76Mvl7816)  Requested for: 53QXC6158; Last    Rx:13Jun2017 Ordered   13  OneTouch Verio In Vitro Strip; TEST BLOOD GLUCOSE 1 TIME DAILY - DX  E11 9; Therapy: 35YAI5576 to (Evaluate:61Ltn2407)  Requested for: 89DNO2421; Last    Rx:13Jun2017 Ordered   14  OneTouch Verio w/Device Kit; 1 kit - DX  E11 9; Therapy: 80IUG1511 to (Last Rx:13Jun2017)  Requested for: 13Jun2017 Ordered    15  Citalopram Hydrobromide 20 MG Oral Tablet (CeleXA); TAKE 1 TABLET DAILY     Requested for: 55EQB8610; Last Rx:26Jan2017 Ordered    16  ProAir  (90 Base) MCG/ACT Inhalation Aerosol Solution; INHALE 1 TO 2 PUFFS    EVERY 4 TO 6 HOURS AS NEEDED; Therapy: 47CUI7725 to (Last Rx:26Jan2017)  Requested for: 26Jan2017 Ordered    17  Warfarin Sodium 5 MG Oral Tablet (Coumadin); TAKE 1 TABLET DAILY AS DIRECTED;     Therapy: 08RXJ5201 to (Evaluate:49Wlu7335)  Requested for: 99UJT5810; Last    OY:95XOR1654 Ordered    18  Triamcinolone Acetonide 0 5 % External Cream; APPLY SPARINGLY AND MASSAGE IN    TWICE DAILY; Therapy: 03Yfw2384 to (Last Rx:01Xrw1683) Ordered    19  Calcitrene 0 005 % External Ointment; APPLY AND GENTLY MASSAGE INTO AFFECTED    AREA(S) TWICE DAILY; Therapy: 56OBW7195 to (Last Rx:34Win6141)  Requested for: 57SUK6047 Ordered   20  Clobetasol Propionate 0 05 % External Ointment; APPLY AND GENTLY MASSAGE INTO    AFFECTED AREA(S) TWICE DAILY; Therapy: 23Ght2610 to (Sean )  Requested for: 97STD4962; Last    Rx:51Pbh7150 Ordered   21  Otezla 30 MG Oral Tablet; One po BID; Therapy: 61OLL1878 to (Last Rx:59Tit6289) Ordered    22  Allopurinol 100 MG Oral Tablet; TAKE 1 TABLET TWICE DAILY  Requested for:    70EUI7911; Last GB:32SJI8901 Ordered   23  MetFORMIN HCl - 500 MG Oral Tablet; TAKE 1 TABLET TWICE DAILY  Requested for:    88MDF3695; Last Rx:26Jan2017 Ordered    24  Vitamin D (Ergocalciferol) 65369 UNIT Oral Capsule; TAKE 1 CAPSULE WEEKLY     Requested for: 87Iee9453; Last OV:32FKW8431 Ordered    25  Ferrex 150 150 MG Oral Capsule; TAKE 1 CAPSULE BY MOUTH TWICE DAILY; Therapy: ([id number]) to Recorded   26  Ocuvite Eye Health Formula Oral Capsule; TAKE 1 CAPSULE DAILY; Therapy: 15Apr2016 to Recorded   27  Turmeric 500 MG Oral Capsule; take 1 capsule 4 times daily; Therapy: 00Edg6004 to Recorded   28  Vitamin C 500 MG Oral Tablet; TAKE 1 TABLET DAILY; Therapy: (Recorded:84Xsn2486) to Recorded    Allergies    1  Tetanus Toxoids   2  Tetanus-Diphtheria Toxoids Td SUSP    3  Seasonal    Health Management   *VB-Depression Screening; every 1 year; Last 11LFO9304; Next Due: 32KCX5994; Overdue   *VB - Urinary Incontinence Screen (Dx Z13 89 Screen for UI); every 1 year; Last 90IWF5774; Next  Due: 50ZDR7324; Overdue   *VB - Fall Risk Assessment  (Dx Z13 89 Screen for Neurologic Disorder); every 1 year;  Last  11ETJ3512; Next Due: 38HGJ9501; Overdue   (1) MICROALBUMIN CREATININE RATIO, RANDOM URINE; every 1 year; Last 24WJE0451; Next Due:  05Zdk1719; Active    End of Encounter Meds    1  Furosemide 80 MG Oral Tablet (Lasix); TAKE 1 TABLET DAILY  Requested for:   70TIW0288; Last Rx:26Jan2017 Ordered   2  Spironolactone 25 MG Oral Tablet; TAKE 1 TABLET DAILY; Therapy: 44Dvj5544 to (Evaluate:11Wru2377); Last Rx:66Juk6964 Ordered    3  Omeprazole 40 MG Oral Capsule Delayed Release; TAKE 1 CAPSULE DAILY    Requested for: 77EXX7586; Last KM:74BJD6802 Ordered    4  Folbic 2 5-25-2 MG Oral Tablet; TAKE 1 TABELT TWICE DAILY  Requested for:   42Dvf5653; Last AD:55ZQB3269 Ordered    5  Atorvastatin Calcium 40 MG Oral Tablet; TAKE 1 TABLET DAILY  Requested for:   35HCX4974; Last MD:96YGH7411 Ordered    6  Klor-Con M20 20 MEQ Oral Tablet Extended Release; TAKE 2 TABLETS 4 TIMES DAILY; Therapy: 22PXA7206 to [phone]  Requested for: 78FLD8293; Last   Rx:06Apr2017 Ordered    7  MetOLazone 5 MG Oral Tablet (Zaroxolyn); TAKE 1 TABLET BY MOUTH ON MONDAY,   SUNDAY  Requested for: 43STT4041; Last Rx:26Jan2017 Ordered    8  Acetaminophen-Codeine #2 300-15 MG Oral Tablet; TAKE 1 TO 2 TABLETS EVERY 4   HOURS AS NEEDED; Therapy: 45ERC6042 to (Evaluate:27Dlv7147); Last Rx:15May2017 Ordered   9  DiazePAM 5 MG Oral Tablet; TAKE 1 TABLET AT BEDTIME AS NEEDED; Therapy: 15DTK6788 to (Evaluate:05Vve7549); Last Rx:15May2017 Ordered   10  LC-4 Lidocaine 4 % External Cream; apply cream 3-4 times daily as needed for pain    (may patch if preferred); Therapy: 23MYE3701 to (Last Rx:00Zwj1603) Ordered    11  TraMADol HCl - 50 MG Oral Tablet; take 1 tablet daily as needed; Therapy: 00Wzb9692 to (Last Rx:14Apr2017) Ordered    12  OneTouch Delica Lancets Fine Miscellaneous; TEST BLOOD GLUCOSE 1 TIME DAILY -    DX  E11 9; Therapy: 66YVH4328 to (Evaluate:76Mut2884)  Requested for: 03JYV0906; Last    Rx:13Jun2017 Ordered   13   OneTouch Verio In [employer]; TEST BLOOD GLUCOSE 1 TIME DAILY - DX  E11 9; Therapy: 78KBM2371 to (Evaluate:44Oym0925)  Requested for: 87ABN8893; Last    Rx:13Jun2017 Ordered   14  OneTouch Verio w/Device Kit; 1 kit - DX  E11 9; Therapy: 36XDA7723 to (Last Rx:13Jun2017)  Requested for: 13Jun2017 Ordered    15  Citalopram Hydrobromide 20 MG Oral Tablet (CeleXA); TAKE 1 TABLET DAILY     Requested for: 38PLI3891; Last Rx:26Jan2017 Ordered    16  ProAir  (90 Base) MCG/ACT Inhalation Aerosol Solution; INHALE 1 TO 2 PUFFS    EVERY 4 TO 6 HOURS AS NEEDED; Therapy: 43MGG3111 to (Last Rx:26Jan2017)  Requested for: 26Jan2017 Ordered    17  Warfarin Sodium 5 MG Oral Tablet (Coumadin); TAKE 1 TABLET DAILY AS DIRECTED; Therapy: 87WID0160 to (Acey Block)  Requested for: 26Jan2017; Last    Rx:26Jan2017 Ordered    18  Triamcinolone Acetonide 0 5 % External Cream; APPLY SPARINGLY AND MASSAGE IN    TWICE DAILY; Therapy: 43Wyk2695 to (Last Rx:29Dec2016) Ordered    19  Calcitrene 0 005 % External Ointment; APPLY AND GENTLY MASSAGE INTO AFFECTED    AREA(S) TWICE DAILY; Therapy: 36MKX9913 to (Last Rx:30Nov2016)  Requested for: 84EGA2878 Ordered   20  Clobetasol Propionate 0 05 % External Ointment; APPLY AND GENTLY MASSAGE INTO    AFFECTED AREA(S) TWICE DAILY; Therapy: 03Apr2014 to (Ardelle Maple)  Requested for: 81XYS5060; Last    Rx:30Nov2016 Ordered   21  Otezla 30 MG Oral Tablet; One po BID; Therapy: 84UIR0027 to (Last Rx:98Yqs4948) Ordered    22  Allopurinol 100 MG Oral Tablet; TAKE 1 TABLET TWICE DAILY  Requested for:    10TJX5823; Last OD:72QSG2593 Ordered   23  MetFORMIN HCl - 500 MG Oral Tablet; TAKE 1 TABLET TWICE DAILY  Requested for:    00NIN2094; Last Rx:26Jan2017 Ordered    24  Vitamin D (Ergocalciferol) 89660 UNIT Oral Capsule; TAKE 1 CAPSULE WEEKLY     Requested for: 44Ler1474; Last XD:80LSP6252 Ordered    25  Ferrex 150 150 MG Oral Capsule; TAKE 1 CAPSULE BY MOUTH TWICE DAILY;     Therapy: (Recorded:07Nov2013) to Recorded   26  Ocuvite Eye Health Formula Oral Capsule; TAKE 1 CAPSULE DAILY; Therapy: 81Jjs0909 to Recorded   27  Turmeric 500 MG Oral Capsule; take 1 capsule 4 times daily; Therapy: 06Pxc6684 to Recorded   28  Vitamin C 500 MG Oral Tablet; TAKE 1 TABLET DAILY; Therapy: (Nolvia Escalona) to Recorded    Future Appointments    Date/Time Provider Specialty Site   06/22/2017 04:00 PM Mak Little MD Infectious Disease [facility] INFECTIOUS DISEASE   06/23/2017 09:30 [facility]   07/31/2017 01:30 PM Eufemia Lagos, [facility] Gastroenterology [facility]     Patient Care Team    Care Team Member Role Specialty Office Number   Sam VILLAVICENCIO    Nephrology [phone]     Signatures   Electronically signed by : Inga Spence RN; Jun 21 2017 11:20AM EST                       (Author)

## 2018-01-15 NOTE — PROGRESS NOTES
Assessment    1  Sciatica of right side (724 3) (M54 31)    Discussion/Summary    I advised the patient to contact PCP to schedule an appointment ASAP however I advised the pt  to go to the ER if symptoms worsened  Pt  expressed understanding  Chief Complaint  Low back pain/ Sciatica      History of Present Illness  E-visit  Pt with c/o right leg pain x 2 days  Pt  seen at [address] [place] [address] 4/11 for back pain  She had CT scan performed of back which was essentially negative and given Morphine IV before discharge and prescribed Percocet 5/325mg q 6 hours  However, pain and numbness of right leg persists  Denies weakness  Upon further questioning , she informs me that she had been predominantly sitting all the time since problem began two days ago  Denies previous back injury  She called PCP and was told to come in to be evaluated  Active Problems    1  Acute bronchitis (466 0) (J20 9)   2  Acute upper respiratory infection (465 9) (J06 9)   3  CHANTELL (acute kidney injury) (584 9) (N17 9)   4  Benign essential hypertension (401 1) (I10)   5  Bilateral hearing loss due to cerumen impaction (389 8,380 4) (H61 23)   6  Chronic kidney disease, stage 3 (moderate) (585 3) (N18 3)   7  Fall on same level from slipping, tripping or stumbling, initial encounter (E885 9)   (W01  0XXA)   8  GERD without esophagitis (530 81) (K21 9)   9  Hypercholesterolemia (272 0) (E78 00)   10  Hypokalemia (276 8) (E87 6)   11  Iron deficiency anemia (280 9) (D50 9)   12  Iron deficiency anemia due to chronic blood loss (280 0) (D50 0)   13  Mitral valve prolapse (424 0) (I34 1)   14  Mixed hyperlipidemia (272 2) (E78 2)   15  Need for influenza vaccination (V04 81) (Z23)   16  Psoriasis (696 1) (L40 9)   17  RBBB (right bundle branch block) (426 4) (I45 10)   18  Type 2 diabetes mellitus (250 00) (E11 9)   19  Vitamin D deficiency (268 9) (E55 9)   20  Weight gain (783 1) (R63 5)    Past Medical History    1   History of Abscess of abdominal wall (682 2) (L02 211)   2  History of Arteriovenous Malformation (CNS) (747 81)   3  Denied: History of Carrier Of STD   4  History of Diabetes Mellitus (250 00)   5  History of Gastroparesis (536 3) (K31 84)   6  History of Generalized anxiety disorder (300 02) (F41 1)   7  History of allergy (V15 09) (Z88 9)   8  History of blood coagulation disorder (V12 3) (Z86 2)   9  History of hypertension (V12 59) (Z86 79)   10  History of psoriasis (V13 3) (Z87 2)   11  History of Lymphedema (457 1) (I89 0)   12  History of Medicare annual wellness visit, subsequent (V70 0) (Z00 00)   13  Denied: History of Mental Status Change   14  History of Morbid or severe obesity due to excess calories (278 01) (E66 01)   15  History of Multiple joint pain (719 49) (M25 50)   16  History of Need for immunization against influenza (V04 81) (Z23)   17  History of Need for Tdap vaccination (V06 1) (Z23)   18  History of Need for vaccination with 13-polyvalent pneumococcal conjugate vaccine    (V03 82) (Z23)   19  History of Osteoporosis screening (V82 81) (Z13 820)   20  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)   21  History of Screening for breast cancer (V76 10) (Z12 39)   22  History of Screening for depression (V79 0) (Z13 89)   23  History of Screening for depression (V79 0) (Z13 89)   24  History of Screening for genitourinary condition (V81 6) (Z13 89)   25  History of Screening for genitourinary condition (V81 6) (Z13 89)   26  History of Screening for neurological condition (V80 09) (Z13 89)   27  History of Screening for neurological condition (V80 09) (Z13 89)   28  History of Type 2 diabetes mellitus (250 00) (E11 9)   29  History of Urinary Symptoms (788 69)   30  History of Visit for screening mammogram (V76 12) (Z12 31)   31  History of Visit for suture removal (V58 32) (Z48 02)   32   History of Well adult on routine health check (V70 0) (Z00 00)    The active problems and past medical history were reviewed and updated today  Family History  Mother    1  Family history of Diabetes Mellitus (V18 0)  Father    2  Family history of Diabetes Mellitus (V18 0)   3  Family history of Type 2 Diabetes Mellitus  Sister    4  Family history of Carotid Artery Stenosis   5  Family history of Coronary Artery Disease (V17 49)  Family History    6  Family history of Family Health Status 2  Children Living   7  Family history of Stroke Syndrome (V17 1)    Social History    · Denied: History of Alcohol Use (History)   · Denied: History of Drug Use   · Exercising Regularly   · Former smoker (M51 79) (K61 256)   · Marital History - Currently    · Occupation:    Surgical History    1  History of  Section   2  History of Mitral Valve Replacement   3  History of Tonsillectomy With Adenoidectomy   4  History of Tonsillectomy With Adenoidectomy    Current Meds   1  Accu-Chek Multiclix Lancets Miscellaneous; test 2-3 times daily; Therapy: [phone]) to Recorded   2  Allopurinol 100 MG Oral Tablet; TAKE 1 TABLET TWICE DAILY  Requested for:   06OXT9260; Last RH:07LUS8166 Ordered   3  Atorvastatin Calcium 40 MG Oral Tablet; TAKE 1 TABLET DAILY  Requested for:   55ANW8049; Last TE:07CQM8543 Ordered   4  Calcitrene 0 005 % External Ointment; APPLY AND GENTLY MASSAGE INTO AFFECTED   AREA(S) TWICE DAILY; Therapy: 72COP7851 to (Last Rx:2016)  Requested for: 04QCX2666 Ordered   5  Citalopram Hydrobromide 20 MG Oral Tablet; TAKE 1 TABLET DAILY  Requested for:   37UPS6948; Last PINA:54FVV9169 Ordered   6  Clobetasol Propionate 0 05 % External Ointment; APPLY AND GENTLY MASSAGE INTO   AFFECTED AREA(S) TWICE DAILY; Therapy: 2014 to ([facility])  Requested for: 51CII5480; Last   Rx:2016 Ordered   7  Ferrex 150 150 MG Oral Capsule; TAKE 1 CAPSULE BY MOUTH TWICE DAILY; Therapy: [phone] to Recorded   8   Folbic 2 5-25-2 MG Oral Tablet; TAKE 1 TABELT TWICE DAILY Requested for:   84Gcs4298; Last Rx:26Jan2017 Ordered   9  Furosemide 80 MG Oral Tablet; TAKE 1 TABLET DAILY  Requested for: 95DAE8287; Last   HK:33DJB2662 Ordered   10  Klor-Con M20 20 MEQ Oral Tablet Extended Release; TAKE 2 TABLETS 4 TIMES DAILY; Therapy: 89DLC2605 to (96 060857)  Requested for: 54GEY6084; Last    Rx:06Apr2017 Ordered   11  MetFORMIN HCl - 500 MG Oral Tablet; TAKE 1 TABLET TWICE DAILY  Requested for:    92CAP4845; Last Rx:26Jan2017 Ordered   12  MetOLazone 5 MG Oral Tablet; TAKE 1 TABLET BY MOUTH ON MONDAY, SUNDAY     Requested for: 71TVL6124; Last Rx:26Jan2017 Ordered   15  [facility] Eye Health Formula Oral Capsule; TAKE 1 CAPSULE DAILY; Therapy: 15Apr2016 to Recorded   14  Omeprazole 40 MG Oral Capsule Delayed Release; TAKE 1 CAPSULE DAILY     Requested for: 45LDN5551; Last IO:60OKF3267 Ordered   15  Otezla 30 MG Oral Tablet; One po BID; Therapy: 69LPK9487 to (Last Rx:74Fmf2183) Ordered   16  ProAir  (90 Base) MCG/ACT Inhalation Aerosol Solution; INHALE 1 TO 2 PUFFS    EVERY 4 TO 6 HOURS AS NEEDED; Therapy: 58SUN2452 to (Last Rx:26Jan2017)  Requested for: 26Jan2017 Ordered   17  Spironolactone 25 MG Oral Tablet; TAKE 1 TABLET DAILY; Therapy: 95Doa2075 to (Evaluate:30Nfh2975); Last Rx:27Feb2017 Ordered   18  Triamcinolone Acetonide 0 5 % External Cream; APPLY SPARINGLY AND MASSAGE IN    TWICE DAILY; Therapy: 72Xsc3923 to (Last Rx:55Uyh5492) Ordered   19  Turmeric 500 MG Oral Capsule; take 1 capsule 4 times daily; Therapy: 15Apr2016 to Recorded   20  Vitamin C 500 MG Oral Tablet; TAKE 1 TABLET DAILY; Therapy: (Harkristofer Danyelle) to Recorded   21  Vitamin D (Ergocalciferol) 46594 UNIT Oral Capsule; TAKE 1 CAPSULE WEEKLY     Requested for: 57Ehm1636; Last OJ:54KDY1744 Ordered   22  Warfarin Sodium 5 MG Oral Tablet; TAKE 1 TABLET DAILY AS DIRECTED;     Therapy: 95DWK8095 to (Yamilex Castano)  Requested for: 56ITI3113; Last    IO:51NRW8893 Ordered    Allergies    1  Tetanus Toxoids   2  Tetanus-Diphtheria Toxoids Td SUSP    3   Seasonal    Observations Made  Gen: Elderly female in NAD, sitting      Future Appointments    Date/Time Provider Specialty Site   04/14/2017 11:15 AM Lynsey Tomlin [facility] Internal Medicine [facility]   06/23/2017 09:30 AM Lynsey Tomlin [facility] Internal Medicine [facility]     Signatures   Electronically signed by : EDNA Osborn ; Apr 13 2017  4:05PM EST                       (Author)

## 2018-01-16 NOTE — MISCELLANEOUS
Message  pt states she hit her head and went to [facility] ER on 4-916 -- ct, ekg all normal - pt is on coumadin - no internal bleeding- pt wanted to know if she could put ice on - her eyes are black and blue- per [place]- pt can use ice, do not be alone, keep sipping water thru the day , keep head at 35-45 degree angle , - if any problem go back to ER- keep appt with our office on 4-15-16 ds      Active Problems    1  Abscess of abdominal wall (682 2) (L02 211)   2  Benign essential hypertension (401 1) (I10)   3  Esophageal reflux (530 81) (K21 9)   4  Hypercholesterolemia (272 0) (E78 0)   5  Hypokalemia (276 8) (E87 6)   6  Iron deficiency anemia due to chronic blood loss (280 0) (D50 0)   7  Medicare annual wellness visit, subsequent (V70 0) (Z00 00)   8  Need for Tdap vaccination (V06 1) (Z23)   9  Need for vaccination with 13-polyvalent pneumococcal conjugate vaccine (V03 82) (Z23)   10  Psoriasis (696 1) (L40 9)   11  Screening for depression (V79 0) (Z13 89)   12  Screening for genitourinary condition (V81 6) (Z13 89)   13  Screening for neurological condition (V80 09) (Z13 89)   14  Type 2 diabetes mellitus (250 00) (E11 9)   15  Vitamin D deficiency (268 9) (E55 9)    Current Meds   1  Accu-Chek Multiclix Lancets Miscellaneous; test 2-3 times daily; Therapy: [phone]) to Recorded   2  Allopurinol 100 MG Oral Tablet; TAKE 1 TABLET TWICE DAILY  Requested for:   46Egr1540; Last Rx:10Znt4997 Ordered   3  Atorvastatin Calcium 40 MG Oral Tablet; TAKE 1 TABLET DAILY  Requested for:   33Zhf0425; Last Rx:37Gvl9060 Ordered   4  Citalopram Hydrobromide 20 MG Oral Tablet; TAKE 1 TABLET DAILY  Requested for:   85Civ6776; Last Rx:90Bri8189 Ordered   5  Clobetasol Propionate 0 05 % External Ointment; APPLY AND GENTLY MASSAGE INTO   AFFECTED AREA(S) TWICE DAILY; Therapy: 84BHI2747 to (Evaluate:93Hzl6280)  Requested for: 91Efu4330; Last   Rx:72Qyy6143 Ordered   6   Ergocalciferol 32163 UNIT Oral Capsule; TAKE 1 CAPSULE WEEKLY  Requested for:   33One7162; Last Rx:47Fxe0892 Ordered   7  Ferrex 150 150 MG Oral Capsule; TAKE 1 CAPSULE BY MOUTH TWICE DAILY; Therapy: ([phone]) to Recorded   8  Folbic 2 5-25-2 MG Oral Tablet; TAKE 1 TABELT TWICE DAILY  Requested for:   34Ruo3038; Last Rx:00Cvc6350 Ordered   9  Furosemide 80 MG Oral Tablet; TAKE 1 AND 1/2 TABLETS DAILY  Requested for:   32Uxq9514; Last Rx:97Eky2006 Ordered   10  Klor-Con M20 20 MEQ Oral Tablet Extended Release; take 1 tab po qid; Therapy: 96WVX9199 to (Last Rx:76Msd4642)  Requested for: 21Dec2015 Ordered   11  MetFORMIN HCl - 500 MG Oral Tablet; TAKE 1 TABLET TWICE DAILY  Requested for:    23Agh6751; Last Rx:53Khk8975 Ordered   12  Metolazone 5 MG Oral Tablet; TAKE 1 TABLET BY MOUTH ON [facility] AND    SUNDAY  Requested for: 84OQB8358; Last Rx:20Doj0701 Ordered   13  Omeprazole 40 MG Oral Capsule Delayed Release; TAKE 1 CAPSULE DAILY     Requested for: 95Cnw8779; Last Rx:69Fci1114 Ordered   14  Vitamin C 500 MG Oral Tablet; TAKE 1 TABLET DAILY; Therapy: (Carlos Mabry) to Recorded   15  Warfarin Sodium 5 MG Oral Tablet; TAKE 1 TABLET DAILY AS DIRECTED; Therapy: 36WXL9228 to (Evaluate:04Zal0187)  Requested for: 06Bca3627; Last    Rx:80Shq7557 Ordered    Allergies    1  Tetanus Toxoids   2  Tetanus-Diphtheria Toxoids Td SUSP    3  Seasonal    Signatures   Electronically signed by : Jose Walter;  Apr 26 2016  1:08PM EST                       (Author)

## 2018-01-16 NOTE — MISCELLANEOUS
Assessment    1  History of Acute on chronic diastolic congestive heart failure (428 33,428 0) (I50 33)   2  Streptococcal sepsis (038 0,995 91) (A40 9)   3  Vertebral osteomyelitis (730 28) (M46 20)   4  Presence of prosthetic heart valve (V43 3) (Z95 2)   5  Anticoagulant long-term use (V58 61) (Z79 01)   6  CAD (coronary artery disease) (414 00) (I25 10)   7  HLD (hyperlipidemia) (272 4) (E78 5)   8  Physical deconditioning (799 3) (R53 81)   9  Non-insulin dependent type 2 diabetes mellitus (250 00) (E11 9)   10  Transition of care   11  Cardiac arrhythmia (427 9) (I49 9)   12  Benign essential hypertension (401 1) (I10)   13  Hypercholesterolemia (272 0) (E78 00)    Plan   Benign essential hypertension    · Furosemide 80 MG Oral Tablet (Lasix); TAKE 1 TABLET DAILY   Rx By: Goldie Alvarado; Dispense: 90 Days ; #:90 Tablet; Refill: 1; For: Benign essential hypertension; AD = N; [facility]; Last Updated By: System, SureScripts; 8/16/2017 12:57:19 PM   · Spironolactone 25 MG Oral Tablet; TAKE 1 TABLET DAILY   Rx By: Goldie Alvarado; Dispense: 90 Days ; #:90 Tablet; Refill: 1; For: Benign essential hypertension; AD = N; Verified Transmission to [address]; Last Updated By: System, SureScripts; 8/16/2017 12:57:20 PM  GERD without esophagitis    · Omeprazole 40 MG Oral Capsule Delayed Release; TAKE 1 CAPSULE DAILY   Rx By: Goldie Alvarado; Dispense: 90 Days ; #:90 Capsule Delayed Release; Refill: 1; For: GERD without esophagitis; AD = N; Verified Transmission to [address]; Last Updated By: System, SureScripts; 8/16/2017 12:57:19 PM  Hypercholesterolemia    · Atorvastatin Calcium 40 MG Oral Tablet; TAKE 1 TABLET DAILY   Rx By: Goldie Alvarado; Dispense: 90 Days ; #:90 Tablet; Refill: 1; For: Hypercholesterolemia; AD = N; Verified Transmission to [address];  Last Updated By: System, SureScripts; 8/16/2017 12:57:18 PM  Non-insulin dependent type 2 diabetes mellitus    · Allopurinol 100 MG Oral Tablet; TAKE 1 TABLET TWICE DAILY   Rx By: Kevin Brooke; Dispense: 90 Days ; #:180 Tablet; Refill: 1; For: Non-insulin dependent type 2 diabetes mellitus; AD = N; Verified Transmission to [place] [address]; Last Updated By: System, SureScripts; 8/16/2017 12:57:20 PM   · MetFORMIN HCl - 500 MG Oral Tablet; TAKE 1 TABLET TWICE DAILY   Rx By: Kevin Brooke; Dispense: 90 Days ; #:180 Tablet; Refill: 1; For: Non-insulin dependent type 2 diabetes mellitus; AD = N; Sent To: [place] [address]  PMH: Generalized anxiety disorder    · Citalopram Hydrobromide 20 MG Oral Tablet (CeleXA); TAKE 1 TABLET DAILY   Rx By: Kevin Brooke; Dispense: 90 Days ; #:90 Tablet; Refill: 1; For: PMH: Generalized anxiety disorder; AD = N; Verified Transmission to [place] [address]; Last Updated By: System, SureScripts; 8/16/2017 12:57:20 PM    Acetaminophen-Codeine #2 300-15 MG Oral Tablet; TAKE 1  TABLETS EVERY 4 HOURS AS NEEDED for extremem pain; Therapy: 17JYT0147 to (Evaluate:54Jkx8803); Last Rx:72Giy9255; Status: ACTIVE Ordered  Rx By: Kevin Brooke; Dispense: 5 Days ; #:60 Tablet; Refill: 1;   For: Lumbar degenerative disc disease; AD = N; Print Rx  Amoxicillin 500 MG Oral Tablet; TAKE 1 TABLET 3 TIMES DAILY; Therapy: 85IAE3802 to (Evaluate:84Vmi3264)  Requested for: 24Nlh6459; Last Rx:25Dyz2830; Status: ACTIVE Ordered  Rx By: Kevin Brooke; Dispense: 30 Days ; #:90 Tablet; Refill: 0;   For: Streptococcal sepsis; AD = N; Verified Transmission to [facility]; Last Updated By: System, SureScripts; 8/17/2017 3:08:46 PM  (1) COMPREHENSIVE METABOLIC PANEL; Status:Resulted - Requires Verification;   Done: 39DCZ9298 12:00AM  Due:73Hrc9471; Ordered; [address]: Acute on chronic diastolic congestive heart failure, CAD (coronary artery disease);  Ordered By:Juan Carlos Mora;   (1) CBC/PLT/DIFF; Status:Resulted - Requires Verification; Done: 75YCG7788 12:00AM  Due:24Zwx5702; Ordered; [address]: Acute on chronic diastolic congestive heart failure, CAD (coronary artery disease); Ordered By:Juan Carlos Mora;   (1) MAGNESIUM; Status:Resulted - Requires Verification;   Done: 17YLV9739 12:00AM  Due:32Fcm4102; Ordered; For:Cardiac arrhythmia; Ordered By:Juan Carlos Mora;   (1) DIGOXIN; Status:Resulted - Requires Verification;   Done: 37IMM1370 12:00AM  Due:52Mri6898; Ordered; For:Cardiac arrhythmia; Ordered By:Juan Carlos Mora;   (1) LIPID PANEL, FASTING; Status:Resulted - Requires Verification;   Done: 43KME9767 12:00AM  Due:28Ltl1426; Ordered;    For:Hypercholesterolemia; Ordered By:Juan Carlos Mora;   (1) C-REACTIVE PROTEIN; Status:Resulted - Requires Verification;   Done: 15DFZ5524 12:00AM  Due:40Mhw7384; Ordered; For:Streptococcal sepsis, Vertebral osteomyelitis; Ordered By:Juan Carlos Mora;   (1) SED RATE; Status:Resulted - Requires Verification;   Done: 48PTO3420 12:00AM  Due:07Dyn8572; Ordered; For:Streptococcal sepsis, Vertebral osteomyelitis; Ordered By:Karen Mora;   GERD without esophagitis (530 81) (K21 9)       Hypercholesterolemia (272 0) (E78 00)       Benign essential hypertension (401 1) (I10)       Non-insulin dependent type 2 diabetes mellitus (250 00) (E11 9)          Discussion/Summary  Discussion Summary:   Reviewed [facility] records and hospital course  Had [facility] staff reach out to Dr Donald Saavedra group regarding PO Abx as she is not currently on any  Patient has ID follow up later this week  [facility] d/w Dr Russell Jackman directly who revommends labs as well as starting amoxicillin  Additionally patient is to have colonoscopy  She had had colonoscopy prior and is aware she is to schedule follow up  Cardiology follows regarding prosthetic valve, Amio/dig/CHF/Coumadin  With direction of ID will restart amoxicillin, may be lifelong but will need ID discussion   Advised her to make Coumadin clinic aware that she is stating abx as it will affect her INR and Coumadin dosing  Labs ordered and will be forwarded to Dr Vidal [facility] as well as cardio  Keep cardio and ID follow up as well as [facility] follow up in 2 weeks  Start formal PT once home PT ends  Advised low sodium diet  Discussed report any fever immediately due to h/o sepsis  Counseling Documentation With Imm: The patient was counseled regarding diagnostic results, instructions for management, risk factor reductions, prognosis, patient and family education, impressions, risks and benefits of treatment options, importance of compliance with treatment  Medication SE Review and Pt Understands Tx: Possible side effects of new medications were reviewed with the patient/guardian today  The treatment plan was reviewed with the patient/guardian  The patient/guardian understands and agrees with the treatment plan      Chief Complaint  Chief Complaint Free Text Note Form: pt is here for JOSEPH appt, pt was admitted to [place] on 7/15/2017 and discharged to home on 8/3/2017  Diagnoses: acute on diastolic CHF, hypokalemia, right leg pain, gout, GERD,DM, CAD, Mitral valve replacement  pt would like to discuss pain medication, pt states her therapy is going good, pt c/o having back pain and traveling down both legs      History of Present Illness  TCM Communication [facility]: The patient is being contacted for follow-up after hospitalization and JOSEPH scheduled 8/10/17 @ 3pm w/ IA in [address] to fax facility records upon discharge  She was hospitalized at [facility]  The date of admission: 7/15/17, date of discharge: 8/3/17  Diagnosis: acute on chronic diastolic CHF, asthenia, right ovarian cyst, hypokalemia, streptococcal sepsis, vertebral osteomyelitis, right leg pain, right side meralgia, paresthetica, HLD, gout, GERD, DM, cardiac disease, CAD, Mitral valve replacement  She was discharged to home  Medications were not reviewed today  She scheduled a follow up appointment   The patient is currently asymptomatic  Communication performed and completed by guilherme   HPI: 72 yo female for JOSEPH  She notes 4/28/17 she had back and leg pain and problems walking  She was found to have streptococcal sepsis  She was admit 5/23/17 and d/c 6/5/17  She was at [facility] 7/15/2017 and discharged to home on 8/3/2017  She has been at home with home therapy, using pain control once daily  Working with PT  Pain controlled and able to do therapy when her pain is controlled  Walking short distances with walker  She is weighing self at home, has lost weight and is trying to eat better  She was found to have vertebral osteo while in hospital and [place] [facility]  [place] to PO [place] [facility]  She notes she was eval for possible drain placed but did not have drain placed because there was not a large enough collection for drain placement  Osteomyelitis (Brief): The patient is being seen for a routine clinic follow-up of and has ID eval later this week on thursday  No longer on oral abx since leaving [facility]  osteomyelitis  Symptoms:  no fever, no chills, no swelling and no redness    The patient presents with complaints of pain (low back pain improved since hospital and nursing home  Weaned her pain medication)  Associated symptoms:  no weakness and no malaise  Congestive Heart Failure (Follow-Up): Comorbid Illnesses:  Weighs self  Lose weight while in hospital  Trying to eat more healthy  Cardio is following  Symptoms: stable lower extremity edema, stable dyspnea on exertion and stable fatigue  Anticoagulation (Follow-Up): The patient is currently anticoagulated with warfarin (Coumadin)  Managing MD: follows with cardio who manages  Symptoms:   Arrhythmia (Brief): Symptoms:  no dizziness  Associated symptoms:  taking dig and amio which patient notes she was not on before her infection and hospital visits  Diabetes Type II (Follow-Up):  The patient states she has been stable with her Type II Diabetes control since the last visit  Symptoms:      Review of Systems  Complete-Female:   Constitutional: recent weight loss and lost water weight, less swelling, but no fever and no recent weight gain  Cardiovascular: notes she follows with cardio  , but no chest pain and no palpitations  Respiratory: no cough and no wheezing  Gastrointestinal: no vomiting, no diarrhea and no blood in stools  Musculoskeletal: Back pain improved  Integumentary: no rash over flank  Active Problems     1  Ambulatory dysfunction (719 7) (R26 2)   2  Arthritis of knee (716 96) (M17 10)   3  Asthenia (780 79) (R53 1)   4  Bilateral hearing loss due to cerumen impaction (389 8,380 4) (H61 23)   5  Cardiac disease (429 9) (I51 9)   6  Chronic kidney disease, stage 3 (moderate) (585 3) (N18 3)   7  Fall on same level from slipping, tripping or stumbling, initial encounter (E885 9)   (W01  0XXA)   8  Gout (274 9) (M10 9)   9  HLD (hyperlipidemia) (272 4) (E78 5)   10  Hypokalemia (276 8) (E87 6)   11  Lactic acidosis (276 2) (E87 2)   12  Lumbar degenerative disc disease (722 52) (M51 36)   13  Meralgia paresthetica of right side (355 1) (G57 11)   14  Mitral valve replaced (V43 3) (Z95 2)   15  Ovarian cyst, right (620 2) (N83 201)   16  Pain of right lower extremity (729 5) (M79 604)   17  RBBB (right bundle branch block) (426 4) (I45 10)   18  Sepsis (038 9,995 91) (A41 9)   19   Vitamin D deficiency (268 9) (E55 9)    GERD without esophagitis (530 81) (K21 9)       Hypercholesterolemia (272 0) (E78 00)       Psoriasis (696 1) (L40 9)       Benign essential hypertension (401 1) (I10)       Iron deficiency anemia (280 9) (D50 9)       Mitral valve prolapse (424 0) (I34 1)       Mixed hyperlipidemia (272 2) (E78 2)       CHANTELL (acute kidney injury) (584 9) (N17 9)       Acute diastolic congestive heart failure (428 31) (I50 31)       CAD (coronary artery disease) (414 00) (I25 10)       Streptococcus bovis infection (041 04) (A49 1)       Streptococcal sepsis (038 0) (A40 9)       Acute on chronic diastolic congestive heart failure (428 33) (I50 33)       Vertebral osteomyelitis (730 28) (M46 20)       Non-insulin dependent type 2 diabetes mellitus (250 00) (E11 9)          Past Medical History     1  History of Abscess of abdominal wall (682 2) (L02 211)   2  History of Acute upper respiratory infection (465 9) (J06 9)   3  History of Arteriovenous Malformation (CNS) (747 81)   4  Denied: History of Carrier Of STD   5  History of Diabetes Mellitus (250 00)   6  History of Gastroparesis (536 3) (K31 84)   7  History of Generalized anxiety disorder (300 02) (F41 1)   8  History of acute bronchitis (V12 69) (Z87 09)   9  History of allergy (V15 09) (Z88 9)   10  History of blood coagulation disorder (V12 3) (Z86 2)   11  History of hypertension (V12 59) (Z86 79)   12  History of psoriasis (V13 3) (Z87 2)   13  History of Lymphedema (457 1) (I89 0)   14  History of Medicare annual wellness visit, subsequent (V70 0) (Z00 00)   15  Denied: History of Mental Status Change   16  History of Morbid or severe obesity due to excess calories (278 01) (E66 01)   17  History of Multiple joint pain (719 49) (M25 50)   18  History of Need for immunization against influenza (V04 81) (Z23)   19  History of Need for Tdap vaccination (V06 1) (Z23)   20  History of Need for vaccination with 13-polyvalent pneumococcal conjugate vaccine    (V03 82) (Z23)   21  History of Osteoporosis screening (V82 81) (Z13 820)   22  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)   23  History of Sciatica of right side (724 3) (M54 31)   24  History of Screening for breast cancer (V76 10) (Z12 31)   25  History of Screening for depression (V79 0) (Z13 89)   26  History of Screening for genitourinary condition (V81 6) (Z13 89)   27  History of Screening for neurological condition (V80 09) (Z13 89)   28  History of Type 2 diabetes mellitus (250 00) (E11 9)   29  History of Urinary Symptoms (788 69)   30  History of Visit for screening mammogram (V76 12) (Z12 31)   31  History of Visit for suture removal (V58 32) (Z48 02)   32  History of Weight gain (783 1) (R63 5)   33  History of Well adult on routine health check (V70 0) (Z00 00)    History of Screening for neurological condition (V80 09) (Z13 89)       History of Screening for genitourinary condition (V81 6) (Z13 89)       History of Screening for depression (V79 0) (Z13 89)          Surgical History    1  History of  Section   2  History of Mitral Valve Replacement   3  History of Tonsillectomy With Adenoidectomy   4  History of Tonsillectomy With Adenoidectomy  Surgical History Reviewed: The surgical history was reviewed and updated today  Family History  Mother    1  Family history of Diabetes Mellitus (V18 0)  Father    2  Family history of Diabetes Mellitus (V18 0)   3  Family history of Type 2 Diabetes Mellitus  Sister    4  Family history of Carotid Artery Stenosis   5  Family history of Coronary Artery Disease (V17 49)  Family History    6  Family history of Family Health Status 2  Children Living   7  Family history of Stroke Syndrome (V17 1)  Family History Reviewed: The family history was reviewed and updated today  Social History    · Exercising Regularly   · Former smoker (H50 46) (U25 122)   · Marital History - Currently    · No alcohol use   · No illicit drug use   · Occupation:  Social History Reviewed: The social history was reviewed and updated today  Current Meds   1  Acetaminophen-Codeine #2 300-15 MG Oral Tablet; TAKE 1 TO 2 TABLETS EVERY 4   HOURS AS NEEDED; Therapy: 28FOW4959 to (Evaluate:61Hkb0609); Last Rx:18Icy0994 Ordered   2  Allopurinol 100 MG Oral Tablet; TAKE 1 TABLET TWICE DAILY  Requested for:   89CQN5977; Last TREVIZO:21QGF4374 Ordered   3   Atorvastatin Calcium 40 MG Oral Tablet; TAKE 1 TABLET DAILY  Requested for:   18WQC0916; Last AK:57JGK6274 Ordered   4  Calcitrene 0 005 % External Ointment; APPLY AND GENTLY MASSAGE INTO AFFECTED   AREA(S) TWICE DAILY; Therapy: 28IIT5548 to (Last Rx:2016)  Requested for: 16HOB9991 Ordered   5  Citalopram Hydrobromide 20 MG Oral Tablet; TAKE 1 TABLET DAILY  Requested for:   14MBI8502; Last V58MRD0491 Ordered   6  Clobetasol Propionate 0 05 % External Ointment; APPLY AND GENTLY MASSAGE INTO   AFFECTED AREA(S) TWICE DAILY; Therapy: 2014 to (Elsie Mai)  Requested for: 01YCO4409; Last   Rx:2016 Ordered   7  DiazePAM 5 MG Oral Tablet; TAKE 1 TABLET AT BEDTIME AS NEEDED; Therapy: 94GTI0280 to (Evaluate:81Sjp1314); Last Rx:2017 Ordered   8  Ferrex 150 150 MG Oral Capsule; TAKE 1 CAPSULE BY MOUTH TWICE DAILY; Therapy: (Lore Redman) to Recorded   9  Folbic 2 5-25-2 MG Oral Tablet; TAKE 1 TABELT TWICE DAILY  Requested for:   39Jnz4918; Last FA:13WNA6604 Ordered   10  Furosemide 80 MG Oral Tablet; TAKE 1 TABLET DAILY  Requested for: 79QFM7778; Last    Rx:2017 Ordered   11  LC-4 Lidocaine 4 % External Cream; apply cream 3-4 times daily as needed for pain    (may patch if preferred); Therapy: 16LOB9078 to (Last Rx:84Lbh3433) Ordered   12  MetFORMIN HCl - 500 MG Oral Tablet; TAKE 1 TABLET TWICE DAILY  Requested for:    86DYE7433; Last Rx:2017 Ordered   13  [facility] Formula Oral Capsule; TAKE 1 CAPSULE DAILY; Therapy: 2016 to Recorded   14  Omeprazole 40 MG Oral Capsule Delayed Release; TAKE 1 CAPSULE DAILY     Requested for: 44OSY9765; Last NA:83WAV0842 Ordered   15  OneTouch Delica Lancets Fine Miscellaneous; TEST BLOOD GLUCOSE 1 TIME DAILY -    DX  E11 9; Therapy: 32FPN3986 to (Evaluate:68Ecd6352)  Requested for: 45IKV8642; Last    Rx:2017 Ordered   16  OneTouch Verio In Vitro Strip; TEST BLOOD GLUCOSE 1 TIME DAILY - DX  E11 9; Therapy: 86LCU7859 to (Evaluate:90Emz6206)  Requested for: 98ZIV4737; Last    Rx:2017 Ordered   17   OneTouch Verio w/Device Kit; 1 kit - DX  E11 9; Therapy: 02UAD8619 to (Last Rx:13Jun2017)  Requested for: 13Jun2017 Ordered   18  ProAir  (90 Base) MCG/ACT Inhalation Aerosol Solution; INHALE 1 TO 2 PUFFS    EVERY 4 TO 6 HOURS AS NEEDED; Therapy: 89PPL0546 to (Last Rx:26Jan2017)  Requested for: 26Jan2017 Ordered   19  Spironolactone 25 MG Oral Tablet; TAKE 1 TABLET DAILY; Therapy: 03Vvk6591 to (Evaluate:24Giu5720); Last Rx:53Acd1323 Ordered   20  Triamcinolone Acetonide 0 5 % External Cream; APPLY SPARINGLY AND MASSAGE IN    TWICE DAILY; Therapy: 29Rde2028 to (Last Rx:29Dec2016) Ordered   21  Turmeric 500 MG Oral Capsule; take 1 capsule 4 times daily; Therapy: 15Qks8801 to Recorded   22  Vitamin C 500 MG Oral Tablet; TAKE 1 TABLET DAILY; Therapy: (Heidi Burger) to Recorded   23  Vitamin D (Ergocalciferol) 25135 UNIT Oral Capsule; TAKE 1 CAPSULE WEEKLY     Requested for: 49Iou8868; Last XW:21WEW4267 Ordered   24  Warfarin Sodium 5 MG Oral Tablet; TAKE 1 TABLET DAILY AS DIRECTED; Therapy: 79YQO9892 to ()  Requested for: 47HZD9854; Last    Rx:26Jan2017 Ordered  Medication List Reviewed: The medication list was reviewed and updated today  Allergies    1  Tetanus Toxoids   2  Tetanus-Diphtheria Toxoids Td SUSP    3  Seasonal    Physical Exam    Constitutional   General appearance: No acute distress, well appearing and well nourished  Alert pleasant 72 yo female in room, family present  Seated in WC  Eyes   Pupils and irises: Equal, round and reactive to light  reactive  Ears, Nose, Mouth, and Throat   Oropharynx: Normal with no erythema, edema, exudate or lesions  MMM  Pulmonary   Respiratory effort: No increased work of breathing or signs of respiratory distress  CTA throughout  NO resp distress  Auscultation of lungs: Clear to auscultation      Cardiovascular   Auscultation of heart: Abnormal   The heart rate was normal  Heart sounds: normal S1 and normal S2  Prosthetic valve: prosthetic mitral valve heard  Examination of extremities for edema and/or varicosities: Abnormal   bilateral ankle 1++ pitting edema  no foot ulcers  Musculoskeletal Able to stand  No focal lumbar TTP  Symmetric LE, UE strength but decreased  Skin   Skin and subcutaneous tissue: Abnormal   venous stasis changes of LE  Psychiatric   Mood and affect: Normal          Health Management  History of Screening for depression   *VB-Depression Screening; every 1 year; Last 69WSS8321; Next Due: 03CHA2007; Overdue  History of Screening for genitourinary condition   *VB - Urinary Incontinence Screen (Dx Z13 89 Screen for UI); every 1 year; Last 26Fvd7532; Next  Due: 93Ejo9317; Active  History of Screening for neurological condition   *VB - Fall Risk Assessment  (Dx Z13 89 Screen for Neurologic Disorder); every 1 year; Last  06Dgy8681; Next Due: 90Ujb7954; Active  Type 2 diabetes mellitus   (1) MICROALBUMIN CREATININE RATIO, RANDOM URINE; every 1 year; Last 38PEQ5030; Next Due:  23Yyg3815; Active    Future Appointments    Date/Time Provider Specialty Site   09/28/2017 02:15 PM Jessica Little MD Infectious Disease [facility] INFECTIOUS DISEASE   12/12/2017 10:45 AM Rashid Browne DO Internal Medicine [facility]     Signatures   Electronically signed by : Pal Aden, ; Aug  2 2017  2:42PM EST                       (Co-author)    Electronically signed by : Maribel Moraes, [facility];  Aug 14 2017  6:51PM EST                       (Author)    Electronically signed by : Nirmala Lord MD; Aug 30 2017  1:43PM EST                       (Author)

## 2018-01-16 NOTE — MISCELLANEOUS
History of Present Illness  TCM Communication [facility]: The patient is being contacted for follow-up after hospitalization and Pt transferred to Son  No JOSEPH needed at this time  She was hospitalized at [facility]  The date of admission: 6/28/17, date of discharge: 7/15/17  Diagnosis: acute on chronic diastolic CHF, asthenia, right ovarian cyst, hypokalemia, streptococcal sepsis, vertebral osteomyelitis, right leg pain, right side meralgia paresthetica, HLD, gout, GERD, DM, cardiac disease, CAD, Mitral valve replaced  She was discharged to a nursing home  Medications were not reviewed today  Communication performed and completed by kb      Active Problems    1  Acute diastolic congestive heart failure (428 31,428 0) (I50 31)   2  Acute on chronic diastolic congestive heart failure (428 33,428 0) (I50 33)   3  CHANTELL (acute kidney injury) (584 9) (N17 9)   4  Ambulatory dysfunction (719 7) (R26 2)   5  Arthritis of knee (716 96) (M17 10)   6  Benign essential hypertension (401 1) (I10)   7  Bilateral hearing loss due to cerumen impaction (389 8,380 4) (H61 23)   8  CAD (coronary artery disease) (414 00) (I25 10)   9  Chronic kidney disease, stage 3 (moderate) (585 3) (N18 3)   10  Fall on same level from slipping, tripping or stumbling, initial encounter (E885 9)    (W01  0XXA)   11  GERD without esophagitis (530 81) (K21 9)   12  Gout (274 9) (M10 9)   13  HLD (hyperlipidemia) (272 4) (E78 5)   14  Hypercholesterolemia (272 0) (E78 00)   15  Hypokalemia (276 8) (E87 6)   16  Iron deficiency anemia (280 9) (D50 9)   17  Lactic acidosis (276 2) (E87 2)   18  Lumbar degenerative disc disease (722 52) (M51 36)   19  Meralgia paresthetica of right side (355 1) (G57 11)   20  Mitral valve prolapse (424 0) (I34 1)   21  Mitral valve replaced (V43 3) (Z95 2)   22  Mixed hyperlipidemia (272 2) (E78 2)   23  Non-insulin dependent type 2 diabetes mellitus (250 00) (E11 9)   24  Pain of right lower extremity (729 5) (Z29 012)   25  Psoriasis (696 1) (L40 9)   26  RBBB (right bundle branch block) (426 4) (I45 10)   27  Sepsis (038 9,995 91) (A41 9)   28  Streptococcal sepsis (038 0,995 91) (A40 9)   29  Streptococcus bovis infection (041 04) (A49 1)   30  Vertebral osteomyelitis (730 28) (M46 20)   31  Vitamin D deficiency (268 9) (E55 9)    Past Medical History    1  History of Abscess of abdominal wall (682 2) (L02 211)   2  History of Acute upper respiratory infection (465 9) (J06 9)   3  History of Arteriovenous Malformation (CNS) (747 81)   4  Denied: History of Carrier Of STD   5  History of Diabetes Mellitus (250 00)   6  History of Gastroparesis (536 3) (K31 84)   7  History of Generalized anxiety disorder (300 02) (F41 1)   8  History of acute bronchitis (V12 69) (Z87 09)   9  History of allergy (V15 09) (Z88 9)   10  History of blood coagulation disorder (V12 3) (Z86 2)   11  History of hypertension (V12 59) (Z86 79)   12  History of psoriasis (V13 3) (Z87 2)   13  History of Lymphedema (457 1) (I89 0)   14  History of Medicare annual wellness visit, subsequent (V70 0) (Z00 00)   15  Denied: History of Mental Status Change   16  History of Morbid or severe obesity due to excess calories (278 01) (E66 01)   17  History of Multiple joint pain (719 49) (M25 50)   18  History of Need for immunization against influenza (V04 81) (Z23)   19  History of Need for Tdap vaccination (V06 1) (Z23)   20  History of Need for vaccination with 13-polyvalent pneumococcal conjugate vaccine    (V03 82) (Z23)   21  History of Osteoporosis screening (V82 81) (Z13 820)   22  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)   23  History of Sciatica of right side (724 3) (M54 31)   24  History of Screening for breast cancer (V76 10) (Z12 39)   25  History of Screening for depression (V79 0) (Z13 89)   26  History of Screening for depression (V79 0) (Z13 89)   27  History of Screening for genitourinary condition (V81 6) (Z13 89)   28   History of Screening for genitourinary condition (V81 6) (Z13 89)   29  History of Screening for neurological condition (V80 09) (Z13 89)   30  History of Screening for neurological condition (V80 09) (Z13 89)   31  History of Type 2 diabetes mellitus (250 00) (E11 9)   32  History of Urinary Symptoms (788 69)   33  History of Visit for screening mammogram (V76 12) (Z12 31)   34  History of Visit for suture removal (V58 32) (Z48 02)   35  History of Weight gain (783 1) (R63 5)   36  History of Well adult on routine health check (V70 0) (Z00 00)    Surgical History    1  History of  Section   2  History of Mitral Valve Replacement   3  History of Tonsillectomy With Adenoidectomy   4  History of Tonsillectomy With Adenoidectomy    Family History  Mother    1  Family history of Diabetes Mellitus (V18 0)  Father    2  Family history of Diabetes Mellitus (V18 0)   3  Family history of Type 2 Diabetes Mellitus  Sister    4  Family history of Carotid Artery Stenosis   5  Family history of Coronary Artery Disease (V17 49)  Family History    6  Family history of Family Health Status 2  Children Living   7  Family history of Stroke Syndrome (V17 1)    Social History    · Exercising Regularly   · Former smoker (O39 99) (W42 129)   · Marital History - Currently    · No alcohol use   · No illicit drug use   · Occupation:    Current Meds   1  Acetaminophen-Codeine #2 300-15 MG Oral Tablet; TAKE 1 TO 2 TABLETS EVERY 4   HOURS AS NEEDED; Therapy: 63BIX4495 to (Evaluate:07Ytk6601); Last Rx:43Eim3633 Ordered   2  Allopurinol 100 MG Oral Tablet; TAKE 1 TABLET TWICE DAILY  Requested for:   73EFJ2937; Last NC:31SRS1101 Ordered   3  Amoxicillin 500 MG Oral Capsule; TAKE 1 CAPSULE 3 TIMES DAILY; Therapy: 42OGD5407 to (77 162 135)  Requested for: 95QGH5871; Last   Rx:78Ivj6405 Ordered   4  Atorvastatin Calcium 40 MG Oral Tablet; TAKE 1 TABLET DAILY  Requested for:   24GSD2492; Last WL:00TOC9035 Ordered   5   Calcitrene 0 005 % External Ointment; APPLY AND GENTLY MASSAGE INTO AFFECTED   AREA(S) TWICE DAILY; Therapy: 51IBX1041 to (Last Rx:30Nov2016)  Requested for: 46YCA8967 Ordered   6  Citalopram Hydrobromide 20 MG Oral Tablet (CeleXA); TAKE 1 TABLET DAILY    Requested for: 42EFL2294; Last Rx:26Jan2017 Ordered   7  Clobetasol Propionate 0 05 % External Ointment; APPLY AND GENTLY MASSAGE INTO   AFFECTED AREA(S) TWICE DAILY; Therapy: 03Apr2014 to (Hema Gustafson)  Requested for: 31JFB2183; Last   Rx:30Nov2016 Ordered   8  DiazePAM 5 MG Oral Tablet; TAKE 1 TABLET AT BEDTIME AS NEEDED; Therapy: 95VBI5985 to (Evaluate:83Heq7907); Last Rx:15May2017 Ordered   9  Ferrex 150 150 MG Oral Capsule; TAKE 1 CAPSULE BY MOUTH TWICE DAILY; Therapy: (02 947 297) to Recorded   10  Folbic 2 5-25-2 MG Oral Tablet; TAKE 1 TABELT TWICE DAILY  Requested for:    65Hmm5866; Last Rx:26Jan2017 Ordered   11  Furosemide 80 MG Oral Tablet (Lasix); TAKE 1 TABLET DAILY  Requested for:    34DNN9999; Last Rx:26Jan2017 Ordered   12  Klor-Con M20 20 MEQ Oral Tablet Extended Release; TAKE 2 TABLETS 4 TIMES DAILY; Therapy: 10QSZ4957 to ([phone])  Requested for: 52FGF4875; Last    Rx:06Apr2017 Ordered   13  LC-4 Lidocaine 4 % External Cream; apply cream 3-4 times daily as needed for pain    (may patch if preferred); Therapy: 15ZQA1548 to (Last Rx:60Yye1397) Ordered   14  MetFORMIN HCl - 500 MG Oral Tablet; TAKE 1 TABLET TWICE DAILY  Requested for:    83HCT0380; Last Rx:26Jan2017 Ordered   15  MetOLazone 5 MG Oral Tablet (Zaroxolyn); TAKE 1 TABLET BY MOUTH ON MONDAY,    SUNDAY  Requested for: 04TWF5046; Last Rx:26Jan2017 Ordered   16  Ocuvite Eye Health Formula Oral Capsule; TAKE 1 CAPSULE DAILY; Therapy: 15Apr2016 to Recorded   17  Omeprazole 40 MG Oral Capsule Delayed Release; TAKE 1 CAPSULE DAILY     Requested for: 69TTO8924; Last Rx:26Jan2017 Ordered   18  OneTouch Delica Lancets Fine Miscellaneous; TEST BLOOD GLUCOSE 1 TIME DAILY -    DX  E11 9; Therapy: 14JCZ4377 to (Evaluate:00Cdx4289)  Requested for: 41WRW6229; Last    Rx:13Jun2017 Ordered   19  OneTouch Verio In Vitro Strip; TEST BLOOD GLUCOSE 1 TIME DAILY - DX  E11 9; Therapy: 34FKK8178 to (Evaluate:04Mll6993)  Requested for: 69RPJ3459; Last    Rx:13Jun2017 Ordered   20  OneTouch Verio w/Device Kit; 1 kit - DX  E11 9; Therapy: 71IXO9160 to (Last Rx:13Jun2017)  Requested for: 13Jun2017 Ordered   21  Otezla 30 MG Oral Tablet; One po BID; Therapy: 00SXO6025 to (Last Rx:03Kmn8807) Ordered   22  ProAir  (90 Base) MCG/ACT Inhalation Aerosol Solution; INHALE 1 TO 2 PUFFS    EVERY 4 TO 6 HOURS AS NEEDED; Therapy: 51SZQ8613 to (Last Rx:26Jan2017)  Requested for: 26Jan2017 Ordered   23  Spironolactone 25 MG Oral Tablet; TAKE 1 TABLET DAILY; Therapy: 51Zaw0495 to (Evaluate:85Hea6843); Last Rx:83Tne5405 Ordered   24  TraMADol HCl - 50 MG Oral Tablet; take 1 tablet daily as needed; Therapy: 14Apr2017 to (Last Rx:14Apr2017) Ordered   25  Triamcinolone Acetonide 0 5 % External Cream; APPLY SPARINGLY AND MASSAGE IN    TWICE DAILY; Therapy: 65Ljs2756 to (Last Rx:65Dcj3738) Ordered   26  Turmeric 500 MG Oral Capsule; take 1 capsule 4 times daily; Therapy: 12Xdd6684 to Recorded   27  Vitamin C 500 MG Oral Tablet; TAKE 1 TABLET DAILY; Therapy: (Lamona Gouty) to Recorded   28  Vitamin D (Ergocalciferol) 05520 UNIT Oral Capsule; TAKE 1 CAPSULE WEEKLY     Requested for: 71Mco0623; Last BY:48IEI0711 Ordered   29  Warfarin Sodium 5 MG Oral Tablet (Coumadin); TAKE 1 TABLET DAILY AS DIRECTED; Therapy: 25VXF2734 to (Evaluate:39Ubs1797)  Requested for: 26Jan2017; Last    Rx:26Jan2017 Ordered    Allergies    1  Tetanus Toxoids   2  Tetanus-Diphtheria Toxoids Td SUSP    3  Seasonal    Health Management  History of Screening for depression   *VB-Depression Screening; every 1 year; Last 67BBL6224; Next Due: 60JCJ2161;  Overdue  History of Screening for genitourinary condition   *VB - Urinary Incontinence Screen (Dx Z13 89 Screen for UI); every 1 year; Last 29VGM2023; Next  Due: 54ACX5836; Overdue  History of Screening for neurological condition   *VB - Fall Risk Assessment  (Dx Z13 89 Screen for Neurologic Disorder); every 1 year; Last  39SBF8367; Next Due: 67IXW1418; Overdue  Type 2 diabetes mellitus   (1) MICROALBUMIN CREATININE RATIO, RANDOM URINE; every 1 year; Last 95WRB1702; Next Due:  24Rbr8740;  Active    Future Appointments    Date/Time Provider Specialty Site   07/27/2017 03:15 PM Patrick Little MD Infectious Disease [facility] INFECTIOUS DISEASE   07/31/2017 01:30 PM Mattie Chacon [facility] Gastroenterology [facility] 9     Signatures   Electronically signed by : Isai Cuevas, ; Jul 17 2017 10:53AM EST                       (Co-author)    Electronically signed by : Adalid Marie DO; Jul 21 2017 11:00AM EST

## 2018-01-16 NOTE — PROCEDURES
Procedures by Vanda Richardson RN at 6/2/2017  1:21 PM      Author: Vanda Richardson RN  Service:  (none) Author Type:  Registered Nurse     Filed:  6/2/2017  1:30 PM  Date of Service:  6/2/2017  1:21 PM Status:  Addendum     : Vanda Richardson RN (Registered Nurse)        Related Notes: Original Note by Vanda Richardson RN (Registered Nurse) filed at 6/2/2017  1:27 PM         Procedure Orders:       1  Insert PICC line [id number] ordered by Jessica Amaral MD at 06/02/17 1046                     Insert PICC line  Date/Time: 6/2/2017 1:21 PM  Performed by: Danial Alexander by: Areli Garcia     Patient location:  Bedside  Consent:     Consent given by:  Patient    Procedural risks discussed: consent obtained by physician  [place] protocol:     Procedure explained and questions answered to patient or proxy's satisfaction: yes      Relevant documents present and verified: yes      Test results available and properly labeled: yes       Imaging studies available: yes      Required blood products, implants, devices, and special equipment available: yes      Site/side marked: yes      Immediately prior to procedure, a time out was called: yes      Patient identity confirmed:  Verbally with patient, arm band, provided demographic data and hospital-assigned identification number  Pre-procedure details:     Hand hygiene: Hand hygiene performed prior to insertion      Sterile barrier technique: All elements of maximal sterile technique followed      Skin preparation:  ChloraPrep    Skin preparation agent: Skin preparation agent completely dried prior to procedure    Indications:     PICC line indications: long term antibiotics    Anesthesia (see MAR for exact dosages):      Anesthesia method:  Local infiltration (3ml)    Local anesthetic:  Lidocaine 1% w/o epi  Procedure details:     Location:  Cephalic    Vessel type: vein      Laterality:  Right    Approach: percutaneous technique used      Patient position:  Flat    Procedural supplies:  Double lumen    Catheter size:  5 Fr    Landmarks identified: yes      Ultrasound guidance: yes      Sterile ultrasound techniques: Sterile gel and sterile probe covers were used      Number of attempts:  1    Successful placement: yes      Vessel of catheter tip end:  Sherlock 3CG confirmed    Total catheter length (cm):  41    Catheter out on skin (cm):  0    Max flow rate:  999ml/hr    Arm circumference:  43  Post-procedure details:     Post-procedure:  Dressing applied and securement device placed    Assessment:  Blood return through all ports and free fluid flow (sherlock 3cg confirmed placement record placed on pts chart)    Post-procedure complications: none      Patient tolerance of procedure: Tolerated well, no immediate complications  Comments:      lolis UE multiple ecchymotic areas not new  Ecchymotic area with small old hematoma near insertion site resolving not new and Skin intact                         Received for:Provider  EPIC   Jun 2 2017  1:30PM [facility] Standard Time

## 2018-01-17 NOTE — PROGRESS NOTES
History of Present Illness  HPI: Patient here for follow-up for Streptococcus bovis sepsis, vertebral osteomyelitis, so as abscess  She had been readmitted to [facility] and completed an 8 week course of intravenous ceftriaxone  She was then discharged to acute rehabilitation  She has now been discharged home  She was to be discharged from rehabilitation on oral amoxicillin, however apparently the medication was not continued  I was called by the primary care provider and we restarted the antibiotics  The patient has had decreased back pain and is now able to ambulate with a walker  She is not having any fever or chills or sweats  She denies any nausea vomiting or diarrhea  Review of Systems  Complete-Female:   Constitutional: No fever, no chills, feels well, no tiredness, no recent weight gain or weight loss  Cardiovascular: No complaints of slow heart rate, no fast heart rate, no chest pain, no palpitations, no leg claudication, no lower extremity edema  Respiratory: No complaints of shortness of breath, no wheezing, no cough, no SOB on exertion, no orthopnea, no PND  Gastrointestinal: No complaints of abdominal pain, no constipation, no nausea or vomiting, no diarrhea, no bloody stools  Genitourinary: No complaints of dysuria, no incontinence, no pelvic pain, no dysmenorrhea, no vaginal discharge or bleeding  Musculoskeletal: as noted in HPI  Active Problems    1  Acute diastolic congestive heart failure (428 31,428 0) (I50 31)   2  Acute on chronic diastolic congestive heart failure (428 33,428 0) (I50 33)   3  CHANTELL (acute kidney injury) (584 9) (N17 9)   4  Ambulatory dysfunction (719 7) (R26 2)   5  Anticoagulant long-term use (V58 61) (Z79 01)   6  Arthritis of knee (716 96) (M17 10)   7  Asthenia (780 79) (R53 1)   8  Benign essential hypertension (401 1) (I10)   9  Bilateral hearing loss due to cerumen impaction (389 8,380 4) (H61 23)   10   CAD (coronary artery disease) (414 00) (I25 10)   11  Candidiasis of breast (112 89) (B37 89)   12  Cardiac arrhythmia (427 9) (I49 9)   13  Cardiac disease (429 9) (I51 9)   14  Chronic kidney disease, stage 3 (moderate) (585 3) (N18 3)   15  Elevated digoxin level (796 0) (R78 89)   16  Fall on same level from slipping, tripping or stumbling, initial encounter (E885 9)    (W01  0XXA)   17  GERD without esophagitis (530 81) (K21 9)   18  Gout (274 9) (M10 9)   19  HLD (hyperlipidemia) (272 4) (E78 5)   20  Hypercholesterolemia (272 0) (E78 00)   21  Hypokalemia (276 8) (E87 6)   22  Iron deficiency anemia (280 9) (D50 9)   23  Lactic acidosis (276 2) (E87 2)   24  Lumbar degenerative disc disease (722 52) (M51 36)   25  Meralgia paresthetica of right side (355 1) (G57 11)   26  Mitral valve prolapse (424 0) (I34 1)   27  Mitral valve replaced (V43 3) (Z95 2)   28  Mixed hyperlipidemia (272 2) (E78 2)   29  Non-insulin dependent type 2 diabetes mellitus (250 00) (E11 9)   30  Ovarian cyst, right (620 2) (N83 201)   31  Pain of right lower extremity (729 5) (M79 604)   32  Physical deconditioning (799 3) (R53 81)   33  Presence of prosthetic heart valve (V43 3) (Z95 2)   34  Psoriasis (696 1) (L40 9)   35  RBBB (right bundle branch block) (426 4) (I45 10)   36  Sepsis (038 9,995 91) (A41 9)   37  Streptococcal sepsis (038 0,995 91) (A40 9)   38  Streptococcus bovis infection (041 04) (A49 1)   39  Transition of care   40  Vertebral osteomyelitis (730 28) (M46 20)   41  Vitamin D deficiency (268 9) (E55 9)    Past Medical History    1  History of Abscess of abdominal wall (682 2) (L02 211)   2  History of Acute upper respiratory infection (465 9) (J06 9)   3  History of Arteriovenous Malformation (CNS) (747 81)   4  Denied: History of Carrier Of STD   5  History of Diabetes Mellitus (250 00)   6  History of Gastroparesis (536 3) (K31 84)   7  History of Generalized anxiety disorder (300 02) (F41 1)   8   History of acute bronchitis (V12 69) (Z87 09)   9  History of allergy (V15 09) (Z88 9)   10  History of blood coagulation disorder (V12 3) (Z86 2)   11  History of hypertension (V12 59) (Z86 79)   12  History of psoriasis (V13 3) (Z87 2)   13  History of Lymphedema (457 1) (I89 0)   14  History of Medicare annual wellness visit, subsequent (V70 0) (Z00 00)   15  Denied: History of Mental Status Change   16  History of Morbid or severe obesity due to excess calories (278 01) (E66 01)   17  History of Multiple joint pain (719 49) (M25 50)   18  History of Need for immunization against influenza (V04 81) (Z23)   19  History of Need for Tdap vaccination (V06 1) (Z23)   20  History of Need for vaccination with 13-polyvalent pneumococcal conjugate vaccine    (V03 82) (Z23)   21  History of Osteoporosis screening (V82 81) (Z13 820)   22  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)   23  History of Sciatica of right side (724 3) (M54 31)   24  History of Screening for breast cancer (V76 10) (Z12 31)   25  History of Screening for depression (V79 0) (Z13 89)   26  History of Screening for depression (V79 0) (Z13 89)   27  History of Screening for genitourinary condition (V81 6) (Z13 89)   28  History of Screening for genitourinary condition (V81 6) (Z13 89)   29  History of Screening for neurological condition (V80 09) (Z13 89)   30  History of Screening for neurological condition (V80 09) (Z13 89)   31  History of Type 2 diabetes mellitus (250 00) (E11 9)   32  History of Urinary Symptoms (788 69)   33  History of Visit for screening mammogram (V76 12) (Z12 31)   34  History of Visit for suture removal (V58 32) (Z48 02)   35  History of Weight gain (783 1) (R63 5)   36  History of Well adult on routine health check (V70 0) (Z00 00)    Surgical History    1  History of  Section   2  History of Mitral Valve Replacement   3  History of Tonsillectomy With Adenoidectomy   4  History of Tonsillectomy With Adenoidectomy    Family History    1   Family history of Diabetes Mellitus (V18 0)    2  Family history of Diabetes Mellitus (V18 0)   3  Family history of Type 2 Diabetes Mellitus    4  Family history of Carotid Artery Stenosis   5  Family history of Coronary Artery Disease (V17 49)    6  Family history of Family Health Status 2  Children Living   7  Family history of Stroke Syndrome (V17 1)    Social History    · Exercising Regularly   · Former smoker (R14 62) (S14 127)   · Marital History - Currently    · No alcohol use   · No illicit drug use   · Occupation:    Current Meds   1  Acetaminophen-Codeine #2 300-15 MG Oral Tablet; TAKE 1  TABLETS EVERY 4 HOURS   AS NEEDED for extremem pain; Therapy: 31NIR8403 to (Evaluate:49Lgd4944); Last Rx:04Qrk9684 Ordered   2  Allopurinol 100 MG Oral Tablet; TAKE 1 TABLET TWICE DAILY  Requested for:   46Qec9818; Last Rx:16Aug2017 Ordered   3  Amiodarone HCl - 200 MG Oral Tablet; take 1 tablet every twelve hours; Therapy: (Alyson Cardozo to Recorded   4  Amoxicillin 500 MG Oral Tablet; TAKE 1 TABLET 3 TIMES DAILY; Therapy: 28HII7146 to (Evaluate:14Ehl4289)  Requested for: 37LDL6217; Last   Rx:51Ybh4583 Ordered   5  Atorvastatin Calcium 40 MG Oral Tablet; TAKE 1 TABLET DAILY  Requested for:   47Rqa6596; Last Rx:16Aug2017 Ordered   6  Calcitrene 0 005 % External Ointment; APPLY AND GENTLY MASSAGE INTO AFFECTED   AREA(S) TWICE DAILY; Therapy: 70MYY4377 to (Last Rx:30Nov2016)  Requested for: 71KGC2341 Ordered   7  Citalopram Hydrobromide 20 MG Oral Tablet (CeleXA); TAKE 1 TABLET DAILY    Requested for: 24Lar3806; Last Rx:80Glf4286 Ordered   8  Clobetasol Propionate 0 05 % External Ointment; APPLY AND GENTLY MASSAGE INTO   AFFECTED AREA(S) TWICE DAILY; Therapy: 03Apr2014 to (Sintia Mccormick)  Requested for: 63JYO1560; Last   Rx:30Nov2016 Ordered   9  Digoxin 125 MCG Oral Tablet; TAKE 1 TABLET DAILY; Therapy: (Alyson Cardozo to Recorded   10   Folbic 2 5-25-2 MG Oral Tablet; TAKE 1 TABELT TWICE DAILY Requested for:    46Whg2936; Last Rx:26Jan2017 Ordered   11  Furosemide 80 MG Oral Tablet (Lasix); TAKE 1 TABLET DAILY  Requested for:    19Jue6657; Last Rx:16Aug2017 Ordered   12  LORazepam 0 5 MG Oral Tablet; TAKE 1 TABLET Every 8 hours PRN; Therapy: (Sharyle Beans) to Recorded   13  MetFORMIN HCl - 500 MG Oral Tablet; TAKE 1 TABLET TWICE DAILY  Requested for:    16Aug2017; Last Rx:16Aug2017; Status: ACTIVE - Transmit to [facility]    Verification Ordered   14  Metoprolol Tartrate 25 MG Oral Tablet; take 1 tablet every twelve hours; Therapy: (Sharyle Beans) to Recorded   15  Nystop 267082 UNIT/GM External Powder; apply twice daily as needed under breast;    Therapy: 08Aug2017 to (Last Rx:08Aug2017)  Requested for: 10Aug2017 Ordered   16  [facility] Formula Oral Capsule; TAKE 1 CAPSULE DAILY; Therapy: 15Apr2016 to Recorded   17  Omeprazole 40 MG Oral Capsule Delayed Release; TAKE 1 CAPSULE DAILY     Requested for: 16Aug2017; Last Rx:16Aug2017 Ordered   18  OneTouch Delica Lancets Fine Miscellaneous; TEST BLOOD GLUCOSE 1 TIME DAILY -    DX  E11 9; Therapy: 37XTD7469 to (Evaluate:78Qlw2809)  Requested for: 76QEE3434; Last    Rx:13Jun2017 Ordered   19  OneTouch Verio In Vitro Strip; TEST BLOOD GLUCOSE 1 TIME DAILY - DX  E11 9; Therapy: 34GUJ9407 to (Evaluate:41Zai5019)  Requested for: 03ZSG2342; Last    Rx:13Jun2017 Ordered   20  OneTouch Verio w/Device Kit; 1 kit - DX  E11 9; Therapy: 04MPX6107 to (Last Rx:13Jun2017)  Requested for: 13Jun2017 Ordered   21  Potassium Chloride Denise ER 20 MEQ Oral Tablet Extended Release; TAKE 1 TABLET    DAILY; Therapy: (Sharyle Beans) to Recorded   22  ProAir  (90 Base) MCG/ACT Inhalation Aerosol Solution; INHALE 1 TO 2 PUFFS    EVERY 4 TO 6 HOURS AS NEEDED; Therapy: 40ZBL3300 to (Last Rx:26Jan2017)  Requested for: 26Jan2017 Ordered   23  Spironolactone 25 MG Oral Tablet; TAKE 1 TABLET DAILY;     Therapy: 90Vtg5397 to (Evaluate:48Mqe2609)  Requested for: 29ZWG3244; Last    Rx:05Crp0145 Ordered   24  Triamcinolone Acetonide 0 5 % External Cream; APPLY SPARINGLY AND MASSAGE IN    TWICE DAILY; Therapy: 75Ddi4442 to (Last Rx:79Mhn7475) Ordered   25  Warfarin Sodium 1 MG Oral Tablet; TAKE 1 TABLET DAILY; Therapy: (Recorded:72Zyc7629) to Recorded    Allergies    1  Tetanus Toxoids   2  Tetanus-Diphtheria Toxoids Td SUSP    3  Seasonal    Vitals  Signs   Recorded: 17Aug2017 02:09PM   Temperature: 97 1 F  Heart Rate: 64  Respiration: 18  Systolic: 517  Diastolic: 40  Height: 4 ft 11 in  Weight: 209 lb 9 6 oz  BMI Calculated: 42 33  BSA Calculated: 1 88  O2 Saturation: 94    Physical Exam    Constitutional   General appearance: Abnormal   Obese  Ears, Nose, Mouth, and Throat   Oropharynx: Normal with no erythema, edema, exudate or lesions  Pulmonary   Respiratory effort: No increased work of breathing or signs of respiratory distress  Auscultation of lungs: Clear to auscultation  Cardiovascular   Palpation of heart: Normal PMI, no thrills  Auscultation of heart: Normal rate and rhythm, normal S1 and S2, without murmurs  Abdomen   Abdomen: Non-tender, no masses  Liver and spleen: No hepatomegaly or splenomegaly  Lymphatic   Palpation of lymph nodes in neck: No lymphadenopathy  Assessment    1  Streptococcal sepsis (038 0,995 91) (A40 9)   2  Vertebral osteomyelitis (730 28) (M46 20)   3  Morbid obesity (278 01) (E66 01)    Plan    1  (1) CBC/PLT/DIFF; Status:Active; Requested for:00Fsb2146;    2  (1) CREATININE; Status:Active; Requested for:47Csy2008;    3  (1) SED RATE; Status:Active; Requested for:30Giy7347;     Discussion/Summary  Discussion Summary:   #1  Streptococcus bovis sepsis-in a patient with a prostatic valve  BREANN negative for endocarditis  She cleared her bacteremia and completed 8+ weeks of intravenous antibiotics  The patient still needs a outpatient colonoscopy to be done   Defer this to the primary M D  to refer    #2  Vertebral osteomyelitis-status post 8+ weeks of intravenous antibiotics  The patient is now on oral amoxicillin with a decreased sedimentation rate of 66  We'll continue the amoxicillin and monitor the CBC with differential, creatinine, and sedimentation rate  Recheck labs in 4 weeks    #3  Morbid obesity-probably contributing to the patient's chronic low back pain  The patient would benefit from significant weight loss  #4  Chronic back pain-would await referral to pain management until after the antibiotics are completed  Follow-up with me in 6 weeks    Counseling Documentation With Imm: The patient was counseled regarding diagnostic results, instructions for management, prognosis, patient and family education, risks and benefits of treatment options  Medication SE Review and Pt Understands Tx: Possible side effects of new medications were reviewed with the patient/guardian today  The treatment plan was reviewed with the patient/guardian  The patient/guardian understands and agrees with the treatment plan      Health Management  History of Screening for depression   *VB-Depression Screening; every 1 year; Last 50FOU9562; Next Due: 43ZYY7829; Overdue  History of Screening for genitourinary condition   *VB - Urinary Incontinence Screen (Dx Z13 89 Screen for UI); every 1 year; Last 53MOX6881; Next  Due: 87VNZ5047; Overdue  History of Screening for neurological condition   *VB - Fall Risk Assessment  (Dx Z13 89 Screen for Neurologic Disorder); every 1 year; Last  20HAP2203; Next Due: 98BAG3654; Overdue  Type 2 diabetes mellitus   (1) MICROALBUMIN CREATININE RATIO, RANDOM URINE; every 1 year; Last 67JPP3918; Next Due:  21Nib4452;  Active    Future Appointments    Date/Time Provider Specialty Site   08/29/2017 03:30 PM Sommer Ayon DO Internal Medicine [facility]     Signatures   Electronically signed by : Nolan Noyola MD; Aug 17 2017  2:32PM EST (Author)

## 2018-01-22 VITALS
DIASTOLIC BLOOD PRESSURE: 40 MMHG | WEIGHT: 209.6 LBS | BODY MASS INDEX: 42.25 KG/M2 | RESPIRATION RATE: 18 BRPM | HEART RATE: 64 BPM | OXYGEN SATURATION: 94 % | SYSTOLIC BLOOD PRESSURE: 102 MMHG | HEIGHT: 59 IN | TEMPERATURE: 97.1 F

## 2018-01-23 VITALS
HEART RATE: 69 BPM | DIASTOLIC BLOOD PRESSURE: 52 MMHG | TEMPERATURE: 97.6 F | OXYGEN SATURATION: 96 % | BODY MASS INDEX: 46.72 KG/M2 | SYSTOLIC BLOOD PRESSURE: 132 MMHG | WEIGHT: 238 LBS | HEIGHT: 60 IN

## 2018-01-23 VITALS
TEMPERATURE: 98.2 F | WEIGHT: 251.6 LBS | BODY MASS INDEX: 49.39 KG/M2 | OXYGEN SATURATION: 98 % | DIASTOLIC BLOOD PRESSURE: 58 MMHG | RESPIRATION RATE: 18 BRPM | HEIGHT: 60 IN | SYSTOLIC BLOOD PRESSURE: 128 MMHG | HEART RATE: 69 BPM

## 2018-01-23 NOTE — PROGRESS NOTES
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  She completed an 8 week course of intravenous antibiotics and now has been on oral amoxicillin for many months  She denies having any fever chills or sweats, denies any nausea vomiting or diarrhea  Her back pain continues to improve and she is ambulating without difficulty  Review of Systems  Complete-Female:   Constitutional: No fever, no chills, feels well, no tiredness, no recent weight gain or weight loss  Cardiovascular: No complaints of slow heart rate, no fast heart rate, no chest pain, no palpitations, no leg claudication, no lower extremity edema  Respiratory: No complaints of shortness of breath, no wheezing, no cough, no SOB on exertion, no orthopnea, no PND  Gastrointestinal: No complaints of abdominal pain, no constipation, no nausea or vomiting, no diarrhea, no bloody stools  Genitourinary: No complaints of dysuria, no incontinence, no pelvic pain, no dysmenorrhea, no vaginal discharge or bleeding  Musculoskeletal: as noted in HPI  Active Problems    1  Ambulatory dysfunction (719 7) (R26 2)   2  Anticoagulant long-term use (V58 61) (Z79 01)   3  Arthritis of knee (716 96) (M17 10)   4  Asthenia (780 79) (R53 1)   5  Benign essential hypertension (401 1) (I10)   6  Bilateral hearing loss due to cerumen impaction (389 8,380 4) (H61 23)   7  CAD (coronary artery disease) (414 00) (I25 10)   8  Cardiac arrhythmia (427 9) (I49 9)   9  Cardiac disease (429 9) (I51 9)   10  Chronic kidney disease, stage 3 (moderate) (585 3) (N18 3)   11  Elevated digoxin level (796 0) (R78 89)   12  Fall on same level from slipping, tripping or stumbling, initial encounter (E885 9)    (W01  0XXA)   13  GERD without esophagitis (530 81) (K21 9)   14  Gout (274 9) (M10 9)   15  HLD (hyperlipidemia) (272 4) (E78 5)   16  Hypercholesterolemia (272 0) (E78 00)   17   Hypokalemia (276 8) (E87 6)   18  Hypothyroidism, unspecified type (244 9) (E03 9)   19  Iron deficiency anemia (280 9) (D50 9)   20  Lactic acidosis (276 2) (E87 2)   21  Lumbar degenerative disc disease (722 52) (M51 36)   22  Meralgia paresthetica of right side (355 1) (G57 11)   23  Mitral valve prolapse (424 0) (I34 1)   24  Mitral valve replaced (V43 3) (Z95 2)   25  Mixed hyperlipidemia (272 2) (E78 2)   26  Morbid obesity (278 01) (E66 01)   27  Need for hepatitis C screening test (V73 89) (Z11 59)   28  Non-insulin dependent type 2 diabetes mellitus (250 00) (E11 9)   29  Onychomycosis of toenail (110 1) (B35 1)   30  Ovarian cyst, right (620 2) (N83 201)   31  Pain of right lower extremity (729 5) (M79 604)   32  Physical deconditioning (799 3) (R53 81)   33  Presence of prosthetic heart valve (V43 3) (Z95 2)   34  Psoriasis (696 1) (L40 9)   35  RBBB (right bundle branch block) (426 4) (I45 10)   36  Sepsis (038 9,995 91) (A41 9)   37  Streptococcal sepsis (038 0,995 91) (A40 9)   38  Streptococcus bovis infection (041 04) (A49 1)   39  Vertebral osteomyelitis (730 28) (M46 20)   40  Vitamin D deficiency (268 9) (E55 9)    Past Medical History    1  History of Abscess of abdominal wall (682 2) (L02 211)   2  History of Acute diastolic congestive heart failure (428 31,428 0) (I50 31)   3  History of Acute on chronic diastolic congestive heart failure (428 33,428 0) (I50 33)   4  History of Acute upper respiratory infection (465 9) (J06 9)   5  History of CHANTELL (acute kidney injury) (584 9) (N17 9)   6  History of Arteriovenous Malformation (CNS) (747 81)   7  History of Candidiasis of breast (112 89) (B37 89)   8  Denied: History of Carrier Of STD   9  History of Diabetes Mellitus (250 00)   10  History of Gastroparesis (536 3) (K31 84)   11  History of Generalized anxiety disorder (300 02) (F41 1)   12  History of acute bronchitis (V12 69) (Z87 09)   13  History of allergy (V15 09) (Z88 9)   14   History of blood coagulation disorder (V12 3) (Z86 2)   15  History of hypertension (V12 59) (Z86 79)   16  History of psoriasis (V13 3) (Z87 2)   17  History of Lymphedema (457 1) (I89 0)   18  History of Medicare annual wellness visit, subsequent (V70 0) (Z00 00)   19  Denied: History of Mental Status Change   20  History of Morbid or severe obesity due to excess calories (278 01) (E66 01)   21  History of Multiple joint pain (719 49) (M25 50)   22  History of Need for immunization against influenza (V04 81) (Z23)   23  History of Need for Tdap vaccination (V06 1) (Z23)   24  History of Need for vaccination with 13-polyvalent pneumococcal conjugate vaccine    (V03 82) (Z23)   25  History of Osteoporosis screening (V82 81) (Z13 820)   26  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)   27  History of Sciatica of right side (724 3) (M54 31)   28  History of Screening for breast cancer (V76 10) (Z12 31)   29  History of Screening for depression (V79 0) (Z13 89)   30  History of Screening for genitourinary condition (V81 6) (Z13 89)   31  History of Screening for neurological condition (V80 09) (Z13 89)   32  History of Type 2 diabetes mellitus (250 00) (E11 9)   33  History of Urinary Symptoms (788 69)   34  History of Visit for screening mammogram (V76 12) (Z12 31)   35  History of Visit for suture removal (V58 32) (Z48 02)   36  History of Weight gain (783 1) (R63 5)   37  History of Well adult on routine health check (V70 0) (Z00 00)    Surgical History    1  History of  Section   2  History of Mitral Valve Replacement   3  History of Tonsillectomy With Adenoidectomy   4  History of Tonsillectomy With Adenoidectomy    Family History    1  Family history of Diabetes Mellitus (V18 0)    2  Family history of Diabetes Mellitus (V18 0)   3  Family history of Type 2 Diabetes Mellitus    4  Family history of Carotid Artery Stenosis   5  Family history of Coronary Artery Disease (V17 49)    6   Family history of Family Health Status 2 Children Living   7  Family history of Stroke Syndrome (V17 1)    Social History    · Exercising Regularly   · Former smoker (A26 03) (R17 396)   · Marital History - Currently    · No alcohol use   · No illicit drug use   · Occupation:    Current Meds   1  Acetaminophen-Codeine #2 300-15 MG Oral Tablet; TAKE 1  TABLETS EVERY 6 HOURS   AS NEEDED for extremem pain; Therapy: 71NPE8406 to (Evaluate:21Oct2017); Last Rx:16Oct2017 Ordered   2  Allopurinol 100 MG Oral Tablet; TAKE 1 TABLET TWICE DAILY  Requested for:   29Ogo0127; Last Rx:16Aug2017 Ordered   3  Amiodarone HCl - 200 MG Oral Tablet; TAKE 1 TABLET DAILY; Therapy: (Recorded:76Npu5045) to Recorded   4  Atorvastatin Calcium 40 MG Oral Tablet; TAKE 1 TABLET DAILY  Requested for:   34Khu0473; Last Rx:60Ruc6721 Ordered   5  Calcitrene 0 005 % External Ointment; APPLY AND GENTLY MASSAGE INTO AFFECTED   AREA(S) TWICE DAILY; Therapy: 00RDG2476 to (Last Willodean Gals)  Requested for: 38Ccq6454 Ordered   6  Citalopram Hydrobromide 20 MG Oral Tablet (CeleXA); TAKE 1 TABLET DAILY    Requested for: 71Fii3344; Last Rx:21Kbz4373 Ordered   7  Digoxin 125 MCG Oral Tablet; TAKE 1 TABLET EVERY OTHER DAY; Therapy: (Amanda Nunez) to Recorded   8  Folbic 2 5-25-2 MG Oral Tablet; TAKE 1 TABELT TWICE DAILY  Requested for:   93Gfo4043; Last Rx:10Vxj3958 Ordered   9  Furosemide 80 MG Oral Tablet (Lasix); TAKE 1 TABLET DAILY  Requested for:   11Hot8009; Last Rx:98Nco2992 Ordered   10  Levothyroxine Sodium 50 MCG Oral Tablet; take 1 tablet by mouth one time daily; Therapy: 71LXW5807 to (Evaluate:14Mar2018)  Requested for: 07IAZ0082; Last    Rx:08Ydr9287 Ordered   11  LORazepam 0 5 MG Oral Tablet; TAKE ONE TABLET AT NIGHT AS NEEDED; Last    Rx:71Gdb3115 Ordered   12  MetFORMIN HCl - 500 MG Oral Tablet; TAKE 1 TABLET TWICE DAILY  Requested for:    75Kly0480; Last Rx:05Eia1703; Status: ACTIVE - Transmit to [facility]    Verification Ordered   13   Metoprolol Tartrate 25 MG Oral Tablet; take 1 tablet every twelve hours; Therapy: (Latanya Garcia) to Recorded   14  Naproxen 500 MG Oral Tablet; TAKE 1 TABLET EVERY 12 HOURS DAILY PRN with food; Therapy: 53Edf9697 to [phone])  Requested for: 39Jjl6279; Last    Rx:18Krk4777 Ordered   15  Nystop 790340 UNIT/GM External Powder; apply twice daily as needed under breast;    Therapy: 03Yza9765 to (Last Rx:27Rfn5009)  Requested for: 10Aug2017 Ordered   16  [facility] Formula Oral Capsule; TAKE 1 CAPSULE DAILY; Therapy: 95Jqs7210 to Recorded   17  Omeprazole 40 MG Oral Capsule Delayed Release; TAKE 1 CAPSULE DAILY     Requested for: 44Vbu1511; Last Rx:16Aug2017 Ordered   18  OneTouch Delica Lancets Fine Miscellaneous; TEST BLOOD GLUCOSE 1 TIME DAILY -    DX  E11 9; Therapy: 87XTR7481 to (Evaluate:86Xcs6178)  Requested for: 66FRH4299; Last    Rx:13Jun2017 Ordered   19  OneTouch Verio In Vitro Strip; TEST BLOOD GLUCOSE 1 TIME DAILY - DX  E11 9; Therapy: 83GJZ4738 to (Evaluate:65Une4524)  Requested for: 13HPW1268; Last    Rx:13Jun2017 Ordered   20  OneTouch Verio w/Device Kit; 1 kit - DX  E11 9; Therapy: 09MDY0235 to (Last Rx:13Jun2017)  Requested for: 13Jun2017 Ordered   21  Potassium Chloride Denise ER 20 MEQ Oral Tablet Extended Release; TAKE 1 TABLET    TWICE DAILY; Therapy: (Recorded:27Tuy9552) to Recorded   22  ProAir  (90 Base) MCG/ACT Inhalation Aerosol Solution; INHALE 1 TO 2 PUFFS    EVERY 4 TO 6 HOURS AS NEEDED; Therapy: 92IAK9517 to (Last Rx:26Jan2017)  Requested for: 26Jan2017 Ordered   23  Spironolactone 25 MG Oral Tablet; TAKE 1 TABLET DAILY; Therapy: 06Aew3271 to (Evaluate:86Pau1324)  Requested for: 61FET5335; Last    Rx:16Aug2017 Ordered   24  Warfarin Sodium 5 MG Oral Tablet; TAKE AS DIRECTED; Therapy: (Recorded:86Dpx8798) to Recorded    Allergies    1  Tetanus Toxoids   2  Tetanus-Diphtheria Toxoids Td SUSP    3   Seasonal    Vitals  Signs   Recorded: 87Mtq1938 02: 25PM   Temperature: 98 2 F  Heart Rate: 69  Respiration: 18  Systolic: 098  Diastolic: 58  BP Cuff Size: Large  Height: 4 ft 11 5 in  Weight: 251 lb 9 6 oz  BMI Calculated: 49 97  BSA Calculated: 2 05  O2 Saturation: 98    Physical Exam    Constitutional   General appearance: No acute distress, well appearing and well nourished  Ears, Nose, Mouth, and Throat   Oropharynx: Normal with no erythema, edema, exudate or lesions  Pulmonary   Respiratory effort: No increased work of breathing or signs of respiratory distress  Auscultation of lungs: Clear to auscultation  Cardiovascular   Auscultation of heart: Normal rate and rhythm, normal S1 and S2, without murmurs  Examination of extremities for edema and/or varicosities: Normal     Abdomen   Abdomen: Non-tender, no masses  Liver and spleen: No hepatomegaly or splenomegaly  Lymphatic   Palpation of lymph nodes in neck: No lymphadenopathy  Assessment    1  Streptococcus bovis infection (041 04) (A49 1)   2  Vertebral osteomyelitis (730 28) (M46 20)   3  Chronic kidney disease, stage 3 (moderate) (585 3) (N18 3)    Plan    1  Follow-up visit in 6 weeks Evaluation and Treatment  Follow-up  Status: Hold For -   Scheduling  Requested for: 42Lhf5095    Discussion/Summary  Discussion Summary:   Streptococcus bovis sepsis-in a patient with a prosthetic valve  The patient is cleared the bacteremia is doing well  No additional ID workup is needed  She is scheduled to have a colonoscopy in January  Streptococcus bovis vertebral osteomyelitis with psoas abscess-she continues to improve steadily and has a falling sedimentation rate  Her sedimentation rate has now fallen to 42  Will continue the the amoxicillin for now, and recheck CBC with diff, creatinine, and sedimentation rate in 5 weeks    Chronic kidney disease-the renal function did worsened a bit and now is modestly improved with increased oral intake  Her creatinine is now 1 79   Her primary care physician is aware this problem and is addressing this  Will continue to monitor the GFR in dose adjust antibiotics as needed    Counseling Documentation With Imm: The patient was counseled regarding diagnostic results, instructions for management, prognosis, risks and benefits of treatment options, importance of compliance with treatment  Patient's Capacity to Self-Care: Patient is able to Self-Care  Medication SE Review and Pt Understands Tx: Possible side effects of new medications were reviewed with the patient/guardian today  Health Management  History of Screening for depression   *VB-Depression Screening; every 1 year; Last 09IJE4429; Next Due: 00BVX9016; Overdue  History of Screening for genitourinary condition   *VB - Urinary Incontinence Screen (Dx Z13 89 Screen for UI); every 1 year; Last 53Eyu6868; Next  Due: 25Iqd0726; Active  History of Screening for neurological condition   *VB - Fall Risk Assessment  (Dx Z13 89 Screen for Neurologic Disorder); every 1 year; Last  57Cuj0744; Next Due: 18Cbh9791; Active  Type 2 diabetes mellitus   (1) MICROALBUMIN CREATININE RATIO, RANDOM URINE; every 1 year; Last 00DXM4187; Next Due:  60Pcc4027;  Overdue    Future Appointments    Date/Time Provider Specialty Site   02/22/2018 02:30 PM Corwin Espinoza DO Internal Medicine [facility]   05/21/2018 10:45 AM Corwin Espinoza DO Internal Medicine [facility]     Signatures   Electronically signed by : Nicho Juarez MD; Dec 21 2017  2:43PM EST                       (Author)

## 2018-01-23 NOTE — PROGRESS NOTES
Assessment   1  Fall on same level from slipping, tripping or stumbling, initial encounter (E885 9) (W01  0XXA)  2  Visit for suture removal (V58 32) (Z48 02)    Discussion/Summary  Discussion Summary:   11 Sutures removed, patient stable  No concerns  Follow up as needed  Allow steri strips to fall off on own  Return as needed  1        1 Amended By: Marlena Amaya; Apr 26 2016 11:32 AM EST    Chief Complaint  Chief Complaint Free Text Note Form: Pt is here fore a suture removal  Sutures placed 4/9/2016 @  [facility]  History of Present Illness  HPI: 73 yo female here for suture removal after fall and laceration to face 7 days ago  Healing well, no symptoms of headache, n/v/dizziness  Has been holding on her coumadin due to recommendations at ER  No new symptoms, facial ecchymosis improving per patient  No cp/sob/ambulatory dysfunction  Review of Systems  Complete-Female:   Constitutional: no fever and no chills  Eyes: ecchymosis in several areas due to, but no eye pain, no dryness of the eyes, eyes not red and no purulent discharge from the eyes  ENT: as noted in HPI  Respiratory: No complaints of shortness of breath, no wheezing, no cough, no SOB on exertion, no orthopnea, no PND  Musculoskeletal: No complaints of arthralgias, no myalgias, no joint swelling or stiffness, no limb pain or swelling  Integumentary: No complaints of skin rash or lesions, no itching, no skin wounds, no breast pain or lump  ROS Reviewed:   ROS reviewed  1        1 Amended By: Marlena Amaya; Apr 26 2016 11:30 AM EST    Active Problems   1  Abscess of abdominal wall (682 2) (L02 211)  2  Benign essential hypertension (401 1) (I10)  3  Esophageal reflux (530 81) (K21 9)  4  Hypercholesterolemia (272 0) (E78 0)  5  Hypokalemia (276 8) (E87 6)  6  Iron deficiency anemia due to chronic blood loss (280 0) (D50 0)  7  Medicare annual wellness visit, subsequent (V70 0) (Z00 00)  8   Need for Tdap vaccination (V06 1) (Z23)  9  Need for vaccination with 13-polyvalent pneumococcal conjugate vaccine (V03 82) (Z23)  10  Psoriasis (696 1) (L40 9)  11  Screening for depression (V79 0) (Z13 89)  12  Screening for genitourinary condition (V81 6) (Z13 89)  13  Screening for neurological condition (V80 09) (Z13 89)  14  Type 2 diabetes mellitus (250 00) (E11 9)  15  Vitamin D deficiency (268 9) (E55 9)    Past Medical History   1  History of Acute bronchitis (466 0) (J20 9)  2  History of Acute upper respiratory infection (465 9) (J06 9)  3  History of Arteriovenous Malformation (CNS) (747 81)  4  Denied: History of Carrier Of STD  5  History of Diabetes Mellitus (250 00)  6  History of Gastroparesis (536 3) (K31 84)  7  History of Generalized anxiety disorder (300 02) (F41 1)  8  History of allergy (V15 09) (Z88 9)  9  History of blood coagulation disorder (V12 3) (Z86 2)  10  History of hypertension (V12 59) (Z86 79)  11  History of iron deficiency anemia (V12 3) (Z86 2)  12  History of psoriasis (V13 3) (Z87 2)  13  History of Lymphedema (457 1) (I89 0)  14  Denied: History of Mental Status Change  15  History of Morbid or severe obesity due to excess calories (278 01) (E66 01)  16  History of Multiple joint pain (719 49) (M25 50)  17  History of Need for immunization against influenza (V04 81) (Z23)  18  History of Osteoporosis screening (V82 81) (Z13 820)  19  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)  20  History of Screening for breast cancer (V76 10) (Z12 39)  21  History of Screening for depression (V79 0) (Z13 89)  22  History of Screening for genitourinary condition (V81 6) (Z13 89)  23  History of Screening for neurological condition (V80 09) (Z13 89)  24  History of Type 2 diabetes mellitus (250 00) (E11 9)  25  History of Urinary Symptoms (788 69)  26  History of Visit for screening mammogram (V76 12) (Z12 31)  27   History of Well adult on routine health check (V70 0) (Z00 00)  Active Problems And Past Medical History Reviewed: The active problems and past medical history were reviewed and updated today  Surgical History   1  History of  Section  2  History of Mitral Valve Replacement  3  History of Tonsillectomy With Adenoidectomy  4  History of Tonsillectomy With Adenoidectomy  Surgical History Reviewed: The surgical history was reviewed and updated today  Family History   1  Family history of Diabetes Mellitus (V18 0)   2  Family history of Diabetes Mellitus (V18 0)  3  Family history of Type 2 Diabetes Mellitus   4  Family history of Carotid Artery Stenosis  5  Family history of Coronary Artery Disease (V17 49)   6  Family history of Family Health Status 2  Children Living  7  Family history of Stroke Syndrome (V17 1)  Family History Reviewed: The family history was reviewed and updated today  Social History    · Denied: History of Alcohol Use (History)   · Denied: History of Drug Use   · Exercising Regularly   · Former smoker (F48 26) (M58 001)   · Marital History - Currently    · Occupation:  Social History Reviewed: The social history was reviewed and updated today  The social history was reviewed and is unchanged  Current Meds  1  Accu-Chek Multiclix Lancets Miscellaneous; test 2-3 times daily; Therapy: [phone]) to Recorded  2  Allopurinol 100 MG Oral Tablet; TAKE 1 TABLET TWICE DAILY  Requested for: 66Xip5742; Last   Rx:72Bjx5127 Ordered  3  Atorvastatin Calcium 40 MG Oral Tablet; TAKE 1 TABLET DAILY  Requested for: 90Yxc1504; Last   Rx:84Ekv6114 Ordered  4  Citalopram Hydrobromide 20 MG Oral Tablet; TAKE 1 TABLET DAILY  Requested for: 94Trz2830; Last Rx:81Eyn1085 Ordered  5  Clobetasol Propionate 0 05 % External Ointment; APPLY AND GENTLY MASSAGE INTO AFFECTED   AREA(S) TWICE DAILY; Therapy: 10HFM6559 to (Evaluate:46Zzt7924)  Requested for: 28Vgn1257; Last Rx:74Wpa7676   Ordered  6   Ergocalciferol 95591 UNIT Oral Capsule; TAKE 1 CAPSULE WEEKLY  Requested for: 19ZZQ4752; Last Rx:29Sti1686 Ordered  7  Ferrex 150 150 MG Oral Capsule; TAKE 1 CAPSULE BY MOUTH TWICE DAILY; Therapy: (0472 51 11 42) to Recorded  8  Folbic 2 5-25-2 MG Oral Tablet; TAKE 1 TABELT TWICE DAILY  Requested for: 57Hlb6254; Last   Rx:76Ula4948 Ordered  9  Furosemide 80 MG Oral Tablet; TAKE 1 AND 1/2 TABLETS DAILY  Requested for: 98Nmq5738; Last   Rx:19Wjj9661 Ordered  10  Klor-Con M20 20 MEQ Oral Tablet Extended Release; take 1 tab po qid; Therapy: 91KPD9054 to (Last Rx:62Rla1230)  Requested for: 21Dec2015 Ordered  11  MetFORMIN HCl - 500 MG Oral Tablet; TAKE 1 TABLET TWICE DAILY  Requested for: 23Dqe3633; Last Rx:80Zkf5000 Ordered  12  Metolazone 5 MG Oral Tablet; TAKE 1 TABLET BY MOUTH ON [facility] AND SUNDAY     Requested for: 92NNC3539; Last Rx:62Ufz7317 Ordered  13  [facility] Formula Oral Capsule; TAKE 1 CAPSULE DAILY; Therapy: 15Apr2016 to Recorded  14  Omeprazole 40 MG Oral Capsule Delayed Release; TAKE 1 CAPSULE DAILY  Requested for:    29Nqx6823; Last Rx:49Ahn5034 Ordered  15  Turmeric 500 MG Oral Capsule; take 1 capsule 4 times daily; Therapy: 50Btz8854 to Recorded  16  Vitamin C 500 MG Oral Tablet; TAKE 1 TABLET DAILY; Therapy: (Recorded:21Xrf2801) to Recorded  17  Warfarin Sodium 5 MG Oral Tablet; TAKE 1 TABLET DAILY AS DIRECTED; Therapy: 61SEN5656 to (Evaluate:18Jun2016)  Requested for: 27Hci2547; Last Rx:31Lvy5370    Ordered  Medication List Reviewed: The medication list was reviewed and updated today  Allergies   1  Tetanus Toxoids  2  Tetanus-Diphtheria Toxoids Td SUSP   3   Seasonal    Vitals  Vital Signs [Data Includes: Current Encounter]    Recorded: 15Apr2016 11:29AM   Temperature 98 3 F, Oral   Heart Rate 97   Systolic 477, LUE, Sitting   Diastolic 64, LUE, Sitting   BP Cuff Size Large   Height 4 ft 11 7 in   Weight 268 lb    BMI Calculated 52 87   BSA Calculated 2 11   O2 Saturation 96, RA     Physical Exam    Constitutional   General appearance: No acute distress, well appearing and well nourished  Eyes   Conjunctiva and lids: No swelling, erythema or discharge  Pupils and irises: Equal, round and reactive to light  Ears, Nose, Mouth, and Throat   External inspection of ears and nose: Abnormal     Otoscopic examination: Tympanic membranes translucent with normal light reflex  Canals patent without erythema  Nasal mucosa, septum, and turbinates: Normal without edema or erythema  Pulmonary   Respiratory effort: No increased work of breathing or signs of respiratory distress  Cardiovascular   Palpation of heart: Normal PMI, no thrills  Abdomen   Liver and spleen: No hepatomegaly or splenomegaly  Lymphatic   Palpation of lymph nodes in neck: No lymphadenopathy  Musculoskeletal   Gait and station: Normal     Digits and nails: Normal without clubbing or cyanosis  Inspection/palpation of joints, bones, and muscles: Normal     Skin   Skin and subcutaneous tissue: Normal without rashes or lesions  Neurologic   Cranial nerves: Cranial nerves 2-12 intact  Reflexes: 2+ and symmetric  Sensation: No sensory loss  Psychiatric   Orientation to person, place, and time: Normal     Mood and affect: Normal          Health Management  History of Screening for depression   *VB-Depression Screening; every 1 year; Last 38QED2220; Next Due: 20LMC0482; Active  History of Screening for genitourinary condition   *VB-Urinary Incontinence Screen (Dx V81 6 Screen for UI); every 1 year; Last 79YIA5490; Next  Due: 28XJX5810; Active  History of Screening for neurological condition   *VB - Fall Risk Assessment  (Dx V80 09 Screen for Neurologic Disorder); every 1 year; Last  98SWD9389; Next Due: 96ZBZ0704; Active  Type 2 diabetes mellitus   (1) MICROALBUMIN CREATININE RATIO, RANDOM URINE; every 1 year; Last 57VXG9755; Next Due:  51GKW8330;  Active    Future Appointments    Date/Time Provider Specialty Site   06/28/2016 12:15 PM Maik Reynaga Ely Mckinney, DO Internal Medicine [facility]     Signatures   Electronically signed by : Ashutosh Vicente [address] ; Apr 15 2016 12:10PM EST                       (Author)    Electronically signed by : Marilyn Stanley MD; Apr 15 2016 12:24PM EST                       (Author)    Electronically signed by : Ashutosh Vicente [address] ; Apr 26 2016 11:32AM EST                       (Author)    Electronically signed by : SONIA Mirza; Apr 26 2016 11:34AM EST                       (Author)    Electronically signed by : SONIA Mirza; Apr 26 2016 12:41PM EST                       (Author)    Electronically signed by : SONIA Mirza; Apr 26 2016 12:42PM EST                       (Author)    Electronically signed by : SONIA Mirza; Apr 26 2016  1:05PM EST                       (Author)    Electronically signed by :  Gina Thompson, ; Oct 31 2016 11:16AM EST                       (Author)

## 2018-01-24 NOTE — MISCELLANEOUS
Assessment   1  Vertebral osteomyelitis (730 28) (M46 20)1   2  Streptococcus bovis infection (041 04) (A49 1)1   3  Streptococcal sepsis (038 0,995 91) (A40 9)1   4  Acute on chronic diastolic congestive heart failure (428 33,428 0) (I50 33)1   5  Chronic kidney disease, stage 3 (moderate) (585 3) (N18 3)1      1 Amended By: Lindsay Collier; Jun 12 2017 4:14 PM EST    Plan  Chronic kidney disease, stage 3 (moderate)    · (1) C-REACTIVE PROTEIN; Status:Active; Requested CDU:[id number]; 1   Perform:[facility]; CSA:[id number]; Ordered; For:Chronic kidney disease,   stage 3 (moderate); Ordered By:Yanely Garza   Chronic kidney disease, stage 3 (moderate), Streptococcal sepsis, Streptococcus bovis  infection, Type 2 diabetes mellitus, Vertebral osteomyelitis    · (1) COMPREHENSIVE METABOLIC PANEL; Status:Active; Requested PWT:[id number]; 1   Perform:[facility]; SBT:[id number]; Ordered; For:Chronic kidney disease,   stage 3 (moderate), Streptococcal sepsis, Streptococcus bovis infection, Type   2 diabetes mellitus, Vertebral osteomyelitis; Ordered By:Yanely Garza   Chronic kidney disease, stage 3 (moderate), Streptococcal sepsis, Streptococcus bovis  infection, Vertebral osteomyelitis    · (1) CBC/PLT/DIFF; Status:Active; Requested TYD:[id number]; 1   Perform:[facility]; LKF:[id number]; Ordered; For:Chronic kidney disease,   stage 3 (moderate), Streptococcal sepsis, Streptococcus bovis infection, Vertebral   osteomyelitis; Ordered By:Yanely Garza    · (1) SED RATE; Status:Active; Requested IUA:[id number]; 1   Perform:[facility]; CER:[id number]; Ordered; For:Chronic kidney disease,   stage 3 (moderate), Streptococcal sepsis, Streptococcus bovis infection, Vertebral   osteomyelitis;  Ordered By:Yanely Garza      1 Amended By: Lindsay Collier; Jun 12 2017 4:15 PM EST    Discussion/Summary  Discussion Summary:   As discussed, we will check labs to 1  1,2 compare 2  1,2  to 2  1,2  your labs while 2  you 1  1,2  were 2  1,2  in the hospital  2    1    2  We will have our , Bc Naif, reach out to you and your family about potentially reconsidering short term rehab  Return in 1 week for 1  1,2  follow-up to review your results  If you suddenly feel worse or feel that your condition is changing, then go to the ER  2    Counseling Documentation With Imm: The  patient1  was counseled regarding1  diagnostic results1 , instructions for management1 , risk factor reductions1 , prognosis1 , patient and family education1 , impressions1 , risks and benefits of treatment options1 , importance of compliance with treatment1   Medication SE Review and Pt Understands Tx: Possible side effects of new medications were reviewed with the patient/guardian today2  The treatment plan was reviewed with the patient/guardian  The patient/guardian understands and agrees with the treatment plan2        1 Amended By: Destinee Torre; Jun 12 2017 4:18 PM EST   2 Amended By: Destinee Torre; Jun 12 2017 4:51 PM EST    Chief Complaint  Chief Complaint Free Text Note Form: pt is here for RENATO MCKEON visit, pt was hospitalized at [facility] on 5/23/2017 and discharged to home on 6/5/2017  Diagnoses: streptoccal sepsis,gout,GERD,DM,CAD, Mitral valve replacement,chronic diastolic CHF  1  1,3  1,2  2,3  1,3        1 Amended By: Sandy Chen; Jun 12 2017 3:42 PM EST   2 Amended By: Destinee Torre; Jun 12 2017 4:38 PM EST   3 Amended By: Destinee Torre; Jun 12 2017 4:47 PM EST    History of Present Illness  TCM Communication [facility]: The patient is being contacted for follow-up after hospitalization and JOSEPH scheduled 6/12/17 @ 3:45 PM w/ SC in NH  She was hospitalized at [facility]  The date of admission: 5/23/17, date of discharge: 6/5/17   Diagnosis: streptococcal sepsis, gout, GERD, DM, CAD, Mitral valve replacement, chronic diastolic CHF, vertebral osteomyelitis, hypokalemia, right ovarian cyst, lactic acidosis  She was discharged to home  Medications reviewed and updated today  She scheduled a follow up appointment  Symptoms: fatigue  The patient is currently experiencing symptoms  edema Counseling was provided to the patient  Communication performed and completed by guilherme   HPI: Patient presents for a 1  1,2  JOSEPH 2  1,2  visit  She has visiting nurses come to see her one time per week and her  and family cares for her otherwise  After reviewing her records, it appears that it was recommended that she be discharged to a short-term rehab instead of home, but she had requested to go home instead  She feels that maybe she should have gone to rehab  2  She reports that the IV antibiotic that she is taking is making her feel like she is lightheaded and that it makes her tired  1  1,2  She 1  1,2  feels that her pain is improving her back pain somewhat, as this is where she has the osteomyelitis  The patient reports that she feels restless and agitated, and feels that she is having trouble thinking clearly  She feels that this is the same as she was in the hospital and since she got home  1  1,2  She 2  1,2  feels 2  1,2  that she should be getting better faster than she has been, and this is frustrating  She does verbalize that 2  she 1  1,2  understands how the treatment and management of her OM will be long term and 2  that1  her recovery may be long  2      She reports that overall she is feeling better than before she went to the hospital, but not significantly better than she felt when she was discharged  She has an appointment with ID next week  3        1 Amended By: Phan Sanchez; Jun 12 2017 4:02 PM EST   2 Amended By: Phan Sanchez; Jun 12 2017 4:37 PM EST   3 Amended By: Phan Sanchez; Jun 12 2017 4:38 PM EST    Review of Systems  Complete-Female:   Constitutional:1  feeling poorly1 , chills1  and feeling tired1 , but no fever1      Cardiovascular:1  no chest pain1  and no palpitations1   Respiratory: shortness of breath during exertion1 , but no cough1  and no wheezing1   Gastrointestinal:1  no nausea1 , no vomiting1 , no constipation1  and no diarrhea1   Musculoskeletal:1  back pain associated with the OM, and right leg pain  1   Integumentary:1  No complaints of skin rash or lesions, no itching, no skin wounds, no breast pain or lump1   Neurological:1  tingling1 , but no headache1  and no dizziness1     The patient presents with complaints of confusion (she admits to some mild confusion)1   Hematologic/Lymphatic:1  a tendency for easy bruising1     The patient presents with complaints of a tendency for easy bleeding (on coumadin)1   ROS Reviewed:   ROS reviewed  2        1 Amended By: Aislinn Pacheco; Jun 12 2017 4:12 PM EST   2 Amended By: Aislinn Pacheco; Jun 12 2017 4:47 PM EST    Active Problems   1  Acute diastolic congestive heart failure (428 31,428 0) (I50 31)  2  CHANTELL (acute kidney injury) (584 9) (N17 9)  3  Arthritis of knee (716 96) (M17 10)  4  Benign essential hypertension (401 1) (I10)  5  Bilateral hearing loss due to cerumen impaction (389 8,380 4) (H61 23)  6  CAD (coronary artery disease) (414 00) (I25 10)  7  Chronic kidney disease, stage 3 (moderate) (585 3) (N18 3)  8  Fall on same level from slipping, tripping or stumbling, initial encounter (E885 9)   (W01  0XXA)  9  GERD without esophagitis (530 81) (K21 9)  10  Gout (274 9) (M10 9)  11  HLD (hyperlipidemia) (272 4) (E78 5)  12  Hypercholesterolemia (272 0) (E78 00)  13  Hypokalemia (276 8) (E87 6)  14  Iron deficiency anemia (280 9) (D50 9)  15  Iron deficiency anemia due to chronic blood loss (280 0) (D50 0)  16  Lumbar degenerative disc disease (722 52) (M51 36)  17  Meralgia paresthetica of right side (355 1) (G57 11)  18  Mitral valve prolapse (424 0) (I34 1)  19  Mitral valve replaced (V43 3) (Z95 2)  20  Mixed hyperlipidemia (272 2) (E78 2)  21   Need for influenza vaccination (V04 81) (Z23)  22  Pain of right lower extremity (729 5) (M79 604)  23  Psoriasis (696 1) (L40 9)  24  RBBB (right bundle branch block) (426 4) (I45 10)  25  Streptococcus bovis infection (041 04) (A49 1)  26  Type 2 diabetes mellitus (250 00) (E11 9)  27  Vitamin D deficiency (268 9) (E55 9)    Past Medical History   1  History of Abscess of abdominal wall (682 2) (L02 211)  2  History of Acute upper respiratory infection (465 9) (J06 9)  3  History of Arteriovenous Malformation (CNS) (747 81)  4  Denied: History of Carrier Of STD  5  History of Diabetes Mellitus (250 00)  6  History of Gastroparesis (536 3) (K31 84)  7  History of Generalized anxiety disorder (300 02) (F41 1)  8  History of acute bronchitis (V12 69) (Z87 09)  9  History of allergy (V15 09) (Z88 9)  10  History of blood coagulation disorder (V12 3) (Z86 2)  11  History of hypertension (V12 59) (Z86 79)  12  History of psoriasis (V13 3) (Z87 2)  13  History of Lymphedema (457 1) (I89 0)  14  History of Medicare annual wellness visit, subsequent (V70 0) (Z00 00)  15  Denied: History of Mental Status Change  16  History of Morbid or severe obesity due to excess calories (278 01) (E66 01)  17  History of Multiple joint pain (719 49) (M25 50)  18  History of Need for immunization against influenza (V04 81) (Z23)  19  History of Need for Tdap vaccination (V06 1) (Z23)  20  History of Need for vaccination with 13-polyvalent pneumococcal conjugate vaccine    (V03 82) (Z23)  21  History of Osteoporosis screening (V82 81) (Z13 820)  22  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)  23  History of Sciatica of right side (724 3) (M54 31)  24  History of Screening for breast cancer (V76 10) (Z12 39)  25  History of Screening for depression (V79 0) (Z13 89)  26  History of Screening for depression (V79 0) (Z13 89)  27  History of Screening for genitourinary condition (V81 6) (P03 32)  28  History of Screening for genitourinary condition (V81 6) (L85 46)  29  History of Screening for neurological condition (V80 09) (Z13 89)  30  History of Screening for neurological condition (V80 09) (Z13 89)  31  History of Type 2 diabetes mellitus (250 00) (E11 9)  32  History of Urinary Symptoms (788 69)  33  History of Visit for screening mammogram (V76 12) (Z12 31)  34  History of Visit for suture removal (V58 32) (Z48 02)  35  History of Weight gain (783 1) (R63 5)  36  History of Well adult on routine health check (V70 0) (Z00 00)    Surgical History   1  History of  Section  2  History of Mitral Valve Replacement  3  History of Tonsillectomy With Adenoidectomy  4  History of Tonsillectomy With Adenoidectomy  Surgical History Reviewed: The surgical history was reviewed and updated today  1        1 Amended By: Autumn Bear; 2017 4:39 PM EST    Family History  Mother   1  Family history of Diabetes Mellitus (V18 0)  Father   2  Family history of Diabetes Mellitus (V18 0)  3  Family history of Type 2 Diabetes Mellitus  Sister   4  Family history of Carotid Artery Stenosis  5  Family history of Coronary Artery Disease (V17 49)  Family History   6  Family history of Family Health Status 2  Children Living  7  Family history of Stroke Syndrome (V17 1)  Family History Reviewed: The family history was reviewed and updated today  1        1 Amended By: Autumn Bear; 2017 4:39 PM EST    Social History    · Exercising Regularly   · Former smoker (R53 33) (A24 811)   · Marital History - Currently    · No alcohol use   · No illicit drug use   · Occupation:    Social History Reviewed: The social history was reviewed and updated today  1  The social history was reviewed and is unchanged  1        1 Amended By: Autumn Bear; 2017 4:39 PM EST    Current Meds  1  2   2  Acetaminophen-Codeine #2 300-15 MG Oral Tablet; TAKE 1 TO 2 TABLETS EVERY 4   HOURS AS NEEDED; Therapy: 83GXT3612 to (Evaluate:59Qki3042); Last Rx:02Bjt2040 Ordered  3  Allopurinol 100 MG Oral Tablet; TAKE 1 TABLET TWICE DAILY  Requested for:   30FET3152; Last TM:70LUO9519 Ordered  4  Atorvastatin Calcium 40 MG Oral Tablet; TAKE 1 TABLET DAILY  Requested for:   17LOJ9374; Last WB:99MKL2596 Ordered  5  Calcitrene 0 005 % External Ointment; APPLY AND GENTLY MASSAGE INTO AFFECTED   AREA(S) TWICE DAILY; Therapy: 82TDQ6830 to (Last Rx:30Nov2016)  Requested for: 93TUH2111 Ordered  6  Citalopram Hydrobromide 20 MG Oral Tablet (CeleXA);1  TAKE 1 TABLET DAILY  Requested for: 43TXF2703; Last Rx:26Jan2017 Ordered  7  Clobetasol Propionate 0 05 % External Ointment; APPLY AND GENTLY MASSAGE INTO   AFFECTED AREA(S) TWICE DAILY; Therapy: 58Mzk5188 to [phone])  Requested for: 68LWP0138; Last   Rx:30Nov2016 Ordered  8  DiazePAM 5 MG Oral Tablet; TAKE 1 TABLET AT BEDTIME AS NEEDED; Therapy: 61RYI4569 to (Evaluate:81Jzp5940); Last Rx:15May2017 Ordered  9  Ferrex 150 150 MG Oral Capsule; TAKE 1 CAPSULE BY MOUTH TWICE DAILY; Therapy: ([phone] to Recorded  10  Folbic 2 5-25-2 MG Oral Tablet; TAKE 1 TABELT TWICE DAILY  Requested for:    40Jwh8459; Last Rx:26Jan2017 Ordered  11  Furosemide 80 MG Oral Tablet (Lasix);1  TAKE 1 TABLET DAILY  Requested for: 82UGS8151; Last Rx:26Jan2017 Ordered  12  Klor-Con M20 20 MEQ Oral Tablet Extended Release; TAKE 2 TABLETS 4 TIMES DAILY; Therapy: 26DGP6890 to [phone]  Requested for: 49BBL8251; Last    Rx:06Apr2017 Ordered  13  LC-4 Lidocaine 4 % External Cream; apply cream 3-4 times daily as needed for pain    (may patch if preferred); Therapy: 32NFE2185 to (Last Rx:72Xnp7107) Ordered  14  MetFORMIN HCl - 500 MG Oral Tablet; TAKE 1 TABLET TWICE DAILY  Requested for:    99IYO3319; Last Rx:26Jan2017 Ordered  15  MetOLazone 5 MG Oral Tablet (Zaroxolyn);1  TAKE 1 TABLET BY MOUTH ON MONDAY, SUNDAY  Requested for: 12CPS1775; Last    Rx:26Jan2017 Ordered  16   [facility] Formula Oral Capsule; TAKE 1 CAPSULE DAILY; Therapy: 15Apr2016 to Recorded  17  Omeprazole 40 MG Oral Capsule Delayed Release; TAKE 1 CAPSULE DAILY     Requested for: 12CBY7175; Last Rx:26Jan2017 Ordered  18  OneTouch Delica Lancets Fine Miscellaneous; TEST BLOOD GLUCOSE 1 TIME DAILY -    DX  E11 9; Therapy: 97EXH6801 to (Evaluate:06Loi7263)  Requested for: 26DPE7286; Last    Rx:13Jun2017 Ordered2   19  OneTouch Verio In Vitro Strip; TEST BLOOD GLUCOSE 1 TIME DAILY - DX  E11 9; Therapy: 14PVT9152 to (Evaluate:54Omz7481)  Requested for: 46VPX4840; Last    Rx:13Jun2017 Ordered2   20  OneTouch Verio w/Device Kit; 1 kit - DX  E11 9; Therapy: 54BPG5851 to (Last Rx:13Jun2017)  Requested for: 13Jun2017 Ordered    2   21  Otezla 30 MG Oral Tablet; One po BID; Therapy: 20NOC7691 to (Last Rx:76Imt1346) Ordered  22  ProAir  (90 Base) MCG/ACT Inhalation Aerosol Solution; INHALE 1 TO 2 PUFFS    EVERY 4 TO 6 HOURS AS NEEDED; Therapy: 03DGK7190 to (Last Rx:26Jan2017)  Requested for: 26Jan2017 Ordered  23  Spironolactone 25 MG Oral Tablet; TAKE 1 TABLET DAILY; Therapy: 71Yru3139 to (Evaluate:30Tvf2393); Last Rx:59Cdi8311 Ordered  24  TraMADol HCl - 50 MG Oral Tablet; take 1 tablet daily as needed; Therapy: 14Apr2017 to (Last Rx:14Apr2017) Ordered  25  Triamcinolone Acetonide 0 5 % External Cream; APPLY SPARINGLY AND MASSAGE IN    TWICE DAILY; Therapy: 42Jgf9722 to (Last Rx:65Szs2927) Ordered  26  Turmeric 500 MG Oral Capsule; take 1 capsule 4 times daily; Therapy: 15Apr2016 to Recorded  27  Vitamin C 500 MG Oral Tablet; TAKE 1 TABLET DAILY; Therapy: (Susanne Marie) to Recorded  28  Vitamin D (Ergocalciferol) 66469 UNIT Oral Capsule; TAKE 1 CAPSULE WEEKLY     Requested for: 76Pgk2519; Last LZ:59ATA6588 Ordered  29  Warfarin Sodium 5 MG Oral Tablet (Coumadin);1  TAKE 1 TABLET DAILY AS DIRECTED; Therapy: 96JHK4044 to ([phone])  Requested for: 98XKX3488; Last    Rx:26Jan2017 Ordered  Medication List Reviewed:    The medication list was reviewed and updated today  1        1 Amended By: Dayanara Jo; Jun 12 2017 4:39 PM EST   2 Amended By: Janet Nelson; Jun 14 2017 3:51 PM EST    Allergies   1  Tetanus Toxoids  2  Tetanus-Diphtheria Toxoids Td SUSP   3  Seasonal    Physical Exam    Constitutional1    General appearance: Abnormal  1  Chronically ill1  and morbidly obese1   Eyes1    Conjunctiva and lids: No swelling, erythema or discharge  1    Ears, Nose, Mouth, and Throat1    External inspection of ears and nose: Normal 1    Oropharynx: Normal with no erythema, edema, exudate or lesions  1    Pulmonary1    Respiratory effort: No increased work of breathing or signs of respiratory distress  1    Auscultation of lungs: Clear to auscultation  1    Cardiovascular1    Auscultation of heart: Abnormal  1  The heart rate was1  normal1   Heart sounds: 1  a click was heard1   2/6 systolic murmur1  Prosthetic valve:1  prosthetic mitral valve heard1   Examination of extremities for edema and/or varicosities: Abnormal  1  Bilateral ankle 2+ pitting edema1  and bilateral pretibial 2+ pitting edema1   Abdomen1    Abdomen: Non-tender, no masses  1  Obese1   Musculoskeletal1  Patient in [address] [place] [address]    Orientation to person, place, and time: Normal 1    Mood and affect: Abnormal  1  Mood and Affect:1  constricted1           1 Amended By: Dayanara Jo; Jun 12 2017 4:49 PM EST    Health Management  History of Screening for depression   *VB-Depression Screening; every 1 year; Last 03PNS2839; Next Due: 09JWM1269; Overdue  History of Screening for genitourinary condition   *VB - Urinary Incontinence Screen (Dx Z13 89 Screen for UI); every 1 year; Last 22HRY3112; Next  Due: 36LLT7436; Overdue  History of Screening for neurological condition   *VB - Fall Risk Assessment  (Dx Z13 89 Screen for Neurologic Disorder); every 1 year; Last  70PTR3545; Next Due: 83BML2624;  Overdue  Type 2 diabetes mellitus   (1) MICROALBUMIN CREATININE RATIO, RANDOM URINE; every 1 year; Last 44SKT6312; Next Due:  89Hbk5905;  Active    Future Appointments    Date/Time Provider Specialty Site   06/12/2017 03:45 PM Cutting, Severiano Gouty, [address]  Internal [facility]   06/23/2017 09:30 AM Antonio Rodas [facility]     Signatures   Electronically signed by : Dinora Andersen, ; Jun 6 2017 12:27PM EST                       (Co-author)    Electronically signed by : Yousif Rowley; Jun 6 2017 12:28PM EST                       (Review)    Electronically signed by : Ramon Patricia MD; Jun 6 2017  6:07PM EST                          Electronically signed by : Yousif Rowley; Jun 12 2017  4:51PM EST                       (Author)    Electronically signed by : Yousif Rowley; Jun 12 2017  4:52PM EST                       (Author)    Electronically signed by : Lionel Rae MD; Jun 14 2017  3:51PM EST                       (Author)

## 2018-01-29 LAB — HCV AB SER-ACNC: NEGATIVE

## 2018-01-30 RX ORDER — LEVOTHYROXINE SODIUM 0.05 MG/1
1 TABLET ORAL DAILY
COMMUNITY
Start: 2017-12-14 | End: 2018-03-12 | Stop reason: SDUPTHER

## 2018-01-30 RX ORDER — NYSTATIN 100000 [USP'U]/G
1 POWDER TOPICAL DAILY
COMMUNITY
Start: 2017-08-08

## 2018-01-30 RX ORDER — LORAZEPAM 0.5 MG/1
TABLET ORAL
COMMUNITY
End: 2018-07-13 | Stop reason: ALTCHOICE

## 2018-01-30 RX ORDER — NAPROXEN 500 MG/1
TABLET ORAL
COMMUNITY
Start: 2017-08-29 | End: 2018-05-23 | Stop reason: ALTCHOICE

## 2018-01-30 RX ORDER — ACETAMINOPHEN AND CODEINE PHOSPHATE 300; 15 MG/1; MG/1
TABLET ORAL
COMMUNITY
Start: 2017-05-15 | End: 2018-05-23 | Stop reason: ALTCHOICE

## 2018-01-30 RX ORDER — AMOXICILLIN 500 MG/1
1 CAPSULE ORAL EVERY 8 HOURS
COMMUNITY
End: 2018-05-23 | Stop reason: ALTCHOICE

## 2018-02-01 ENCOUNTER — OFFICE VISIT (OUTPATIENT)
Dept: INFECTIOUS DISEASES | Facility: CLINIC | Age: 73
End: 2018-02-01
Payer: COMMERCIAL

## 2018-02-01 VITALS
TEMPERATURE: 97.8 F | WEIGHT: 252.8 LBS | RESPIRATION RATE: 16 BRPM | SYSTOLIC BLOOD PRESSURE: 126 MMHG | HEART RATE: 76 BPM | HEIGHT: 59 IN | BODY MASS INDEX: 50.96 KG/M2 | DIASTOLIC BLOOD PRESSURE: 60 MMHG

## 2018-02-01 DIAGNOSIS — M46.20 VERTEBRAL OSTEOMYELITIS (HCC): ICD-10-CM

## 2018-02-01 DIAGNOSIS — A40.9 STREPTOCOCCAL SEPSIS (HCC): Primary | ICD-10-CM

## 2018-02-01 DIAGNOSIS — N18.30 CHRONIC KIDNEY DISEASE, STAGE 3 (MODERATE): ICD-10-CM

## 2018-02-01 PROCEDURE — 99214 OFFICE O/P EST MOD 30 MIN: CPT | Performed by: INTERNAL MEDICINE

## 2018-02-01 PROCEDURE — 3066F NEPHROPATHY DOC TX: CPT | Performed by: INTERNAL MEDICINE

## 2018-02-01 NOTE — PROGRESS NOTES
Progress Note - Infectious Disease   Shanell Kimberlee 71 y o  female MRN: [id number]  Unit/Bed#:  Encounter: [id number]      Impression/Plan:  1  Streptococcus bovis sepsis-in a patient with a prosthetic valve  Patient completed a 8 week course of intravenous antibiotics  The patient cleared the bacteremia and is doing well  The patient underwent colonoscopy which revealed an adenomatous polyp   -No additional ID workup is needed    -follow up with GI as far as repeat colonoscopy for polyps in the future      2   Streptococcus bovis vertebral osteomyelitis with psoas abscess-she continues to improve steadily and sedimentation rate has fallen considerably and now stabilized in the 40s  She has now completed 8 weeks of intravenous antibiotics and several months of oral amoxicillin  She has tolerated amoxicillin well without difficulty  He her back pain continues to improve and she is ambulating much better   -discontinue amoxicillin  -no additional antibiotics for now  -continue rehab as needed  -no additional laboratory follow-up needed from infectious disease standpoint     3  Chronic kidney disease-the renal function remains abnormal but has improved modestly since the last visit  -no additional ID workup needed  -continued management as per the primary care physician    No need for additional ID follow-up  Will see the patient as needed  Antibiotics:  Amoxicillin    Subjective: Follow-up for Streptococcus bovis sepsis with vertebral osteomyelitis associated with psoas abscess  She completed an 8 week course of intravenous antibiotics and now has been on oral amoxicillin for many months  She denies having any fever chills or sweats, denies any nausea vomiting or diarrhea  Her back pain continues to improve and she is ambulating without difficulty       ROS: A complete 12 point ROS is negative other than that noted in the HPI    Objective:  Vitals:  Vitals:    02/01/18 1511   BP: 126/60   Pulse: 76   Resp: 16 Temp: 97 8 °F (36 6 °C)   Weight: 115 kg (252 lb 12 8 oz)   Height: 4' 11" (1 499 m)       Physical Exam:   General Appearance:  Alert, interactive, appearing well, nontoxic, no acute distress  Throat: Oropharynx moist without lesions  Lungs:   Clear to auscultation bilaterally; no audible wheezes, rhonchi or rales; respirations unlabored   Heart:  RRR; no murmur, rub or gallop   Abdomen:   Soft, non-tender, non-distended, positive bowel sounds  Extremities: No clubbing, cyanosis or edema   Skin: No new rashes or lesions  No new draining wounds         Labs, Imaging, & Other studies:   All pertinent labs and imaging studies were personally reviewed    Lab Results   Component Value Date     08/16/2017    K 3 9 08/16/2017     08/16/2017    CO2 29 08/16/2017    ANIONGAP 8 08/16/2017    BUN 16 08/16/2017    CREATININE 0 93 08/21/2017    GLUCOSE 100 07/13/2017    GLUF 81 08/16/2017    CALCIUM 9 0 08/16/2017    AST 29 08/16/2017    ALT 10 (L) 08/16/2017    ALKPHOS 142 (H) 08/16/2017    PROT 7 3 08/16/2017    BILITOT 0 42 08/16/2017    EGFR 62 08/21/2017     Lab Results   Component Value Date    WBC 9 55 08/21/2017    HGB 12 5 08/21/2017    HCT 39 2 08/21/2017    MCV 93 08/21/2017     08/21/2017   No results found for: [address] done at [facility] 1/29/2018    Sedimentation rate 48    White cell count 8 3    Creatinine 1 56

## 2018-02-08 DIAGNOSIS — E55.9 VITAMIN D DEFICIENCY: Primary | ICD-10-CM

## 2018-02-08 RX ORDER — ERGOCALCIFEROL (VITAMIN D2) 1250 MCG
1 CAPSULE ORAL WEEKLY
Qty: 6 CAPSULE | Refills: 0 | Status: SHIPPED | OUTPATIENT
Start: 2018-02-08 | End: 2018-03-21 | Stop reason: SDUPTHER

## 2018-02-22 ENCOUNTER — OFFICE VISIT (OUTPATIENT)
Dept: INTERNAL MEDICINE CLINIC | Facility: CLINIC | Age: 73
End: 2018-02-22
Payer: COMMERCIAL

## 2018-02-22 VITALS
WEIGHT: 257.2 LBS | HEIGHT: 60 IN | TEMPERATURE: 98.6 F | BODY MASS INDEX: 50.49 KG/M2 | OXYGEN SATURATION: 93 % | SYSTOLIC BLOOD PRESSURE: 132 MMHG | DIASTOLIC BLOOD PRESSURE: 56 MMHG | HEART RATE: 68 BPM

## 2018-02-22 DIAGNOSIS — E11.9 NON-INSULIN DEPENDENT TYPE 2 DIABETES MELLITUS (HCC): ICD-10-CM

## 2018-02-22 DIAGNOSIS — E87.6 HYPOKALEMIA: ICD-10-CM

## 2018-02-22 DIAGNOSIS — E11.9 TYPE 2 DIABETES MELLITUS WITHOUT COMPLICATION, WITHOUT LONG-TERM CURRENT USE OF INSULIN (HCC): ICD-10-CM

## 2018-02-22 DIAGNOSIS — D50.9 IRON DEFICIENCY ANEMIA, UNSPECIFIED IRON DEFICIENCY ANEMIA TYPE: ICD-10-CM

## 2018-02-22 DIAGNOSIS — Z95.2 S/P MVR (MITRAL VALVE REPLACEMENT): ICD-10-CM

## 2018-02-22 DIAGNOSIS — E78.5 HYPERLIPIDEMIA, UNSPECIFIED HYPERLIPIDEMIA TYPE: ICD-10-CM

## 2018-02-22 DIAGNOSIS — N18.30 STAGE 3 CHRONIC KIDNEY DISEASE (HCC): ICD-10-CM

## 2018-02-22 DIAGNOSIS — I10 BENIGN ESSENTIAL HYPERTENSION: ICD-10-CM

## 2018-02-22 DIAGNOSIS — K21.9 GERD WITHOUT ESOPHAGITIS: Primary | ICD-10-CM

## 2018-02-22 DIAGNOSIS — M46.20 VERTEBRAL OSTEOMYELITIS (HCC): ICD-10-CM

## 2018-02-22 DIAGNOSIS — I51.9 CARDIAC DISEASE: ICD-10-CM

## 2018-02-22 DIAGNOSIS — E55.9 VITAMIN D DEFICIENCY: ICD-10-CM

## 2018-02-22 DIAGNOSIS — E03.9 HYPOTHYROIDISM, UNSPECIFIED TYPE: ICD-10-CM

## 2018-02-22 DIAGNOSIS — I34.1 MITRAL VALVE PROLAPSE: ICD-10-CM

## 2018-02-22 PROBLEM — N17.9 AKI (ACUTE KIDNEY INJURY) (HCC): Status: ACTIVE | Noted: 2018-01-02

## 2018-02-22 PROBLEM — E66.01 MORBID OBESITY DUE TO EXCESS CALORIES (HCC): Status: ACTIVE | Noted: 2017-08-28

## 2018-02-22 PROBLEM — R78.89 ELEVATED DIGOXIN LEVEL: Status: ACTIVE | Noted: 2017-08-16

## 2018-02-22 PROBLEM — R26.2 AMBULATORY DYSFUNCTION: Status: ACTIVE | Noted: 2017-06-22

## 2018-02-22 PROBLEM — M51.36 LUMBAR DEGENERATIVE DISC DISEASE: Status: ACTIVE | Noted: 2017-04-14

## 2018-02-22 PROBLEM — E66.01 MORBID OBESITY (HCC): Status: ACTIVE | Noted: 2017-08-17

## 2018-02-22 PROBLEM — B35.1 ONYCHOMYCOSIS OF TOENAIL: Status: ACTIVE | Noted: 2017-12-12

## 2018-02-22 PROBLEM — A41.9 SEPSIS (HCC): Status: ACTIVE | Noted: 2017-06-22

## 2018-02-22 PROBLEM — M17.10 ARTHRITIS OF KNEE: Status: ACTIVE | Noted: 2017-05-15

## 2018-02-22 PROBLEM — R53.81 PHYSICAL DECONDITIONING: Status: ACTIVE | Noted: 2017-08-14

## 2018-02-22 PROBLEM — Z86.010 HISTORY OF ADENOMATOUS POLYP OF COLON: Status: ACTIVE | Noted: 2017-11-01

## 2018-02-22 PROBLEM — A49.1 STREPTOCOCCUS BOVIS INFECTION: Status: ACTIVE | Noted: 2017-05-23

## 2018-02-22 PROBLEM — I48.0 PAROXYSMAL ATRIAL FIBRILLATION (HCC): Status: ACTIVE | Noted: 2018-01-01

## 2018-02-22 PROBLEM — I49.9 CARDIAC ARRHYTHMIA: Status: ACTIVE | Noted: 2017-08-14

## 2018-02-22 PROBLEM — Z79.01 ANTICOAGULANT LONG-TERM USE: Status: ACTIVE | Noted: 2018-01-01

## 2018-02-22 PROCEDURE — 99214 OFFICE O/P EST MOD 30 MIN: CPT | Performed by: NURSE PRACTITIONER

## 2018-02-22 NOTE — PATIENT INSTRUCTIONS
As discussed will have you check your potassium in 1 week  I will call you we need to increase her potassium  To 3 times a day  Go for all of her lab work prior to her next follow-up with Dr Kenna Duong in May

## 2018-02-22 NOTE — PROGRESS NOTES
Assessment/Plan:    GERD without esophagitis  Symptoms currently controlled on omeprazole 40mg daily  Benign essential hypertension  Controlled  Continue diuretics and lopressor, no changes today    Mitral valve prolapse  Follows with Dr Phu Lambert cardiology  Coumadin managed by coumadin clinic    Hypokalemia  Will recheck BMP in 2 weeks and determine if we need to increase potassium to tid    Iron deficiency anemia  Stable  Continue iron supplement    Vitamin D deficiency  Doing well    S/P MVR (mitral valve replacement)  Follows with cardiology    Stage 3 chronic kidney disease  Appears slightly improved from December 2017  Will check repeat BMP in 1 week to review potassium and will also monitor kidney function to determine appropriate follow up  Subjective:      Patient ID: Kenna Enciso is a 71 y o  female  Hypertension   This is a chronic problem  The problem is unchanged  The problem is controlled  Associated symptoms include peripheral edema  Pertinent negatives include no blurred vision, chest pain, headaches, palpitations or shortness of breath  Risk factors for coronary artery disease include obesity, diabetes mellitus, dyslipidemia and post-menopausal state  Past treatments include diuretics  There are no compliance problems  Currently on lasix 80mg daily and spironolactone 25 mg daily, Lopressor 25 bid  GERD   Patient is currently taking omeprazole 40 mg daily  She has been on this for about the past 10 years  Denies current symptoms, occasionally will feel some heartburn, infrequently  She is interested in coming off of this medication  I advised that we can either decrease the dose or switch her to an H2 blocker  She will discuss this with Dr Tavia Carbajal at her follow up in May  Vit D Def  She is currently taking 50,000 Units of Vit D3 weekly  Her Vitamin D level is 37  Hyperlipidemia   She is currently taking Atorvastatin 40mg daily  Total cholesterol is 242  Triglycerides is 261   HDL is 63 and LDL is 127  Diet is very poor  Since she has come home from the hospital her  cooks for her  She states that he "fries everything" They eat very poorly  DM2   Type II Dm  CurrentDoing very well  HbA1C 6 4  Diabetes Mellitus  Patient presents for follow up of diabetes  Current symptoms include: none  Symptoms have been well-controlled  Patient denies foot ulcerations, hyperglycemia, hypoglycemia , nausea, paresthesia of the feet, polydipsia, polyuria, visual disturbances and vomiting  Evaluation to date has included: fasting blood sugar, fasting lipid panel and hemoglobin A1C  Home sugars: patient does not check sugars  Current treatment: metformin 500mg bid  Last dilated eye exam: patient states she is due  S/p MVR/Amio/Dig/Coumadin; - Follows with cardiology Dr Sabino Noonan  Note reviewed from 12/4/17  Follow up in 6 months  ECHO done in November and reviewed in cards note  Follows with coumadin clinic  Hypokalemia -   Patient reports that she was a significantly higher dose of potassium prior to her hospitalization  Currently her potassium is 3 6  She states she is now taking potassium 20mEq bid, but that it was recently increased  She is not sure if she was taking once or twice a day when her labs were drawn  Iron def anemia - Iron level reviewed  Normal  Doing well  Currently on supplement  Does note having increased fatigue  Sepsis/OM - Sed rate 48  Although it was 43 in December  Patient was following with ID Dr Mariya Hale  Last visit was 2/1/18  Dr Cordon Stage notes that her sed rate has been stable in 40s, s/p 8 weeks IV abx, and many months of PO amoxicillin  At that visit he discontinued her abx and discharged her  CKD Stage 3  Reviewed creatinine 1 56, BUN 40 and GFR 33  Labs from 12/2017 indicate that kidney function has slightly improved from creatinine 1 73 and GFR 29        The following portions of the patient's history were reviewed and updated as appropriate: allergies, current medications, past family history, past medical history, past social history, past surgical history and problem list     Review of Systems   Constitutional: Positive for chills and fatigue  Negative for activity change, appetite change, fever and unexpected weight change  Eyes: Negative for blurred vision, photophobia and visual disturbance ("i need to see the eye doctor soon for a new prescription")  Respiratory: Negative for cough, chest tightness, shortness of breath and wheezing  Cardiovascular: Positive for leg swelling (sometimes)  Negative for chest pain and palpitations  Gastrointestinal: Negative for abdominal pain, constipation, diarrhea, nausea and vomiting  Neurological: Negative for dizziness, syncope, light-headedness and headaches  Psychiatric/Behavioral: Negative for dysphoric mood  The patient is nervous/anxious (reports occasional anxiety related to her children)  Objective:    /56 (BP Location: Left arm, Patient Position: Sitting, Cuff Size: Large)   Pulse 68   Temp 98 6 °F (37 °C) (Oral)   Ht 4' 11 5" (1 511 m)   Wt 117 kg (257 lb 3 2 oz)   SpO2 93%   BMI 51 08 kg/m²      Physical Exam   Constitutional: She is oriented to person, place, and time  She appears well-developed and well-nourished  No distress  obese   HENT:   Head: Normocephalic and atraumatic  Right Ear: Tympanic membrane and external ear normal    Left Ear: Tympanic membrane and external ear normal    Mouth/Throat: Oropharynx is clear and moist  No oropharyngeal exudate  Eyes: Conjunctivae and EOM are normal  Pupils are equal, round, and reactive to light    +glasses   Neck: Neck supple  Carotid bruit is not present  Cardiovascular: Normal rate, regular rhythm and normal heart sounds  +crisp click  Trace bilateral lower extremity edema   Pulmonary/Chest: Effort normal and breath sounds normal  No respiratory distress  She has no wheezes     Lymphadenopathy:     She has no cervical adenopathy  Neurological: She is alert and oriented to person, place, and time  Skin: Skin is warm and dry  She is not diaphoretic  Psychiatric: She has a normal mood and affect   Her behavior is normal

## 2018-02-23 NOTE — ASSESSMENT & PLAN NOTE
Appears slightly improved from December 2017  Will check repeat BMP in 1 week to review potassium and will also monitor kidney function to determine appropriate follow up

## 2018-03-01 DIAGNOSIS — Z87.09 HISTORY OF BRONCHITIS: Primary | ICD-10-CM

## 2018-03-01 RX ORDER — ALBUTEROL SULFATE 90 UG/1
2 AEROSOL, METERED RESPIRATORY (INHALATION) AS NEEDED
Qty: 1 INHALER | Refills: 0 | Status: SHIPPED | OUTPATIENT
Start: 2018-03-01 | End: 2018-05-23 | Stop reason: SDUPTHER

## 2018-03-12 DIAGNOSIS — E03.9 HYPOTHYROIDISM, UNSPECIFIED TYPE: Primary | ICD-10-CM

## 2018-03-12 RX ORDER — LEVOTHYROXINE SODIUM 0.05 MG/1
50 TABLET ORAL DAILY
Qty: 90 TABLET | Refills: 1 | Status: SHIPPED | OUTPATIENT
Start: 2018-03-12 | End: 2018-05-23 | Stop reason: ALTCHOICE

## 2018-03-20 ENCOUNTER — TRANSCRIBE ORDERS (OUTPATIENT)
Dept: INTERNAL MEDICINE CLINIC | Facility: CLINIC | Age: 73
End: 2018-03-20

## 2018-03-20 ENCOUNTER — TELEPHONE (OUTPATIENT)
Dept: INTERNAL MEDICINE CLINIC | Facility: CLINIC | Age: 73
End: 2018-03-20

## 2018-03-20 DIAGNOSIS — N18.30 CHRONIC KIDNEY DISEASE, STAGE III (MODERATE) (HCC): Primary | ICD-10-CM

## 2018-03-20 DIAGNOSIS — N18.30 STAGE 3 CHRONIC KIDNEY DISEASE (HCC): Primary | ICD-10-CM

## 2018-03-20 NOTE — TELEPHONE ENCOUNTER
Called patient and asked to get BMP done prior the appt with Dr Tai Feldman  Order placed, printed and mailed to patient's home

## 2018-03-21 DIAGNOSIS — E55.9 VITAMIN D DEFICIENCY: ICD-10-CM

## 2018-03-21 LAB — HBA1C MFR BLD HPLC: 6.4 %

## 2018-03-22 RX ORDER — ERGOCALCIFEROL (VITAMIN D2) 1250 MCG
1 CAPSULE ORAL WEEKLY
Qty: 12 CAPSULE | Refills: 1 | Status: SHIPPED | OUTPATIENT
Start: 2018-03-22 | End: 2018-05-23 | Stop reason: SDUPTHER

## 2018-05-16 LAB — HBA1C MFR BLD HPLC: 7.1 %

## 2018-05-23 ENCOUNTER — OFFICE VISIT (OUTPATIENT)
Dept: INTERNAL MEDICINE CLINIC | Facility: CLINIC | Age: 73
End: 2018-05-23
Payer: COMMERCIAL

## 2018-05-23 VITALS
DIASTOLIC BLOOD PRESSURE: 60 MMHG | HEART RATE: 94 BPM | WEIGHT: 269.6 LBS | OXYGEN SATURATION: 93 % | SYSTOLIC BLOOD PRESSURE: 140 MMHG | BODY MASS INDEX: 52.93 KG/M2 | TEMPERATURE: 98.4 F | HEIGHT: 60 IN | RESPIRATION RATE: 20 BRPM

## 2018-05-23 DIAGNOSIS — E55.9 VITAMIN D DEFICIENCY: ICD-10-CM

## 2018-05-23 DIAGNOSIS — F32.A DEPRESSION, UNSPECIFIED DEPRESSION TYPE: ICD-10-CM

## 2018-05-23 DIAGNOSIS — E78.00 HYPERCHOLESTEROLEMIA: ICD-10-CM

## 2018-05-23 DIAGNOSIS — E11.9 TYPE 2 DIABETES MELLITUS WITHOUT COMPLICATION, WITHOUT LONG-TERM CURRENT USE OF INSULIN (HCC): ICD-10-CM

## 2018-05-23 DIAGNOSIS — E11.8 TYPE 2 DIABETES MELLITUS WITH COMPLICATION, WITHOUT LONG-TERM CURRENT USE OF INSULIN (HCC): ICD-10-CM

## 2018-05-23 DIAGNOSIS — I05.9 MITRAL VALVE DISORDER: ICD-10-CM

## 2018-05-23 DIAGNOSIS — K21.9 GASTROESOPHAGEAL REFLUX DISEASE WITHOUT ESOPHAGITIS: ICD-10-CM

## 2018-05-23 DIAGNOSIS — E03.9 HYPOTHYROIDISM, UNSPECIFIED TYPE: ICD-10-CM

## 2018-05-23 DIAGNOSIS — K21.9 GERD WITHOUT ESOPHAGITIS: ICD-10-CM

## 2018-05-23 DIAGNOSIS — Z87.09 HISTORY OF BRONCHITIS: ICD-10-CM

## 2018-05-23 DIAGNOSIS — I48.0 PAROXYSMAL ATRIAL FIBRILLATION (HCC): ICD-10-CM

## 2018-05-23 DIAGNOSIS — I50.32 CHRONIC DIASTOLIC CONGESTIVE HEART FAILURE (HCC): ICD-10-CM

## 2018-05-23 DIAGNOSIS — N18.30 STAGE 3 CHRONIC KIDNEY DISEASE (HCC): Primary | ICD-10-CM

## 2018-05-23 DIAGNOSIS — D50.9 IRON DEFICIENCY ANEMIA, UNSPECIFIED IRON DEFICIENCY ANEMIA TYPE: ICD-10-CM

## 2018-05-23 DIAGNOSIS — I50.33 ACUTE ON CHRONIC DIASTOLIC CONGESTIVE HEART FAILURE (HCC): ICD-10-CM

## 2018-05-23 DIAGNOSIS — E78.5 HYPERLIPIDEMIA, UNSPECIFIED HYPERLIPIDEMIA TYPE: ICD-10-CM

## 2018-05-23 DIAGNOSIS — F41.9 ANXIETY: ICD-10-CM

## 2018-05-23 DIAGNOSIS — I10 BENIGN ESSENTIAL HYPERTENSION: ICD-10-CM

## 2018-05-23 DIAGNOSIS — I10 HYPERTENSION, UNSPECIFIED TYPE: ICD-10-CM

## 2018-05-23 DIAGNOSIS — M10.9 GOUT, UNSPECIFIED CAUSE, UNSPECIFIED CHRONICITY, UNSPECIFIED SITE: Primary | ICD-10-CM

## 2018-05-23 PROBLEM — R78.89 ELEVATED DIGOXIN LEVEL: Status: RESOLVED | Noted: 2017-08-16 | Resolved: 2018-05-23

## 2018-05-23 PROBLEM — N17.9 AKI (ACUTE KIDNEY INJURY) (HCC): Status: RESOLVED | Noted: 2018-01-02 | Resolved: 2018-05-23

## 2018-05-23 PROBLEM — A41.9 SEPSIS (HCC): Status: RESOLVED | Noted: 2017-06-22 | Resolved: 2018-05-23

## 2018-05-23 PROBLEM — E87.6 HYPOKALEMIA: Status: RESOLVED | Noted: 2017-05-27 | Resolved: 2018-05-23

## 2018-05-23 PROBLEM — E66.01 MORBID OBESITY (HCC): Status: RESOLVED | Noted: 2017-08-17 | Resolved: 2018-05-23

## 2018-05-23 PROCEDURE — 99214 OFFICE O/P EST MOD 30 MIN: CPT | Performed by: PHYSICIAN ASSISTANT

## 2018-05-23 RX ORDER — WARFARIN SODIUM 5 MG/1
5 TABLET ORAL DAILY
Qty: 90 TABLET | Refills: 1 | OUTPATIENT
Start: 2018-05-23

## 2018-05-23 RX ORDER — LORAZEPAM 0.5 MG/1
0.5 TABLET ORAL
Qty: 30 TABLET | Refills: 0 | OUTPATIENT
Start: 2018-05-23

## 2018-05-23 RX ORDER — FUROSEMIDE 80 MG
80 TABLET ORAL DAILY
Qty: 90 TABLET | Refills: 1 | OUTPATIENT
Start: 2018-05-23

## 2018-05-23 RX ORDER — FUROSEMIDE 80 MG
80 TABLET ORAL DAILY
Qty: 90 TABLET | Refills: 0 | Status: SHIPPED | OUTPATIENT
Start: 2018-05-23 | End: 2018-09-14 | Stop reason: SDUPTHER

## 2018-05-23 RX ORDER — ERGOCALCIFEROL (VITAMIN D2) 1250 MCG
1 CAPSULE ORAL WEEKLY
Qty: 12 CAPSULE | Refills: 0 | Status: SHIPPED | OUTPATIENT
Start: 2018-05-23 | End: 2018-11-16 | Stop reason: SDUPTHER

## 2018-05-23 RX ORDER — ALLOPURINOL 100 MG/1
100 TABLET ORAL 2 TIMES DAILY
Qty: 180 TABLET | Refills: 1 | OUTPATIENT
Start: 2018-05-23

## 2018-05-23 RX ORDER — METOLAZONE 2.5 MG/1
2.5 TABLET ORAL 3 TIMES WEEKLY
Qty: 1 TABLET | Refills: 0 | Status: SHIPPED | OUTPATIENT
Start: 2018-05-23 | End: 2018-05-23 | Stop reason: SDUPTHER

## 2018-05-23 RX ORDER — POTASSIUM CHLORIDE 20 MEQ/1
40 TABLET, EXTENDED RELEASE ORAL 2 TIMES DAILY
Qty: 180 TABLET | Refills: 1 | Status: CANCELLED | OUTPATIENT
Start: 2018-05-23

## 2018-05-23 RX ORDER — ATORVASTATIN CALCIUM 40 MG/1
40 TABLET, FILM COATED ORAL DAILY
Qty: 90 TABLET | Refills: 1 | OUTPATIENT
Start: 2018-05-23

## 2018-05-23 RX ORDER — CITALOPRAM 20 MG/1
20 TABLET ORAL DAILY
Qty: 90 TABLET | Refills: 1 | OUTPATIENT
Start: 2018-05-23

## 2018-05-23 RX ORDER — LEVOTHYROXINE SODIUM 0.05 MG/1
50 TABLET ORAL DAILY
Qty: 90 TABLET | Refills: 1 | Status: CANCELLED | OUTPATIENT
Start: 2018-05-23

## 2018-05-23 RX ORDER — METOLAZONE 2.5 MG/1
2.5 TABLET ORAL 3 TIMES WEEKLY
Qty: 30 TABLET | Refills: 0 | Status: SHIPPED | OUTPATIENT
Start: 2018-05-23 | End: 2018-05-23 | Stop reason: SDUPTHER

## 2018-05-23 RX ORDER — LEVOTHYROXINE SODIUM 0.07 MG/1
75 TABLET ORAL DAILY
Qty: 30 TABLET | Refills: 2 | Status: SHIPPED | OUTPATIENT
Start: 2018-05-23 | End: 2018-05-23 | Stop reason: SDUPTHER

## 2018-05-23 RX ORDER — METOLAZONE 2.5 MG/1
2.5 TABLET ORAL 3 TIMES WEEKLY
Qty: 30 TABLET | Refills: 0 | Status: SHIPPED | OUTPATIENT
Start: 2018-05-23 | End: 2018-06-19 | Stop reason: SDUPTHER

## 2018-05-23 RX ORDER — LEVOTHYROXINE SODIUM 0.07 MG/1
75 TABLET ORAL DAILY
Qty: 30 TABLET | Refills: 0 | Status: SHIPPED | OUTPATIENT
Start: 2018-05-23 | End: 2018-07-13 | Stop reason: SDUPTHER

## 2018-05-23 RX ORDER — SPIRONOLACTONE 25 MG/1
25 TABLET ORAL DAILY
Qty: 90 TABLET | Refills: 1 | OUTPATIENT
Start: 2018-05-23

## 2018-05-23 RX ORDER — DIGOXIN 125 MCG
125 TABLET ORAL EVERY OTHER DAY
Qty: 45 TABLET | Refills: 1 | OUTPATIENT
Start: 2018-05-23

## 2018-05-23 RX ORDER — LEVOTHYROXINE SODIUM 0.07 MG/1
75 TABLET ORAL DAILY
Qty: 30 TABLET | Refills: 0 | Status: SHIPPED | OUTPATIENT
Start: 2018-05-23 | End: 2018-05-23 | Stop reason: SDUPTHER

## 2018-05-23 RX ORDER — ALLOPURINOL 100 MG/1
100 TABLET ORAL 2 TIMES DAILY
Qty: 180 TABLET | Refills: 0 | Status: SHIPPED | OUTPATIENT
Start: 2018-05-23 | End: 2018-07-13 | Stop reason: SDUPTHER

## 2018-05-23 RX ORDER — ERGOCALCIFEROL (VITAMIN D2) 1250 MCG
1 CAPSULE ORAL WEEKLY
Qty: 36 CAPSULE | Refills: 1 | OUTPATIENT
Start: 2018-05-23

## 2018-05-23 RX ORDER — AMIODARONE HYDROCHLORIDE 200 MG/1
200 TABLET ORAL DAILY
Qty: 90 TABLET | Refills: 1 | OUTPATIENT
Start: 2018-05-23

## 2018-05-23 RX ORDER — ALBUTEROL SULFATE 90 UG/1
2 AEROSOL, METERED RESPIRATORY (INHALATION) AS NEEDED
Qty: 1 INHALER | Refills: 5 | OUTPATIENT
Start: 2018-05-23

## 2018-05-23 RX ORDER — OMEPRAZOLE 40 MG/1
40 CAPSULE, DELAYED RELEASE ORAL 2 TIMES DAILY
Qty: 90 CAPSULE | Refills: 0 | Status: SHIPPED | OUTPATIENT
Start: 2018-05-23 | End: 2018-08-23 | Stop reason: DRUGHIGH

## 2018-05-23 RX ORDER — IRON POLYSACCHARIDE COMPLEX 150 MG
150 CAPSULE ORAL 2 TIMES DAILY
Qty: 180 CAPSULE | Refills: 0 | Status: SHIPPED | OUTPATIENT
Start: 2018-05-23 | End: 2021-02-05 | Stop reason: ALTCHOICE

## 2018-05-23 RX ORDER — ALBUTEROL SULFATE 90 UG/1
2 AEROSOL, METERED RESPIRATORY (INHALATION) AS NEEDED
Qty: 1 INHALER | Refills: 0 | Status: SHIPPED | OUTPATIENT
Start: 2018-05-23

## 2018-05-23 RX ORDER — TURMERIC ROOT EXTRACT 500 MG
1 TABLET ORAL 2 TIMES DAILY
COMMUNITY

## 2018-05-23 RX ORDER — ATORVASTATIN CALCIUM 40 MG/1
40 TABLET, FILM COATED ORAL DAILY
Qty: 90 TABLET | Refills: 0 | Status: SHIPPED | OUTPATIENT
Start: 2018-05-23 | End: 2018-07-13 | Stop reason: SDUPTHER

## 2018-05-23 RX ORDER — OMEPRAZOLE 40 MG/1
40 CAPSULE, DELAYED RELEASE ORAL DAILY
Qty: 90 CAPSULE | Refills: 1 | Status: CANCELLED | OUTPATIENT
Start: 2018-05-23

## 2018-05-23 RX ORDER — SPIRONOLACTONE 25 MG/1
25 TABLET ORAL DAILY
Qty: 90 TABLET | Refills: 0 | Status: SHIPPED | OUTPATIENT
Start: 2018-05-23 | End: 2019-02-05 | Stop reason: SDUPTHER

## 2018-05-23 RX ORDER — CITALOPRAM 20 MG/1
20 TABLET ORAL DAILY
Qty: 90 TABLET | Refills: 0 | Status: SHIPPED | OUTPATIENT
Start: 2018-05-23 | End: 2018-09-20 | Stop reason: SDUPTHER

## 2018-05-23 NOTE — PATIENT INSTRUCTIONS
GERD without esophagitis  Stable at this time on Prilosec 40 mg once daily  No dose change  Will continue to follow  Hypothyroidism    TSH recently completed which was 4 34  Patient has been on Synthroid  50 mcg daily  Discontinue Synthroid 50 mcg  Start Synthroid 75 mcg by mouth once daily  Will repeat TSH  And adjust Synthroid accordingly  Type 2 diabetes mellitus without complication, without long-term current use of insulin (HCC)    A1c at goal however I concerns with patient's CKD 3 and her current metformin use  We will discontinue metformin at this time  Start Tradjenta 5 mg daily in place of metformin 500mg twice daily  Discussed if tradjenta is not covered will switch to januvia 50 mg daily (renally dosed) however at this time would prefer tradjenta with her renal function  Continue diabetic diet  Script for [employer] sent to pharmacy  Chronic diastolic congestive heart failure ([address] )    Stable at this time following with Cardiology  Patient has appointment scheduled with Dr Neris Leonardo  She follows her weight regularly  Cardiology follows her INR  She has  June 19th appointment with Cardiology and will be getting chest x-ray as well as echocardiogram at that time  Continue with diuretics  Currently using Lasix along with spironolactone with metalazone on 3 days per week  Will check labs prior to follow-up  Refills given  Continue digoxin and amiodarone, rate is controlled  Stage 3 chronic kidney disease   Adjust diabetic medicines as discussed above  Continue with close monitoring of weight and swelling  Will consider further renal workup pending response to medication adjustments as discussed above    HLD (hyperlipidemia)   Stable at this time on cholesterol medications  No dose change  Will continue to follow  Paroxysmal atrial fibrillation (HCC)   Rate controlled on amiodarone and digoxin  Cardiology manages Coumadin level    Patient also has a history of Bioprosthetic valve    Benign essential hypertension   Stable at this time on medications

## 2018-05-23 NOTE — ASSESSMENT & PLAN NOTE
TSH recently completed which was 4 34  Patient has been on Synthroid  50 mcg daily  Discontinue Synthroid 50 mcg  Start Synthroid 75 mcg by mouth once daily  Will repeat TSH  And adjust Synthroid accordingly

## 2018-05-23 NOTE — ASSESSMENT & PLAN NOTE
Stable at this time following with Cardiology  Patient has appointment scheduled with Dr Haven Chen  She follows her weight regularly  Cardiology follows her INR  She has  June 19th appointment with Cardiology and will be getting chest x-ray as well as echocardiogram at that time  Continue with diuretics  Currently using Lasix along with spironolactone with metalazone on 3 days per week  Will check labs prior to follow-up  Refills given  Continue digoxin and amiodarone, rate is controlled  Dr Prakash Farmer present and agrees with tx plan

## 2018-05-23 NOTE — ASSESSMENT & PLAN NOTE
Adjust diabetic medicines as discussed above  BUN 30 from 22  Creatinine 1 42 from 1 49 or prior labs  GFR 37 from 35  Continue with close monitoring of weight and swelling     Will consider further renal workup pending response to medication adjustments as discussed above

## 2018-05-23 NOTE — ASSESSMENT & PLAN NOTE
A1c at goal however I concerns with patient's CKD 3 and her current metformin use  We will discontinue metformin at this time  Start Tradjenta 5 mg daily in place of metformin 500mg twice daily  Discussed if tradjenta is not covered will switch to januvia 50 mg daily (renally dosed) however at this time would prefer tradjenta with her renal function  Continue diabetic diet  Script for [employer] sent to pharmacy

## 2018-05-23 NOTE — PROGRESS NOTES
[facility]  Standard Office Visit  Patient ID: Orlin Mcadams    : 1945  Age/Gender: 71 y o  female     DATE: 2018      Assessment/Plan:    GERD without esophagitis  Stable at this time on Prilosec 40 mg once daily  No dose change  Will continue to follow  Hypothyroidism    TSH recently completed which was 4 34  Patient has been on Synthroid  50 mcg daily  Discontinue Synthroid 50 mcg  Start Synthroid 75 mcg by mouth once daily  Will repeat TSH  And adjust Synthroid accordingly  Type 2 diabetes mellitus without complication, without long-term current use of insulin (HCC)    A1c at goal however I concerns with patient's CKD 3 and her current metformin use  We will discontinue metformin at this time  Start Tradjenta 5 mg daily in place of metformin 500mg twice daily  Discussed if tradjenta is not covered will switch to januvia 50 mg daily (renally dosed) however at this time would prefer tradjenta with her renal function  Continue diabetic diet  Script for [employer] sent to pharmacy  Chronic diastolic congestive heart failure ([address] )    Stable at this time following with Cardiology  Patient has appointment scheduled with Dr Chico Espinal  She follows her weight regularly  Cardiology follows her INR  She has   appointment with Cardiology and will be getting chest x-ray as well as echocardiogram at that time  Continue with diuretics  Currently using Lasix along with spironolactone with metalazone on 3 days per week  Will check labs prior to follow-up  Refills given  Continue digoxin and amiodarone, rate is controlled  Dr Niurka Villalta present and agrees with tx plan  Stage 3 chronic kidney disease   Adjust diabetic medicines as discussed above  BUN 30 from 22  Creatinine 1 42 from 1 49 or prior labs  GFR 37 from 35  Continue with close monitoring of weight and swelling     Will consider further renal workup pending response to medication adjustments as discussed above    HLD (hyperlipidemia)   Stable at this time on cholesterol medications  No dose change  Will continue to follow  Paroxysmal atrial fibrillation (HCC)   Rate controlled on amiodarone and digoxin  Cardiology manages Coumadin level  Patient also has a history of  Bioprosthetic valve    Benign essential hypertension   Stable at this time on medications       Diagnoses and all orders for this visit:    Stage 3 chronic kidney disease  -     TSH, 3rd generation with T4 reflex; Future  -     Comprehensive metabolic panel; Future  -     Uric acid; Future  -     Discontinue: Linagliptin (TRADJENTA) 5 MG TABS; Take 5 mg by mouth daily  -     Linagliptin (TRADJENTA) 5 MG TABS; Take 5 mg by mouth daily  -     allopurinol (ZYLOPRIM) 100 mg tablet; Take 1 tablet (100 mg total) by mouth 2 (two) times a day  -     spironolactone (ALDACTONE) 25 mg tablet; Take 1 tablet (25 mg total) by mouth daily    GERD without esophagitis  -     omeprazole (PriLOSEC) 40 MG capsule; Take 1 capsule (40 mg total) by mouth 2 (two) times a day    Hypothyroidism, unspecified type  -     Discontinue: levothyroxine 75 mcg tablet; Take 1 tablet (75 mcg total) by mouth daily  -     TSH, 3rd generation with T4 reflex; Future  -     Comprehensive metabolic panel; Future  -     CBC and differential; Future  -     Discontinue: levothyroxine 75 mcg tablet; Take 1 tablet (75 mcg total) by mouth daily  -     levothyroxine 75 mcg tablet; Take 1 tablet (75 mcg total) by mouth daily    Type 2 diabetes mellitus without complication, without long-term current use of insulin (HCC)  -     Discontinue: sitaGLIPtin (JANUVIA) 50 mg tablet;  Take 1 tablet (50 mg total) by mouth daily  -     Diabetic Shoe  -     Discontinue: Linagliptin (TRADJENTA) 5 MG TABS; Take 5 mg by mouth daily  -     Linagliptin (TRADJENTA) 5 MG TABS; Take 5 mg by mouth daily    Chronic diastolic congestive heart failure (HCC)  - Ferritin; Future  -     Comprehensive metabolic panel; Future  -     Discontinue: metolazone (ZAROXOLYN) 2 5 mg tablet; Take 1 tablet (2 5 mg total) by mouth 3 (three) times a week  -     Discontinue: metolazone (ZAROXOLYN) 2 5 mg tablet; Take 1 tablet (2 5 mg total) by mouth 3 (three) times a week  -     metolazone (ZAROXOLYN) 2 5 mg tablet; Take 1 tablet (2 5 mg total) by mouth 3 (three) times a week  -     furosemide (LASIX) 80 mg tablet; Take 1 tablet (80 mg total) by mouth daily    Hyperlipidemia, unspecified hyperlipidemia type  -     TSH, 3rd generation with T4 reflex; Future  -     Comprehensive metabolic panel; Future  -     atorvastatin (LIPITOR) 40 mg tablet; Take 1 tablet (40 mg total) by mouth daily  -     citalopram (CeleXA) 20 mg tablet; Take 1 tablet (20 mg total) by mouth daily    Acute on chronic diastolic congestive heart failure (HCC)  -     iron polysaccharides (FERREX 150) 150 mg capsule; Take 1 capsule (150 mg total) by mouth 2 (two) times a day  -     spironolactone (ALDACTONE) 25 mg tablet; Take 1 tablet (25 mg total) by mouth daily    Paroxysmal atrial fibrillation (HCC)  -     iron polysaccharides (FERREX 150) 150 mg capsule; Take 1 capsule (150 mg total) by mouth 2 (two) times a day  -     metoprolol tartrate (LOPRESSOR) 25 mg tablet; Take 1 tablet (25 mg total) by mouth every 12 (twelve) hours    Benign essential hypertension    History of bronchitis  -     albuterol (PROAIR HFA) 90 mcg/act inhaler; Inhale 2 puffs as needed for wheezing    Vitamin D deficiency  -     ergocalciferol (ERGOCALCIFEROL) 11359 units capsule; Take 1 capsule (50,000 Units total) by mouth once a week    Other orders  -     Turmeric 500 MG TABS; Take 1 capsule by mouth 2 (two) times a day  -     Cancel: XR chest pa & lateral; Future  -     Cancel: Echo complete with contrast if indicated; Future          Subjective:   Chief Complaint   Patient presents with    Follow-up     Pt is here for a follow up    Pt would like to discuss discontinuing some of her meds   Weight Gain     Pt has gained 14 lbs in 3 months         Timmy Bender is a 71 y o  female who presents to the office on 5/23/2018 for     Follow up  She notes that now that her infection is gone her appetite is improved  She has been gaining weight  She saw her cardiologist who sent her for a chest x-ray as well as an echocardiogram   She had an echocardiogram performed 11  She is taking her diuretics as directed  Does use metolazone 1-2 times per week  Her cardiologist told her she could use it up to 3 times a week  Has not yet made this change  Has been trying to monitor her weight  She is taking amiodarone and digoxin  No chest pain or palpitations  Trying to manage her blood sugars and weight with diet and lifestyle modifications alone  She notes she saw her infectious disease doctor who told her earlier this year she no longer needs any further antibiotics  She manages her diabetes with metformin  She notes she wanted to come in to be evaluated as her previous office visit was rescheduled  Recently had labs which she is here to review  taking her cholesterol medication as directed  Takes allopurinol  No recent gout flare  She would like to discuss her heartburn medication  She was told she would have to be on it for some time due to hiatal hernia  Not having any symptoms at this time  Well controlled with medications  Diabetes   She presents for her follow-up diabetic visit  She has type 2 diabetes mellitus  Her disease course has been stable  Pertinent negatives for hypoglycemia include no dizziness or headaches  Pertinent negatives for diabetes include no chest pain, no fatigue, no foot ulcerations and no weight loss  Current diabetic treatment includes diet and oral agent (monotherapy)  She is compliant with treatment all of the time  There is no change in her home blood glucose trend   An ACE inhibitor/angiotensin II receptor blocker is not being taken  Thyroid Problem   Presents for follow-up (She notes she has been on Synthroid for several weeks  Taking 50 micrograms daily ) visit  Symptoms include weight gain  Patient reports no constipation, diarrhea, fatigue, palpitations or weight loss  Heartburn   She reports no abdominal pain, no chest pain, no coughing, no nausea or no wheezing  The problem has been resolved  Pertinent negatives include no fatigue or weight loss  Risk factors include hiatal hernia  The following portions of the patient's history were reviewed and updated as appropriate: allergies, current medications, past family history, past medical history, past social history, past surgical history and problem list     Review of Systems   Constitutional: Positive for appetite change (improved), unexpected weight change and weight gain  Negative for activity change, chills, fatigue, fever and weight loss  Respiratory: Negative for cough, shortness of breath and wheezing  SOB with activity  Limited to short distances  Uses walker  Cardio manages her coumadin  Cardiovascular: Positive for leg swelling (chronic unchanged  )  Negative for chest pain and palpitations  Gastrointestinal: Negative for abdominal pain, constipation, diarrhea, nausea and vomiting  Colonoscopy in December  Genitourinary: Negative for dysuria and frequency  Musculoskeletal:        Chronic back    Skin: Positive for rash (chornic psoriasis)  Neurological: Negative for dizziness, syncope, light-headedness and headaches           Patient Active Problem List   Diagnosis    Gout    GERD without esophagitis    Cardiac disease    CAD (coronary artery disease)    Mitral valve replaced    Chronic diastolic congestive heart failure (HCC)    Meralgia paresthetica of right side    Pain of right lower extremity    Streptococcus bovis infection    Vertebral osteomyelitis ([facility] )    Ovarian cyst, right    Acute on chronic diastolic congestive heart failure (HCC)    Asthenia    Stage 3 chronic kidney disease    Ambulatory dysfunction    Arthritis of knee    Anticoagulant long-term use    Benign essential hypertension    Cardiac arrhythmia    History of adenomatous polyp of colon    HLD (hyperlipidemia)    Hypothyroidism    Iron deficiency anemia    Lumbar degenerative disc disease    Mitral valve prolapse    Morbid obesity due to excess calories (HCC)    Vitamin D deficiency    Type 2 diabetes mellitus without complication, without long-term current use of insulin (HCC)    S/P MVR (mitral valve replacement)    RBBB (right bundle branch block)    Psoriasis    Physical deconditioning    Paroxysmal atrial fibrillation (HCC)    Onychomycosis of toenail       Past Medical History:   Diagnosis Date    Abscess of abdominal wall     RESOLVED:  6/28/16    Acute diastolic congestive heart failure (HCC)     RESOLVED:  8/29/17    Acute on chronic diastolic congestive heart failure (HCC)     RESOLVED:  8/29/17    CHANTELL (acute kidney injury) ([address] )     RESOLVED:  8/29/17    Allergy     Arteriovenous malformation     OF THE GASTROINTESTINAL    Blood coagulation disorder ([facility])     RESOLVED: 6/21/15    CAD (coronary artery disease)     Candidiasis of breast     RESOLVED: 8/29/17    Diabetes mellitus (HCC)     Diastolic CHF ([address] )     Gastroparesis     RESOLVED: 6/21/15    Generalized anxiety disorder     RESOLVED: 6/21/15    GERD (gastroesophageal reflux disease)     Gout     Hyperlipidemia     Hypertension     Lymphedema     RESOLVED: 6/21/15     Mitral valve replaced     2010 - prosthetic    Morbid (severe) obesity due to excess calories ([address] )     RESOLVED: 6/21/15    Psoriasis     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HCC)     RESOLVED: 6/21/15    Sciatica of right side     RESOLVED: 5/15/17    Type 2 diabetes mellitus ([address] )     Weight gain     RESOLVED: 5/15/17       Past Surgical History:   Procedure Laterality Date     SECTION      X 1    COLONOSCOPY N/A 2017    Procedure: COLONOSCOPY;  Surgeon: Rogelia Prader, DO;  Location: [facility];   Service:     MITRAL VALVE REPLACEMENT      TONSILLECTOMY AND ADENOIDECTOMY      LISTED 2 X         Current Outpatient Prescriptions:     albuterol (PROAIR HFA) 90 mcg/act inhaler, Inhale 2 puffs as needed for wheezing, Disp: 1 Inhaler, Rfl: 0    allopurinol (ZYLOPRIM) 100 mg tablet, Take 1 tablet (100 mg total) by mouth 2 (two) times a day, Disp: 180 tablet, Rfl: 0    amiodarone 200 mg tablet, Take 1 tablet by mouth 2 (two) times a day with meals (Patient taking differently: Take 200 mg by mouth daily  ), Disp: , Rfl: 0    atorvastatin (LIPITOR) 40 mg tablet, Take 1 tablet (40 mg total) by mouth daily, Disp: 90 tablet, Rfl: 0    citalopram (CeleXA) 20 mg tablet, Take 1 tablet (20 mg total) by mouth daily, Disp: 90 tablet, Rfl: 0    digoxin (LANOXIN) 0 125 mg tablet, Take 1 tablet by mouth daily (Patient taking differently: Take 125 mcg by mouth every other day  ), Disp: , Rfl: 0    ergocalciferol (ERGOCALCIFEROL) 48530 units capsule, Take 1 capsule (50,000 Units total) by mouth once a week, Disp: 12 capsule, Rfl: 0    folic acid-pyridoxine-cyanocobalamin (FOLBIC) 2 5-25-2 mg, Take 2 tablets by mouth daily  , Disp: , Rfl:     furosemide (LASIX) 80 mg tablet, Take 1 tablet (80 mg total) by mouth daily, Disp: 90 tablet, Rfl: 0    iron polysaccharides (FERREX 150) 150 mg capsule, Take 1 capsule (150 mg total) by mouth 2 (two) times a day, Disp: 180 capsule, Rfl: 0    LORazepam (ATIVAN) 0 5 mg tablet, Take by mouth, Disp: , Rfl:     metoprolol tartrate (LOPRESSOR) 25 mg tablet, Take 1 tablet (25 mg total) by mouth every 12 (twelve) hours, Disp: 180 tablet, Rfl: 0    Multiple Vitamins-Minerals (OCUVITE EYE HEALTH FORMULA PO), Take 1 tablet by mouth daily, Disp: , Rfl:     nystatin (nystatin) powder, Apply topically, Disp: , Rfl:     omeprazole (PriLOSEC) 40 MG capsule, Take 1 capsule (40 mg total) by mouth 2 (two) times a day, Disp: 90 capsule, Rfl: 0    potassium chloride (KLOR-CON M20) 20 mEq tablet, Take 40 mEq by mouth 2 (two) times a day  , Disp: , Rfl:     spironolactone (ALDACTONE) 25 mg tablet, Take 1 tablet (25 mg total) by mouth daily, Disp: 90 tablet, Rfl: 0    Turmeric 500 MG TABS, Take 1 capsule by mouth 2 (two) times a day, Disp: , Rfl:     warfarin (COUMADIN) 5 mg tablet, Take 5 mg by mouth daily, Disp: , Rfl:     levothyroxine 75 mcg tablet, Take 1 tablet (75 mcg total) by mouth daily, Disp: 30 tablet, Rfl: 0    Linagliptin (TRADJENTA) 5 MG TABS, Take 5 mg by mouth daily, Disp: 30 tablet, Rfl: 0    metolazone (ZAROXOLYN) 2 5 mg tablet, Take 1 tablet (2 5 mg total) by mouth 3 (three) times a week, Disp: 30 tablet, Rfl: 0    Allergies   Allergen Reactions    Tetanus Toxoid        Social History     Social History    Marital status: /Civil Union     Spouse name: N/A    Number of children: 2    Years of education: N/A     Occupational History    HOMEMAKER      Social History Main Topics    Smoking status: Former Smoker    Smokeless tobacco: Never Used      Comment: quit 40 yrs ago, former 1 ppd; SHE HAS A PAST HX OF CIGARETTE SMOKING; QUIT DATE 40 YEARS AGO    Alcohol use No    Drug use: No    Sexual activity: Not Asked     Other Topics Concern    None     Social History Narrative    EXERCISING REGULARLY: 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           Family History   Problem Relation Age of Onset    Diabetes Mother      MELLITUS    Diabetes Father      MELLITUS    Diabetes type II Father      MELLITUS    Coronary artery disease Sister      CAROTID ARTERY STENOSIS    Stroke Family      SYNDROME       PHQ-9 Depression Screening    PHQ-9:    Frequency of the following problems over the past two weeks:              Health Maintenance   Topic Date Due    Hepatitis C Screening  1945    GLAUCOMA SCREENING 67+ YR  06/20/2013    MAMMOGRAM  02/11/2017    OPHTHALMOLOGY EXAM  06/21/2017    Diabetic Foot Exam  09/22/2017    URINE MICROALBUMIN  12/19/2017    HEMOGLOBIN A1C  07/29/2018    CREATININE LEVEL  08/21/2018    Fall Risk  08/29/2018    Depression Screening PHQ-9  08/29/2018    Urinary Incontinence Screening  08/29/2018    INFLUENZA VACCINE  09/01/2018    COLONOSCOPY  06/01/2027    DTaP,Tdap,and Td Vaccines (2 - Td) 02/22/2028    PNEUMOCOCCAL POLYSACCHARIDE VACCINE AGE 72 AND OVER  Completed       Immunization History   Administered Date(s) Administered    Influenza 11/20/2006, 08/04/2017    Influenza Split High Dose Preservative Free IM 09/25/2014, 12/21/2015, 12/29/2016, 12/12/2017    Influenza TIV (IM) 11/22/2010, 09/14/2012, 09/30/2013    Pneumococcal Conjugate 13-Valent 02/11/2016    Pneumococcal Polysaccharide PPV23 1945, 09/23/2009    Tuberculin Skin Test-PPD Intradermal 03/07/2006, 06/06/2007        Objective:  Vitals:    05/23/18 1017   BP: 140/60   BP Location: Right arm   Patient Position: Sitting   Cuff Size: Large   Pulse: 94   Resp: 20   Temp: 98 4 °F (36 9 °C)   TempSrc: Oral   SpO2: 93%   Weight: 122 kg (269 lb 9 6 oz)   Height: 4' 11 5" (1 511 m)     Wt Readings from Last 3 Encounters:   05/23/18 122 kg (269 lb 9 6 oz)   02/22/18 117 kg (257 lb 3 2 oz)   02/01/18 115 kg (252 lb 12 8 oz)     Body mass index is 53 54 kg/m²  No exam data present       Physical Exam   Constitutional: She is oriented to person, place, and time  She appears well-developed and well-nourished  No distress  Alert pleasant cooperative seated in room in no acute distress  Talking in complete sentences  HENT:   Head: Normocephalic and atraumatic  Right Ear: External ear normal    Left Ear: External ear normal    Mouth/Throat: Oropharynx is clear and moist  No oropharyngeal exudate  Moist mucous membranes  Normal posterior pharynx   Eyes: Conjunctivae are normal  Right eye exhibits no discharge  Left eye exhibits no discharge  No scleral icterus  Neck: Neck supple  Cardiovascular: Normal rate, regular rhythm and normal heart sounds  No murmur heard  Crisp mechanical valve sound  Regular rate  No murmurs  Pulmonary/Chest: Effort normal and breath sounds normal  No respiratory distress  She has no wheezes  Clear to auscultation throughout  No crackles in the bases  No respiratory distress  PO2 stable on room air   Abdominal: Soft  Bowel sounds are normal    Obese soft nontender, positive bowel sounds   Musculoskeletal: She exhibits edema (  2+ bilateral lower extremity edema)  No foot ulcerations  Neurological: She is alert and oriented to person, place, and time  No gross focal neurologic deficits on exam   Skin: Skin is warm and dry  She is not diaphoretic  Psychiatric: She has a normal mood and affect  Her behavior is normal  Thought content normal    Nursing note and vitals reviewed            Future Appointments  Date Time Provider Department Center   7/13/2018 2:15 PM Robby Lynn PA-C [address] [place] [address]    Patient Care Team:  Gama Herrera DO as PCP - Yuki Handley MD

## 2018-05-24 LAB — HBA1C MFR BLD HPLC: 7.1 %

## 2018-05-24 NOTE — ASSESSMENT & PLAN NOTE
Patient currently follows with her cardiologist and has follow-up chest x-ray and echocardiogram scheduled  Continue with diuretics  Currently using Lasix along with spironolactone with metalazone on 3 days per week  Will check labs prior to follow-up

## 2018-05-24 NOTE — TELEPHONE ENCOUNTER
Please contact patient  Please review patient's medication list and give refills to patient's mail order pharmacy  Please confirm all of her doses of her medications   So that refills can be given  Update her dose and sig   For all medications  Please also call mail order pharmacy as well as her local pharmacy and see that Faith Arreaga is discontinued at both places  Januvia will not be started she will be instead started on Tradjenta 5 milligrams daily  See that metformin is removed from her profile at both her local pharmacy as well as her mail order pharmacy       see me if any questions      Thanks Francisco Javier Parisi

## 2018-05-24 NOTE — ASSESSMENT & PLAN NOTE
Rate controlled on amiodarone and digoxin  Cardiology manages Coumadin level    Patient also has a history of  Bioprosthetic valve

## 2018-06-11 ENCOUNTER — TELEPHONE (OUTPATIENT)
Dept: INTERNAL MEDICINE CLINIC | Facility: CLINIC | Age: 73
End: 2018-06-11

## 2018-06-11 NOTE — TELEPHONE ENCOUNTER
Spoke with patient  She notes that mail order sent Januvia 50 mg which she had been taking  She picked up tradjenta 5mg from local pharmacy but she has not taken this  She is taking januvia 50 mg daily alone for BS control  I advised her to stop [facility] today and make her mail order pharmacy aware that this med was discontinued  Start Tradjenta 5mg daily for BS control  Go for labs as ordered at end of this month prior to follow up with myself  Keep cardio apt as scheduled next week  Keep our office informed of any change in tx plan

## 2018-06-19 DIAGNOSIS — E11.9 TYPE 2 DIABETES MELLITUS WITHOUT COMPLICATION, WITHOUT LONG-TERM CURRENT USE OF INSULIN (HCC): ICD-10-CM

## 2018-06-19 DIAGNOSIS — N18.30 STAGE 3 CHRONIC KIDNEY DISEASE (HCC): ICD-10-CM

## 2018-06-19 DIAGNOSIS — I50.32 CHRONIC DIASTOLIC CONGESTIVE HEART FAILURE (HCC): ICD-10-CM

## 2018-06-19 RX ORDER — METOLAZONE 2.5 MG/1
2.5 TABLET ORAL 3 TIMES WEEKLY
Qty: 40 TABLET | Refills: 1 | Status: SHIPPED | OUTPATIENT
Start: 2018-06-20 | End: 2018-07-13 | Stop reason: SDUPTHER

## 2018-07-11 NOTE — TELEPHONE ENCOUNTER
It doesn't seem pt called into [employer] to notify them of medication therapy change  Received fax from [facility]zym[facility]kier requiring therapy clarification request      Reference # [id number]    Returned call to [employer] and [facility] notifying them that Januvia 50 mg daily was d/c'd and pt is only taking Tradjenta 5 mg daily  Asked that they call with any questions

## 2018-07-13 ENCOUNTER — OFFICE VISIT (OUTPATIENT)
Dept: INTERNAL MEDICINE CLINIC | Facility: CLINIC | Age: 73
End: 2018-07-13
Payer: COMMERCIAL

## 2018-07-13 VITALS
HEART RATE: 68 BPM | DIASTOLIC BLOOD PRESSURE: 58 MMHG | HEIGHT: 59 IN | OXYGEN SATURATION: 96 % | SYSTOLIC BLOOD PRESSURE: 118 MMHG | TEMPERATURE: 98.4 F | WEIGHT: 273.4 LBS | RESPIRATION RATE: 20 BRPM | BODY MASS INDEX: 55.12 KG/M2

## 2018-07-13 DIAGNOSIS — Z12.39 SCREENING FOR BREAST CANCER: Primary | ICD-10-CM

## 2018-07-13 DIAGNOSIS — Z95.2 S/P MVR (MITRAL VALVE REPLACEMENT): ICD-10-CM

## 2018-07-13 DIAGNOSIS — N18.30 STAGE 3 CHRONIC KIDNEY DISEASE (HCC): ICD-10-CM

## 2018-07-13 DIAGNOSIS — E78.5 HYPERLIPIDEMIA, UNSPECIFIED HYPERLIPIDEMIA TYPE: ICD-10-CM

## 2018-07-13 DIAGNOSIS — E11.9 TYPE 2 DIABETES MELLITUS WITHOUT COMPLICATION, WITHOUT LONG-TERM CURRENT USE OF INSULIN (HCC): ICD-10-CM

## 2018-07-13 DIAGNOSIS — E03.9 HYPOTHYROIDISM, UNSPECIFIED TYPE: ICD-10-CM

## 2018-07-13 DIAGNOSIS — I50.32 CHRONIC DIASTOLIC CONGESTIVE HEART FAILURE (HCC): ICD-10-CM

## 2018-07-13 PROCEDURE — 99214 OFFICE O/P EST MOD 30 MIN: CPT | Performed by: PHYSICIAN ASSISTANT

## 2018-07-13 RX ORDER — ALLOPURINOL 100 MG/1
100 TABLET ORAL DAILY
Qty: 180 TABLET | Refills: 0 | Status: SHIPPED | OUTPATIENT
Start: 2018-07-13 | End: 2019-02-05 | Stop reason: SDUPTHER

## 2018-07-13 RX ORDER — ATORVASTATIN CALCIUM 80 MG/1
40 TABLET, FILM COATED ORAL DAILY
Qty: 30 TABLET | Refills: 1 | Status: SHIPPED | OUTPATIENT
Start: 2018-07-13 | End: 2019-02-05 | Stop reason: SDUPTHER

## 2018-07-13 RX ORDER — AMIODARONE HYDROCHLORIDE 200 MG/1
200 TABLET ORAL DAILY
Start: 2018-07-13 | End: 2019-02-05 | Stop reason: SDUPTHER

## 2018-07-13 RX ORDER — METOLAZONE 2.5 MG/1
2.5 TABLET ORAL EVERY OTHER DAY
Qty: 40 TABLET | Refills: 0 | Status: SHIPPED | OUTPATIENT
Start: 2018-07-13 | End: 2018-09-04 | Stop reason: SDUPTHER

## 2018-07-13 RX ORDER — DIGOXIN 125 MCG
125 TABLET ORAL EVERY OTHER DAY
Start: 2018-07-13 | End: 2019-02-05 | Stop reason: SDUPTHER

## 2018-07-13 RX ORDER — LEVOTHYROXINE SODIUM 88 UG/1
88 TABLET ORAL DAILY
Qty: 90 TABLET | Refills: 1 | Status: SHIPPED | OUTPATIENT
Start: 2018-07-13 | End: 2019-01-21 | Stop reason: SDUPTHER

## 2018-07-13 NOTE — PROGRESS NOTES
[facility]ay[facility]  Standard Office Visit  Patient ID: Greer Lynn    : 1945  Age/Gender: 68 y o  female     DATE: 2018      Assessment/Plan:    Hypothyroidism  TSH noted  Increased Synthroid from 75 to 88 mcg daily  Will repeat TSH in 6 weeks time    Type 2 diabetes mellitus without complication, without long-term current use of insulin (HCC)  Lab Results   Component Value Date    HGBA1C 6 7 (H) 2017       No results for input(s): POCGLU in the last 72 hours  Blood Sugar Average: Last 72 hrs:        blood sugar well controlled per patient at this time with home blood sugar readings  At last visit metformin was discontinued and she was changed to [place]  Due to cost [place] was later changed to Januvia 50 mg daily which is a renal dose for this medication  Her blood sugars are controlled however cost may be prohibitive  She will contact her insurance company to see if the cost of Januvia 50 mg daily is manageable  In the event that it is not high discussed possible treatment options including insulin therapy to control her blood sugars  Will repeat A1c with TSH in 6 weeks    Chronic diastolic congestive heart failure ([address] )   Discussed my concern with weight gain  Encouraged daily activity and weight loss  Low-salt diet  Weight has trended up from 257 2018 to 269 on 2018  On today's visit her weight is 273  Increased mentalis down to 5 mg by mouth every other day  Continue Lasix as directed  Continue spironolactone as directed  Agree with weight loss and consideration for bariatric program   Due to increasing renal function will reduce allopurinol to once daily from twice daily  Repeat BMP in 1 week to monitor renal function  Encouraged her to call to schedule nephrology follow up  Discussed case with Dr Zee Ours    6 week follow up with other labs (CMp/lipids/A1c(    Stage 3 chronic kidney disease  See tx plan as above  Schedule with nephrology    HLD (hyperlipidemia)  Recently saw cardio who increased her lipitor dosing to 80mg  S/P MVR (mitral valve replacement)  Follows with cardio who manages coumadin       Diagnoses and all orders for this visit:    Screening for breast cancer  -     Mammo screening bilateral w cad; Future    Hyperlipidemia, unspecified hyperlipidemia type  -     atorvastatin (LIPITOR) 80 mg tablet; Take 0 5 tablets (40 mg total) by mouth daily  -     Lipid panel; Future    Hypothyroidism, unspecified type  -     levothyroxine 88 mcg tablet; Take 1 tablet (88 mcg total) by mouth daily  -     TSH, 3rd generation; Future    Type 2 diabetes mellitus without complication, without long-term current use of insulin (HCC)  -     Hemoglobin A1C; Future    Stage 3 chronic kidney disease  -     allopurinol (ZYLOPRIM) 100 mg tablet; Take 1 tablet (100 mg total) by mouth daily  -     Comprehensive metabolic panel; Future  -     CBC and differential; Future  -     Basic metabolic panel; Future    Chronic diastolic congestive heart failure (HCC)  -     amiodarone 200 mg tablet; Take 1 tablet (200 mg total) by mouth daily  -     digoxin (LANOXIN) 0 125 mg tablet; Take 1 tablet (125 mcg total) by mouth every other day  -     metolazone (ZAROXOLYN) 2 5 mg tablet; Take 1 tablet (2 5 mg total) by mouth every other day  -     Basic metabolic panel; Future    S/P MVR (mitral valve replacement)          Subjective:   Chief Complaint   Patient presents with    Follow-up     Pt is here for a 1 month follow up for her HTN  Review labs from 7/11/18  Kamille Ospina is a 68 y o  female who presents to the office on 7/13/2018 for     Patient for follow up  Saw his cardiologist who recommended he see bariatric specialist   Notes she was told her weight serena was due to caloric intake  She will be trying to work on diet  Has cardio labs scheduled for follow up  Cost of tradjenta  Is limiting at this time    Does have shortness of breath with increased activity but no shortness of breath at rest   Does follow her weight  Notes that she has not seen any real change to the swelling in her lower extremity  No longer taking metformin as this was stopped last visit  Does check her blood sugars  Notes her blood sugars have been relatively controlled around 150 in the morning     recently saw her cardiologist who increased her Lipitor to 80 mg     does not currently follow with Nephrology  Notes she has saw a kidney specialist in the past       Congestive Heart Failure   Presents for follow-up visit  Associated symptoms include edema (chronic unchanged), fatigue (chronic unchanged ) and shortness of breath (chronic unchanged  )  Pertinent negatives include no abdominal pain, chest pain, chest pressure, near-syncope or palpitations  The symptoms have been stable  Diabetes   She presents for her follow-up diabetic visit  She has type 2 diabetes mellitus  Her disease course has been stable  Pertinent negatives for hypoglycemia include no dizziness  Associated symptoms include fatigue (chronic unchanged  )  Pertinent negatives for diabetes include no blurred vision, no chest pain, no polydipsia, no polyphagia and no polyuria  There are no hypoglycemic complications  Risk factors for coronary artery disease include male sex, obesity, hypertension and dyslipidemia  Current diabetic treatment includes diet and oral agent (monotherapy)  She is compliant with treatment most of the time  Her weight is increasing steadily  She is following a diabetic diet  There is no change in her home blood glucose trend  An ACE inhibitor/angiotensin II receptor blocker is not being taken  Thyroid Problem   Presents for follow-up visit  Symptoms include fatigue (chronic unchanged  )  Patient reports no diarrhea or palpitations         The following portions of the patient's history were reviewed and updated as appropriate: allergies, current medications, past family history, past medical history, past social history, past surgical history and problem list     Review of Systems   Constitutional: Positive for fatigue (chronic unchanged  )  Eyes: Negative for blurred vision  Respiratory: Positive for shortness of breath (chronic unchanged  )  Negative for cough  Cardiovascular: Positive for leg swelling (  Chronic unchanged)  Negative for chest pain, palpitations and near-syncope  Gastrointestinal: Negative for abdominal pain, diarrhea, nausea and vomiting  Endocrine: Negative for polydipsia, polyphagia and polyuria  Neurological: Negative for dizziness           Patient Active Problem List   Diagnosis    Gout    GERD without esophagitis    Cardiac disease    CAD (coronary artery disease)    Mitral valve replaced    Chronic diastolic congestive heart failure (HCC)    Meralgia paresthetica of right side    Pain of right lower extremity    Streptococcus bovis infection    Vertebral osteomyelitis (HCC)    Ovarian cyst, right    Acute on chronic diastolic congestive heart failure ([facility])    Asthenia    Stage 3 chronic kidney disease    Ambulatory dysfunction    Arthritis of knee    Anticoagulant long-term use    Benign essential hypertension    Cardiac arrhythmia    History of adenomatous polyp of colon    HLD (hyperlipidemia)    Hypothyroidism    Iron deficiency anemia    Lumbar degenerative disc disease    Mitral valve prolapse    Morbid obesity due to excess calories ([address] )    Vitamin D deficiency    Type 2 diabetes mellitus without complication, without long-term current use of insulin ([facility])    S/P MVR (mitral valve replacement)    RBBB (right bundle branch block)    Psoriasis    Physical deconditioning    Paroxysmal atrial fibrillation ([facility])    Onychomycosis of toenail       Past Medical History:   Diagnosis Date    Abscess of abdominal wall     RESOLVED:  6/28/16    Acute diastolic congestive heart failure (HCC) RESOLVED:  17    Acute on chronic diastolic congestive heart failure (HCC)     RESOLVED:  17    CHANTELL (acute kidney injury) ([facility] )     RESOLVED:  17    Allergy     Arteriovenous malformation     OF THE GASTROINTESTINAL    Blood coagulation disorder (HCC)     RESOLVED: 6/21/15    CAD (coronary artery disease)     Candidiasis of breast     RESOLVED: 17    Diabetes mellitus (HCC)     Diastolic CHF ([facility] )     Gastroparesis     RESOLVED: 6/21/15    Generalized anxiety disorder     RESOLVED: 6/21/15    GERD (gastroesophageal reflux disease)     Gout     Hyperlipidemia     Hypertension     Lymphedema     RESOLVED: 6/21/15     Mitral valve replaced      - prosthetic    Morbid (severe) obesity due to excess calories ([facility] )     RESOLVED: 6/21/15    Psoriasis     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[facility] )     RESOLVED: 6/21/15    Sciatica of right side     RESOLVED: 5/15/17    Type 2 diabetes mellitus ([facility] )     Weight gain     RESOLVED: 5/15/17       Past Surgical History:   Procedure Laterality Date     SECTION      X 1    COLONOSCOPY N/A 2017    Procedure: COLONOSCOPY;  Surgeon: Tc Calvillo DO;  Location: [facility];   Service:     MITRAL VALVE REPLACEMENT      TONSILLECTOMY AND ADENOIDECTOMY      LISTED 2 X         Current Outpatient Prescriptions:     albuterol (PROAIR HFA) 90 mcg/act inhaler, Inhale 2 puffs as needed for wheezing, Disp: 1 Inhaler, Rfl: 0    allopurinol (ZYLOPRIM) 100 mg tablet, Take 1 tablet (100 mg total) by mouth daily, Disp: 180 tablet, Rfl: 0    amiodarone 200 mg tablet, Take 1 tablet (200 mg total) by mouth daily, Disp: , Rfl:     atorvastatin (LIPITOR) 80 mg tablet, Take 0 5 tablets (40 mg total) by mouth daily, Disp: 30 tablet, Rfl: 1    citalopram (CeleXA) 20 mg tablet, Take 1 tablet (20 mg total) by mouth daily, Disp: 90 tablet, Rfl: 0    digoxin (LANOXIN) 0 125 mg tablet, Take 1 tablet (125 mcg total) by mouth every other day, Disp: , Rfl:     ergocalciferol (ERGOCALCIFEROL) 88291 units capsule, Take 1 capsule (50,000 Units total) by mouth once a week, Disp: 12 capsule, Rfl: 0    folic acid-pyridoxine-cyanocobalamin (FOLBIC) 2 5-25-2 mg, Take 2 tablets by mouth daily  , Disp: , Rfl:     furosemide (LASIX) 80 mg tablet, Take 1 tablet (80 mg total) by mouth daily, Disp: 90 tablet, Rfl: 0    iron polysaccharides (FERREX 150) 150 mg capsule, Take 1 capsule (150 mg total) by mouth 2 (two) times a day, Disp: 180 capsule, Rfl: 0    levothyroxine 88 mcg tablet, Take 1 tablet (88 mcg total) by mouth daily, Disp: 90 tablet, Rfl: 1    Linagliptin (TRADJENTA) 5 MG TABS, Take 5 mg by mouth daily, Disp: 90 tablet, Rfl: 1    metolazone (ZAROXOLYN) 2 5 mg tablet, Take 1 tablet (2 5 mg total) by mouth every other day, Disp: 40 tablet, Rfl: 0    metoprolol tartrate (LOPRESSOR) 25 mg tablet, Take 1 tablet (25 mg total) by mouth every 12 (twelve) hours, Disp: 180 tablet, Rfl: 0    Multiple Vitamins-Minerals (OCUVITE EYE HEALTH FORMULA PO), Take 1 tablet by mouth daily, Disp: , Rfl:     nystatin (nystatin) powder, Apply 1 application topically daily  , Disp: , Rfl:     omeprazole (PriLOSEC) 40 MG capsule, Take 1 capsule (40 mg total) by mouth 2 (two) times a day (Patient taking differently: Take 40 mg by mouth daily  ), Disp: 90 capsule, Rfl: 0    potassium chloride (KLOR-CON M20) 20 mEq tablet, Take 40 mEq by mouth 2 (two) times a day  , Disp: , Rfl:     spironolactone (ALDACTONE) 25 mg tablet, Take 1 tablet (25 mg total) by mouth daily, Disp: 90 tablet, Rfl: 0    Turmeric 500 MG TABS, Take 1 capsule by mouth 2 (two) times a day, Disp: , Rfl:     warfarin (COUMADIN) 5 mg tablet, Take 5 mg by mouth daily, Disp: , Rfl:     Allergies   Allergen Reactions    Tetanus Toxoid        Social History     Social History    Marital status: /Civil Union     Spouse name: N/A    Number of children: 2    Years of education: N/A     Occupational History  HOMEMAKER      Social History Main Topics    Smoking status: Former Smoker    Smokeless tobacco: Never Used      Comment: quit 40 yrs ago, former 1 ppd; SHE HAS A PAST HX OF CIGARETTE SMOKING; QUIT DATE 36 YEARS AGO    Alcohol use No    Drug use: No    Sexual activity: Not Asked     Other Topics Concern    None     Social History Narrative    EXERCISING REGULARLY: 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           Family History   Problem Relation Age of Onset    Diabetes Mother         MELLITUS    Diabetes Father         MELLITUS    Diabetes type II Father         MELLITUS    Coronary artery disease Sister         CAROTID ARTERY STENOSIS    Stroke Family         SYNDROME       PHQ-9 Depression Screening    PHQ-9:    Frequency of the following problems over the past two weeks:              Health Maintenance   Topic Date Due    GLAUCOMA SCREENING 65 + YR  06/20/2013    MAMMOGRAM  02/11/2017    DM Eye Exam  06/21/2017    Diabetic Foot Exam  09/22/2017    URINE MICROALBUMIN  12/19/2017    CREATININE LEVEL  08/21/2018    Fall Risk  08/29/2018    Urinary Incontinence Screening  08/29/2018    INFLUENZA VACCINE  09/01/2018    HEMOGLOBIN A1C  11/16/2018    CRC Screening: Colonoscopy  06/01/2027    DTaP,Tdap,and Td Vaccines (2 - Td) 02/22/2028    Hepatitis C Screening  Completed    PNEUMOCOCCAL POLYSACCHARIDE VACCINE AGE 72 AND OVER  Completed       Immunization History   Administered Date(s) Administered    Influenza 11/20/2006, 08/04/2017    Influenza Split High Dose Preservative Free IM 09/25/2014, 12/21/2015, 12/29/2016, 12/12/2017    Influenza TIV (IM) 11/22/2010, 09/14/2012, 09/30/2013    Pneumococcal Conjugate 13-Valent 02/11/2016    Pneumococcal Polysaccharide PPV23 1945, 09/23/2009    Tuberculin Skin Test-PPD Intradermal 03/07/2006, 06/06/2007        Objective:  Vitals:    07/13/18 1406   BP: 118/58   BP Location: Right arm   Patient Position: Sitting   Cuff Size: Large Pulse: 68   Resp: 20   Temp: 98 4 °F (36 9 °C)   TempSrc: Oral   SpO2: 96%   Weight: 124 kg (273 lb 6 4 oz)   Height: 4' 11" (1 499 m)     Wt Readings from Last 3 Encounters:   07/13/18 124 kg (273 lb 6 4 oz)   05/23/18 122 kg (269 lb 9 6 oz)   02/22/18 117 kg (257 lb 3 2 oz)     Body mass index is 55 22 kg/m²  No exam data present       Physical Exam   Constitutional: She is oriented to person, place, and time  She appears well-developed and well-nourished  No distress  Alert pleasant cooperative  Seated in room in no acute distress  Talking in complete sentences   HENT:   Head: Normocephalic and atraumatic  Mouth/Throat: Oropharynx is clear and moist  No oropharyngeal exudate  Eyes: Pupils are equal, round, and reactive to light  Right eye exhibits no discharge  Left eye exhibits no discharge  No scleral icterus  Neck: Neck supple  Cardiovascular: Normal rate, regular rhythm and normal heart sounds  No murmur heard  Crisp mechanical heart valve sound   Pulmonary/Chest: Effort normal and breath sounds normal  She has no wheezes  Clear to auscultation throughout  No crackles no rhonchi no wheeze  Symmetric breath sounds bilateral bases   Abdominal: Soft  Bowel sounds are normal  There is no tenderness  Morbidly obese  Obesity limits physical exam   Abdomen nontender   Musculoskeletal: She exhibits edema ( +2 symmetric lower extremity edema  No calf tenderness to palpation  No erythema)  Neurological: She is alert and oriented to person, place, and time  Skin: Skin is warm  She is not diaphoretic  Psychiatric: She has a normal mood and affect  Her behavior is normal  Thought content normal    Nursing note and vitals reviewed            Future Appointments  Date Time Provider Raymond Valladares   9/13/2018 1:15 PM Aurora Nair DO [address]    Patient Care Team:  Aurora Nair DO as PCP - Rosmery Burrows MD

## 2018-07-13 NOTE — PATIENT INSTRUCTIONS
Hypothyroidism  TSH noted  Increased Synthroid from 75 to 88 mcg daily  Will repeat TSH in 6 weeks time    Type 2 diabetes mellitus without complication, without long-term current use of insulin (HCC)  Lab Results   Component Value Date    HGBA1C 6 7 (H) 04/28/2017       No results for input(s): POCGLU in the last 72 hours  Blood Sugar Average: Last 72 hrs:        blood sugar well controlled per patient at this time with home blood sugar readings  At last visit metformin was discontinued and she was changed to [place]  Due to cost [place] was later changed to Januvia 50 mg daily which is a renal dose for this medication  Her blood sugars are controlled however cost may be prohibitive  She will contact her insurance company to see if the cost of Januvia 50 mg daily is manageable  In the event that it is not high discussed possible treatment options including insulin therapy to control her blood sugars  Will repeat A1c with TSH in 6 weeks    Chronic diastolic congestive heart failure ([address] )   Discussed my concern with weight gain  Encouraged daily activity and weight loss  Low-salt diet  Weight has trended up from 257 1/2018 to 269 on 05/2018  On today's visit her weight is 273  Increased mentalis down to 5 mg by mouth every other day  Continue Lasix as directed  Continue spironolactone as directed  Agree with weight loss and consideration for bariatric program   Due to increasing renal function will reduce allopurinol to once daily from twice daily  Repeat BMP in 1 week  Encouraged her to call to schedule nephrology follow up  Stage 3 chronic kidney disease  See tx plan as above    HLD (hyperlipidemia)  Recently saw cardio who increased her lipitor dosing to 80mg

## 2018-07-13 NOTE — ASSESSMENT & PLAN NOTE
Lab Results   Component Value Date    HGBA1C 6 7 (H) 04/28/2017       No results for input(s): POCGLU in the last 72 hours  Blood Sugar Average: Last 72 hrs:        blood sugar well controlled per patient at this time with home blood sugar readings  At last visit metformin was discontinued and she was changed to [place]  Due to cost [place] ([facility]) was later changed to Januvia 50 mg daily which is a renal dose for this medication  Her blood sugars are controlled however cost may be prohibitive  She will contact her insurance company to see if the cost of Januvia 50 mg daily is manageable  In the event that it is not high discussed possible treatment options including insulin therapy to control her blood sugars    Will repeat A1c with TSH in 6 weeks

## 2018-07-13 NOTE — ASSESSMENT & PLAN NOTE
Discussed my concern with weight gain  Encouraged daily activity and weight loss  Low-salt diet  Weight has trended up from 257 1/2018 to 269 on 05/2018  On today's visit her weight is 273  Increased mentalis down to 5 mg by mouth every other day  Continue Lasix as directed  Continue spironolactone as directed  Agree with weight loss and consideration for bariatric program   Due to increasing renal function will reduce allopurinol to once daily from twice daily  Repeat BMP in 1 week to monitor renal function  Encouraged her to call to schedule nephrology follow up  Discussed case with Dr Shauna Ceja    6 week follow up with other labs (CMp/lipids/A1c(

## 2018-07-19 DIAGNOSIS — E11.9 TYPE 2 DIABETES MELLITUS WITHOUT COMPLICATION, WITHOUT LONG-TERM CURRENT USE OF INSULIN (HCC): Primary | ICD-10-CM

## 2018-07-19 NOTE — TELEPHONE ENCOUNTER
Patient called as there is a mix-up with her medication orders  She told Vicky Phillips that she did not want the Tradjenta that he ordered because of the cost, so he then ordered Januvia but the script was then voided (unknown reason)  She needs a new script for the Januvia 50 mg #30 pills to be sent to the [facility] in [address] and then another script for #90 pills sent to [facility]  Please call patient if you have any questions and she will explain the situation

## 2018-07-20 DIAGNOSIS — E11.9 TYPE 2 DIABETES MELLITUS WITHOUT COMPLICATION, WITHOUT LONG-TERM CURRENT USE OF INSULIN (HCC): Primary | ICD-10-CM

## 2018-07-20 NOTE — TELEPHONE ENCOUNTER
D/W Shiv Gomez  Gave me ok to send over medication Januvia 50 MG qty 30 to retail pharm, and then qty 90 to Mail order pharm

## 2018-07-20 NOTE — PROGRESS NOTES
Reviewed with patient at last visit  Synthroid dose increased from 75 to 88mCg  V[place] Moore was d/c and changed to [place] and [place]  Rx sent to local and mail order pharmacy

## 2018-08-17 NOTE — PROGRESS NOTES
Called to discuss test results that were in my inbox upon return to office  Potassium is low 3 0 with increased BUN and creatinine from previous (7/11/18 BUN 31, creatinine 1 66)  No answer will have to call back to discuss labs and K supplementation

## 2018-08-23 DIAGNOSIS — E87.6 HYPOKALEMIA: Primary | ICD-10-CM

## 2018-08-23 DIAGNOSIS — K21.9 GERD WITHOUT ESOPHAGITIS: ICD-10-CM

## 2018-08-23 RX ORDER — OMEPRAZOLE 40 MG/1
40 CAPSULE, DELAYED RELEASE ORAL DAILY
Qty: 30 CAPSULE | Refills: 1
Start: 2018-08-23 | End: 2018-09-04 | Stop reason: SDUPTHER

## 2018-08-23 RX ORDER — POTASSIUM CHLORIDE 20 MEQ/1
20 TABLET, EXTENDED RELEASE ORAL 3 TIMES DAILY
Qty: 90 TABLET | Refills: 0
Start: 2018-08-23 | End: 2018-11-16 | Stop reason: SDUPTHER

## 2018-08-23 NOTE — PROGRESS NOTES
Pt called regarding low K+ (3) on 8/2  She reports she is taking KCL 20meq 1 tablet BID not 40meq BID as ordered and noted in chart    I asked her to increase her current dose to KCL 20meq 1 tablet TID and repeat labs in 1 week  Pt agrees to this   I will update this in her med list   Pt has f/u with Dr Codie Camejo in September

## 2018-09-04 DIAGNOSIS — I48.0 PAROXYSMAL ATRIAL FIBRILLATION (HCC): ICD-10-CM

## 2018-09-04 DIAGNOSIS — I50.32 CHRONIC DIASTOLIC CONGESTIVE HEART FAILURE (HCC): ICD-10-CM

## 2018-09-04 DIAGNOSIS — K21.9 GERD WITHOUT ESOPHAGITIS: ICD-10-CM

## 2018-09-04 RX ORDER — OMEPRAZOLE 40 MG/1
40 CAPSULE, DELAYED RELEASE ORAL 2 TIMES DAILY
Qty: 180 CAPSULE | Refills: 1 | Status: SHIPPED | OUTPATIENT
Start: 2018-09-04 | End: 2018-11-16 | Stop reason: SDUPTHER

## 2018-09-04 RX ORDER — METOLAZONE 2.5 MG/1
2.5 TABLET ORAL 3 TIMES WEEKLY
Qty: 36 TABLET | Refills: 1 | Status: SHIPPED | OUTPATIENT
Start: 2018-09-05 | End: 2019-02-05 | Stop reason: SDUPTHER

## 2018-09-04 NOTE — TELEPHONE ENCOUNTER
Pt called left message for refills   Reports takes omeprazole 40mg DR twice daily     Last O/V: 7/13/18  Next O/V:  9/13/18

## 2018-09-13 ENCOUNTER — OFFICE VISIT (OUTPATIENT)
Dept: INTERNAL MEDICINE CLINIC | Facility: CLINIC | Age: 73
End: 2018-09-13
Payer: COMMERCIAL

## 2018-09-13 VITALS
BODY MASS INDEX: 56.38 KG/M2 | SYSTOLIC BLOOD PRESSURE: 110 MMHG | OXYGEN SATURATION: 97 % | DIASTOLIC BLOOD PRESSURE: 58 MMHG | HEIGHT: 58 IN | HEART RATE: 63 BPM | WEIGHT: 268.6 LBS | TEMPERATURE: 98 F

## 2018-09-13 DIAGNOSIS — Z79.01 ANTICOAGULANT LONG-TERM USE: ICD-10-CM

## 2018-09-13 DIAGNOSIS — D50.9 IRON DEFICIENCY ANEMIA, UNSPECIFIED IRON DEFICIENCY ANEMIA TYPE: ICD-10-CM

## 2018-09-13 DIAGNOSIS — E78.00 PURE HYPERCHOLESTEROLEMIA: ICD-10-CM

## 2018-09-13 DIAGNOSIS — E03.9 ACQUIRED HYPOTHYROIDISM: ICD-10-CM

## 2018-09-13 DIAGNOSIS — Z23 NEED FOR INFLUENZA VACCINATION: ICD-10-CM

## 2018-09-13 DIAGNOSIS — E66.01 MORBID OBESITY DUE TO EXCESS CALORIES (HCC): ICD-10-CM

## 2018-09-13 DIAGNOSIS — I48.20 CHRONIC ATRIAL FIBRILLATION (HCC): ICD-10-CM

## 2018-09-13 DIAGNOSIS — Z95.2 S/P MVR (MITRAL VALVE REPLACEMENT): ICD-10-CM

## 2018-09-13 DIAGNOSIS — I10 BENIGN ESSENTIAL HYPERTENSION: ICD-10-CM

## 2018-09-13 DIAGNOSIS — E87.6 HYPOKALEMIA: ICD-10-CM

## 2018-09-13 DIAGNOSIS — E87.8 ELECTROLYTE IMBALANCE: ICD-10-CM

## 2018-09-13 DIAGNOSIS — N18.30 STAGE 3 CHRONIC KIDNEY DISEASE (HCC): Primary | ICD-10-CM

## 2018-09-13 DIAGNOSIS — E11.9 TYPE 2 DIABETES MELLITUS WITHOUT COMPLICATION, WITHOUT LONG-TERM CURRENT USE OF INSULIN (HCC): ICD-10-CM

## 2018-09-13 DIAGNOSIS — I50.33 ACUTE ON CHRONIC DIASTOLIC CONGESTIVE HEART FAILURE (HCC): ICD-10-CM

## 2018-09-13 DIAGNOSIS — E55.9 VITAMIN D DEFICIENCY: ICD-10-CM

## 2018-09-13 PROCEDURE — 90662 IIV NO PRSV INCREASED AG IM: CPT

## 2018-09-13 PROCEDURE — 99214 OFFICE O/P EST MOD 30 MIN: CPT | Performed by: FAMILY MEDICINE

## 2018-09-13 PROCEDURE — 1101F PT FALLS ASSESS-DOCD LE1/YR: CPT | Performed by: FAMILY MEDICINE

## 2018-09-13 PROCEDURE — 3074F SYST BP LT 130 MM HG: CPT | Performed by: FAMILY MEDICINE

## 2018-09-13 PROCEDURE — 3078F DIAST BP <80 MM HG: CPT | Performed by: FAMILY MEDICINE

## 2018-09-13 PROCEDURE — G0008 ADMIN INFLUENZA VIRUS VAC: HCPCS

## 2018-09-13 RX ORDER — REPAGLINIDE 1 MG/1
1 TABLET ORAL
Qty: 90 TABLET | Refills: 1 | Status: SHIPPED | OUTPATIENT
Start: 2018-09-13 | End: 2018-10-02 | Stop reason: SDUPTHER

## 2018-09-13 NOTE — PROGRESS NOTES
Patient's shoes and socks removed  Right Foot/Ankle   Right Foot Inspection  Skin Exam: skin normal, skin intact, dry skin, callus, abnormal color and callus no warmth, no erythema, no maceration, no pre-ulcer and no ulcer                          Toe Exam: ROM and strength within normal limits  Sensory   Vibration: intact  Proprioception: intact   Monofilament testing: intact  Vascular  Capillary refills: < 3 seconds  The right DP pulse is 2+  The right PT pulse is 2+  Right Toe  - Comprehensive Exam  Ecchymosis: none  Arch: normal  Hammertoes: absent  Swelling: none         Left Foot/Ankle  Left Foot Inspection  Skin Exam: skin normal, skin intact, dry skin, abnormal color and callusno warmth, no erythema, no maceration, no pre-ulcer and no ulcer                         Toe Exam: ROM and strength within normal limits                   Sensory   Vibration: intact  Proprioception: intact  Monofilament: intact  Vascular  Capillary refills: < 3 seconds  The left DP pulse is 2+  The left PT pulse is 2+     Left Toe  - Comprehensive Exam  Ecchymosis: none  Arch: normal  Hammertoes: absent  Swelling: none   Tenderness: none       Assign Risk Category:  No deformity present; ;

## 2018-09-13 NOTE — PROGRESS NOTES
Assessment/Plan:    No problem-specific Assessment & Plan notes found for this encounter  Problem List Items Addressed This Visit        Endocrine    Hypothyroidism    Relevant Orders    TSH, 3rd generation    T4, free    Type 2 diabetes mellitus without complication, without long-term current use of insulin (HCC)    Relevant Medications    repaglinide (PRANDIN) 1 mg tablet    Other Relevant Orders    Hemoglobin A1C    Comprehensive metabolic panel    CBC and differential    Lipid panel       Cardiovascular and Mediastinum    Acute on chronic diastolic congestive heart failure (HCC)    Benign essential hypertension    Cardiac arrhythmia       Genitourinary    Stage 3 chronic kidney disease - Primary       Other    Hypokalemia    Anticoagulant long-term use    HLD (hyperlipidemia)    Relevant Orders    Lipid panel    Iron deficiency anemia    Morbid obesity due to excess calories ([facility] [address] )    Relevant Orders    Ambulatory referral to Bariatric Surgery    Vitamin D deficiency    S/P MVR (mitral valve replacement)    Electrolyte imbalance    Relevant Orders    Magnesium              Stay off metformin due to Cr elevation, taking januvia but increase in sugars  Start prandin 1 mg TID and cont with kcl TID, labs are improved with potassium and kidney function, consider re start with  Renal- she has seen them in the past      Subjective:  F/up Dm     Patient ID: Mariangel Raymundo is a 68 y o  female  HPI  Diabetes Type II (Follow-Up): The patient states she has been doing well with her Type II Diabetes control since the last visit  Comorbid Illnesses: hyperlipidemia and obesity  Interval Events: 9/13/18: Aic now 8 5  Symptoms:   Home monitoring: The patient checks her blood sugar sporadically  Glycemic control has been good  the patient reports no symptomatic hypoglycemic episodes  Medications: the patient is adherent with her medication regimen  She denies medication side effects  Hypertension (Follow-Up):  The patient presents for follow-up of essential hypertension  The patient states she has been doing well with her blood pressure control since the last visit  She has no significant interval events  Symptoms: The patient is currently asymptomatic          The following portions of the patient's history were reviewed and updated as appropriate: allergies, current medications, past family history, past medical history, past social history, past surgical history and problem list     Review of Systems      Constitutional:  Denies fever or chills   Eyes:  Denies change in visual acuity   HENT:  Denies nasal congestion or sore throat   Respiratory:  Denies cough or shortness of breath or wheezing  Cardiovascular:  Denies palpitations or chest pain  GI:  Denies abdominal pain, nausea, or vomiting  Integument:  Denies rash   Neurologic:  Denies headache or focal weakness        Objective:      /58 (BP Location: Right arm, Patient Position: Sitting, Cuff Size: Large)   Pulse 63   Temp 98 °F (36 7 °C) (Oral)   Ht 4' 10" (1 473 m)   Wt 122 kg (268 lb 9 6 oz)   SpO2 97%   BMI 56 14 kg/m²          Physical Exam      Constitutional:  Well developed, well nourished, no acute distress, non-toxic appearance , morbidly obese  Eyes:  PERRL, conjunctiva normal , non icteric sclera  HENT:  Atraumatic, oropharynx moist  Neck-  supple   Respiratory:  CTA b/l, normal breath sounds, no rales, no wheezing   Cardiovascular:  RRR, no murmurs, no LE edema b/l  GI:  Soft, nondistended, normal bowel sounds x 4, nontender, no organomegaly, no mass, no rebound, no guarding   Neurologic:  no focal deficits noted   Psychiatric:  Speech and behavior appropriate , AAO x 3

## 2018-09-14 DIAGNOSIS — I50.32 CHRONIC DIASTOLIC CONGESTIVE HEART FAILURE (HCC): ICD-10-CM

## 2018-09-14 RX ORDER — FUROSEMIDE 80 MG
80 TABLET ORAL DAILY
Qty: 90 TABLET | Refills: 1 | Status: SHIPPED | OUTPATIENT
Start: 2018-09-14 | End: 2019-02-05 | Stop reason: SDUPTHER

## 2018-09-20 DIAGNOSIS — E78.5 HYPERLIPIDEMIA, UNSPECIFIED HYPERLIPIDEMIA TYPE: ICD-10-CM

## 2018-09-21 RX ORDER — CITALOPRAM 20 MG/1
20 TABLET ORAL DAILY
Qty: 90 TABLET | Refills: 1 | Status: SHIPPED | OUTPATIENT
Start: 2018-09-21 | End: 2019-02-05 | Stop reason: SDUPTHER

## 2018-10-02 DIAGNOSIS — E11.9 TYPE 2 DIABETES MELLITUS WITHOUT COMPLICATION, WITHOUT LONG-TERM CURRENT USE OF INSULIN (HCC): ICD-10-CM

## 2018-10-02 RX ORDER — REPAGLINIDE 1 MG/1
1 TABLET ORAL
Qty: 270 TABLET | Refills: 1 | Status: SHIPPED | OUTPATIENT
Start: 2018-10-02 | End: 2019-02-05 | Stop reason: SDUPTHER

## 2018-10-09 ENCOUNTER — OFFICE VISIT (OUTPATIENT)
Dept: INTERNAL MEDICINE CLINIC | Facility: CLINIC | Age: 73
End: 2018-10-09
Payer: COMMERCIAL

## 2018-10-09 VITALS
BODY MASS INDEX: 51.71 KG/M2 | TEMPERATURE: 98.4 F | OXYGEN SATURATION: 98 % | SYSTOLIC BLOOD PRESSURE: 118 MMHG | HEART RATE: 63 BPM | WEIGHT: 263.4 LBS | HEIGHT: 60 IN | DIASTOLIC BLOOD PRESSURE: 52 MMHG

## 2018-10-09 DIAGNOSIS — L30.4 INTERTRIGO: Primary | ICD-10-CM

## 2018-10-09 DIAGNOSIS — H61.22 IMPACTED CERUMEN, LEFT EAR: ICD-10-CM

## 2018-10-09 PROCEDURE — 1036F TOBACCO NON-USER: CPT | Performed by: NURSE PRACTITIONER

## 2018-10-09 PROCEDURE — 4040F PNEUMOC VAC/ADMIN/RCVD: CPT | Performed by: NURSE PRACTITIONER

## 2018-10-09 PROCEDURE — 99213 OFFICE O/P EST LOW 20 MIN: CPT | Performed by: NURSE PRACTITIONER

## 2018-10-09 PROCEDURE — 1160F RVW MEDS BY RX/DR IN RCRD: CPT | Performed by: NURSE PRACTITIONER

## 2018-10-09 PROCEDURE — 69210 REMOVE IMPACTED EAR WAX UNI: CPT | Performed by: NURSE PRACTITIONER

## 2018-10-09 RX ORDER — NYSTATIN 100000 U/G
OINTMENT TOPICAL 2 TIMES DAILY
Qty: 30 G | Refills: 1 | Status: SHIPPED | OUTPATIENT
Start: 2018-10-09 | End: 2019-05-24 | Stop reason: SDUPTHER

## 2018-10-09 NOTE — PROGRESS NOTES
Assessment/Plan:    No problem-specific Assessment & Plan notes found for this encounter  Diagnoses and all orders for this visit:    Intertrigo  -     nystatin (MYCOSTATIN) ointment; Apply topically 2 (two) times a day    Impacted cerumen, left ear  -     Ear cerumen removal      Prescribed nystatin cream to apply to posterior knee skin fold intertrigo  May apply nystatin powder if the area becomes moist, but it appears to be dry  The ointment was also chosen to help minimize chaffing while walking, which the patient stated irritated the rash  Cerumen in left ear disimpacted without incident  Patient has follow up scheduled in February  Return sooner as needed  Subjective:      Patient ID: Rufina Simmons is a 68 y o  female  Patient presents for cerumen disimpaction and evaluation of a sore behind her right knee  She reports a history of psoriasis, but she does not feel like this sore is like her other psoriasis patches  She says that it was initially painful, but then she applied Desitin and neosporin topically so that it did not rub against her leg when she walks and she reports that that was helpful in making it feel better  She denies a known injury to the area  She does admit that because she is short, the back of her legs sometimes hit the back of her chairs, but she does not remember particularly doing that recently  The following portions of the patient's history were reviewed and updated as appropriate: allergies, current medications, past family history, past medical history, past social history, past surgical history and problem list     Review of Systems   Constitutional: Negative for chills and fever  HENT: Positive for ear pain (blocked sensation in left ear )  Respiratory: Negative for shortness of breath (only on exertion)  Cardiovascular: Negative for chest pain  Gastrointestinal: Negative for abdominal pain, diarrhea, nausea and vomiting     Skin: Positive for wound (per HPI)           Past Medical History:   Diagnosis Date    Abscess of abdominal wall     RESOLVED:  6/28/16    Acute diastolic congestive heart failure (HCC)     RESOLVED:  8/29/17    Acute on chronic diastolic congestive heart failure (HCC)     RESOLVED:  8/29/17    CHANTELL (acute kidney injury) ([facility] [place] [address] )     RESOLVED:  8/29/17    Allergy     Arteriovenous malformation     OF THE GASTROINTESTINAL    Blood coagulation disorder (HCC)     RESOLVED: 6/21/15    CAD (coronary artery disease)     Candidiasis of breast     RESOLVED: 8/29/17    Diabetes [facility]  )     Diastolic CHF ([place] [address] )     Gastroparesis     RESOLVED: 6/21/15    Generalized anxiety disorder     RESOLVED: 6/21/15    GERD (gastroesophageal reflux disease)     Gout     Hyperlipidemia     Hypertension     Lymphedema     RESOLVED: 6/21/15     Mitral valve replaced     2010 - prosthetic    Morbid (severe) obesity due to excess calories ([facility] )     RESOLVED: 6/21/15    Psoriasis     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[facility] )     RESOLVED: 6/21/15    Sciatica of right side     RESOLVED: 5/15/17    Type 2 diabetes mellitus ([place] [address] )     Weight gain     RESOLVED: 5/15/17         Current Outpatient Prescriptions:     albuterol (PROAIR HFA) 90 mcg/act inhaler, Inhale 2 puffs as needed for wheezing, Disp: 1 Inhaler, Rfl: 0    allopurinol (ZYLOPRIM) 100 mg tablet, Take 1 tablet (100 mg total) by mouth daily, Disp: 180 tablet, Rfl: 0    amiodarone 200 mg tablet, Take 1 tablet (200 mg total) by mouth daily, Disp: , Rfl:     atorvastatin (LIPITOR) 80 mg tablet, Take 0 5 tablets (40 mg total) by mouth daily (Patient taking differently: Take 80 mg by mouth daily  ), Disp: 30 tablet, Rfl: 1    citalopram (CeleXA) 20 mg tablet, Take 1 tablet (20 mg total) by mouth daily, Disp: 90 tablet, Rfl: 1    digoxin (LANOXIN) 0 125 mg tablet, Take 1 tablet (125 mcg total) by mouth every other day, Disp: , Rfl:     ergocalciferol (ERGOCALCIFEROL) 41078 units capsule, Take 1 capsule (50,000 Units total) by mouth once a week, Disp: 12 capsule, Rfl: 0    folic acid-pyridoxine-cyanocobalamin (FOLBIC) 2 5-25-2 mg, Take 2 tablets by mouth daily  , Disp: , Rfl:     furosemide (LASIX) 80 mg tablet, Take 1 tablet (80 mg total) by mouth daily, Disp: 90 tablet, Rfl: 1    iron polysaccharides (FERREX 150) 150 mg capsule, Take 1 capsule (150 mg total) by mouth 2 (two) times a day (Patient taking differently: Take 150 mg by mouth daily  ), Disp: 180 capsule, Rfl: 0    levothyroxine 88 mcg tablet, Take 1 tablet (88 mcg total) by mouth daily, Disp: 90 tablet, Rfl: 1    metolazone (ZAROXOLYN) 2 5 mg tablet, Take 1 tablet (2 5 mg total) by mouth 3 (three) times a week, Disp: 36 tablet, Rfl: 1    metoprolol tartrate (LOPRESSOR) 25 mg tablet, Take 1 tablet (25 mg total) by mouth every 12 (twelve) hours, Disp: 180 tablet, Rfl: 1    Multiple Vitamins-Minerals (OCUVITE EYE HEALTH FORMULA PO), Take 1 tablet by mouth daily, Disp: , Rfl:     nystatin (nystatin) powder, Apply 1 application topically daily  , Disp: , Rfl:     omeprazole (PriLOSEC) 40 MG capsule, Take 1 capsule (40 mg total) by mouth 2 (two) times a day, Disp: 180 capsule, Rfl: 1    potassium chloride (KLOR-CON M20) 20 mEq tablet, Take 1 tablet (20 mEq total) by mouth 3 (three) times a day, Disp: 90 tablet, Rfl: 0    repaglinide (PRANDIN) 1 mg tablet, Take 1 tablet (1 mg total) by mouth 3 (three) times a day before meals, Disp: 270 tablet, Rfl: 1    sitaGLIPtin (JANUVIA) 50 mg tablet, Take 1 tablet (50 mg total) by mouth daily, Disp: 90 tablet, Rfl: 1    spironolactone (ALDACTONE) 25 mg tablet, Take 1 tablet (25 mg total) by mouth daily, Disp: 90 tablet, Rfl: 0    Turmeric 500 MG TABS, Take 1 capsule by mouth 2 (two) times a day, Disp: , Rfl:     warfarin (COUMADIN) 5 mg tablet, Take 5 mg by mouth daily, Disp: , Rfl:     nystatin (MYCOSTATIN) ointment, Apply topically 2 (two) times a day, Disp: 30 g, Rfl: 1    Allergies Allergen Reactions    Tetanus Toxoid        Social History   Past Surgical History:   Procedure Laterality Date     SECTION      X 1    COLONOSCOPY N/A 2017    Procedure: COLONOSCOPY;  Surgeon: Erickson Rodas DO;  Location: [facility]; Service:     MITRAL VALVE REPLACEMENT      TONSILLECTOMY AND ADENOIDECTOMY      LISTED 2 X     Family History   Problem Relation Age of Onset    Diabetes Mother         MELLITUS    Diabetes Father         MELLITUS    Diabetes type II Father         MELLITUS    Coronary artery disease Sister         CAROTID ARTERY STENOSIS    Stroke Family         SYNDROME       Objective:  /52 (BP Location: Right arm, Patient Position: Sitting, Cuff Size: Large)   Pulse 63   Temp 98 4 °F (36 9 °C) (Oral)   Ht 4' 11 5" (1 511 m)   Wt 119 kg (263 lb 6 4 oz)   SpO2 98%   BMI 52 31 kg/m²        Physical Exam   Constitutional: She appears well-developed and well-nourished  No distress  HENT:   Head: Normocephalic and atraumatic  Left Ear: A foreign body (cerumen obstructing TM) is present  Eyes: Pupils are equal, round, and reactive to light  Neck: Normal range of motion  Cardiovascular: Normal rate and regular rhythm  Murmur heard  Systolic murmur is present with a grade of 2/6   Loud click from artifical mitral valve noted    Abdominal: Soft  Skin: Skin is warm and dry                Ear cerumen removal  Date/Time: 10/9/2018 6:01 PM  Performed by: Christopher Serrano  Authorized by: Christopher Serrano     Patient location:  Clinic  Indications / Diagnosis:  Cerumen impaction  Other Assisting Provider: No    Consent:     Consent obtained:  Verbal    Consent given by:  Patient    Risks discussed:  Dizziness, infection, incomplete removal, bleeding, TM perforation and pain    Alternatives discussed:  Alternative treatment, observation, referral and delayed treatment  Universal protocol:     Procedure explained and questions answered to patient or proxy's satisfaction: yes      Patient identity confirmed:  Verbally with patient  Procedure details:     Local anesthetic:  None    Location:  L ear    Procedure type: curette      Approach:  External    Visualization (free text):  Otoscope    Equipment used:  Otoscope, curette  Post-procedure details:     Complication:  None    Hearing quality:  Improved    Patient tolerance of procedure:   Tolerated well, no immediate complications

## 2018-10-09 NOTE — PATIENT INSTRUCTIONS
Skin Yeast Infection   AMBULATORY CARE:   What do I need to know about a skin yeast infection? Yeast is normally present on the skin  Infection happens when you have too much yeast, or when it gets into a cut on your skin  Certain types of mold and fungus can cause a yeast infection  A skin yeast infection can appear anywhere on your skin or nail beds  Skin yeast infections are usually found on warm, moist parts of the body  Examples include between skin folds or under the breasts  Common symptoms include the following:  Signs and symptoms will depend on the type of yeast causing the infection, and where the infection is located  · Red, scaly skin    · Changes in skin color, especially a beefy red color    · Itching, dry skin    · Painful, cracking skin at the corners of your mouth    · Thick, discolored, chipping nails    · Skin lesions that may be red or purple and round    · Pus bumps  Seek care immediately if:   · You have signs of infection, such as pus, warmth or red streaks coming from the wound, or a fever  Contact your healthcare provider if:   · Your symptoms worsen or do not get better within 7 to 10 days  · You have new or returning signs of a skin yeast infection after treatment  · You have questions or concerns about your condition or care  Treatment for a skin yeast infection  may include antifungal medicine to treat the fungal infection  The medicine be given as a cream, ointment, or pill  Care for the skin near the infection:  You may only have discolored patches of skin, or areas that are dry and flaking  Care for these skin problems as directed by your healthcare provider  If you have painful skin or an open sore, you will need to protect the skin and prevent damage  You will also need to keep the skin dry as much as possible  Ask your healthcare provider how to care for your skin while the infection clears   The following are general guidelines for caring for painful or open skin:  · Keep the skin clean  Ask your healthcare provider if you should wash with mild soap and water  Do not use soap that contains alcohol  Alcohol can dry and irritate the skin and make symptoms worse  Your baby's healthcare provider may tell you to use diaper cream or ointment when you change his diaper  This will protect the skin and prevent moisture from collecting  · Keep the skin dry  Pat the area dry with a towel  Do not rub, because this may irritate the skin  If you have a skin yeast infection between skin folds, lift the top part gently and hold it while you dry between your skin folds  Always dry your feet completely after you swim or bathe, including between your toes  Dry your skin if you are sweating from exercise or exposure to heat  Use a clean towel each time to prevent spreading or continuing the infection  · Keep the skin protected  Ask your healthcare provider if you should cover the area with a bandage or leave it open  Check your skin each day to make sure you do not have new or worsening problems  You may need to have someone check the skin if you cannot see the area easily  Prevent another skin yeast infection:   · Do not share clothing or towels    · Wear shower shoes if you need to use a public shower    · Dry your feet completely after you bathe, and apply antifungal powder or cream as directed    · Put on socks before you get dressed so you do not spread fungus from your feet    · Wear light clothing that allows air to get to your skin    · Manage your weight to prevent skin folds where yeast can collect    · Manage diabetes    · Change your baby's diaper often, and keep the area clean and dry as much as possible    · Use a diaper cream or ointment that contains zinc oxide or dimethicone on your baby's diaper area as directed  Follow up with your healthcare provider as directed:  Write down your questions so you remember to ask them during your visits     © 2017 [employer] [facility] is for End User's use only and may not be sold, redistributed or otherwise used for commercial purposes  All illustrations and images included in CareNotes® are the copyrighted property of A D A M [employer]  or Deny Smith  The above information is an  only  It is not intended as medical advice for individual conditions or treatments  Talk to your doctor, nurse or pharmacist before following any medical regimen to see if it is safe and effective for you

## 2018-10-30 NOTE — TELEPHONE ENCOUNTER
Pt l/m on [facility] Rx line looking for refill of Januvia  Medication has refill left with [employer]  Attempted to call pt back and notify her, but unable to get through all I got was static on her phone line

## 2018-11-16 DIAGNOSIS — K21.9 GERD WITHOUT ESOPHAGITIS: ICD-10-CM

## 2018-11-16 DIAGNOSIS — E55.9 VITAMIN D DEFICIENCY: ICD-10-CM

## 2018-11-16 DIAGNOSIS — E87.6 HYPOKALEMIA: ICD-10-CM

## 2018-11-16 RX ORDER — POTASSIUM CHLORIDE 20 MEQ/1
20 TABLET, EXTENDED RELEASE ORAL 3 TIMES DAILY
Qty: 270 TABLET | Refills: 1 | Status: SHIPPED | OUTPATIENT
Start: 2018-11-16 | End: 2019-03-26 | Stop reason: SDUPTHER

## 2018-11-16 RX ORDER — OMEPRAZOLE 40 MG/1
40 CAPSULE, DELAYED RELEASE ORAL 2 TIMES DAILY
Qty: 180 CAPSULE | Refills: 0 | Status: SHIPPED | OUTPATIENT
Start: 2018-11-16 | End: 2019-05-24 | Stop reason: SDUPTHER

## 2018-11-16 RX ORDER — ERGOCALCIFEROL (VITAMIN D2) 1250 MCG
1 CAPSULE ORAL WEEKLY
Qty: 12 CAPSULE | Refills: 1 | Status: SHIPPED | OUTPATIENT
Start: 2018-11-16 | End: 2019-02-05 | Stop reason: SDUPTHER

## 2018-12-18 ENCOUNTER — OFFICE VISIT (OUTPATIENT)
Dept: INTERNAL MEDICINE CLINIC | Facility: CLINIC | Age: 73
End: 2018-12-18
Payer: COMMERCIAL

## 2018-12-18 VITALS
DIASTOLIC BLOOD PRESSURE: 60 MMHG | BODY MASS INDEX: 53.22 KG/M2 | WEIGHT: 264 LBS | OXYGEN SATURATION: 96 % | SYSTOLIC BLOOD PRESSURE: 120 MMHG | TEMPERATURE: 97.9 F | HEIGHT: 59 IN | HEART RATE: 65 BPM

## 2018-12-18 DIAGNOSIS — L30.4 INTERTRIGO: ICD-10-CM

## 2018-12-18 DIAGNOSIS — J06.9 VIRAL URI WITH COUGH: Primary | ICD-10-CM

## 2018-12-18 PROCEDURE — 99213 OFFICE O/P EST LOW 20 MIN: CPT | Performed by: NURSE PRACTITIONER

## 2018-12-18 NOTE — PATIENT INSTRUCTIONS
Start using Flonase 1-2 times per day to help open nasal passages and help with any allergy component that there may be to this illness  Also, start using over-the-counter Mucinex DM to help thin secretions and decrease cough  Illness appears to be viral in nature  Rest and fluids advised  Educated that the course of this illness could be 2-4 weeks  Discussed symptomatic relief, such as warm steam inhalations, tylenol/ibuprofen for fevers and body aches, rest, and drink plenty of fluids  Discussed red flag signs to go to the ER, such as chest pain or shortness of breath     Return to the office for reevaluation if symptoms worsen or do not improve in 1-2 weeks

## 2018-12-18 NOTE — PROGRESS NOTES
Assessment/Plan:    No problem-specific Assessment & Plan notes found for this encounter  Diagnoses and all orders for this visit:    Viral URI with cough    Intertrigo        Start using Flonase 1-2 times per day to help open nasal passages and help with any allergy component that there may be to this illness  Also, start using over-the-counter Mucinex DM to help thin secretions and decrease cough  Illness appears to be viral in nature  Rest and fluids advised  Educated that the course of this illness could be 2-4 weeks  Discussed symptomatic relief, such as warm steam inhalations, tylenol/ibuprofen for fevers and body aches, rest, and drink plenty of fluids  Discussed red flag signs to go to the ER, such as chest pain or shortness of breath  Return to the office for reevaluation if symptoms worsen or do not improve in 1-2 weeks  Rash on buttocks appears to be intertrigo  Use previously prescribed nystatin cream on the area  Subjective:      Patient ID: Mica Argueta is a 68 y o  female  Patient reports a 3 week history of congestion, blocked ear sensation, and cough  She also reports a sore throat and hoarse voice  She reports that her symptoms are not worsening, but they are just not getting better  She reports that she has been drinking tea and using cough drops with mild relief  She took a sudafed once and that was helpful  She reports chills, but denies fever  Cough   This is a new problem  The current episode started 1 to 4 weeks ago  The problem has been unchanged  The cough is productive of sputum  Associated symptoms include chills, ear congestion, headaches (mild), nasal congestion, postnasal drip, a rash (on the top of her buttocks, she has been applying desitin), rhinorrhea, a sore throat, shortness of breath and wheezing  Pertinent negatives include no chest pain, ear pain (blocked ear sensation) or fever         The following portions of the patient's history were reviewed and updated as appropriate: allergies, current medications, past family history, past medical history, past social history, past surgical history and problem list     Review of Systems   Constitutional: Positive for chills  Negative for fever  HENT: Positive for congestion, hearing loss (due to blocked ear sensation), postnasal drip, rhinorrhea, sore throat and voice change (hoarse voice)  Negative for ear discharge, ear pain (blocked ear sensation), sinus pain and sinus pressure  Respiratory: Positive for cough, shortness of breath and wheezing  Cardiovascular: Negative for chest pain  Gastrointestinal: Negative for abdominal pain, diarrhea, nausea and vomiting  Skin: Positive for rash (on the top of her buttocks, she has been applying desitin)  Neurological: Positive for headaches (mild)  Negative for dizziness           Past Medical History:   Diagnosis Date    Abscess of abdominal wall     RESOLVED:  6/28/16    Acute diastolic congestive heart failure (HCC)     RESOLVED:  8/29/17    Acute on chronic diastolic congestive heart failure (HCC)     RESOLVED:  8/29/17    CHANTELL (acute kidney injury) ([address] )     RESOLVED:  8/29/17    Allergy     Arteriovenous malformation     OF THE GASTROINTESTINAL    Blood coagulation disorder (HCC)     RESOLVED: 6/21/15    CAD (coronary artery disease)     Candidiasis of breast     RESOLVED: 8/29/17    Diabetes mellitus (HCC)     Diastolic CHF ([address] )     Gastroparesis     RESOLVED: 6/21/15    Generalized anxiety disorder     RESOLVED: 6/21/15    GERD (gastroesophageal reflux disease)     Gout     Hyperlipidemia     Hypertension     Lymphedema     RESOLVED: 6/21/15     Mitral valve replaced     2010 - prosthetic    Morbid (severe) obesity due to excess calories ([address] )     RESOLVED: 6/21/15    Psoriasis     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[address] )     RESOLVED: 6/21/15    Sciatica of right side     RESOLVED: 5/15/17    Type 2 diabetes mellitus ([address] )  Weight gain     RESOLVED: 5/15/17         Current Outpatient Prescriptions:     albuterol (PROAIR HFA) 90 mcg/act inhaler, Inhale 2 puffs as needed for wheezing, Disp: 1 Inhaler, Rfl: 0    allopurinol (ZYLOPRIM) 100 mg tablet, Take 1 tablet (100 mg total) by mouth daily, Disp: 180 tablet, Rfl: 0    amiodarone 200 mg tablet, Take 1 tablet (200 mg total) by mouth daily, Disp: , Rfl:     atorvastatin (LIPITOR) 80 mg tablet, Take 0 5 tablets (40 mg total) by mouth daily (Patient taking differently: Take 80 mg by mouth daily  ), Disp: 30 tablet, Rfl: 1    citalopram (CeleXA) 20 mg tablet, Take 1 tablet (20 mg total) by mouth daily, Disp: 90 tablet, Rfl: 1    digoxin (LANOXIN) 0 125 mg tablet, Take 1 tablet (125 mcg total) by mouth every other day, Disp: , Rfl:     ergocalciferol (ERGOCALCIFEROL) 56608 units capsule, Take 1 capsule (50,000 Units total) by mouth once a week, Disp: 12 capsule, Rfl: 1    folic acid-pyridoxine-cyanocobalamin (FOLBIC) 2 5-25-2 mg, Take 2 tablets by mouth daily  , Disp: , Rfl:     furosemide (LASIX) 80 mg tablet, Take 1 tablet (80 mg total) by mouth daily, Disp: 90 tablet, Rfl: 1    iron polysaccharides (FERREX 150) 150 mg capsule, Take 1 capsule (150 mg total) by mouth 2 (two) times a day (Patient taking differently: Take 150 mg by mouth daily  ), Disp: 180 capsule, Rfl: 0    levothyroxine 88 mcg tablet, Take 1 tablet (88 mcg total) by mouth daily, Disp: 90 tablet, Rfl: 1    metolazone (ZAROXOLYN) 2 5 mg tablet, Take 1 tablet (2 5 mg total) by mouth 3 (three) times a week, Disp: 36 tablet, Rfl: 1    metoprolol tartrate (LOPRESSOR) 25 mg tablet, Take 1 tablet (25 mg total) by mouth every 12 (twelve) hours, Disp: 180 tablet, Rfl: 1    Multiple Vitamins-Minerals (OCUVITE EYE HEALTH FORMULA PO), Take 1 tablet by mouth daily, Disp: , Rfl:     nystatin (MYCOSTATIN) ointment, Apply topically 2 (two) times a day, Disp: 30 g, Rfl: 1    nystatin (nystatin) powder, Apply 1 application topically daily  , Disp: , Rfl:     omeprazole (PriLOSEC) 40 MG capsule, Take 1 capsule (40 mg total) by mouth 2 (two) times a day, Disp: 180 capsule, Rfl: 0    potassium chloride (KLOR-CON M20) 20 mEq tablet, Take 1 tablet (20 mEq total) by mouth 3 (three) times a day, Disp: 270 tablet, Rfl: 1    repaglinide (PRANDIN) 1 mg tablet, Take 1 tablet (1 mg total) by mouth 3 (three) times a day before meals, Disp: 270 tablet, Rfl: 1    sitaGLIPtin (JANUVIA) 50 mg tablet, Take 1 tablet (50 mg total) by mouth daily, Disp: 90 tablet, Rfl: 1    spironolactone (ALDACTONE) 25 mg tablet, Take 1 tablet (25 mg total) by mouth daily, Disp: 90 tablet, Rfl: 0    Turmeric 500 MG TABS, Take 1 capsule by mouth 2 (two) times a day, Disp: , Rfl:     warfarin (COUMADIN) 5 mg tablet, Take 5 mg by mouth daily, Disp: , Rfl:     Allergies   Allergen Reactions    Tetanus Toxoid        Social History   Past Surgical History:   Procedure Laterality Date     SECTION      X 1    COLONOSCOPY N/A 2017    Procedure: COLONOSCOPY;  Surgeon: Kourtney Condon DO;  Location: [facility]; Service:     MITRAL VALVE REPLACEMENT      TONSILLECTOMY AND ADENOIDECTOMY      LISTED 2 X     Family History   Problem Relation Age of Onset    Diabetes Mother         MELLITUS    Diabetes Father         MELLITUS    Diabetes type II Father         MELLITUS    Coronary artery disease Sister         CAROTID ARTERY STENOSIS    Stroke Family         SYNDROME       Objective:  /60 (BP Location: Left arm, Patient Position: Sitting, Cuff Size: Large)   Pulse 65   Temp 97 9 °F (36 6 °C) (Oral)   Ht 4' 11" (1 499 m)   Wt 120 kg (264 lb)   SpO2 96%   BMI 53 32 kg/m²        Physical Exam   Constitutional: She is oriented to person, place, and time  She appears well-developed and well-nourished  No distress  Obese   HENT:   Head: Normocephalic and atraumatic     Right Ear: External ear normal    Left Ear: External ear normal    Nose: Rhinorrhea present  Mouth/Throat: Oropharynx is clear and moist    Eyes: Pupils are equal, round, and reactive to light  Conjunctivae are normal  No scleral icterus  Neck: Normal range of motion  Neck supple  No thyromegaly present  Cardiovascular: Normal rate, regular rhythm and normal heart sounds  Pulmonary/Chest: Effort normal and breath sounds normal  No respiratory distress  She has no wheezes  Mildly congested cough noted during examination   Abdominal: Soft  Bowel sounds are normal  She exhibits no distension  Musculoskeletal: Normal range of motion  She exhibits no edema  Lymphadenopathy:     She has no cervical adenopathy  Neurological: She is alert and oriented to person, place, and time  Skin: Skin is warm and dry  mildly erythematous rash noted on upper buttocks consistent with candidal rash   Psychiatric: She has a normal mood and affect  Her behavior is normal  Judgment and thought content normal    Vitals reviewed

## 2019-01-17 LAB — HBA1C MFR BLD HPLC: 6.9 %

## 2019-01-18 ENCOUNTER — TELEPHONE (OUTPATIENT)
Dept: INTERNAL MEDICINE CLINIC | Facility: CLINIC | Age: 74
End: 2019-01-18

## 2019-01-21 DIAGNOSIS — E03.9 HYPOTHYROIDISM, UNSPECIFIED TYPE: ICD-10-CM

## 2019-01-21 DIAGNOSIS — E78.00 HYPERCHOLESTEREMIA: Primary | ICD-10-CM

## 2019-01-21 RX ORDER — LEVOTHYROXINE SODIUM 88 UG/1
88 TABLET ORAL DAILY
Qty: 90 TABLET | Refills: 1 | Status: SHIPPED | OUTPATIENT
Start: 2019-01-21 | End: 2019-05-24 | Stop reason: SDUPTHER

## 2019-01-21 RX ORDER — FENOFIBRATE 145 MG/1
145 TABLET, COATED ORAL DAILY
Qty: 90 TABLET | Refills: 1 | Status: SHIPPED | OUTPATIENT
Start: 2019-01-21 | End: 2019-02-05 | Stop reason: SDUPTHER

## 2019-02-02 NOTE — RESULT NOTES
Discussion/Summary   Polyps were precancers, Recommend an office follow-up and consideration of repeat colonoscopy in a few months time     Verified Results  (1) TISSUE EXAM [id number] 01:55PM David Blanchard     Test Name Result Flag Reference   LAB AP CASE REPORT (Report)     Surgical Pathology Report             Case: [id number]                   Authorizing Provider: Argelia Higginbotham DO    Collected:      06/01/2017 1355        Ordering Location:   [facility]    Received:      06/01/2017 [address] [place] [address] Endoscopy                              Pathologist:      Neil Garza MD                                 Specimens:  A) - Polyp, Colorectal, Cold snare, descending colon polyp                       B) - Polyp, Colorectal, Cold snare, rectal colon polyp   LAB AP FINAL DIAGNOSIS      A  Descending colon polyp (polypectomy):  - Fragments of tubular adenoma  - No high grade dysplasia     B  Rectal polyp (polypectomy):  - Tubular adenoma, excised  - No high grade dysplasia   Electronically signed by Neil Garza MD on 6/6/2017 at 1:18 PM   LAB AP SURGICAL ADDITIONAL INFORMATION (Report)     These tests were developed and their performance characteristics   determined by [facility] or [facility]  They may not be cleared or approved by the [place]  Food and   Drug Administration  The FDA has determined that such clearance or   approval is not necessary  These tests are used for clinical purposes  They should not be regarded as investigational or for research  This   laboratory has been approved by [place], designated as a high-complexity   laboratory and is qualified to perform these tests  LAB AP GROSS DESCRIPTION (Report)     A  The specimen is received in formalin, labeled with the patient's name   and medical record number, and is designated descending colon polyp  The   specimen consists of one tan polyp measuring 0 6 cm in greatest dimension     The apparent margin of resection is painted with blue ink  Also submitted   in the container are multiple green-yellow fragments consistent with fecal   material measuring in loose aggregate 0 8 x 0 4 x 0 1 cm  Entire submitted   with the polyp bisected  One cassette  B  The specimen is received in formalin, labeled with the patient's name   and medical record number, and is designated Rectal colon polyp  The   specimen consists of one tan-yellow polyp measuring 0 7 cm in greatest   dimension  The apparent margin of resection is painted with blue ink  Entire submitted bisected  One cassette  Note: The estimated total fixation time based upon information provided by   the submitting clinician and standard processing schedule is 24 00 hours      [facility] History of placement of ear tubes  April 2015, Dr. Sameer Jimenez History of placement of ear tubes  w/ adenoidectomy, April 2015, Dr. Sameer Jimenez

## 2019-02-05 ENCOUNTER — OFFICE VISIT (OUTPATIENT)
Dept: INTERNAL MEDICINE CLINIC | Facility: CLINIC | Age: 74
End: 2019-02-05
Payer: COMMERCIAL

## 2019-02-05 VITALS
BODY MASS INDEX: 52.17 KG/M2 | TEMPERATURE: 98.1 F | SYSTOLIC BLOOD PRESSURE: 110 MMHG | HEART RATE: 64 BPM | HEIGHT: 59 IN | DIASTOLIC BLOOD PRESSURE: 56 MMHG | OXYGEN SATURATION: 97 % | WEIGHT: 258.8 LBS

## 2019-02-05 DIAGNOSIS — R53.81 PHYSICAL DECONDITIONING: ICD-10-CM

## 2019-02-05 DIAGNOSIS — Z79.01 ANTICOAGULANT LONG-TERM USE: ICD-10-CM

## 2019-02-05 DIAGNOSIS — E78.00 HYPERCHOLESTEREMIA: ICD-10-CM

## 2019-02-05 DIAGNOSIS — Z95.2 S/P MVR (MITRAL VALVE REPLACEMENT): ICD-10-CM

## 2019-02-05 DIAGNOSIS — K21.9 GERD WITHOUT ESOPHAGITIS: ICD-10-CM

## 2019-02-05 DIAGNOSIS — I48.0 PAROXYSMAL ATRIAL FIBRILLATION (HCC): ICD-10-CM

## 2019-02-05 DIAGNOSIS — N18.4 CKD (CHRONIC KIDNEY DISEASE) STAGE 4, GFR 15-29 ML/MIN (HCC): ICD-10-CM

## 2019-02-05 DIAGNOSIS — Z12.39 SCREENING FOR BREAST CANCER: Primary | ICD-10-CM

## 2019-02-05 DIAGNOSIS — I50.33 ACUTE ON CHRONIC DIASTOLIC CONGESTIVE HEART FAILURE (HCC): ICD-10-CM

## 2019-02-05 DIAGNOSIS — E78.5 HYPERLIPIDEMIA, UNSPECIFIED HYPERLIPIDEMIA TYPE: ICD-10-CM

## 2019-02-05 DIAGNOSIS — M1A.09X0 IDIOPATHIC CHRONIC GOUT OF MULTIPLE SITES WITHOUT TOPHUS: ICD-10-CM

## 2019-02-05 DIAGNOSIS — R53.83 OTHER FATIGUE: ICD-10-CM

## 2019-02-05 DIAGNOSIS — Z12.31 SCREENING MAMMOGRAM, ENCOUNTER FOR: ICD-10-CM

## 2019-02-05 DIAGNOSIS — E03.9 ACQUIRED HYPOTHYROIDISM: ICD-10-CM

## 2019-02-05 DIAGNOSIS — E11.9 TYPE 2 DIABETES MELLITUS WITHOUT COMPLICATION, WITHOUT LONG-TERM CURRENT USE OF INSULIN (HCC): ICD-10-CM

## 2019-02-05 DIAGNOSIS — M51.36 LUMBAR DEGENERATIVE DISC DISEASE: ICD-10-CM

## 2019-02-05 DIAGNOSIS — L40.9 PSORIASIS: ICD-10-CM

## 2019-02-05 DIAGNOSIS — D50.9 IRON DEFICIENCY ANEMIA, UNSPECIFIED IRON DEFICIENCY ANEMIA TYPE: ICD-10-CM

## 2019-02-05 DIAGNOSIS — M85.89 OSTEOPENIA OF MULTIPLE SITES: ICD-10-CM

## 2019-02-05 DIAGNOSIS — I50.32 CHRONIC DIASTOLIC CONGESTIVE HEART FAILURE (HCC): ICD-10-CM

## 2019-02-05 DIAGNOSIS — E55.9 VITAMIN D DEFICIENCY: ICD-10-CM

## 2019-02-05 DIAGNOSIS — E87.6 HYPOKALEMIA: ICD-10-CM

## 2019-02-05 DIAGNOSIS — I10 BENIGN ESSENTIAL HYPERTENSION: ICD-10-CM

## 2019-02-05 DIAGNOSIS — B35.1 ONYCHOMYCOSIS OF TOENAIL: ICD-10-CM

## 2019-02-05 DIAGNOSIS — Z95.2 MITRAL VALVE REPLACED: ICD-10-CM

## 2019-02-05 DIAGNOSIS — I34.1 MITRAL VALVE PROLAPSE: ICD-10-CM

## 2019-02-05 DIAGNOSIS — N18.30 STAGE 3 CHRONIC KIDNEY DISEASE (HCC): ICD-10-CM

## 2019-02-05 PROBLEM — M79.604 PAIN OF RIGHT LOWER EXTREMITY: Status: RESOLVED | Noted: 2017-04-30 | Resolved: 2019-02-05

## 2019-02-05 PROBLEM — R26.2 AMBULATORY DYSFUNCTION: Status: RESOLVED | Noted: 2017-06-22 | Resolved: 2019-02-05

## 2019-02-05 PROBLEM — E87.8 ELECTROLYTE IMBALANCE: Status: RESOLVED | Noted: 2018-09-13 | Resolved: 2019-02-05

## 2019-02-05 PROCEDURE — 3078F DIAST BP <80 MM HG: CPT | Performed by: FAMILY MEDICINE

## 2019-02-05 PROCEDURE — 3066F NEPHROPATHY DOC TX: CPT | Performed by: FAMILY MEDICINE

## 2019-02-05 PROCEDURE — 1160F RVW MEDS BY RX/DR IN RCRD: CPT | Performed by: FAMILY MEDICINE

## 2019-02-05 PROCEDURE — 99215 OFFICE O/P EST HI 40 MIN: CPT | Performed by: FAMILY MEDICINE

## 2019-02-05 PROCEDURE — 1036F TOBACCO NON-USER: CPT | Performed by: FAMILY MEDICINE

## 2019-02-05 PROCEDURE — 3074F SYST BP LT 130 MM HG: CPT | Performed by: FAMILY MEDICINE

## 2019-02-05 PROCEDURE — 3008F BODY MASS INDEX DOCD: CPT | Performed by: FAMILY MEDICINE

## 2019-02-05 RX ORDER — DIGOXIN 125 MCG
125 TABLET ORAL EVERY OTHER DAY
Qty: 90 TABLET | Refills: 1 | Status: SHIPPED | OUTPATIENT
Start: 2019-02-05 | End: 2020-03-17 | Stop reason: ALTCHOICE

## 2019-02-05 RX ORDER — FENOFIBRATE 145 MG/1
145 TABLET, COATED ORAL DAILY
Qty: 90 TABLET | Refills: 1 | Status: SHIPPED | OUTPATIENT
Start: 2019-02-05 | End: 2019-11-12

## 2019-02-05 RX ORDER — CALCIPOTRIENE 50 UG/G
OINTMENT TOPICAL 2 TIMES DAILY
Qty: 60 G | Refills: 11 | Status: SHIPPED | OUTPATIENT
Start: 2019-02-05 | End: 2019-03-18 | Stop reason: SDUPTHER

## 2019-02-05 RX ORDER — FUROSEMIDE 80 MG
80 TABLET ORAL DAILY
Qty: 90 TABLET | Refills: 1 | Status: SHIPPED | OUTPATIENT
Start: 2019-02-05 | End: 2019-07-05 | Stop reason: SDUPTHER

## 2019-02-05 RX ORDER — REPAGLINIDE 1 MG/1
1 TABLET ORAL
Qty: 270 TABLET | Refills: 1 | Status: SHIPPED | OUTPATIENT
Start: 2019-02-05 | End: 2019-07-05 | Stop reason: SDUPTHER

## 2019-02-05 RX ORDER — ALLOPURINOL 100 MG/1
100 TABLET ORAL DAILY
Qty: 180 TABLET | Refills: 1 | Status: SHIPPED | OUTPATIENT
Start: 2019-02-05 | End: 2020-05-26 | Stop reason: SDUPTHER

## 2019-02-05 RX ORDER — ATORVASTATIN CALCIUM 80 MG/1
80 TABLET, FILM COATED ORAL DAILY
Qty: 90 TABLET | Refills: 1 | Status: SHIPPED | OUTPATIENT
Start: 2019-02-05 | End: 2021-05-21 | Stop reason: SDUPTHER

## 2019-02-05 RX ORDER — AMIODARONE HYDROCHLORIDE 200 MG/1
200 TABLET ORAL DAILY
Qty: 90 TABLET | Refills: 1 | Status: SHIPPED | OUTPATIENT
Start: 2019-02-05 | End: 2021-03-05 | Stop reason: ALTCHOICE

## 2019-02-05 RX ORDER — METOLAZONE 2.5 MG/1
2.5 TABLET ORAL 3 TIMES WEEKLY
Qty: 36 TABLET | Refills: 1 | Status: SHIPPED | OUTPATIENT
Start: 2019-02-06 | End: 2019-07-05 | Stop reason: SDUPTHER

## 2019-02-05 RX ORDER — SPIRONOLACTONE 25 MG/1
25 TABLET ORAL DAILY
Qty: 90 TABLET | Refills: 1 | Status: SHIPPED | OUTPATIENT
Start: 2019-02-05 | End: 2019-09-30 | Stop reason: SDUPTHER

## 2019-02-05 RX ORDER — ERGOCALCIFEROL (VITAMIN D2) 1250 MCG
1 CAPSULE ORAL WEEKLY
Qty: 12 CAPSULE | Refills: 1 | Status: SHIPPED | OUTPATIENT
Start: 2019-02-05 | End: 2019-08-06 | Stop reason: SDUPTHER

## 2019-02-05 RX ORDER — CITALOPRAM 20 MG/1
20 TABLET ORAL DAILY
Qty: 90 TABLET | Refills: 1 | Status: SHIPPED | OUTPATIENT
Start: 2019-02-05 | End: 2019-08-06 | Stop reason: SDUPTHER

## 2019-02-05 NOTE — PROGRESS NOTES
Diabetic Foot Exam    Patient's shoes and socks removed  Right Foot/Ankle   Right Foot Inspection  Skin Exam: skin normal, skin intact and abnormal color no dry skin, no warmth, no callus, no erythema, no maceration, no pre-ulcer, no ulcer and no callus                          Toe Exam: ROM and strength within normal limits  Sensory   Vibration: intact  Proprioception: intact   Monofilament testing: intact  Vascular  Capillary refills: < 3 seconds  The right DP pulse is 1+  The right PT pulse is 1+  Right Toe  - Comprehensive Exam  Ecchymosis: none  Tenderness: none         Left Foot/Ankle  Left Foot Inspection  Skin Exam: skin normal, skin intact and abnormal colorno dry skin, no warmth, no erythema, no maceration, no pre-ulcer, no ulcer and no callus                         Toe Exam: ROM and strength within normal limits                   Sensory   Vibration: intact  Proprioception: intact  Monofilament: intact  Vascular  Capillary refills: < 3 seconds  The left DP pulse is 1+  The left PT pulse is 1+  Left Toe  - Comprehensive Exam  Ecchymosis: none  Tenderness: none       Assign Risk Category:  No deformity present;  No loss of protective sensation; Weak pulses       Risk: 1

## 2019-02-05 NOTE — PROGRESS NOTES
Assessment/Plan:    No problem-specific Assessment & Plan notes found for this encounter           Problem List Items Addressed This Visit        Digestive    GERD without esophagitis       Endocrine    Hypothyroidism    Relevant Medications    metoprolol tartrate (LOPRESSOR) 25 mg tablet    Other Relevant Orders    TSH, 3rd generation    T4, free    Type 2 diabetes mellitus without complication, without long-term current use of insulin (HCC)    Relevant Medications    sitaGLIPtin (JANUVIA) 50 mg tablet    repaglinide (PRANDIN) 1 mg tablet    Other Relevant Orders    Microalbumin / creatinine urine ratio    Comprehensive metabolic panel    CBC and differential    Hemoglobin A1C       Cardiovascular and Mediastinum    Chronic diastolic congestive heart failure (HCC)    Relevant Medications    amiodarone 200 mg tablet    digoxin (LANOXIN) 0 125 mg tablet    furosemide (LASIX) 80 mg tablet    metolazone (ZAROXOLYN) 2 5 mg tablet (Start on 2/6/2019)    metoprolol tartrate (LOPRESSOR) 25 mg tablet    Acute on chronic diastolic congestive heart failure (HCC)    Relevant Medications    spironolactone (ALDACTONE) 25 mg tablet    digoxin (LANOXIN) 0 125 mg tablet    metoprolol tartrate (LOPRESSOR) 25 mg tablet    Benign essential hypertension    Relevant Medications    spironolactone (ALDACTONE) 25 mg tablet    furosemide (LASIX) 80 mg tablet    metolazone (ZAROXOLYN) 2 5 mg tablet (Start on 2/6/2019)    metoprolol tartrate (LOPRESSOR) 25 mg tablet    Mitral valve prolapse    Relevant Medications    digoxin (LANOXIN) 0 125 mg tablet    metoprolol tartrate (LOPRESSOR) 25 mg tablet    Paroxysmal atrial fibrillation (HCC)    Relevant Medications    digoxin (LANOXIN) 0 125 mg tablet    metoprolol tartrate (LOPRESSOR) 25 mg tablet       Musculoskeletal and Integument    Lumbar degenerative disc disease    Psoriasis    Relevant Medications    calcipotriene (DOVONOX) 0 005 % ointment    Onychomycosis of toenail       Genitourinary CKD (chronic kidney disease) stage 4, GFR 15-29 ml/min (HCC)    Relevant Medications    spironolactone (ALDACTONE) 25 mg tablet    furosemide (LASIX) 80 mg tablet    metolazone (ZAROXOLYN) 2 5 mg tablet (Start on 2/6/2019)    RESOLVED: Stage 3 chronic kidney disease (HCC)    Relevant Medications    spironolactone (ALDACTONE) 25 mg tablet    furosemide (LASIX) 80 mg tablet    metolazone (ZAROXOLYN) 2 5 mg tablet (Start on 2/6/2019)    allopurinol (ZYLOPRIM) 100 mg tablet       Other    Gout    Relevant Orders    Uric acid    Mitral valve replaced    Hypokalemia    Anticoagulant long-term use    HLD (hyperlipidemia)    Relevant Medications    fenofibrate (TRICOR) 145 mg tablet    atorvastatin (LIPITOR) 80 mg tablet    citalopram (CeleXA) 20 mg tablet    Iron deficiency anemia    Relevant Orders    Ferritin    Iron    Vitamin D deficiency    Relevant Medications    ergocalciferol (ERGOCALCIFEROL) 06208 units capsule    S/P MVR (mitral valve replacement)    Physical deconditioning      Other Visit Diagnoses     Screening for breast cancer    -  Primary    Relevant Orders    Mammo screening bilateral w cad    Hypercholesteremia        Relevant Medications    fenofibrate (TRICOR) 145 mg tablet    atorvastatin (LIPITOR) 80 mg tablet    Other Relevant Orders    Lipid panel    Osteopenia of multiple sites        Relevant Orders    DXA bone density spine hip and pelvis    Other fatigue        Relevant Orders    Vitamin D 25 hydroxy    Screening mammogram, encounter for        Relevant Orders    Mammo screening bilateral w cad            Discussed labs, recommend increasing activity levels  For better control and deconditioning  Discussed KETO diet  Repeat labs in 4 months, schedule DEXA and mammogram  See above for orders      Subjective:  F/up DM     Patient ID: Derrick Wilcox is a 68 y o  female  HPI     Gastroesophageal Reflux Disease (Brief):  The patient is being seen for an initial evaluation of gastroesophageal reflux disease  Symptoms:  no heartburn  The patient is currently asymptomatic  No associated symptoms are reported  Diabetes Type II (Follow-Up): The patient states she has been doing well with her Type II Diabetes control since the last visit  Comorbid Illnesses: hyperlipidemia and obesity  Interval Events: 9/13/18: Aic now 8 5, 2/5/19: Aic 6 9 now  Symptoms:   Home monitoring: The patient checks her blood sugar sporadically  Glycemic control has been good  the patient reports no symptomatic hypoglycemic episodes  Medications: the patient is adherent with her medication regimen  She denies medication side effects       Hypertension (Follow-Up): The patient presents for follow-up of essential hypertension  The patient states she has been doing well with her blood pressure control since the last visit  She has no significant interval events  Symptoms: The patient is currently asymptomatic  Hyperlipidemia (Follow-Up): The patient states her hyperlipidemia has been stable since the last visit  Comorbid Illnesses: diabetes mellitus and hypertension  She has no significant interval events  Symptoms: compliant with statin and fenofibrate  Labs dhow elevated TG in the 300 range      Congestive Heart Failure (Follow-Up): Comorbid Illnesses: Trying to eat more healthy  Cardio is following- sees Dr Malorie Mahoney   Symptoms: stable lower extremity edema,-- stable dyspnea on exertion-- and-- stable fatigue  compliant with medications     Arrhythmia (Brief): Symptoms:  no dizziness  Associated symptoms:  taking dig and amiodarone as prescribed- follows with caridology       The following portions of the patient's history were reviewed and updated as appropriate: allergies, current medications, past family history, past medical history, past social history, past surgical history and problem list     Review of Systems    Constitutional:  Denies fever or chills , 6 lb weight loss  Eyes:  Denies change in visual acuity   HENT:  Denies nasal congestion or sore throat   Respiratory:  Denies cough or shortness of breath or wheezing  Cardiovascular:  Denies palpitations or chest pain  GI:  Denies abdominal pain, nausea, or vomiting  Integument:  Denies rash   Neurologic:  Denies headache or focal weakness, no dizziness      Objective:      /56 (BP Location: Right arm, Patient Position: Sitting, Cuff Size: Standard)   Pulse 64   Temp 98 1 °F (36 7 °C) (Oral)   Ht 4' 11" (1 499 m)   Wt 117 kg (258 lb 12 8 oz)   SpO2 97%   BMI 52 27 kg/m²          Physical Exam      Constitutional:  Well developed, well nourished, no acute distress, non-toxic appearance, morbid obesity   Eyes:  PERRL, conjunctiva normal , non icteric sclera  HENT:  Atraumatic, oropharynx moist  Neck-  supple   Respiratory:  CTA b/l, normal breath sounds, no rales, no wheezing   Cardiovascular:  RRR, no murmurs, no LE edema b/l, valve click present  GI:  Soft, nondistended, normal bowel sounds x 4, nontender, no organomegaly, no mass, no rebound, no guarding   Neurologic:  no focal deficits noted   Psychiatric:  Speech and behavior appropriate , AAO x 3  Skin: dry skin on feet b/l, onychomycosis on great toenails b/l

## 2019-02-13 ENCOUNTER — TELEPHONE (OUTPATIENT)
Dept: INTERNAL MEDICINE CLINIC | Facility: CLINIC | Age: 74
End: 2019-02-13

## 2019-02-13 NOTE — TELEPHONE ENCOUNTER
Prior authorization was required for the calcipotrien 0 005% ointment     Approval from 12/30/18 through 5/13/19 (Spoke with Thony Garcia from [facility])

## 2019-03-14 ENCOUNTER — TELEPHONE (OUTPATIENT)
Dept: INTERNAL MEDICINE CLINIC | Facility: CLINIC | Age: 74
End: 2019-03-14

## 2019-03-14 NOTE — TELEPHONE ENCOUNTER
Calcipotriene oitnment 00 5% she needs 6 tubes for 3 month supply  The pharmacy told her she needs prior auth  They told her to call [phone]    [employer] [facility]

## 2019-03-18 DIAGNOSIS — L40.9 PSORIASIS: ICD-10-CM

## 2019-03-18 RX ORDER — CALCIPOTRIENE 50 UG/G
OINTMENT TOPICAL 2 TIMES DAILY
Qty: 120 G | Refills: 11 | Status: SHIPPED | OUTPATIENT
Start: 2019-03-18 | End: 2019-05-24 | Stop reason: SDUPTHER

## 2019-03-24 DIAGNOSIS — K21.9 GERD WITHOUT ESOPHAGITIS: ICD-10-CM

## 2019-03-24 RX ORDER — OMEPRAZOLE 40 MG/1
CAPSULE, DELAYED RELEASE ORAL
Qty: 180 CAPSULE | Refills: 1 | OUTPATIENT
Start: 2019-03-24

## 2019-03-26 DIAGNOSIS — E87.6 HYPOKALEMIA: ICD-10-CM

## 2019-03-26 RX ORDER — POTASSIUM CHLORIDE 20 MEQ/1
20 TABLET, EXTENDED RELEASE ORAL 3 TIMES DAILY
Qty: 270 TABLET | Refills: 1 | Status: SHIPPED | OUTPATIENT
Start: 2019-03-26 | End: 2019-06-12 | Stop reason: SDUPTHER

## 2019-04-01 ENCOUNTER — TELEPHONE (OUTPATIENT)
Dept: OTHER | Facility: OTHER | Age: 74
End: 2019-04-01

## 2019-04-03 ENCOUNTER — DOCUMENTATION (OUTPATIENT)
Dept: INTERNAL MEDICINE CLINIC | Facility: CLINIC | Age: 74
End: 2019-04-03

## 2019-04-11 LAB
LEFT EYE DIABETIC RETINOPATHY: NORMAL
RIGHT EYE DIABETIC RETINOPATHY: NORMAL

## 2019-05-11 LAB
CREAT ?TM UR-SCNC: 18.5 UMOL/L
HBA1C MFR BLD HPLC: 5.9 %

## 2019-05-24 ENCOUNTER — OFFICE VISIT (OUTPATIENT)
Dept: INTERNAL MEDICINE CLINIC | Facility: CLINIC | Age: 74
End: 2019-05-24
Payer: COMMERCIAL

## 2019-05-24 VITALS
TEMPERATURE: 98.4 F | DIASTOLIC BLOOD PRESSURE: 58 MMHG | WEIGHT: 268 LBS | HEART RATE: 61 BPM | HEIGHT: 59 IN | SYSTOLIC BLOOD PRESSURE: 100 MMHG | BODY MASS INDEX: 54.03 KG/M2 | OXYGEN SATURATION: 97 %

## 2019-05-24 DIAGNOSIS — E11.9 TYPE 2 DIABETES MELLITUS WITHOUT COMPLICATION, WITHOUT LONG-TERM CURRENT USE OF INSULIN (HCC): ICD-10-CM

## 2019-05-24 DIAGNOSIS — D50.9 IRON DEFICIENCY ANEMIA, UNSPECIFIED IRON DEFICIENCY ANEMIA TYPE: ICD-10-CM

## 2019-05-24 DIAGNOSIS — E66.01 MORBID OBESITY DUE TO EXCESS CALORIES (HCC): ICD-10-CM

## 2019-05-24 DIAGNOSIS — L40.9 PSORIASIS: ICD-10-CM

## 2019-05-24 DIAGNOSIS — I10 BENIGN ESSENTIAL HYPERTENSION: ICD-10-CM

## 2019-05-24 DIAGNOSIS — I34.1 MITRAL VALVE PROLAPSE: ICD-10-CM

## 2019-05-24 DIAGNOSIS — Z23 NEED FOR PNEUMOCOCCAL VACCINATION: Primary | ICD-10-CM

## 2019-05-24 DIAGNOSIS — N18.4 CKD (CHRONIC KIDNEY DISEASE) STAGE 4, GFR 15-29 ML/MIN (HCC): ICD-10-CM

## 2019-05-24 DIAGNOSIS — Z95.2 S/P MVR (MITRAL VALVE REPLACEMENT): ICD-10-CM

## 2019-05-24 DIAGNOSIS — I50.32 CHRONIC DIASTOLIC CONGESTIVE HEART FAILURE (HCC): ICD-10-CM

## 2019-05-24 DIAGNOSIS — I50.33 ACUTE ON CHRONIC DIASTOLIC CONGESTIVE HEART FAILURE (HCC): ICD-10-CM

## 2019-05-24 DIAGNOSIS — E55.9 VITAMIN D DEFICIENCY: ICD-10-CM

## 2019-05-24 DIAGNOSIS — E03.9 HYPOTHYROIDISM, UNSPECIFIED TYPE: ICD-10-CM

## 2019-05-24 DIAGNOSIS — K21.9 GERD WITHOUT ESOPHAGITIS: ICD-10-CM

## 2019-05-24 DIAGNOSIS — L30.4 INTERTRIGO: ICD-10-CM

## 2019-05-24 DIAGNOSIS — Z79.01 ANTICOAGULANT LONG-TERM USE: ICD-10-CM

## 2019-05-24 DIAGNOSIS — E78.00 PURE HYPERCHOLESTEROLEMIA: ICD-10-CM

## 2019-05-24 PROCEDURE — 3078F DIAST BP <80 MM HG: CPT | Performed by: FAMILY MEDICINE

## 2019-05-24 PROCEDURE — 3074F SYST BP LT 130 MM HG: CPT | Performed by: FAMILY MEDICINE

## 2019-05-24 PROCEDURE — 3008F BODY MASS INDEX DOCD: CPT | Performed by: FAMILY MEDICINE

## 2019-05-24 PROCEDURE — 3066F NEPHROPATHY DOC TX: CPT | Performed by: FAMILY MEDICINE

## 2019-05-24 PROCEDURE — 4040F PNEUMOC VAC/ADMIN/RCVD: CPT | Performed by: FAMILY MEDICINE

## 2019-05-24 PROCEDURE — G0009 ADMIN PNEUMOCOCCAL VACCINE: HCPCS | Performed by: FAMILY MEDICINE

## 2019-05-24 PROCEDURE — 3725F SCREEN DEPRESSION PERFORMED: CPT | Performed by: FAMILY MEDICINE

## 2019-05-24 PROCEDURE — 1160F RVW MEDS BY RX/DR IN RCRD: CPT | Performed by: FAMILY MEDICINE

## 2019-05-24 PROCEDURE — 99214 OFFICE O/P EST MOD 30 MIN: CPT | Performed by: FAMILY MEDICINE

## 2019-05-24 PROCEDURE — 90732 PPSV23 VACC 2 YRS+ SUBQ/IM: CPT | Performed by: FAMILY MEDICINE

## 2019-05-24 RX ORDER — CALCIPOTRIENE 50 UG/G
OINTMENT TOPICAL 2 TIMES DAILY
Qty: 360 G | Refills: 3 | Status: SHIPPED | OUTPATIENT
Start: 2019-05-24 | End: 2020-01-09 | Stop reason: SDUPTHER

## 2019-05-24 RX ORDER — NYSTATIN 100000 U/G
OINTMENT TOPICAL 2 TIMES DAILY
Qty: 30 G | Refills: 1 | Status: SHIPPED | OUTPATIENT
Start: 2019-05-24 | End: 2021-03-05 | Stop reason: SDUPTHER

## 2019-05-24 RX ORDER — OMEPRAZOLE 40 MG/1
40 CAPSULE, DELAYED RELEASE ORAL 2 TIMES DAILY
Qty: 180 CAPSULE | Refills: 1 | Status: SHIPPED | OUTPATIENT
Start: 2019-05-24 | End: 2019-10-27 | Stop reason: SDUPTHER

## 2019-05-24 RX ORDER — LEVOTHYROXINE SODIUM 88 UG/1
88 TABLET ORAL DAILY
Qty: 90 TABLET | Refills: 1 | Status: SHIPPED | OUTPATIENT
Start: 2019-05-24 | End: 2019-12-30 | Stop reason: SDUPTHER

## 2019-05-28 ENCOUNTER — HOSPITAL ENCOUNTER (OUTPATIENT)
Dept: RADIOLOGY | Facility: IMAGING CENTER | Age: 74
Discharge: HOME | End: 2019-05-28
Payer: COMMERCIAL

## 2019-05-28 VITALS — WEIGHT: 260 LBS | HEIGHT: 59 IN | BODY MASS INDEX: 52.41 KG/M2

## 2019-05-28 DIAGNOSIS — Z12.31 SCREENING MAMMOGRAM, ENCOUNTER FOR: ICD-10-CM

## 2019-05-28 DIAGNOSIS — M85.89 OSTEOPENIA OF MULTIPLE SITES: ICD-10-CM

## 2019-05-28 PROCEDURE — 77067 SCR MAMMO BI INCL CAD: CPT

## 2019-05-28 PROCEDURE — 77080 DXA BONE DENSITY AXIAL: CPT

## 2019-05-30 ENCOUNTER — TELEPHONE (OUTPATIENT)
Dept: MAMMOGRAPHY | Facility: CLINIC | Age: 74
End: 2019-05-30

## 2019-05-30 ENCOUNTER — TELEPHONE (OUTPATIENT)
Dept: INTERNAL MEDICINE CLINIC | Facility: CLINIC | Age: 74
End: 2019-05-30

## 2019-06-12 DIAGNOSIS — E87.6 HYPOKALEMIA: ICD-10-CM

## 2019-06-13 RX ORDER — POTASSIUM CHLORIDE 20 MEQ/1
20 TABLET, EXTENDED RELEASE ORAL 4 TIMES DAILY
Qty: 360 TABLET | Refills: 1 | Status: SHIPPED | OUTPATIENT
Start: 2019-06-13 | End: 2020-03-02 | Stop reason: SDUPTHER

## 2019-06-20 ENCOUNTER — TELEPHONE (OUTPATIENT)
Dept: INTERNAL MEDICINE CLINIC | Age: 74
End: 2019-06-20

## 2019-07-05 DIAGNOSIS — E11.9 TYPE 2 DIABETES MELLITUS WITHOUT COMPLICATION, WITHOUT LONG-TERM CURRENT USE OF INSULIN (HCC): ICD-10-CM

## 2019-07-05 DIAGNOSIS — I50.32 CHRONIC DIASTOLIC CONGESTIVE HEART FAILURE (HCC): ICD-10-CM

## 2019-07-05 NOTE — TELEPHONE ENCOUNTER
SUPPLY: [facility]Day  PHARMACY: [facility]   PATIENT PHONE #: [phone]  LAST OV: 5/24/2019  UPCOMING OV: 10/4/2019

## 2019-07-07 RX ORDER — FUROSEMIDE 80 MG
80 TABLET ORAL DAILY
Qty: 90 TABLET | Refills: 1 | Status: SHIPPED | OUTPATIENT
Start: 2019-07-07 | End: 2020-02-27 | Stop reason: SDUPTHER

## 2019-07-07 RX ORDER — METOLAZONE 2.5 MG/1
2.5 TABLET ORAL 3 TIMES WEEKLY
Qty: 36 TABLET | Refills: 1 | Status: SHIPPED | OUTPATIENT
Start: 2019-07-08 | End: 2020-01-09 | Stop reason: SDUPTHER

## 2019-07-07 RX ORDER — REPAGLINIDE 1 MG/1
1 TABLET ORAL
Qty: 270 TABLET | Refills: 1 | Status: SHIPPED | OUTPATIENT
Start: 2019-07-07 | End: 2020-03-17 | Stop reason: SDUPTHER

## 2019-07-08 RX ORDER — FUROSEMIDE 80 MG
TABLET ORAL
Qty: 90 TABLET | Refills: 1 | OUTPATIENT
Start: 2019-07-08

## 2019-07-19 DIAGNOSIS — E11.9 TYPE 2 DIABETES MELLITUS WITHOUT COMPLICATION, WITHOUT LONG-TERM CURRENT USE OF INSULIN (HCC): Primary | ICD-10-CM

## 2019-07-26 DIAGNOSIS — E11.9 TYPE 2 DIABETES MELLITUS WITHOUT COMPLICATION, WITHOUT LONG-TERM CURRENT USE OF INSULIN (HCC): ICD-10-CM

## 2019-07-26 RX ORDER — SITAGLIPTIN 50 MG/1
TABLET, FILM COATED ORAL
Qty: 90 TABLET | Refills: 1 | OUTPATIENT
Start: 2019-07-26

## 2019-08-06 DIAGNOSIS — E78.5 HYPERLIPIDEMIA, UNSPECIFIED HYPERLIPIDEMIA TYPE: ICD-10-CM

## 2019-08-06 DIAGNOSIS — E55.9 VITAMIN D DEFICIENCY: ICD-10-CM

## 2019-08-06 DIAGNOSIS — E11.9 TYPE 2 DIABETES MELLITUS WITHOUT COMPLICATION, WITHOUT LONG-TERM CURRENT USE OF INSULIN (HCC): ICD-10-CM

## 2019-08-06 RX ORDER — ERGOCALCIFEROL (VITAMIN D2) 1250 MCG
1 CAPSULE ORAL WEEKLY
Qty: 12 CAPSULE | Refills: 1 | Status: SHIPPED | OUTPATIENT
Start: 2019-08-06 | End: 2020-03-17 | Stop reason: SDUPTHER

## 2019-08-06 RX ORDER — CITALOPRAM 20 MG/1
20 TABLET ORAL DAILY
Qty: 90 TABLET | Refills: 1 | Status: SHIPPED | OUTPATIENT
Start: 2019-08-06 | End: 2020-03-18 | Stop reason: SDUPTHER

## 2019-08-06 NOTE — TELEPHONE ENCOUNTER
MED: Crissy Ulisses   SUPPLY: 90Day  PHARMACY: [facility]  PATIENT PHONE #: [phone]  LAST OV: 5/24/2019  UPCOMING OV: 10/4/2019    MED: Vitamin D  SUPPLY: 90Day  PHARMACY: [facility]  PATIENT PHONE #: [phone]  LAST OV: 5/24/2019  UPCOMING OV: 10/4/2019    MED: Citalopram 20mg   SUPPLY: [facility]Day  PHARMACY: [facility]  PATIENT PHONE #: [phone]  LAST OV: 5/24/2019  UPCOMING OV: 10/4/2019

## 2019-09-12 DIAGNOSIS — I48.0 PAROXYSMAL ATRIAL FIBRILLATION (HCC): ICD-10-CM

## 2019-09-30 DIAGNOSIS — I50.33 ACUTE ON CHRONIC DIASTOLIC CONGESTIVE HEART FAILURE (HCC): ICD-10-CM

## 2019-09-30 DIAGNOSIS — N18.30 STAGE 3 CHRONIC KIDNEY DISEASE (HCC): ICD-10-CM

## 2019-09-30 RX ORDER — SPIRONOLACTONE 25 MG/1
25 TABLET ORAL DAILY
Qty: 90 TABLET | Refills: 1 | Status: SHIPPED | OUTPATIENT
Start: 2019-09-30 | End: 2021-03-05 | Stop reason: ALTCHOICE

## 2019-10-27 DIAGNOSIS — K21.9 GERD WITHOUT ESOPHAGITIS: ICD-10-CM

## 2019-10-27 RX ORDER — OMEPRAZOLE 40 MG/1
CAPSULE, DELAYED RELEASE ORAL
Qty: 180 CAPSULE | Refills: 1 | Status: SHIPPED | OUTPATIENT
Start: 2019-10-27 | End: 2020-04-30 | Stop reason: SDUPTHER

## 2019-11-01 ENCOUNTER — CLINICAL SUPPORT (OUTPATIENT)
Dept: INTERNAL MEDICINE CLINIC | Facility: CLINIC | Age: 74
End: 2019-11-01
Payer: COMMERCIAL

## 2019-11-01 DIAGNOSIS — E78.00 HYPERCHOLESTEROLEMIA: ICD-10-CM

## 2019-11-01 DIAGNOSIS — E11.9 TYPE 2 DIABETES MELLITUS WITHOUT COMPLICATION, WITHOUT LONG-TERM CURRENT USE OF INSULIN (HCC): ICD-10-CM

## 2019-11-01 DIAGNOSIS — D50.9 IRON DEFICIENCY ANEMIA, UNSPECIFIED IRON DEFICIENCY ANEMIA TYPE: Primary | ICD-10-CM

## 2019-11-01 PROCEDURE — 36415 COLL VENOUS BLD VENIPUNCTURE: CPT

## 2019-11-02 LAB
ALBUMIN SERPL-MCNC: 3.9 G/DL (ref 3.6–5.1)
ALBUMIN/GLOB SERPL: 1.1 (CALC) (ref 1–2.5)
ALP SERPL-CCNC: 117 U/L (ref 33–130)
ALT SERPL-CCNC: 20 U/L (ref 6–29)
AST SERPL-CCNC: 29 U/L (ref 10–35)
BASOPHILS # BLD AUTO: 44 CELLS/UL (ref 0–200)
BASOPHILS NFR BLD AUTO: 0.5 %
BILIRUB SERPL-MCNC: 0.5 MG/DL (ref 0.2–1.2)
BUN SERPL-MCNC: 44 MG/DL (ref 7–25)
BUN/CREAT SERPL: 26 (CALC) (ref 6–22)
CALCIUM SERPL-MCNC: 9 MG/DL (ref 8.6–10.4)
CHLORIDE SERPL-SCNC: 97 MMOL/L (ref 98–110)
CHOLEST SERPL-MCNC: 196 MG/DL
CHOLEST/HDLC SERPL: 3 (CALC)
CO2 SERPL-SCNC: 31 MMOL/L (ref 20–32)
CREAT SERPL-MCNC: 1.7 MG/DL (ref 0.6–0.93)
EOSINOPHIL # BLD AUTO: 141 CELLS/UL (ref 15–500)
EOSINOPHIL NFR BLD AUTO: 1.6 %
ERYTHROCYTE [DISTWIDTH] IN BLOOD BY AUTOMATED COUNT: 14 % (ref 11–15)
EST. AVERAGE GLUCOSE BLD GHB EST-MCNC: 120 (CALC)
EST. AVERAGE GLUCOSE BLD GHB EST-SCNC: 6.6 (CALC)
GLOBULIN SER CALC-MCNC: 3.5 G/DL (CALC) (ref 1.9–3.7)
GLUCOSE SERPL-MCNC: 124 MG/DL (ref 65–99)
HBA1C MFR BLD: 5.8 % OF TOTAL HGB
HCT VFR BLD AUTO: 39.7 % (ref 35–45)
HDLC SERPL-MCNC: 65 MG/DL
HGB BLD-MCNC: 13.6 G/DL (ref 11.7–15.5)
LDLC SERPL CALC-MCNC: 108 MG/DL (CALC)
LYMPHOCYTES # BLD AUTO: 994 CELLS/UL (ref 850–3900)
LYMPHOCYTES NFR BLD AUTO: 11.3 %
MCH RBC QN AUTO: 32.1 PG (ref 27–33)
MCHC RBC AUTO-ENTMCNC: 34.3 G/DL (ref 32–36)
MCV RBC AUTO: 93.6 FL (ref 80–100)
MONOCYTES # BLD AUTO: 783 CELLS/UL (ref 200–950)
MONOCYTES NFR BLD AUTO: 8.9 %
NEUTROPHILS # BLD AUTO: 6838 CELLS/UL (ref 1500–7800)
NEUTROPHILS NFR BLD AUTO: 77.7 %
NONHDLC SERPL-MCNC: 131 MG/DL (CALC)
PLATELET # BLD AUTO: 167 THOUSAND/UL (ref 140–400)
PMV BLD REES-ECKER: 11.7 FL (ref 7.5–12.5)
POTASSIUM SERPL-SCNC: 3.7 MMOL/L (ref 3.5–5.3)
PROT SERPL-MCNC: 7.4 G/DL (ref 6.1–8.1)
RBC # BLD AUTO: 4.24 MILLION/UL (ref 3.8–5.1)
SL AMB EGFR AFRICAN AMERICAN: 34 ML/MIN/1.73M2
SL AMB EGFR NON AFRICAN AMERICAN: 29 ML/MIN/1.73M2
SODIUM SERPL-SCNC: 141 MMOL/L (ref 135–146)
TRIGL SERPL-MCNC: 119 MG/DL
WBC # BLD AUTO: 8.8 THOUSAND/UL (ref 3.8–10.8)

## 2019-11-12 ENCOUNTER — OFFICE VISIT (OUTPATIENT)
Dept: INTERNAL MEDICINE CLINIC | Facility: CLINIC | Age: 74
End: 2019-11-12
Payer: COMMERCIAL

## 2019-11-12 VITALS
DIASTOLIC BLOOD PRESSURE: 78 MMHG | TEMPERATURE: 98.5 F | BODY MASS INDEX: 56.77 KG/M2 | HEIGHT: 59 IN | WEIGHT: 281.6 LBS | OXYGEN SATURATION: 97 % | HEART RATE: 60 BPM | SYSTOLIC BLOOD PRESSURE: 118 MMHG

## 2019-11-12 DIAGNOSIS — D50.9 IRON DEFICIENCY ANEMIA, UNSPECIFIED IRON DEFICIENCY ANEMIA TYPE: ICD-10-CM

## 2019-11-12 DIAGNOSIS — E78.00 PURE HYPERCHOLESTEROLEMIA: ICD-10-CM

## 2019-11-12 DIAGNOSIS — E66.01 MORBID OBESITY DUE TO EXCESS CALORIES (HCC): ICD-10-CM

## 2019-11-12 DIAGNOSIS — K21.9 GERD WITHOUT ESOPHAGITIS: ICD-10-CM

## 2019-11-12 DIAGNOSIS — Z00.00 MEDICARE ANNUAL WELLNESS VISIT, SUBSEQUENT: ICD-10-CM

## 2019-11-12 DIAGNOSIS — N18.4 CKD (CHRONIC KIDNEY DISEASE) STAGE 4, GFR 15-29 ML/MIN (HCC): ICD-10-CM

## 2019-11-12 DIAGNOSIS — R53.81 PHYSICAL DECONDITIONING: ICD-10-CM

## 2019-11-12 DIAGNOSIS — E08.9 DIABETES MELLITUS DUE TO UNDERLYING CONDITION, CONTROLLED, WITHOUT COMPLICATION, WITHOUT LONG-TERM CURRENT USE OF INSULIN (HCC): ICD-10-CM

## 2019-11-12 DIAGNOSIS — I48.0 PAROXYSMAL ATRIAL FIBRILLATION (HCC): ICD-10-CM

## 2019-11-12 DIAGNOSIS — I50.32 CHRONIC DIASTOLIC CONGESTIVE HEART FAILURE (HCC): ICD-10-CM

## 2019-11-12 DIAGNOSIS — Z79.01 ANTICOAGULANT LONG-TERM USE: ICD-10-CM

## 2019-11-12 DIAGNOSIS — I10 BENIGN ESSENTIAL HYPERTENSION: ICD-10-CM

## 2019-11-12 DIAGNOSIS — Z23 NEED FOR INFLUENZA VACCINATION: Primary | ICD-10-CM

## 2019-11-12 DIAGNOSIS — E55.9 VITAMIN D DEFICIENCY: ICD-10-CM

## 2019-11-12 DIAGNOSIS — E03.9 ACQUIRED HYPOTHYROIDISM: ICD-10-CM

## 2019-11-12 DIAGNOSIS — M81.0 POSTMENOPAUSAL OSTEOPOROSIS: ICD-10-CM

## 2019-11-12 PROBLEM — E87.6 HYPOKALEMIA: Status: RESOLVED | Noted: 2017-05-27 | Resolved: 2019-11-12

## 2019-11-12 PROBLEM — M17.10 ARTHRITIS OF KNEE: Status: RESOLVED | Noted: 2017-05-15 | Resolved: 2019-11-12

## 2019-11-12 PROBLEM — R53.1 ASTHENIA: Status: RESOLVED | Noted: 2017-06-28 | Resolved: 2019-11-12

## 2019-11-12 PROBLEM — E11.9 TYPE 2 DIABETES MELLITUS WITHOUT COMPLICATION, WITHOUT LONG-TERM CURRENT USE OF INSULIN (HCC): Status: ACTIVE | Noted: 2019-11-12

## 2019-11-12 PROBLEM — M46.20 VERTEBRAL OSTEOMYELITIS (HCC): Status: RESOLVED | Noted: 2017-05-23 | Resolved: 2019-11-12

## 2019-11-12 PROBLEM — I49.9 CARDIAC ARRHYTHMIA: Status: RESOLVED | Noted: 2017-08-14 | Resolved: 2019-11-12

## 2019-11-12 PROBLEM — I50.33 ACUTE ON CHRONIC DIASTOLIC CONGESTIVE HEART FAILURE (HCC): Status: RESOLVED | Noted: 2017-06-28 | Resolved: 2019-11-12

## 2019-11-12 PROBLEM — A49.1 STREPTOCOCCUS BOVIS INFECTION: Status: RESOLVED | Noted: 2017-05-23 | Resolved: 2019-11-12

## 2019-11-12 PROCEDURE — 3725F SCREEN DEPRESSION PERFORMED: CPT | Performed by: FAMILY MEDICINE

## 2019-11-12 PROCEDURE — 1036F TOBACCO NON-USER: CPT | Performed by: FAMILY MEDICINE

## 2019-11-12 PROCEDURE — G0439 PPPS, SUBSEQ VISIT: HCPCS | Performed by: FAMILY MEDICINE

## 2019-11-12 PROCEDURE — G0008 ADMIN INFLUENZA VIRUS VAC: HCPCS

## 2019-11-12 PROCEDURE — 1160F RVW MEDS BY RX/DR IN RCRD: CPT | Performed by: FAMILY MEDICINE

## 2019-11-12 PROCEDURE — 99214 OFFICE O/P EST MOD 30 MIN: CPT | Performed by: FAMILY MEDICINE

## 2019-11-12 PROCEDURE — 90662 IIV NO PRSV INCREASED AG IM: CPT

## 2019-11-12 PROCEDURE — 3066F NEPHROPATHY DOC TX: CPT | Performed by: FAMILY MEDICINE

## 2019-11-12 RX ORDER — ALENDRONATE SODIUM 70 MG/1
70 TABLET ORAL
Qty: 12 TABLET | Refills: 3 | Status: SHIPPED | OUTPATIENT
Start: 2019-11-12 | End: 2020-10-29 | Stop reason: SDUPTHER

## 2019-11-12 RX ORDER — PHENTERMINE HYDROCHLORIDE 37.5 MG/1
37.5 TABLET ORAL DAILY
Qty: 30 TABLET | Refills: 5 | Status: SHIPPED | OUTPATIENT
Start: 2019-11-12 | End: 2020-05-12 | Stop reason: SDUPTHER

## 2019-11-12 NOTE — PROGRESS NOTES
Assessment and Plan:     Problem List Items Addressed This Visit        Other    Medicare annual wellness visit, subsequent      Other Visit Diagnoses     Need for influenza vaccination    -  Primary    Relevant Orders    influenza vaccine, [id number], high-dose, PF 0 5 mL (FLUZONE HIGH-DOSE)           Preventive health issues were discussed with patient, and age appropriate screening tests were ordered as noted in patient's After Visit Summary  Personalized health advice and appropriate referrals for health education or preventive services given if needed, as noted in patient's After Visit Summary       History of Present Illness:     Patient presents for Medicare Annual Wellness visit    Patient Care Team:  Liza Shields DO as PCP - General (Family Medicine)  MD Rey Sanchez DO (Cardiology)  Fatemeh Best DO as Endoscopist     Problem List:     Patient Active Problem List   Diagnosis    Gout    GERD without esophagitis    Cardiac disease    CAD (coronary artery disease)    Mitral valve replaced    Chronic diastolic congestive heart failure ([address] )    Meralgia paresthetica of right side    Streptococcus bovis infection    Vertebral osteomyelitis ([address] )    Hypokalemia    Ovarian cyst, right    Acute on chronic diastolic congestive heart failure ([address] )    Asthenia    Arthritis of knee    Anticoagulant long-term use    Benign essential hypertension    Cardiac arrhythmia    History of adenomatous polyp of colon    HLD (hyperlipidemia)    Hypothyroidism    Iron deficiency anemia    Lumbar degenerative disc disease    Mitral valve prolapse    Morbid obesity due to excess calories ([address] )    Vitamin D deficiency    Type 2 diabetes mellitus without complication, without long-term current use of insulin ([address] )    S/P MVR (mitral valve replacement)    RBBB (right bundle branch block)    Psoriasis    Physical deconditioning    Paroxysmal atrial fibrillation (HCC)    Onychomycosis of toenail    CKD (chronic kidney disease) stage 4, GFR 15-29 ml/min ([facility]    Medicare annual wellness visit, subsequent      Past Medical and Surgical History:     Past Medical History:   Diagnosis Date    Abscess of abdominal wall     RESOLVED:  16    Acute diastolic congestive heart failure (HCC)     RESOLVED:  17    Acute on chronic diastolic congestive heart failure (HCC)     RESOLVED:  17    CHANTELL (acute kidney injury) ([facility] )     RESOLVED:  17    Allergy     Arteriovenous malformation     OF THE GASTROINTESTINAL    Blood coagulation disorder ([facility] )     RESOLVED: 6/21/15    CAD (coronary artery disease)     Candidiasis of breast     RESOLVED: 17    Diabetes mellitus (HCC)     Diastolic CHF ([facility]     Gastroparesis     RESOLVED: 6/21/15    Generalized anxiety disorder     RESOLVED: 6/21/15    GERD (gastroesophageal reflux disease)     Gout     Hyperlipidemia     Hypertension     Lymphedema     RESOLVED: 6/21/15     Mitral valve replaced      - prosthetic    Morbid (severe) obesity due to excess calories ([facility] )     RESOLVED: 6/21/15    Psoriasis     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[facility] )     RESOLVED: 6/21/15    Sciatica of right side     RESOLVED: 5/15/17    Type 2 diabetes mellitus ([facility] )     Weight gain     RESOLVED: 5/15/17     Past Surgical History:   Procedure Laterality Date     SECTION      X 1    COLONOSCOPY N/A 2017    Procedure: COLONOSCOPY;  Surgeon: Layla García DO;  Location: [facility];   Service:     MITRAL VALVE REPLACEMENT      TONSILLECTOMY AND ADENOIDECTOMY      LISTED 2 X      Family History:     Family History   Problem Relation Age of Onset    Diabetes Mother         MELLITUS    Diabetes Father         MELLITUS    Diabetes type II Father         MELLITUS    Coronary artery disease Sister         CAROTID ARTERY STENOSIS    Stroke Family         SYNDROME    No Known Problems Maternal Grandmother     No Known Problems Maternal Grandfather     No Known Problems Paternal Grandmother     No Known Problems Paternal Grandfather     No Known Problems Maternal Aunt     No Known Problems Maternal Aunt       Social History:     Social History     Socioeconomic History    Marital status: /Civil Union     Spouse name: None    Number of children: 2    Years of education: None    Highest education level: None   Occupational History    Occupation: HOMEMAKER   Social Needs    Financial resource strain: None    Food insecurity:     Worry: None     Inability: None    Transportation needs:     Medical: None     Non-medical: None   Tobacco Use    Smoking status: Former Smoker     Packs/day: 1 00     Last attempt to quit: 1976     Years since quittin 1    Smokeless tobacco: Never Used   Substance and Sexual Activity    Alcohol use: No    Drug use: No    Sexual activity: None   Lifestyle    Physical activity:     Days per week: None     Minutes per session: None    Stress: None   Relationships    Social connections:     Talks on phone: None     Gets together: None     Attends Nondenominational service: None     Active member of club or organization: None     Attends meetings of clubs or organizations: None     Relationship status: None    Intimate partner violence:     Fear of current or ex partner: None     Emotionally abused: None     Physically abused: None     Forced sexual activity: None   Other Topics Concern    None   Social History Narrative    EXERCISING REGULARLY: 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       Medications and Allergies:     Current Outpatient Medications   Medication Sig Dispense Refill    albuterol (PROAIR HFA) 90 mcg/act inhaler Inhale 2 puffs as needed for wheezing 1 Inhaler 0    allopurinol (ZYLOPRIM) 100 mg tablet Take 1 tablet (100 mg total) by mouth daily 180 tablet 1    amiodarone 200 mg tablet Take 1 tablet (200 mg total) by mouth daily 90 tablet 1    atorvastatin (LIPITOR) 80 mg tablet Take 1 tablet (80 mg total) by mouth daily 90 tablet 1    calcipotriene (DOVONOX) 0 005 % ointment Apply topically 2 (two) times a day 360 g 3    citalopram (CeleXA) 20 mg tablet Take 1 tablet (20 mg total) by mouth daily 90 tablet 1    ergocalciferol (ERGOCALCIFEROL) 80840 units capsule Take 1 capsule (50,000 Units total) by mouth once a week 12 capsule 1    folic acid-pyridoxine-cyanocobalamin (FOLBIC) 2 5-25-2 mg Take 2 tablets by mouth daily        furosemide (LASIX) 80 mg tablet Take 1 tablet (80 mg total) by mouth daily 90 tablet 1    iron polysaccharides (FERREX 150) 150 mg capsule Take 1 capsule (150 mg total) by mouth 2 (two) times a day (Patient taking differently: Take 150 mg by mouth daily  ) 180 capsule 0    levothyroxine 88 mcg tablet Take 1 tablet (88 mcg total) by mouth daily 90 tablet 1    metolazone (ZAROXOLYN) 2 5 mg tablet Take 1 tablet (2 5 mg total) by mouth 3 (three) times a week 36 tablet 1    metoprolol tartrate (LOPRESSOR) 25 mg tablet Take 1 tablet (25 mg total) by mouth every 12 (twelve) hours 180 tablet 1    Multiple Vitamins-Minerals (OCUVITE EYE HEALTH FORMULA PO) Take 1 tablet by mouth daily      nystatin (MYCOSTATIN) ointment Apply topically 2 (two) times a day 30 g 1    nystatin (nystatin) powder Apply 1 application topically daily        omeprazole (PriLOSEC) 40 MG capsule TAKE 1 CAPSULE TWICE DAILY 180 capsule 1    potassium chloride (KLOR-CON M20) 20 mEq tablet Take 1 tablet (20 mEq total) by mouth 4 (four) times a day 360 tablet 1    repaglinide (PRANDIN) 1 mg tablet Take 1 tablet (1 mg total) by mouth 3 (three) times a day before meals 270 tablet 1    sitaGLIPtin (JANUVIA) 50 mg tablet Take 1 tablet (50 mg total) by mouth daily 90 tablet 1    spironolactone (ALDACTONE) 25 mg tablet Take 1 tablet (25 mg total) by mouth daily 90 tablet 1    Turmeric 500 MG TABS Take 1 capsule by mouth 2 (two) times a day      warfarin (COUMADIN) 5 mg tablet Take 5 mg by mouth daily      digoxin (LANOXIN) 0 125 mg tablet Take 1 tablet (125 mcg total) by mouth every other day (Patient not taking: Reported on 5/24/2019) 90 tablet 1    fenofibrate (TRICOR) 145 mg tablet Take 1 tablet (145 mg total) by mouth daily (Patient not taking: Reported on 5/24/2019) 90 tablet 1     No current facility-administered medications for this visit  Allergies   Allergen Reactions    Tetanus Toxoid       Immunizations:     Immunization History   Administered Date(s) Administered    INFLUENZA 11/20/2006, 08/04/2017    Influenza Split High Dose Preservative Free IM 09/25/2014, 12/21/2015, 12/29/2016, 12/12/2017    Influenza TIV (IM) 11/22/2010, 09/14/2012, 09/30/2013    Influenza, high dose seasonal 0 5 mL 09/13/2018    Pneumococcal Conjugate 13-Valent 02/11/2016    Pneumococcal Polysaccharide PPV23 1946, 09/23/2009, 05/24/2019    Tuberculin Skin Test-PPD Intradermal 03/07/2006, 06/06/2007      Health Maintenance:         Topic Date Due    MAMMOGRAM  05/28/2021    CRC Screening: Colonoscopy  06/01/2027    Hepatitis C Screening  Completed         Topic Date Due    HEPATITIS B VACCINES (1 of 3 - Risk 3-dose series) 06/20/1965    INFLUENZA VACCINE  07/01/2019      Medicare Health Risk Assessment:     /78 (BP Location: Left arm, Patient Position: Sitting, Cuff Size: Large)   Pulse 60   Temp 98 5 °F (36 9 °C) (Oral)   Ht 4' 11" (1 499 m)   Wt 128 kg (281 lb 9 6 oz)   SpO2 97%   BMI 56 88 kg/m²      Last Medicare Wellness visit information reviewed, patient interviewed and updates made to the record today  Health Risk Assessment:   Patient rates overall health as good  Patient feels that their physical health rating is slightly worse  Eyesight was rated as same  Hearing was rated as same  Patient feels that their emotional and mental health rating is same  Pain experienced in the last 7 days has been some  Patient's pain rating has been 4/10   Patient states that she has experienced no weight loss or gain in last 6 months  Depression Screening:   PHQ-2 Score: 0      Fall Risk Screening: In the past year, patient has experienced: history of falling in past year    Number of falls: 2 or more  Injured during fall?: Yes    Feels unsteady when standing or walking?: No    Worried about falling?: Yes      Urinary Incontinence Screening:   Patient has leaked urine accidently in the last six months  Home Safety:  Patient has trouble with stairs inside or outside of their home  Patient has working smoke alarms and has working carbon monoxide detector  Home safety hazards include: none  Nutrition:   Current diet is Low Carb, Unhealthy and Frequent junk food  Medications:   Patient is currently taking over-the-counter supplements  OTC medications include: see medication list  Patient is able to manage medications  Activities of Daily Living (ADLs)/Instrumental Activities of Daily Living (IADLs):   Walk and transfer into and out of bed and chair?: Yes  Dress and groom yourself?: Yes    Bathe or shower yourself?: Yes    Feed yourself? Yes  Do your laundry/housekeeping?: Yes  Manage your money, pay your bills and track your expenses?: Yes  Make your own meals?: Yes    Do your own shopping?: Yes    Previous Hospitalizations:   Any hospitalizations or ED visits within the last 12 months?: No      Advance Care Planning:   Living will: Yes    Durable POA for healthcare:  Yes    Advanced directive: Yes    Advanced directive counseling given: Yes    End of Life Decisions reviewed with patient: Yes    Provider agrees with end of life decisions: Yes      Comments:  is POA    Cognitive Screening:   Provider or family/friend/caregiver concerned regarding cognition?: No    PREVENTIVE SCREENINGS      Cardiovascular Screening:    General: Screening Not Indicated and History Lipid Disorder      Diabetes Screening:     General: Screening Not Indicated and History Diabetes Colorectal Cancer Screening:     General: Screening Current      Breast Cancer Screening:     General: Screening Current      Cervical Cancer Screening:    General: Screening Not Indicated      Lung Cancer Screening:     General: Screening Not Indicated      Hepatitis C Screening:    General: Screening Current    Other Counseling Topics:   Alcohol use counseling, car/seat belt/driving safety, skin self-exam, sunscreen and regular weightbearing exercise and calcium and vitamin D intake         Casie Nascimento DO

## 2019-11-12 NOTE — PROGRESS NOTES
Assessment/Plan:    No problem-specific Assessment & Plan notes found for this encounter  Problem List Items Addressed This Visit        Digestive    GERD without esophagitis       Endocrine    Hypothyroidism    Relevant Orders    TSH, 3rd generation       Cardiovascular and Mediastinum    Chronic diastolic congestive heart failure (HCC)    Benign essential hypertension    Paroxysmal atrial fibrillation (HCC)       Musculoskeletal and Integument    Postmenopausal osteoporosis    Relevant Medications    alendronate (FOSAMAX) 70 mg tablet       Genitourinary    CKD (chronic kidney disease) stage 4, GFR 15-29 ml/min (HCC)       Other    Anticoagulant long-term use    HLD (hyperlipidemia)    Relevant Orders    Lipid panel    CBC and differential    Morbid obesity due to excess calories (HCC)    Relevant Medications    phentermine (ADIPEX-P) 37 5 MG tablet    Vitamin D deficiency    Relevant Orders    Vitamin D 25 hydroxy    Physical deconditioning    Medicare annual wellness visit, subsequent    RESOLVED: Iron deficiency anemia    Relevant Orders    Ferritin      Other Visit Diagnoses     Need for influenza vaccination    -  Primary    Relevant Orders    influenza vaccine, [id number], high-dose, PF 0 5 mL (FLUZONE HIGH-DOSE) (Completed)    Diabetes mellitus due to underlying condition, controlled, without complication, without long-term current use of insulin ([facility] [address] )        Relevant Orders    Hemoglobin A1C    Comprehensive metabolic panel            Discussed labs, recommend increasing activity levels  Re start  KETO diet  See above for orders  Other labs in 4 months  Great control on DM and HLD  Pt was approved for PROLIA but has $200 copay q 6 months that she cannot do  She is willing to try oral meds  Never was on bisphosphonate  Trial appetite suppression due to 21 lb weight gain  Driver 1/3/2018 next due 5 years, dr Jenny Reardon    BMI Counseling: Body mass index is 56 88 kg/m²   Discussed the patient's BMI with her  The BMI is above average  BMI counseling and education was provided to the patient  Nutrition recommendations include 3-5 servings of fruits/vegetables daily  Exercise recommendations include exercising 3-5 times per week  Falls Plan of Care: balance, strength, and gait training instructions were provided  Subjective:  F/up DM     Patient ID: Matilda Nolen is a 69 y o  female  Diabetes     Hyperlipidemia     Congestive Heart Failure          Gastroesophageal Reflux Disease (Brief): The patient is being seen for an initial evaluation of gastroesophageal reflux disease  Symptoms:  no heartburn  The patient is currently asymptomatic  No associated symptoms are reported  Diabetes Type II (Follow-Up): The patient states she has been doing well with her Type II Diabetes control since the last visit  Comorbid Illnesses: hyperlipidemia and obesity  Interval Events: 9/13/18: Aic now 8 5, 2/5/19: Aic 6 9 now, may 2019 Aic 5 9 now, FBS 99  Symptoms:   Home monitoring: The patient checks her blood sugar sporadically  Glycemic control has been good  the patient reports no symptomatic hypoglycemic episodes  Medications: the patient is adherent with her medication regimen  She denies medication side effects    ,ay 2019 felt well on keto diet and labs are great, stopped 3 weeks bc dr Shaila Higginbotham called her but she is not sure why she had to  Nov 2019 stopped KETo diet bc she found it to be hard and  Gained 21 lbs, DM and HLD is still well controlled, feels tired     Hypertension (Follow-Up): The patient presents for follow-up of essential hypertension  The patient states she has been doing well with her blood pressure control since the last visit  She has no significant interval events  Symptoms: The patient is currently asymptomatic  Hyperlipidemia (Follow-Up): The patient states her hyperlipidemia has been stable since the last visit   Comorbid Illnesses: diabetes mellitus and hypertension  She has no significant interval events  Symptoms: compliant with statin and fenofibrate  Labs dhow elevated TG in the 300 range  May 2019 TG now 99 with KETO DIet      Congestive Heart Failure (Follow-Up): Comorbid Illnesses: Trying to eat more healthy  Cardio is following- sees Dr Suzan Bird   Symptoms: stable lower extremity edema,-- stable dyspnea on exertion-- and-- stable fatigue  compliant with medications     Arrhythmia (Brief): Symptoms:  no dizziness  Associated symptoms:  taking dig and amiodarone as prescribed- follows with caridology    On coumadin and doing well, managed by cardio, nov 2019 no new issues    The following portions of the patient's history were reviewed and updated as appropriate: allergies, current medications, past family history, past medical history, past social history, past surgical history and problem list     Review of Systems    Constitutional:  Denies fever or chills ,  21 lb weight gain over 6 mo nths  Eyes:  Denies change in visual acuity   HENT:  Denies nasal congestion or sore throat   Respiratory:  Denies cough or shortness of breath or wheezing  Cardiovascular:  Denies palpitations or chest pain  GI:  Denies abdominal pain, nausea, or vomiting  Neurologic:  Denies headache or focal weakness, no dizziness      Objective:      /78 (BP Location: Left arm, Patient Position: Sitting, Cuff Size: Large)   Pulse 60   Temp 98 5 °F (36 9 °C) (Oral)   Ht 4' 11" (1 499 m)   Wt 128 kg (281 lb 9 6 oz)   SpO2 97%   BMI 56 88 kg/m²          Physical Exam      Constitutional:  Well developed, well nourished, no acute distress, non-toxic appearance, morbid obesity   Eyes:  PERRL, conjunctiva normal , non icteric sclera  HENT:  Atraumatic, oropharynx moist  Neck-  supple   Respiratory:  CTA b/l, normal breath sounds, no rales, no wheezing   Cardiovascular:  RRR, no murmurs, no LE edema b/l, valve click present  GI:  Soft, nondistended, normal bowel sounds x 4, nontender, no organomegaly, no mass, no rebound, no guarding   Neurologic:  no focal deficits noted   Psychiatric:  Speech and behavior appropriate , AAO x 3  Skin: dry skin on feet b/l, onychomycosis on great toenails b/l

## 2019-11-19 ENCOUNTER — TELEPHONE (OUTPATIENT)
Dept: INTERNAL MEDICINE CLINIC | Facility: CLINIC | Age: 74
End: 2019-11-19

## 2019-12-29 DIAGNOSIS — E03.9 HYPOTHYROIDISM, UNSPECIFIED TYPE: ICD-10-CM

## 2019-12-30 DIAGNOSIS — E03.9 HYPOTHYROIDISM, UNSPECIFIED TYPE: ICD-10-CM

## 2019-12-30 DIAGNOSIS — I45.10 RBBB (RIGHT BUNDLE BRANCH BLOCK): Primary | ICD-10-CM

## 2019-12-30 RX ORDER — LEVOTHYROXINE SODIUM 88 UG/1
88 TABLET ORAL DAILY
Qty: 90 TABLET | Refills: 1 | Status: SHIPPED | OUTPATIENT
Start: 2019-12-30 | End: 2020-07-13 | Stop reason: SDUPTHER

## 2019-12-30 RX ORDER — LEVOTHYROXINE SODIUM 88 UG/1
TABLET ORAL
Qty: 90 TABLET | Refills: 1 | OUTPATIENT
Start: 2019-12-30

## 2019-12-30 RX ORDER — WARFARIN SODIUM 5 MG/1
5 TABLET ORAL DAILY
Qty: 90 TABLET | Refills: 0 | Status: SHIPPED | OUTPATIENT
Start: 2019-12-30 | End: 2021-03-05 | Stop reason: ALTCHOICE

## 2020-01-09 DIAGNOSIS — L40.9 PSORIASIS: ICD-10-CM

## 2020-01-09 DIAGNOSIS — I50.32 CHRONIC DIASTOLIC CONGESTIVE HEART FAILURE (HCC): ICD-10-CM

## 2020-01-13 RX ORDER — CALCIPOTRIENE 50 UG/G
OINTMENT TOPICAL 2 TIMES DAILY
Qty: 360 G | Refills: 3 | Status: SHIPPED | OUTPATIENT
Start: 2020-01-13 | End: 2020-03-02 | Stop reason: SDUPTHER

## 2020-01-13 RX ORDER — METOLAZONE 2.5 MG/1
2.5 TABLET ORAL 3 TIMES WEEKLY
Qty: 36 TABLET | Refills: 1 | Status: SHIPPED | OUTPATIENT
Start: 2020-01-13 | End: 2020-03-17 | Stop reason: SDUPTHER

## 2020-01-16 ENCOUNTER — HOSPITAL ENCOUNTER (OUTPATIENT)
Dept: MAMMOGRAPHY | Facility: CLINIC | Age: 75
Discharge: HOME | End: 2020-01-16
Payer: COMMERCIAL

## 2020-01-16 ENCOUNTER — HOSPITAL ENCOUNTER (OUTPATIENT)
Dept: MAMMOGRAPHY | Facility: CLINIC | Age: 75
Discharge: HOME | End: 2020-01-16

## 2020-01-16 VITALS — BODY MASS INDEX: 54.43 KG/M2 | HEIGHT: 59 IN | WEIGHT: 270 LBS

## 2020-01-16 DIAGNOSIS — R92.8 ABNORMAL SCREENING MAMMOGRAM: ICD-10-CM

## 2020-01-16 PROCEDURE — G0279 TOMOSYNTHESIS, MAMMO: HCPCS

## 2020-01-16 PROCEDURE — 77065 DX MAMMO INCL CAD UNI: CPT

## 2020-01-25 DIAGNOSIS — E11.9 TYPE 2 DIABETES MELLITUS WITHOUT COMPLICATION, WITHOUT LONG-TERM CURRENT USE OF INSULIN (HCC): ICD-10-CM

## 2020-01-26 RX ORDER — SITAGLIPTIN 50 MG/1
TABLET, FILM COATED ORAL
Qty: 90 TABLET | Refills: 0 | OUTPATIENT
Start: 2020-01-26

## 2020-02-03 DIAGNOSIS — E11.9 TYPE 2 DIABETES MELLITUS WITHOUT COMPLICATION, WITHOUT LONG-TERM CURRENT USE OF INSULIN (HCC): ICD-10-CM

## 2020-02-06 LAB — HBA1C MFR BLD HPLC: 5.4 %

## 2020-02-15 DIAGNOSIS — I50.32 CHRONIC DIASTOLIC CONGESTIVE HEART FAILURE (HCC): ICD-10-CM

## 2020-02-15 DIAGNOSIS — I48.0 PAROXYSMAL ATRIAL FIBRILLATION (HCC): ICD-10-CM

## 2020-02-27 DIAGNOSIS — I50.32 CHRONIC DIASTOLIC CONGESTIVE HEART FAILURE (HCC): ICD-10-CM

## 2020-02-27 DIAGNOSIS — I48.0 PAROXYSMAL ATRIAL FIBRILLATION (HCC): ICD-10-CM

## 2020-02-27 RX ORDER — FUROSEMIDE 80 MG
80 TABLET ORAL DAILY
Qty: 90 TABLET | Refills: 1 | Status: SHIPPED | OUTPATIENT
Start: 2020-02-27 | End: 2020-07-13 | Stop reason: SDUPTHER

## 2020-03-02 DIAGNOSIS — E87.6 HYPOKALEMIA: ICD-10-CM

## 2020-03-02 DIAGNOSIS — L40.9 PSORIASIS: ICD-10-CM

## 2020-03-02 RX ORDER — FUROSEMIDE 80 MG
TABLET ORAL
Qty: 90 TABLET | Refills: 1 | OUTPATIENT
Start: 2020-03-02

## 2020-03-02 NOTE — TELEPHONE ENCOUNTER
Patient called she does need refills on klorcon and calcipotriene ointment  She uses [facility]

## 2020-03-03 DIAGNOSIS — L40.9 PSORIASIS: ICD-10-CM

## 2020-03-03 RX ORDER — CALCIPOTRIENE 50 UG/G
OINTMENT TOPICAL 2 TIMES DAILY
Qty: 360 G | Refills: 3 | Status: SHIPPED | OUTPATIENT
Start: 2020-03-03

## 2020-03-03 RX ORDER — CALCIPOTRIENE 50 UG/G
OINTMENT TOPICAL
Qty: 120 G | Refills: 11 | OUTPATIENT
Start: 2020-03-03

## 2020-03-03 RX ORDER — POTASSIUM CHLORIDE 20 MEQ/1
20 TABLET, EXTENDED RELEASE ORAL 4 TIMES DAILY
Qty: 360 TABLET | Refills: 1 | Status: SHIPPED | OUTPATIENT
Start: 2020-03-03 | End: 2020-07-13 | Stop reason: SDUPTHER

## 2020-03-17 ENCOUNTER — TELEPHONE (OUTPATIENT)
Dept: ADMINISTRATIVE | Facility: OTHER | Age: 75
End: 2020-03-17

## 2020-03-17 ENCOUNTER — OFFICE VISIT (OUTPATIENT)
Dept: INTERNAL MEDICINE CLINIC | Facility: CLINIC | Age: 75
End: 2020-03-17
Payer: COMMERCIAL

## 2020-03-17 VITALS
WEIGHT: 263.8 LBS | HEIGHT: 59 IN | SYSTOLIC BLOOD PRESSURE: 122 MMHG | HEART RATE: 64 BPM | OXYGEN SATURATION: 100 % | TEMPERATURE: 98.2 F | DIASTOLIC BLOOD PRESSURE: 70 MMHG | BODY MASS INDEX: 53.18 KG/M2

## 2020-03-17 DIAGNOSIS — E55.9 VITAMIN D DEFICIENCY: ICD-10-CM

## 2020-03-17 DIAGNOSIS — L60.8 ONYCHOMADESIS OF TOENAIL: ICD-10-CM

## 2020-03-17 DIAGNOSIS — E03.9 ACQUIRED HYPOTHYROIDISM: ICD-10-CM

## 2020-03-17 DIAGNOSIS — N18.4 CKD (CHRONIC KIDNEY DISEASE) STAGE 4, GFR 15-29 ML/MIN (HCC): ICD-10-CM

## 2020-03-17 DIAGNOSIS — I50.32 CHRONIC DIASTOLIC CONGESTIVE HEART FAILURE (HCC): ICD-10-CM

## 2020-03-17 DIAGNOSIS — J43.2 CENTRILOBULAR EMPHYSEMA (HCC): ICD-10-CM

## 2020-03-17 DIAGNOSIS — E11.9 TYPE 2 DIABETES MELLITUS WITHOUT COMPLICATION, WITHOUT LONG-TERM CURRENT USE OF INSULIN (HCC): ICD-10-CM

## 2020-03-17 DIAGNOSIS — K21.9 GERD WITHOUT ESOPHAGITIS: ICD-10-CM

## 2020-03-17 DIAGNOSIS — E78.00 PURE HYPERCHOLESTEROLEMIA: ICD-10-CM

## 2020-03-17 DIAGNOSIS — I10 BENIGN ESSENTIAL HYPERTENSION: Primary | ICD-10-CM

## 2020-03-17 PROCEDURE — 99214 OFFICE O/P EST MOD 30 MIN: CPT | Performed by: FAMILY MEDICINE

## 2020-03-17 PROCEDURE — 2022F DILAT RTA XM EVC RTNOPTHY: CPT | Performed by: FAMILY MEDICINE

## 2020-03-17 PROCEDURE — 1036F TOBACCO NON-USER: CPT | Performed by: FAMILY MEDICINE

## 2020-03-17 PROCEDURE — 3078F DIAST BP <80 MM HG: CPT | Performed by: FAMILY MEDICINE

## 2020-03-17 PROCEDURE — 4040F PNEUMOC VAC/ADMIN/RCVD: CPT | Performed by: FAMILY MEDICINE

## 2020-03-17 PROCEDURE — 3008F BODY MASS INDEX DOCD: CPT | Performed by: FAMILY MEDICINE

## 2020-03-17 PROCEDURE — 3074F SYST BP LT 130 MM HG: CPT | Performed by: FAMILY MEDICINE

## 2020-03-17 PROCEDURE — 3066F NEPHROPATHY DOC TX: CPT | Performed by: FAMILY MEDICINE

## 2020-03-17 PROCEDURE — 1160F RVW MEDS BY RX/DR IN RCRD: CPT | Performed by: FAMILY MEDICINE

## 2020-03-17 RX ORDER — TERBINAFINE HYDROCHLORIDE 250 MG/1
250 TABLET ORAL DAILY
Qty: 84 TABLET | Refills: 0 | Status: SHIPPED | OUTPATIENT
Start: 2020-03-17 | End: 2020-06-09

## 2020-03-17 RX ORDER — APREMILAST 30 MG/1
1 TABLET, FILM COATED ORAL 2 TIMES DAILY
COMMUNITY
Start: 2020-01-23

## 2020-03-17 RX ORDER — REPAGLINIDE 1 MG/1
TABLET ORAL
Qty: 270 TABLET | Refills: 1 | OUTPATIENT
Start: 2020-03-17

## 2020-03-17 RX ORDER — PREDNISONE 20 MG/1
40 TABLET ORAL DAILY
Qty: 10 TABLET | Refills: 0 | Status: SHIPPED | OUTPATIENT
Start: 2020-03-17 | End: 2020-03-22

## 2020-03-17 RX ORDER — AZITHROMYCIN 250 MG/1
TABLET, FILM COATED ORAL
Qty: 6 TABLET | Refills: 0 | Status: SHIPPED | OUTPATIENT
Start: 2020-03-17 | End: 2020-03-22

## 2020-03-17 RX ORDER — TRIAMCINOLONE ACETONIDE 1 MG/G
1 CREAM TOPICAL AS NEEDED
COMMUNITY
Start: 2020-01-15

## 2020-03-17 RX ORDER — ERGOCALCIFEROL (VITAMIN D2) 1250 MCG
1 CAPSULE ORAL WEEKLY
Qty: 12 CAPSULE | Refills: 1 | Status: SHIPPED | OUTPATIENT
Start: 2020-03-17 | End: 2020-09-03 | Stop reason: SDUPTHER

## 2020-03-17 RX ORDER — METOLAZONE 2.5 MG/1
2.5 TABLET ORAL 3 TIMES WEEKLY
Qty: 36 TABLET | Refills: 1 | Status: SHIPPED | OUTPATIENT
Start: 2020-03-18 | End: 2020-04-20 | Stop reason: SDUPTHER

## 2020-03-17 RX ORDER — REPAGLINIDE 0.5 MG/1
0.5 TABLET ORAL
Qty: 270 TABLET | Refills: 1 | Status: SHIPPED | OUTPATIENT
Start: 2020-03-17 | End: 2020-09-10 | Stop reason: SDUPTHER

## 2020-03-17 NOTE — TELEPHONE ENCOUNTER
Upon review of the In Basket request we were able to locate, review, and update the patient chart as requested for Hepatitis C   Any additional questions or concerns should be emailed to the Practice Liaisons via [email or web address] email, please do not reply via In Basket      Thank you  Ger Castañeda

## 2020-03-17 NOTE — PROGRESS NOTES
Assessment/Plan:    1  Benign essential hypertension    2  Vitamin D deficiency  -     ergocalciferol (ERGOCALCIFEROL) 1 25 MG (23947 UT) capsule; Take 1 capsule (50,000 Units total) by mouth once a week    3  Chronic diastolic congestive heart failure (HCC)  -     metolazone (ZAROXOLYN) 2 5 mg tablet; Take 1 tablet (2 5 mg total) by mouth 3 (three) times a week    4  GERD without esophagitis    5  Pure hypercholesterolemia    6  Acquired hypothyroidism    7  CKD (chronic kidney disease) stage 4, GFR 15-29 ml/min ([facility])    8  Centrilobular emphysema (HCC)  -     CBC and differential; Future  -     predniSONE 20 mg tablet; Take 2 tablets (40 mg total) by mouth daily for 5 days  -     azithromycin (Zithromax) 250 mg tablet; Take 2 tablets (500 mg total) by mouth daily for 1 day, THEN 1 tablet (250 mg total) daily for 4 days  9  Type 2 diabetes mellitus without complication, without long-term current use of insulin ([facility])  -     Microalbumin / creatinine urine ratio  -     Comprehensive metabolic panel; Future  -     Hemoglobin A1C; Future  -     repaglinide (PRANDIN) 0 5 mg tablet; Take 1 tablet (0 5 mg total) by mouth 3 (three) times a day before meals    10  Onychomadesis of toenail  -     terbinafine (LamISIL) 250 mg tablet; Take 1 tablet (250 mg total) by mouth daily      BMI Counseling: Body mass index is 53 28 kg/m²  The BMI is above normal  Nutrition recommendations include moderation in carbohydrate intake  Exercise recommendations include exercising 3-5 times per week  No pharmacotherapy was ordered  Continue with ketogenic diet and weight loss  Congratulations, decreased Prandin from 1 mg 3 times a day to 0 5 mg 3 times a day to avoid hypoglycemia  Return in 4 months for lab work and call if any issues arise  All laboratory data reviewed with patient  There are no Patient Instructions on file for this visit  No follow-ups on file  Subjective:      Patient ID: Carrie Carrasco is a 69 y o  female  Chief Complaint   Patient presents with    Follow-up     [facility]W 02/06/2020, no refills needed, HEP C Testing done & NEG in Care Everywhere, msg sent to [facility]    Diabetes     Foot Exam due today       HPI      Gastroesophageal Reflux Disease (Brief): The patient is being seen for an initial evaluation of gastroesophageal reflux disease  Symptoms:  no heartburn  The patient is currently asymptomatic  No associated symptoms are reported          Diabetes Type II (Follow-Up): The patient states she has been doing well with her Type II Diabetes control since the last visit  Comorbid Illnesses: hyperlipidemia and obesity  Interval Events: 9/13/18: Aic now 8 5, 2/5/19: Aic 6 9 now, may 2019 Aic 5 9 now, FBS 99, March 2020 hemoglobin A1c 5 4 with fasting blood sugar 67, no hypoglycemic symptoms  Symptoms:   Home monitoring: The patient checks her blood sugar sporadically  Glycemic control has been good  the patient reports no symptomatic hypoglycemic episodes  Medications: the patient is adherent with her medication regimen  She denies medication side effects    ,ay 2019 felt well on keto diet and labs are great, stopped 3 weeks bc dr Vania Palmer called her but she is not sure why she had to  Nov 2019 stopped KETo diet bc she found it to be hard and  Gained 21 lbs, DM and HLD is still well controlled, feels tired     Hypertension (Follow-Up): The patient presents for follow-up of essential hypertension  The patient states she has been doing well with her blood pressure control since the last visit  She has no significant interval events  Symptoms: The patient is currently asymptomatic       Hyperlipidemia (Follow-Up): The patient states her hyperlipidemia has been stable since the last visit  Comorbid Illnesses: diabetes mellitus and hypertension  She has no significant interval events  Symptoms: compliant with statin and fenofibrate  Labs dhow elevated TG in the 300 range   May 2019 TG now 99 with KETO DIet        Congestive Heart Failure (Follow-Up): Comorbid Illnesses: Trying to eat more healthy  Cardio is following- sees Dr Ramiro Baldwin   Symptoms: stable lower extremity edema,-- stable dyspnea on exertion-- and-- stable fatigue  compliant with medications     Arrhythmia (Brief): Symptoms:  no dizziness  Associated symptoms:  taking dig and amiodarone as prescribed- follows with caridology    On coumadin and doing well, managed by cardio, nov 2019 no new issues       The following portions of the patient's history were reviewed and updated as appropriate: allergies, current medications, past family history, past medical history, past social history, past surgical history and problem list     Review of Systems      Constitutional:  Denies fever or chills, 7 lb weight loss since last visit   Eyes:  Denies double or blurry vision  HENT:  Denies nasal congestion or sore throat   Respiratory:  Denies cough or shortness of breath or wheezing  Cardiovascular:  Denies palpitations or chest pain  GI:  Denies abdominal pain, nausea, or vomiting  Integument:  Denies rash , no open areas  Neurologic:  Denies headache or focal weakness        Current Outpatient Medications   Medication Sig Dispense Refill    alendronate (FOSAMAX) 70 mg tablet Take 1 tablet (70 mg total) by mouth every 7 days 12 tablet 3    allopurinol (ZYLOPRIM) 100 mg tablet Take 1 tablet (100 mg total) by mouth daily 180 tablet 1    amiodarone 200 mg tablet Take 1 tablet (200 mg total) by mouth daily 90 tablet 1    atorvastatin (LIPITOR) 80 mg tablet Take 1 tablet (80 mg total) by mouth daily 90 tablet 1    calcipotriene (DOVONOX) 0 005 % ointment Apply topically 2 (two) times a day 360 g 3    citalopram (CeleXA) 20 mg tablet Take 1 tablet (20 mg total) by mouth daily 90 tablet 1    ergocalciferol (ERGOCALCIFEROL) 1 25 MG (64595 UT) capsule Take 1 capsule (50,000 Units total) by mouth once a week 12 capsule 1    folic acid-pyridoxine-cyanocobalamin (FOLBIC) 2 5-25-2 mg Take 2 tablets by mouth daily        furosemide (LASIX) 80 mg tablet Take 1 tablet (80 mg total) by mouth daily 90 tablet 1    iron polysaccharides (FERREX 150) 150 mg capsule Take 1 capsule (150 mg total) by mouth 2 (two) times a day (Patient taking differently: Take 150 mg by mouth daily  ) 180 capsule 0    levothyroxine 88 mcg tablet Take 1 tablet (88 mcg total) by mouth daily 90 tablet 1    [START ON 3/18/2020] metolazone (ZAROXOLYN) 2 5 mg tablet Take 1 tablet (2 5 mg total) by mouth 3 (three) times a week 36 tablet 1    metoprolol tartrate (LOPRESSOR) 25 mg tablet Take 1 tablet (25 mg total) by mouth every 12 (twelve) hours 180 tablet 1    Multiple Vitamins-Minerals (OCUVITE EYE HEALTH FORMULA PO) Take 1 tablet by mouth daily      nystatin (MYCOSTATIN) ointment Apply topically 2 (two) times a day 30 g 1    nystatin (nystatin) powder Apply 1 application topically daily        omeprazole (PriLOSEC) 40 MG capsule TAKE 1 CAPSULE TWICE DAILY 180 capsule 1    OTEZLA 10 & 20 & 30 MG TBPK       phentermine (ADIPEX-P) 37 5 MG tablet Take 1 tablet (37 5 mg total) by mouth daily 30 tablet 5    potassium chloride (Klor-Con M20) 20 mEq tablet Take 1 tablet (20 mEq total) by mouth 4 (four) times a day 360 tablet 1    repaglinide (PRANDIN) 0 5 mg tablet Take 1 tablet (0 5 mg total) by mouth 3 (three) times a day before meals 270 tablet 1    sitaGLIPtin (JANUVIA) 50 mg tablet Take 1 tablet (50 mg total) by mouth daily 90 tablet 1    spironolactone (ALDACTONE) 25 mg tablet Take 1 tablet (25 mg total) by mouth daily 90 tablet 1    triamcinolone (KENALOG) 0 1 % cream       Turmeric 500 MG TABS Take 1 capsule by mouth 2 (two) times a day      warfarin (COUMADIN) 5 mg tablet Take 1 tablet (5 mg total) by mouth daily 90 tablet 0    albuterol (PROAIR HFA) 90 mcg/act inhaler Inhale 2 puffs as needed for wheezing (Patient not taking: Reported on 3/17/2020) 1 Inhaler 0    azithromycin (Zithromax) 250 mg tablet Take 2 tablets (500 mg total) by mouth daily for 1 day, THEN 1 tablet (250 mg total) daily for 4 days  6 tablet 0    predniSONE 20 mg tablet Take 2 tablets (40 mg total) by mouth daily for 5 days 10 tablet 0    terbinafine (LamISIL) 250 mg tablet Take 1 tablet (250 mg total) by mouth daily 84 tablet 0     No current facility-administered medications for this visit          Objective:    /70 (BP Location: Left arm, Patient Position: Sitting, Cuff Size: Large)   Pulse 64   Temp 98 2 °F (36 8 °C) (Oral)   Ht 4' 11" (1 499 m)   Wt 120 kg (263 lb 12 8 oz) Comment: w shoes  SpO2 100% Comment: redd  BMI 53 28 kg/m²        Physical Exam       Constitutional:  Well developed, well nourished, no acute distress, non-toxic appearance   Eyes:  PERRL, conjunctiva normal , non icteric sclera  HENT:  Atraumatic, oropharynx moist  Neck-  supple   Respiratory:  CTA b/l, normal breath sounds, no rales, no wheezing   Cardiovascular:  RRR, no murmurs, no LE edema b/l, valve click  GI:  Soft, nondistended, normal bowel sounds x 4, nontender, no organomegaly, no mass, no rebound, no guarding   Neurologic:  no focal deficits noted   Psychiatric:  Speech and behavior appropriate , AAO x 3  MS; all toenails with significant onychomycosis      Royanne Pretty, DO

## 2020-03-17 NOTE — TELEPHONE ENCOUNTER
----- Message from Pepe Persaud sent at 3/17/2020 11:34 AM EDT -----  Regarding: hep c  03/17/20 11:34 AM    Hello, our patient Johnnie Peña has had Hepatitis C completed/performed  Please assist in updating the patient chart by pulling the Care Everywhere (CE) document  The date of service is 01/2018       Thank you,  Pepe Persaud  [phone]

## 2020-03-17 NOTE — PROGRESS NOTES
Diabetic Foot Exam    Patient's shoes and socks removed  Right Foot/Ankle   Right Foot Inspection  Skin Exam: dry skin                          Toe Exam: ROM and strength within normal limits  Sensory   Vibration: intact  Proprioception: intact   Monofilament testing: intact  Vascular  Capillary refills: < 3 seconds  The right DP pulse is 2+  The right PT pulse is 2+  Left Foot/Ankle  Left Foot Inspection  Skin Exam: dry skin                         Toe Exam: ROM and strength within normal limits                   Sensory   Vibration: intact  Proprioception: intact  Monofilament: intact  Vascular  Capillary refills: < 3 seconds  The left DP pulse is 2+  The left PT pulse is 2+  Assign Risk Category:  No deformity present; Loss of protective sensation;  No weak pulses       Risk: 1

## 2020-03-18 DIAGNOSIS — E78.5 HYPERLIPIDEMIA, UNSPECIFIED HYPERLIPIDEMIA TYPE: ICD-10-CM

## 2020-03-18 RX ORDER — CITALOPRAM 20 MG/1
20 TABLET ORAL DAILY
Qty: 90 TABLET | Refills: 1 | Status: SHIPPED | OUTPATIENT
Start: 2020-03-18 | End: 2020-10-08 | Stop reason: SDUPTHER

## 2020-04-20 ENCOUNTER — TRANSITIONAL CARE MANAGEMENT (OUTPATIENT)
Dept: INTERNAL MEDICINE CLINIC | Facility: CLINIC | Age: 75
End: 2020-04-20

## 2020-04-20 DIAGNOSIS — I50.32 CHRONIC DIASTOLIC CONGESTIVE HEART FAILURE (HCC): ICD-10-CM

## 2020-04-20 RX ORDER — METOLAZONE 2.5 MG/1
2.5 TABLET ORAL 3 TIMES WEEKLY
Qty: 30 TABLET | Refills: 0 | Status: SHIPPED | OUTPATIENT
Start: 2020-04-20 | End: 2020-07-27 | Stop reason: SDUPTHER

## 2020-04-30 DIAGNOSIS — K21.9 GERD WITHOUT ESOPHAGITIS: ICD-10-CM

## 2020-05-04 RX ORDER — OMEPRAZOLE 40 MG/1
40 CAPSULE, DELAYED RELEASE ORAL 2 TIMES DAILY
Qty: 180 CAPSULE | Refills: 1 | Status: SHIPPED | OUTPATIENT
Start: 2020-05-04 | End: 2020-11-24 | Stop reason: SDUPTHER

## 2020-05-11 DIAGNOSIS — E66.01 MORBID OBESITY DUE TO EXCESS CALORIES (HCC): ICD-10-CM

## 2020-05-12 DIAGNOSIS — E66.01 MORBID OBESITY DUE TO EXCESS CALORIES (HCC): ICD-10-CM

## 2020-05-12 RX ORDER — PHENTERMINE HYDROCHLORIDE 37.5 MG/1
TABLET ORAL
Qty: 30 TABLET | Refills: 5 | OUTPATIENT
Start: 2020-05-12

## 2020-05-12 RX ORDER — PHENTERMINE HYDROCHLORIDE 37.5 MG/1
37.5 TABLET ORAL DAILY
Qty: 30 TABLET | Refills: 5 | Status: SHIPPED | OUTPATIENT
Start: 2020-05-12 | End: 2020-10-29 | Stop reason: SDUPTHER

## 2020-05-26 DIAGNOSIS — N18.30 STAGE 3 CHRONIC KIDNEY DISEASE (HCC): ICD-10-CM

## 2020-05-27 RX ORDER — ALLOPURINOL 100 MG/1
100 TABLET ORAL DAILY
Qty: 180 TABLET | Refills: 1 | Status: SHIPPED | OUTPATIENT
Start: 2020-05-27

## 2020-06-26 ENCOUNTER — TELEPHONE (OUTPATIENT)
Dept: INTERNAL MEDICINE CLINIC | Facility: CLINIC | Age: 75
End: 2020-06-26

## 2020-07-11 DIAGNOSIS — E11.9 TYPE 2 DIABETES MELLITUS WITHOUT COMPLICATION, WITHOUT LONG-TERM CURRENT USE OF INSULIN (HCC): ICD-10-CM

## 2020-07-11 RX ORDER — SITAGLIPTIN 50 MG/1
TABLET, FILM COATED ORAL
Qty: 90 TABLET | Refills: 1 | OUTPATIENT
Start: 2020-07-11

## 2020-07-13 DIAGNOSIS — E87.6 HYPOKALEMIA: ICD-10-CM

## 2020-07-13 DIAGNOSIS — E11.9 TYPE 2 DIABETES MELLITUS WITHOUT COMPLICATION, WITHOUT LONG-TERM CURRENT USE OF INSULIN (HCC): ICD-10-CM

## 2020-07-13 DIAGNOSIS — E03.9 HYPOTHYROIDISM, UNSPECIFIED TYPE: ICD-10-CM

## 2020-07-13 DIAGNOSIS — I50.32 CHRONIC DIASTOLIC CONGESTIVE HEART FAILURE (HCC): ICD-10-CM

## 2020-07-13 RX ORDER — POTASSIUM CHLORIDE 20 MEQ/1
20 TABLET, EXTENDED RELEASE ORAL 4 TIMES DAILY
Qty: 360 TABLET | Refills: 1 | Status: SHIPPED | OUTPATIENT
Start: 2020-07-13 | End: 2021-02-05 | Stop reason: SDUPTHER

## 2020-07-13 RX ORDER — FUROSEMIDE 80 MG
80 TABLET ORAL DAILY
Qty: 90 TABLET | Refills: 1 | Status: SHIPPED | OUTPATIENT
Start: 2020-07-13 | End: 2021-03-30 | Stop reason: SDUPTHER

## 2020-07-13 RX ORDER — LEVOTHYROXINE SODIUM 88 UG/1
88 TABLET ORAL DAILY
Qty: 90 TABLET | Refills: 1 | Status: SHIPPED | OUTPATIENT
Start: 2020-07-13 | End: 2021-02-11 | Stop reason: SDUPTHER

## 2020-07-23 ENCOUNTER — OFFICE VISIT (OUTPATIENT)
Dept: INTERNAL MEDICINE CLINIC | Facility: CLINIC | Age: 75
End: 2020-07-23
Payer: COMMERCIAL

## 2020-07-23 VITALS
HEIGHT: 59 IN | SYSTOLIC BLOOD PRESSURE: 120 MMHG | TEMPERATURE: 97.8 F | OXYGEN SATURATION: 99 % | HEART RATE: 64 BPM | BODY MASS INDEX: 52.01 KG/M2 | WEIGHT: 258 LBS | DIASTOLIC BLOOD PRESSURE: 60 MMHG

## 2020-07-23 DIAGNOSIS — N18.4 CKD (CHRONIC KIDNEY DISEASE) STAGE 4, GFR 15-29 ML/MIN (HCC): ICD-10-CM

## 2020-07-23 DIAGNOSIS — M81.0 POSTMENOPAUSAL OSTEOPOROSIS: ICD-10-CM

## 2020-07-23 DIAGNOSIS — I50.32 CHRONIC DIASTOLIC CONGESTIVE HEART FAILURE (HCC): ICD-10-CM

## 2020-07-23 DIAGNOSIS — K21.9 GERD WITHOUT ESOPHAGITIS: ICD-10-CM

## 2020-07-23 DIAGNOSIS — Z79.01 ANTICOAGULANT LONG-TERM USE: ICD-10-CM

## 2020-07-23 DIAGNOSIS — E78.00 PURE HYPERCHOLESTEROLEMIA: ICD-10-CM

## 2020-07-23 DIAGNOSIS — I10 BENIGN ESSENTIAL HYPERTENSION: ICD-10-CM

## 2020-07-23 DIAGNOSIS — E11.9 TYPE 2 DIABETES MELLITUS WITHOUT COMPLICATION, WITHOUT LONG-TERM CURRENT USE OF INSULIN (HCC): Primary | ICD-10-CM

## 2020-07-23 DIAGNOSIS — J43.2 CENTRILOBULAR EMPHYSEMA (HCC): ICD-10-CM
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
## 2020-07-23 DIAGNOSIS — I51.9 CARDIAC DISEASE: ICD-10-CM

## 2020-07-23 DIAGNOSIS — E03.9 ACQUIRED HYPOTHYROIDISM: ICD-10-CM

## 2020-07-23 DIAGNOSIS — R53.81 PHYSICAL DECONDITIONING: ICD-10-CM

## 2020-07-23 PROCEDURE — 1160F RVW MEDS BY RX/DR IN RCRD: CPT | Performed by: FAMILY MEDICINE

## 2020-07-23 PROCEDURE — 2022F DILAT RTA XM EVC RTNOPTHY: CPT | Performed by: FAMILY MEDICINE

## 2020-07-23 PROCEDURE — 1036F TOBACCO NON-USER: CPT | Performed by: FAMILY MEDICINE

## 2020-07-23 PROCEDURE — 3008F BODY MASS INDEX DOCD: CPT | Performed by: FAMILY MEDICINE

## 2020-07-23 PROCEDURE — 4040F PNEUMOC VAC/ADMIN/RCVD: CPT | Performed by: FAMILY MEDICINE

## 2020-07-23 PROCEDURE — 3078F DIAST BP <80 MM HG: CPT | Performed by: FAMILY MEDICINE

## 2020-07-23 PROCEDURE — 99215 OFFICE O/P EST HI 40 MIN: CPT | Performed by: FAMILY MEDICINE

## 2020-07-23 PROCEDURE — 3074F SYST BP LT 130 MM HG: CPT | Performed by: FAMILY MEDICINE

## 2020-07-23 NOTE — PATIENT INSTRUCTIONS
Fall Prevention   AMBULATORY CARE:   Fall prevention  includes ways to make your home and other areas safer  It also includes ways you can move more carefully to prevent a fall  Health conditions that cause changes in your blood pressure, vision, or muscle strength and coordination may increase your risk for falls  Medicines may also increase your risk for falls if they make you dizzy, weak, or sleepy  Call 911 or have someone else call if:   · You have fallen and are unconscious  · You have fallen and cannot move part of your body  Contact your healthcare provider if:   · You have fallen and have pain or a headache  · You have questions or concerns about your condition or care  Fall prevention tips:   · Stand or sit up slowly  This may help you keep your balance and prevent falls  · Use assistive devices as directed  Your healthcare provider may suggest that you use a cane or walker to help you keep your balance  You may need to have grab bars put in your bathroom near the toilet or in the shower  · Wear shoes that fit well and have soles that   Wear shoes both inside and outside  Use slippers with good   Do not wear shoes with high heels  · Wear a personal alarm  This is a device that allows you to call 911 if you fall and need help  Ask your healthcare provider for more information  · Stay active  Exercise can help strengthen your muscles and improve your balance  Your healthcare provider may recommend water aerobics or walking  He or she may also recommend physical therapy to improve your coordination  Never start an exercise program without talking to your healthcare provider first      · Manage your medical conditions  Keep all appointments with your healthcare providers  Visit your eye doctor as directed  Home safety tips:   · Add items to prevent falls in the bathroom  Put nonslip strips on your bath or shower floor to prevent you from slipping   Use a bath mat if you do not have carpet in the bathroom  This will prevent you from falling when you step out of the bath or shower  Use a shower seat so you do not need to stand while you shower  Sit on the toilet or a chair in your bathroom to dry yourself and put on clothing  This will prevent you from losing your balance from drying or dressing yourself while you are standing  · Keep paths clear  Remove books, shoes, and other objects from walkways and stairs  Place cords for telephones and lamps out of the way so that you do not need to walk over them  Tape them down if you cannot move them  Remove small rugs  If you cannot remove a rug, secure it with double-sided tape  This will prevent you from tripping  · Install bright lights in your home  Use night lights to help light paths to the bathroom or kitchen  Always turn on the light before you start walking  · Keep items you use often on shelves within reach  Do not use a step stool to help you reach an item  · Paint or place reflective tape on the edges of your stairs  This will help you see the stairs better  Follow up with your healthcare provider as directed:  Write down your questions so you remember to ask them during your visits  © 2017 [employer] Information is for End User's use only and may not be sold, redistributed or otherwise used for commercial purposes  All illustrations and images included in CareNotes® are the copyrighted property of [employer] , [employer]  or Deny Smith  The above information is an  only  It is not intended as medical advice for individual conditions or treatments  Talk to your doctor, nurse or pharmacist before following any medical regimen to see if it is safe and effective for you

## 2020-07-23 NOTE — PROGRESS NOTES
Assessment/Plan:    1  Type 2 diabetes mellitus without complication, without long-term current use of insulin (HCC)  -     Lipid panel; Future  -     Comprehensive metabolic panel; Future  -     Hemoglobin A1C; Future  -     Microalbumin / creatinine urine ratio    2  Benign essential hypertension    3  Centrilobular emphysema ([facility])  -     CBC and differential; Future    4  Chronic diastolic congestive heart failure ([facility] [address] )    5  CKD (chronic kidney disease) stage 4, GFR 15-29 ml/min ([facility])    6  GERD without esophagitis    7  Anticoagulant long-term use    8  Pure hypercholesterolemia    9  Acquired hypothyroidism  -     TSH, 3rd generation; Future    10  Postmenopausal osteoporosis    11  Physical deconditioning  -     Ambulatory referral to Physical Therapy; Future    12  Fall on same level from slipping, initial encounter  -     Ambulatory referral to Physical Therapy; Future    13  Cardiac disease      Labs in 4 months, [facility] up home PT for patient  Cont with weight loss, not onKETo anymore  Patient Instructions       Fall Prevention   AMBULATORY CARE:   Fall prevention  includes ways to make your home and other areas safer  It also includes ways you can move more carefully to prevent a fall  Health conditions that cause changes in your blood pressure, vision, or muscle strength and coordination may increase your risk for falls  Medicines may also increase your risk for falls if they make you dizzy, weak, or sleepy  Call 911 or have someone else call if:   · You have fallen and are unconscious  · You have fallen and cannot move part of your body  Contact your healthcare provider if:   · You have fallen and have pain or a headache  · You have questions or concerns about your condition or care  Fall prevention tips:   · Stand or sit up slowly  This may help you keep your balance and prevent falls  · Use assistive devices as directed    Your healthcare provider may suggest that you use a cane or walker to help you keep your balance  You may need to have grab bars put in your bathroom near the toilet or in the shower  · Wear shoes that fit well and have soles that   Wear shoes both inside and outside  Use slippers with good   Do not wear shoes with high heels  · Wear a personal alarm  This is a device that allows you to call 911 if you fall and need help  Ask your healthcare provider for more information  · Stay active  Exercise can help strengthen your muscles and improve your balance  Your healthcare provider may recommend water aerobics or walking  He or she may also recommend physical therapy to improve your coordination  Never start an exercise program without talking to your healthcare provider first      · Manage your medical conditions  Keep all appointments with your healthcare providers  Visit your eye doctor as directed  Home safety tips:   · Add items to prevent falls in the bathroom  Put nonslip strips on your bath or shower floor to prevent you from slipping  Use a bath mat if you do not have carpet in the bathroom  This will prevent you from falling when you step out of the bath or shower  Use a shower seat so you do not need to stand while you shower  Sit on the toilet or a chair in your bathroom to dry yourself and put on clothing  This will prevent you from losing your balance from drying or dressing yourself while you are standing  · Keep paths clear  Remove books, shoes, and other objects from walkways and stairs  Place cords for telephones and lamps out of the way so that you do not need to walk over them  Tape them down if you cannot move them  Remove small rugs  If you cannot remove a rug, secure it with double-sided tape  This will prevent you from tripping  · Install bright lights in your home  Use night lights to help light paths to the bathroom or kitchen  Always turn on the light before you start walking      · Keep items you use often on shelves within reach  Do not use a step stool to help you reach an item  · Paint or place reflective tape on the edges of your stairs  This will help you see the stairs better  Follow up with your healthcare provider as directed:  Write down your questions so you remember to ask them during your visits  © 2017 [address] [employer] [employer] Information is for End User's use only and may not be sold, redistributed or otherwise used for commercial purposes  All illustrations and images included in CareNotes® are the copyrighted property of [employer]  or [employer]  The above information is an  only  It is not intended as medical advice for individual conditions or treatments  Talk to your doctor, nurse or pharmacist before following any medical regimen to see if it is safe and effective for you  No follow-ups on file  Subjective:      Patient ID: Erick Grace is a 77 y o  female  Chief Complaint   Patient presents with    Follow-up     4 mo f/u, no bw, refills needed  Pt would like to discuss Metolazone, taking 4 times per week   Fall     2 wks ago, landed on left arm, still hurting sometimes       HPI     Diabetes Type II (Follow-Up): The patient states she has been doing well with her Type II Diabetes control since the last visit  Comorbid Illnesses: hyperlipidemia and obesity  Interval Events: 9/13/18: Aic now 8 5, 2/5/19: Aic 6 9 now, may 2019 Aic 5 9 now, FBS 99, March 2020 hemoglobin A1c 5 4 with fasting blood sugar 67, no hypoglycemic symptoms  Symptoms:   Home monitoring: The patient checks her blood sugar sporadically  Glycemic control has been good  the patient reports no symptomatic hypoglycemic episodes  Medications: the patient is adherent with her medication regimen   She denies medication side effects    ,ay 2019 felt well on keto diet and labs are great, stopped 3 weeks bc dr Leslie Bains called her but she is not sure why she had to    Nov 2019 stopped KETo diet bc she found it to be hard and  Gained 21 lbs, DM and HLD is still well controlled, feels tired      Gastroesophageal Reflux Disease (Brief): The patient is being seen for an initial evaluation of gastroesophageal reflux disease  Symptoms:  no heartburn  The patient is currently asymptomatic  No associated symptoms are reported          Hypertension (Follow-Up): The patient presents for follow-up of essential hypertension  The patient states she has been doing well with her blood pressure control since the last visit  She has no significant interval events  Symptoms: The patient is currently asymptomatic       Hyperlipidemia (Follow-Up): The patient states her hyperlipidemia has been stable since the last visit  Comorbid Illnesses: diabetes mellitus and hypertension  She has no significant interval events  Symptoms: compliant with statin and fenofibrate  Labs dhow elevated TG in the 300 range  May 2019 TG now 99 with KETO DIet        Congestive Heart Failure (Follow-Up): Comorbid Illnesses: Trying to eat more healthy  Cardio is following- sees Dr Marciano Escamilla   Symptoms: stable lower extremity edema,-- stable dyspnea on exertion-- and-- stable fatigue  compliant with medications     Arrhythmia (Brief): Symptoms:  no dizziness  Associated symptoms:  taking dig and amiodarone as prescribed- follows with caridology   On coumadin and doing well, managed by cardio, nov 2019 no new issues        Fall: slipped on liquid that had fallen  Fell on L arm, getting better but would like PT at home  uses a walker at home and cane outside        The following portions of the patient's history were reviewed and updated as appropriate: allergies, current medications, past family history, past medical history, past social history, past surgical history and problem list     Review of Systems      Constitutional:  Denies fever or chills , weigtht loss- intentional  Eyes:  Denies double or blurry vision  HENT: Denies nasal congestion or sore throat   Respiratory:  Denies cough or shortness of breath or wheezing  Cardiovascular:  Denies palpitations or chest pain  GI:  Denies abdominal pain, nausea, or vomiting, no gerd  Integument:  Denies rash , no open areas  Neurologic:  Denies headache or focal weakness, no dizziness, occasional numbness in finger tips        Current Outpatient Medications   Medication Sig Dispense Refill    alendronate (FOSAMAX) 70 mg tablet Take 1 tablet (70 mg total) by mouth every 7 days 12 tablet 3    allopurinol (ZYLOPRIM) 100 mg tablet Take 1 tablet (100 mg total) by mouth daily 180 tablet 1    amiodarone 200 mg tablet Take 1 tablet (200 mg total) by mouth daily 90 tablet 1    atorvastatin (LIPITOR) 80 mg tablet Take 1 tablet (80 mg total) by mouth daily 90 tablet 1    calcipotriene (DOVONOX) 0 005 % ointment Apply topically 2 (two) times a day 360 g 3    citalopram (CeleXA) 20 mg tablet Take 1 tablet (20 mg total) by mouth daily 90 tablet 1    ergocalciferol (ERGOCALCIFEROL) 1 25 MG (77069 UT) capsule Take 1 capsule (50,000 Units total) by mouth once a week 12 capsule 1    folic acid-pyridoxine-cyanocobalamin (FOLBIC) 2 5-25-2 mg Take 2 tablets by mouth daily        furosemide (LASIX) 80 mg tablet Take 1 tablet (80 mg total) by mouth daily 90 tablet 1    hydrocortisone 2 5 % cream MIX WITH OTC ANTI-FUNGAL MEDICATION AND APPLY TO AFFECTED AREAS TWO TIMES A DAY X 2 WEEKS, AS NEEDED FOR FLARE      iron polysaccharides (FERREX 150) 150 mg capsule Take 1 capsule (150 mg total) by mouth 2 (two) times a day (Patient taking differently: Take 150 mg by mouth daily  ) 180 capsule 0    levothyroxine 88 mcg tablet Take 1 tablet (88 mcg total) by mouth daily 90 tablet 1    metolazone (ZAROXOLYN) 2 5 mg tablet Take 1 tablet (2 5 mg total) by mouth 3 (three) times a week 30 tablet 0    metoprolol tartrate (LOPRESSOR) 25 mg tablet Take 1 tablet (25 mg total) by mouth every 12 (twelve) hours 180 tablet 1    Multiple Vitamins-Minerals (OCUVITE EYE HEALTH FORMULA PO) Take 1 tablet by mouth daily      nystatin (MYCOSTATIN) ointment Apply topically 2 (two) times a day 30 g 1    nystatin (nystatin) powder Apply 1 application topically daily        omeprazole (PriLOSEC) 40 MG capsule Take 1 capsule (40 mg total) by mouth 2 (two) times a day 180 capsule 1    OTEZLA 10 & 20 & 30 MG TBPK       phentermine (ADIPEX-P) 37 5 MG tablet Take 1 tablet (37 5 mg total) by mouth daily 30 tablet 5    potassium chloride (Klor-Con M20) 20 mEq tablet Take 1 tablet (20 mEq total) by mouth 4 (four) times a day 360 tablet 1    repaglinide (PRANDIN) 0 5 mg tablet Take 1 tablet (0 5 mg total) by mouth 3 (three) times a day before meals 270 tablet 1    sitaGLIPtin (JANUVIA) 50 mg tablet Take 1 tablet (50 mg total) by mouth daily 90 tablet 1    spironolactone (ALDACTONE) 25 mg tablet Take 1 tablet (25 mg total) by mouth daily 90 tablet 1    triamcinolone (KENALOG) 0 1 % cream       Turmeric 500 MG TABS Take 1 capsule by mouth 2 (two) times a day      warfarin (COUMADIN) 5 mg tablet Take 1 tablet (5 mg total) by mouth daily 90 tablet 0    albuterol (PROAIR HFA) 90 mcg/act inhaler Inhale 2 puffs as needed for wheezing (Patient not taking: Reported on 3/17/2020) 1 Inhaler 0     No current facility-administered medications for this visit          Objective:    /60 (BP Location: Left arm, Patient Position: Sitting, Cuff Size: Adult)   Pulse 64   Temp 97 8 °F (36 6 °C) (Temporal)   Ht 4' 11" (1 499 m)   Wt 117 kg (258 lb) Comment: w shoes  SpO2 99% Comment: ra  BMI 52 11 kg/m²        Physical Exam    Constitutional:  Well developed, well nourished, no acute distress, non-toxic appearance   Eyes:  PERRL, conjunctiva normal , non icteric sclera  HENT:  Atraumatic, oropharynx moist  Neck-  supple   Respiratory:  CTA b/l, normal breath sounds, no rales, no wheezing   Cardiovascular:  RRR, no murmurs, no LE edema b/l, valve click  GI:  Soft, nondistended, normal bowel sounds x 4, nontender, no organomegaly, no mass, no rebound, no guarding   Neurologic:  no focal deficits noted   Psychiatric:  Speech and behavior appropriate , AAO x 3           Mamadou Fraire, DO  Falls Plan of Care: Patient referred to physical therapy

## 2020-07-24 ENCOUNTER — TELEPHONE (OUTPATIENT)
Dept: INTERNAL MEDICINE CLINIC | Facility: CLINIC | Age: 75
End: 2020-07-24

## 2020-07-27 DIAGNOSIS — I50.32 CHRONIC DIASTOLIC CONGESTIVE HEART FAILURE (HCC): ICD-10-CM

## 2020-07-27 RX ORDER — METOLAZONE 2.5 MG/1
2.5 TABLET ORAL 3 TIMES WEEKLY
Qty: 40 TABLET | Refills: 1 | Status: SHIPPED | OUTPATIENT
Start: 2020-07-27 | End: 2021-01-25

## 2020-08-17 DIAGNOSIS — I48.0 PAROXYSMAL ATRIAL FIBRILLATION (HCC): ICD-10-CM

## 2020-08-18 ENCOUNTER — TELEPHONE (OUTPATIENT)
Dept: INTERNAL MEDICINE CLINIC | Facility: CLINIC | Age: 75
End: 2020-08-18

## 2020-08-18 NOTE — TELEPHONE ENCOUNTER
Discussed with Dr Calvin Bonilla  He recommends that the patient be seen in the office tomorrow  Appointment scheduled  She is to go to the ER for any further rectal bleeding, feeling more short of breath, lightheaded, chest pain or dizziness  She repeated the instructions  She was also advised to contact her cardiologist office re: the bleeding  Her last INR"s are included in this note  She has a home monitor, but it is not working correctly  She is not able to get an accurate reading today

## 2020-08-18 NOTE — TELEPHONE ENCOUNTER
I received a call from the patient reporting 2 episodes of rectal bleeding  She passed some loose stool this morning and both times passed dark blood, "like a clot"  She has never had rectal bleeding in the past   She has no abdominal pain, nausea or vomiting  No complaints of constipation or diarrhea  She is taking tylenol and Percocet for shoulder pain  She is on Coumadin 4 mg 4 days/week and 2 5 mg 3 days/week  Her Coumadin is managed by Dr Dorothy Lugo  [facility] cardi[facility])  She denies dizziness, chest pain, but has some shortness of breath and generalized weakness  that has been present since July

## 2020-08-19 ENCOUNTER — TELEPHONE (OUTPATIENT)
Dept: INTERNAL MEDICINE CLINIC | Facility: CLINIC | Age: 75
End: 2020-08-19

## 2020-08-19 NOTE — TELEPHONE ENCOUNTER
Patient called an Ambulance to be taken to [facility] for her rectal blleding and weakness   Patient had an appt with Dr Padmini Johnson today that was cancelled

## 2020-08-26 ENCOUNTER — TELEPHONE (OUTPATIENT)
Dept: INTERNAL MEDICINE CLINIC | Facility: CLINIC | Age: 75
End: 2020-08-26

## 2020-08-26 NOTE — TELEPHONE ENCOUNTER
Patient wanted you to know that she has been an inpatient at [facility] since 8/19 and they plan to discharge her on Friday 8/28  I asked her to call us when she is home so we can schedule a TCM

## 2020-08-31 ENCOUNTER — TRANSITIONAL CARE MANAGEMENT (OUTPATIENT)
Dept: INTERNAL MEDICINE CLINIC | Age: 75
End: 2020-08-31

## 2020-09-01 ENCOUNTER — TELEPHONE (OUTPATIENT)
Dept: INTERNAL MEDICINE CLINIC | Facility: CLINIC | Age: 75
End: 2020-09-01

## 2020-09-01 NOTE — TELEPHONE ENCOUNTER
I received a call from [place] with [facility]  They have made 2 attempts to draw a BMP that was ordered on discharge from the hospital   They have not been successful  [place] is asking me to set up a home draw with one of the labs  They will not follow  through with this  No further follow through will be done by [facility] for this lab order  [place] does not know what home draw Lab participates with [facility]  I called Juwan Lawrence and she will find out and call me back with this information

## 2020-09-02 DIAGNOSIS — N18.4 CKD (CHRONIC KIDNEY DISEASE) STAGE 4, GFR 15-29 ML/MIN (HCC): Primary | ICD-10-CM

## 2020-09-02 DIAGNOSIS — D50.8 IRON DEFICIENCY ANEMIA SECONDARY TO INADEQUATE DIETARY IRON INTAKE: ICD-10-CM

## 2020-09-02 NOTE — TELEPHONE ENCOUNTER
HD form completed and faxed with insurance information and orders to [facility]  Patient was instructed to call me if she does not hear from the lab by the end of the week with an appointment

## 2020-09-03 DIAGNOSIS — E55.9 VITAMIN D DEFICIENCY: ICD-10-CM

## 2020-09-03 RX ORDER — ERGOCALCIFEROL (VITAMIN D2) 1250 MCG
1 CAPSULE ORAL WEEKLY
Qty: 12 CAPSULE | Refills: 1 | Status: SHIPPED | OUTPATIENT
Start: 2020-09-03 | End: 2021-03-30 | Stop reason: SDUPTHER

## 2020-09-07 ENCOUNTER — NURSE TRIAGE (OUTPATIENT)
Dept: OTHER | Facility: OTHER | Age: 75
End: 2020-09-07

## 2020-09-07 NOTE — TELEPHONE ENCOUNTER
Reason for Disposition   [1] Follow-up call from patient regarding patient's clinical status AND [2] information urgent    Additional Information   Lab result questions    Answer Assessment - Initial Assessment Questions  1  REASON FOR CALL or QUESTION: "What is your reason for calling today?" or "How can I best help you?" or "What question do you have that I can help answer?"      I want to know my PT/INR results from yesterday so that I can adjust my coumadin if I need to      Protocols used: PCP CALL - NO TRIAGE-ADULT-, INFORMATION ONLY CALL - NO TRIAGE-ADULT-

## 2020-09-07 NOTE — TELEPHONE ENCOUNTER
She has an incision biopsy in her vagina that had some bleeding earlier in the day that she was expecting and  that has stopped  As per Dr Miguel Sales " She can  take her same dose and we will advise her of any changes once we get the results  " He is aware of the biopsy bleeding that has stopped  Pt is aware to keep the same dosage until the office receives the newest results

## 2020-09-07 NOTE — TELEPHONE ENCOUNTER
Regarding: coumadin question  ----- Message from Phyllis Funes sent at 9/7/2020  3:39 PM EDT -----  " I am wondering if I should be concerned over my coumadin levels "

## 2020-09-08 ENCOUNTER — DOCUMENTATION (OUTPATIENT)
Dept: INTERNAL MEDICINE CLINIC | Facility: CLINIC | Age: 75
End: 2020-09-08

## 2020-09-08 NOTE — TELEPHONE ENCOUNTER
Dr Leslye Parsons, cardiologist manages this patient's Coumadin  I called the patient and she reports that the bleeding has stopped  She has not yet contacted Dr Martha Reynaga Coumadin clinic for results or instructions  She believed that an INR was drawn on Sunday, but the report indicates that it was done on 9/4/2020   An INR was not resulted on 9/4 or Sunday  She will call Dr Martha Reynaga for further instructions  I also told her that cardiology should arrange for home draws for any blood work that Dr Leslye Parsons wants done  I will arrange for the blood work ordered by Dr Domitila Isbell or [facility]  Cardiology should be knoweldgable of this workflow  She thought that I would be scheduling all of her blood work needs for PCP and specialists,  If she has any further problems with blood work scheduling, she may call me for assistance, but specialists should arrange for home draws as long as she is deemed medically home bound

## 2020-09-10 ENCOUNTER — OFFICE VISIT (OUTPATIENT)
Dept: INTERNAL MEDICINE CLINIC | Facility: CLINIC | Age: 75
End: 2020-09-10
Payer: COMMERCIAL

## 2020-09-10 VITALS
TEMPERATURE: 97.8 F | SYSTOLIC BLOOD PRESSURE: 117 MMHG | HEART RATE: 68 BPM | WEIGHT: 267.4 LBS | HEIGHT: 59 IN | BODY MASS INDEX: 53.91 KG/M2 | DIASTOLIC BLOOD PRESSURE: 54 MMHG | OXYGEN SATURATION: 99 %

## 2020-09-10 DIAGNOSIS — N18.4 CKD (CHRONIC KIDNEY DISEASE) STAGE 4, GFR 15-29 ML/MIN (HCC): ICD-10-CM

## 2020-09-10 DIAGNOSIS — I50.32 CHRONIC DIASTOLIC CONGESTIVE HEART FAILURE (HCC): ICD-10-CM

## 2020-09-10 DIAGNOSIS — E11.9 TYPE 2 DIABETES MELLITUS WITHOUT COMPLICATION, WITHOUT LONG-TERM CURRENT USE OF INSULIN (HCC): ICD-10-CM

## 2020-09-10 DIAGNOSIS — I48.0 PAROXYSMAL ATRIAL FIBRILLATION (HCC): ICD-10-CM

## 2020-09-10 DIAGNOSIS — D50.0 IRON DEFICIENCY ANEMIA DUE TO CHRONIC BLOOD LOSS: ICD-10-CM

## 2020-09-10 DIAGNOSIS — N17.9 AKI (ACUTE KIDNEY INJURY) (HCC): ICD-10-CM

## 2020-09-10 DIAGNOSIS — J30.9 ALLERGIC RHINITIS, UNSPECIFIED SEASONALITY, UNSPECIFIED TRIGGER: Primary | ICD-10-CM

## 2020-09-10 PROBLEM — B35.1 ONYCHOMYCOSIS OF TOENAIL: Status: RESOLVED | Noted: 2017-12-12 | Resolved: 2020-09-10

## 2020-09-10 PROBLEM — L60.8 ONYCHOMADESIS OF TOENAIL: Status: RESOLVED | Noted: 2020-03-17 | Resolved: 2020-09-10

## 2020-09-10 PROCEDURE — 99495 TRANSJ CARE MGMT MOD F2F 14D: CPT | Performed by: FAMILY MEDICINE

## 2020-09-10 RX ORDER — REPAGLINIDE 0.5 MG/1
0.5 TABLET ORAL
Qty: 270 TABLET | Refills: 1 | Status: SHIPPED | OUTPATIENT
Start: 2020-09-10 | End: 2021-03-30

## 2020-09-10 RX ORDER — DOCUSATE SODIUM 100 MG/1
100 CAPSULE, LIQUID FILLED ORAL AS NEEDED
COMMUNITY
Start: 2020-08-30

## 2020-09-10 RX ORDER — FLUTICASONE PROPIONATE 50 MCG
2 SPRAY, SUSPENSION (ML) NASAL DAILY
COMMUNITY
End: 2020-09-10 | Stop reason: SDUPTHER

## 2020-09-10 RX ORDER — FLUTICASONE PROPIONATE 50 MCG
2 SPRAY, SUSPENSION (ML) NASAL DAILY
Qty: 3 BOTTLE | Refills: 1 | Status: SHIPPED | OUTPATIENT
Start: 2020-09-10

## 2020-09-14 DIAGNOSIS — I50.32 CHRONIC DIASTOLIC CONGESTIVE HEART FAILURE (HCC): ICD-10-CM

## 2020-09-14 RX ORDER — FUROSEMIDE 80 MG
TABLET ORAL
Qty: 90 TABLET | Refills: 1 | OUTPATIENT
Start: 2020-09-14

## 2020-09-15 NOTE — PROGRESS NOTES
Assessment/Plan:    1  Allergic rhinitis, unspecified seasonality, unspecified trigger  -     fluticasone (FLONASE) 50 mcg/act nasal spray; 2 sprays into each nostril daily    2  Type 2 diabetes mellitus without complication, without long-term current use of insulin ([facility])  -     repaglinide (PRANDIN) 0 5 mg tablet; Take 1 tablet (0 5 mg total) by mouth 3 (three) times a day before meals    3  CHANTELL (acute kidney injury) ([facility] [address] )  -     CBC and differential; Future; Expected date: 09/24/2020  -     Basic metabolic panel; Future; Expected date: 09/24/2020    4  Iron deficiency anemia due to chronic blood loss  -     CBC and differential; Future; Expected date: 09/24/2020  -     Basic metabolic panel; Future; Expected date: 09/24/2020    5  CKD (chronic kidney disease) stage 4, GFR 15-29 ml/min ([facility])    6  Chronic diastolic congestive heart failure ([facility])      BMI Counseling: Body mass index is 54 01 kg/m²  The BMI is above normal  Nutrition recommendations include moderation in carbohydrate intake  Exercise recommendations include exercising 3-5 times per week  No pharmacotherapy was ordered  There are no Patient Instructions on file for this visit  No follow-ups on file  Subjective:      Patient ID: Selma Gutierrez is a 77 y o  female  Chief Complaint   Patient presents with    Transition of Care Management     Hospitalization [facility] 8/19/20 to 8/29/20 s/p fall, anemia, vaginal bleeding, left arm and shoulder pain  HPI       Hospital course from discharge summary:  "This is a brief description of the patient's hospital stay please refer to medical chart for further details  Debbie Simpson and Aguilar Kaye is a 78-year-old female with past medical history as mentioned below  She presented to [facility] on August 19, 2020 with complaint of bleeding  She was diagnosed with vaginal bleed    Patient stated she tripped at her house 1 week ago(tripped well patient and  were trying to  something off the floor), falling onto the left side of her body on to her hard tile kitchen floor  Patient complained of left shoulder pain along with decreased range of motion  She noted swelling and ecchymosis to the left arm  No elbow pain or wrist pain  Patient then noted bruising to her lower extremities, upper extremities, and abdomen  Patient denied head trauma  Denies loss of consciousness and altered mental status  She noted bleeding in the toilet day before-she is not sure if it was vaginal bleeding or rectal bleeding  Patient reported copious bleeding initially dark red, becoming bright red  She reported associated nausea, denies vomiting  She received 4 units packed red blood cells for hemoglobin of 5 8 her INR was supratherapeutic  Gyn and GI were consulted an EGD and colonoscopy was done and were negative  Hysteroscopy with D&C showed normal vulva and bad gyn now without lesions  Some atrophy  Benign cervix  Uterus sounded to 7 cm  Scant tissue on curettage with minimal bleeding during the procedure  No further bleeding  Cardiology was consulted and place patient on heparin drip due to mechanical valve  Nephrology was consulted in manage patient's renal function and hyperkalemia which is now resolved and back to her home dose of Lasix  Will decrease spironolactone to 12 5 and take metolazone only if weight is greater than 275 "      Update since being home patient states she is feeling slightly better with low bit more energy but still not have baseline  She is eating and drinking okay and has slight dyspnea on exertion  She had has not been noticing any more lower extremity edema    She is compliant with her medications    The following portions of the patient's history were reviewed and updated as appropriate: allergies, current medications, past family history, past medical history, past social history, past surgical history and problem list     Review of Systems      Constitutional:  Denies fever or chills   Eyes:  Denies double or blurry vision  HENT:  Denies nasal congestion or sore throat   Respiratory:  Denies cough or shortness of breath or wheezing  Cardiovascular:  Denies palpitations or chest pain  GI:  Denies abdominal pain, nausea, or vomiting  Integument:  Denies rash , no open areas  Neurologic:  Denies headache or focal weakness        Current Outpatient Medications   Medication Sig Dispense Refill    albuterol (PROAIR HFA) 90 mcg/act inhaler Inhale 2 puffs as needed for wheezing 1 Inhaler 0    alendronate (FOSAMAX) 70 mg tablet Take 1 tablet (70 mg total) by mouth every 7 days 12 tablet 3    allopurinol (ZYLOPRIM) 100 mg tablet Take 1 tablet (100 mg total) by mouth daily 180 tablet 1    amiodarone 200 mg tablet Take 1 tablet (200 mg total) by mouth daily 90 tablet 1    atorvastatin (LIPITOR) 80 mg tablet Take 1 tablet (80 mg total) by mouth daily 90 tablet 1    calcipotriene (DOVONOX) 0 005 % ointment Apply topically 2 (two) times a day 360 g 3    citalopram (CeleXA) 20 mg tablet Take 1 tablet (20 mg total) by mouth daily 90 tablet 1    docusate sodium (COLACE) 100 mg capsule Take 100 mg by mouth 2 (two) times a day      ergocalciferol (ERGOCALCIFEROL) 1 25 MG (80465 UT) capsule Take 1 capsule (50,000 Units total) by mouth once a week 12 capsule 1    fluticasone (FLONASE) 50 mcg/act nasal spray 2 sprays into each nostril daily 3 Bottle 1    folic acid-pyridoxine-cyanocobalamin (FOLBIC) 2 5-25-2 mg Take 2 tablets by mouth daily        furosemide (LASIX) 80 mg tablet Take 1 tablet (80 mg total) by mouth daily 90 tablet 1    hydrocortisone 2 5 % cream MIX WITH OTC ANTI-FUNGAL MEDICATION AND APPLY TO AFFECTED AREAS TWO TIMES A DAY X 2 WEEKS, AS NEEDED FOR FLARE      iron polysaccharides (FERREX 150) 150 mg capsule Take 1 capsule (150 mg total) by mouth 2 (two) times a day 180 capsule 0    levothyroxine 88 mcg tablet Take 1 tablet (88 mcg total) by mouth daily 90 tablet 1    metolazone (ZAROXOLYN) 2 5 mg tablet Take 1 tablet (2 5 mg total) by mouth 3 (three) times a week 40 tablet 1    Multiple Vitamins-Minerals (OCUVITE EYE HEALTH FORMULA PO) Take 1 tablet by mouth daily      nystatin (MYCOSTATIN) ointment Apply topically 2 (two) times a day 30 g 1    nystatin (nystatin) powder Apply 1 application topically daily        omeprazole (PriLOSEC) 40 MG capsule Take 1 capsule (40 mg total) by mouth 2 (two) times a day 180 capsule 1    Otezla 30 MG TABS Take 1 tablet by mouth 2 (two) times a day       phentermine (ADIPEX-P) 37 5 MG tablet Take 1 tablet (37 5 mg total) by mouth daily 30 tablet 5    potassium chloride (Klor-Con M20) 20 mEq tablet Take 1 tablet (20 mEq total) by mouth 4 (four) times a day (Patient taking differently: Take 20 mEq by mouth 3 (three) times a day ) 360 tablet 1    repaglinide (PRANDIN) 0 5 mg tablet Take 1 tablet (0 5 mg total) by mouth 3 (three) times a day before meals 270 tablet 1    sitaGLIPtin (JANUVIA) 50 mg tablet Take 1 tablet (50 mg total) by mouth daily 90 tablet 1    spironolactone (ALDACTONE) 25 mg tablet Take 1 tablet (25 mg total) by mouth daily 90 tablet 1    triamcinolone (KENALOG) 0 1 % cream Apply 1 application topically as needed       Turmeric 500 MG TABS Take 1 capsule by mouth 2 (two) times a day      warfarin (COUMADIN) 5 mg tablet Take 1 tablet (5 mg total) by mouth daily 90 tablet 0    metoprolol tartrate (LOPRESSOR) 25 mg tablet Take 1 tablet (25 mg total) by mouth every 12 (twelve) hours 180 tablet 1     No current facility-administered medications for this visit          Objective:    /54 (BP Location: Left arm, Patient Position: Sitting, Cuff Size: Large)   Pulse 68   Temp 97 8 °F (36 6 °C) (Temporal)   Ht 4' 11" (1 499 m)   Wt 121 kg (267 lb 6 4 oz)   SpO2 99%   BMI 54 01 kg/m²        Physical Exam       Constitutional:  Well developed, well nourished, no acute distress, non-toxic appearance   Eyes:  PERRL, conjunctiva normal , non icteric sclera  HENT:  Atraumatic, oropharynx moist  Neck-  supple   Respiratory:  CTA b/l, normal breath sounds, no rales, no wheezing   Cardiovascular:  RRR, no murmurs, no LE edema b/l  GI:  Soft, nondistended, normal bowel sounds x 4, nontender, no organomegaly, no mass, no rebound, no guarding   Neurologic:  no focal deficits noted   Psychiatric:  Speech and behavior appropriate , AAO x 3        Merl Arnold, DO

## 2020-09-25 ENCOUNTER — TELEPHONE (OUTPATIENT)
Dept: INTERNAL MEDICINE CLINIC | Age: 75
End: 2020-09-25

## 2020-09-25 NOTE — TELEPHONE ENCOUNTER
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 Patient also has CKD stage 3 and recent ZENAIDA, at discharge in 2020 crt  was around 2, 1 week post discharge with home care labs patient crt uptrending to 3. Patient was placed on palliative inotropes Milrinone  in 3/2020. Patient was also placed on vyndamax 61m for treatment of TTR amyloid. Patient and family have been seen by palliative team in the past and decided to continue full code for advance directives.  Patient now presented to ER for ICD shock- device interrogation with multiple episodes of VT/VF with appropriate shock  2020: ER admit for ICD shock VT/VF, amio increased to 400mg BID  2020: febrile, low wbc's, elevated crp, trop, d-dimer, ferritin   4/10/2020: transferred to ICU, CVC and arterial line placed, overnight had VT with shock, now on amio gtt, dobutamine gtt and milrinone gtt, febrile to tmax of 39 overnight, lactate downtrending, UO total 24hr 1000ml, about net even      Review of Systems   unable to obtain      Histories   Home Medications (11) Active  acetaminophen oral 325 mg tablet (Tylenol) 650 mg = 2 tab, PRN, Oral, Q4H  allopurinol 100 mg, Oral, BID  AMIODArone oral 200 mg tablet (Cordarone) 200 mg = 1 tab, Oral, BID  aspirin 81 mg oral tablet 81 mg = 1 tab, Oral, Daily  carvedilol  Ok to continue 25 mg, Oral, BID  Lasix oral 20 mg tablet 20 mg = 1 tab, Oral, BID  Lipitor oral 20 mg tablet 20 mg = 1 tab, Oral, Daily  milrinone 200 mcg/mL-D5% intravenous solution See Instructions  Vyndamax 61 mg oral capsule 61 mg = 1 cap, Oral, Daily  warfarin 2.5 mg oral tablet 2.5 mg = 1 tab, Oral  warfarin 2.5 mg oral tablet 2.5 mg = 1 tab, Oral, Daily      Past Medical History: Atrial fibrillation  Congestive heart failure  Gout  Hernia  Hypertension  Pacemaker  Risk factors for obstructive sleep apnea  Vertigo      Family History:    Diabetes mellitus type 2  MOTHER  BROTHER  Hypertension  MOTHER  FATHER    Procedure History:    Cardiac pacemaker procedure (21953).   Social History:    Alcohol  Details: Alcohol Abuse in Household: No.  Use: None.  Details: Use: None.  Details: Use: None.  Exercise  Details: Excercise: Occasionally.  Home/Environment  Details: Alcohol Abuse in Household: No.  Substance Abuse in Household: No.  Smoker in Household: No.  Patient Lives With: Daughter, Spouse.  Living Situation: Lives With Family.  Substance Abuse  Details: Substance Abuse in Household: No.  Use: None.  Details: Use: None.  Details: Use: None.  Tobacco  Details: Smoker in Houshold: No.  Smoked/Smokeless Tobacco Last 30 Days: No.  Smoking Tobacco Use: Never smoker.  Smokeless Tobacco Use Never.  Details: Smoked/Smokeless Tobacco Last 30 Days: No.  Use: Never smoker.  Details: Used in Last 12 Months: No.  Use: Never smoker.  Cultural/Yarsanism Practices  Details: Yarsanism or Cultural Practices While in Hospital: No.      Physical Examination   VS/Measurements:       Documented vital signs:   Vitals between:   09-APR-2020 13:31:02   TO   10-APR-2020 13:31:02                   LAST RESULT MINIMUM MAXIMUM  Temperature 37.8 37.1 39  Heart Rate 68 62 125  Respiratory Rate 23 15 31  NISBP           118 74 128  NIDBP           75 54 87  NIMBP           88 60 96  A Line Systolic 161 136 179  A Line Diastolic 65 55 83  A Line Mean  89 82 231  CVP                     0 0 12  SpO2                    97 96 100   .    Lines and Tubes   Intake and Output:    I & O between:  09-APR-2020 13:31 TO 10-APR-2020 13:31  Med Dosing Weight:  85.5  kg   08-APR-2020  24 Hour Intake:   1625.15  ( 19.01 mL/kg )  24 Hour Output:   1550.00           24 Hour Urine/Stool Output:   0.0  24 Hour Balance:   75.15           24 Hour Urine Output:   1550.00  ( 0.76 mL/kg/hr )                   Urine Count:  2.00      .    Respiratory:       Support:   NO VENTILATORS QUALIFIED   .      Health Status   Allergies:    Allergic Reactions (All)  NKA  Canceled/Inactive Reactions (All)  Severity Not Documented  NKA,- No reactions were documented.,    Allergies (1) Active Reaction  NKA None Documented    Medications:    Medications (17) Active  Scheduled: (9)  albumin human 25%  25 gm 100 mL, IVPB, Once (scheduled)  AMIODArone 200 mg tab  400 mg 2 tab, Oral, Q8H  Aspirin 81 mg chew tab  81 mg 1 tab, Oral, Daily  Atorvastatin 20 mg tab  20 mg 1 tab, Oral, Daily  cefTRIAXone  2,000 mg 20 mL, Slow IV Push, Daily  Potassium CHLORIDE 20 mEq ER tab  40 mEq 2 tab, Oral, Once (scheduled)  Sodium chloride PF 0.9% flush inj 10 mL  10 mL, Flush, Q12H  vancomycin  1,250 mg, IVPB, Q48H  Vancomycin 1,000 mg inj  Trough level, N/A, Once (scheduled)  Continuous: (5)  AMIODArone 450 mg [0.5 mg/min] + Dextrose 5% 250 mL  250 mL, IV, 16.67 mL/hr  milrinone 20 mg [0.125 mcg/kg/min] + premixed in Dextrose 5% 100 mL  100 mL, IV, 3.21 mL/hr  NORepinephrine 8 mg [5 mcg/min] + premixed in Sodium Chloride 0.9% 250 mL  250 mL, IV, 9.38 mL/hr  Sodium Chloride 0.9% 250 mL  250 mL, IV, 125 mL/hr  vasopressin 20 unit [0.04 unit/min] + Sodium Chloride 0.9% 100 mL  100 mL, IV, 12 mL/hr  PRN: (3)  Acetaminophen 325 mg tab  650 mg 2 tab, Oral, Q4H  Sodium chloride PF 0.9% flush inj 10 mL  10 mL, Flush, As Directed PRN  Sodium chloride PF 0.9% flush inj 10 mL  20 mL, Flush, As Directed PRN      Review / Management    Laboratory Results   Labs between:  09-APR-2020 13:31 to 10-APR-2020 13:31  CBC:                 WBC  HgB  Hct  Plt  MCV  RDW   10-APR-2020 (L) 3.6  (L) 10.1  (L) 30.4  (L) 116  90.7  (H) 16.6   10-APR-2020 (L) 2.9  (L) 10.7  (L) 32.9  (L) 116  91.4  (H) 16.6   DIFF:                 Seg  Neutroph//ABS  Lymph//ABS  Mono//ABS  EOS/ABS  10-APR-2020 NOT APPLICABLE  76 // 2.7 17 // (L) 0.6  6 // (L) 0.2  0 // (L) 0.0  BMP:                 Na  Cl  BUN  Glu   10-APR-2020 (L) 132  105  (H) 67  85                              K  CO2  Cr  Ca                              3.5  (L) 18  (H) 2.97  (L) 8.0   CMP:                 AST  ALT  AlkPhos  Bili  Albumin   10-APR-2020 (H) 72  41  90  (H) 3.7  (L) 1.9   Other Chem:             Mg  Phos  Triglycerides  GGTP  DirectBili                           2.1  3.4         COAG:                 INR  PT  PTT  Ddimer  Fibrinogen    10-APR-2020     (H) 41       Blood Gas:            Ph  PCO2  PO2  BiCarb  BaseExcess   Arterial:  10-APR-2020 7.44  (L) 24  (L) 71  (L) 16  NOT APPLICABLE                              Ionized Ca  Na  K  HgB  Lactic Acid                              1.20  (L) 130  3.4  (L) 10.8  1.4   10-APR-2020 (H) 7.46  (L) 23  (L) 71  (L) 16  NOT APPLICABLE                              Ionized Ca  Na  K  HgB  Lactic Acid                              1.17  (L) 129  3.6  (L) 10.7  (H) 1.7                      Cardiology Results   NM CARDIAC AMYLOIDOSIS SPECT-CT Event Date: 03/18/2020 17:14:45 CDT Updated: 03/19/20 09:40 CDT  NM CARDIAC AMYLOIDOSIS SPECT-CT  This document has an image  Reason For Exam  concern ttr  RADRPT  CLINICAL HISTORY: concern ttr  COMPARISON: None available.  TECHNIQUE:  Radiopharmaceutical Dose: 21.6 mCi technetium labeled pyrophosphate was injected.  One hour and three hour planar images and SPECT of the chest were performed.  FINDINGS:   Early images demonstrate focal abnormal radiotracer uptake within the myocardial tissue (grade 3).  Delayed images  at three hour also demonstrate persistent focal radiotracer uptake within the myocardial tissue (grade 3).  The SPECT images also confirm these findings.  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  Signature Line  -----  F I N A L  -----  Transcribed By: SHOSHANA   03/19/20 8:23 am  Dictated By:            TUAN MOORE MD  Electronically Reviewed and Approved By:           TUAN MOORE MD  03/19/20 9:37 am  , CD ECHO 2D COMPLETE W DOP AND COLOR-ADLT Event Date: 03/13/2020 23:13:22 CDT Updated: 03/14/20 08:18 CDT  CD ECHO 2D COMPLETE W DOP AND COLOR-ADLT  This document has an image  Reason For Exam  chf  RADRPT  *[facility]*  --------------------------------------------------------------------------  STUDY CONCLUSIONS  SUMMARY:  1. Left ventricle: The cavity size is dilated. Wall thickness is     moderately increased. There is concentric hypertrophy which appears    slightly more prominent in the septum. Systolic function is severely    reduced. The estimated ejection fraction is 20-25%, by visual     assessment. Doppler parameters are consistent with a reversible    restrictive pattern, indicative of decreased left ventricular diastolic    compliance and/or increased left atrial pressure (grade 3 diastolic    dysfunction).  2. Aortic valve: Mild to moderate regurgitation.  3. Mitral valve: Moderate regurgitation.  4. Left atrium: The atrium is severely dilated.  5. Right ventricle: Pacemaker lead noted in right ventricle.  6. Right atrium: Pacemaker lead noted in right atrium.  , Cardiac Cath Report  Result Date: March 13, 2020 17:12 CDT  Result Status: Auth (Verified)  Result Title: Right Heart Catheterization  Performed By: MATT VALDEZ on March 13, 2020 17:19 CDT  Verified By: MATT VALDEZ on March 13, 2020 17:19 CDT  Encounter info[id number][place] [facility] -   Right Heart Catheterization    Patient:   AURA  DAVION            MRN: [id number]            FIN: [id number]              Age:   68 years     Sex:  MALE     :  [birth year]   Associated Diagnoses:   None   Author:   JESSE VALDEZ     RIGHT HEART CATHETERIZATION   DATE:    INDICATIONS: Acute on chronic systolic heart failure  PROCEDURES:   1. Right heart catheterization.   2. [place]-Avi catheter placement.   3. Moderate conscious sedation  : Jesse Coley DO  Start time 1650  Stop time 1706  Estimated blood loss: < 5 cc  Complications: None  PROCEDURE DETAILS: After informed consent was obtained the patient was prepped in the usual manner in the cardiac catheterization suite. A 2% lidocaine solution was injected locally at the right internal jugular venous area for patient comfort. Using ultrasound guidance a venotomy needle was then used to access the right internal jugular vein. A guide-wire was advanced into the venotomy needle and into the SVC at the level of the right atrium.  A 7-Ugandan sheath was then advanced over the wire and into the vessel. Intravascular placement was confirmed with adequte venous blood return from the sheath. A 7-Ugandan [place]-Avi catheter was then advanced into the sheath and through the right heart chambers, recording pressures as detailed below. At the end of the procedure the swan avi catheter was secured to the sheath at 62 cm. The patient tolerated the procedure well and had no immediate  complications.  FINDINGS:   1. Right atrial pressures:   22  / 21 / 20 mmHg   2. Right ventricular pressures:   71 / 11 / 19  mmHg   3. Pulmonary artery pressures:  73 / 28 / 43  mmHg   4. Pulmonary capillary wedge pressures:   2 / 34 / 32  mmHg   5. Transpulmonary Gradient:     11   6. Pulmonary artery saturations:    46 %   7. Aortic saturations:     92 %   8. Cardiac output and index calculated by Leilani method:  .7 L/min, 1.8 L/min/m2  9. Cardiac output and index by thermodilution method:  4.3  L/min, 2 L/min/m2  10. Supplemental cardio-pulmonary support:    O2: Room air    Inotrope:  None   Sedation:  Versed 1 mg, Fentanyl 25 mcg   11. Heart rate:  62 bpm  12. Systemic blood pressure:  134 / 90 / 104  mmHg  13. Systemic vascular resistance: 22.4 Woods Units, 1795 dynes * sec/cm5  14. Pulmonary vascular resistance: 2.9 Woods Units, 25 dynes * sec/cm5  15. Right ventricular function parameters:    Right ventricular stroke work index: 668 mmHg * ml / m2    Pulmonary artery pulsitility index:  2.3   RA/PCWP ratio:  0.63  CONCLUSIONS:    Reduced cardiac output   Biventricular failure   Volume overload   Moderately elevated RA, RV pressures   Severely elevated PAWP   Modertately elevated pulmonary arterial pressure   Elevated SVR and PVR          Cardiac Monitor   Course     Impression and Plan   VT/VF  -VT 4/10/2020 2AM ICD shock x1 followed by vfib witnessed by intensivist and RN  -Patient admitted with ICD shock  -reconsult EP Dr Hernández  -device interrogation completed in ER  ----5 shocks for VT/VF: 2 treated VF episodes (04/04, treated with anti-tachycardial pacing (ATP) x 1 & 35J shock x1. 6 treated VT episodes - 2 episodes on 04/08, treated with ATP x3 & 35J shock x1, other episode treated with ATP x3. 78 VT-NS episodes, most recent on 04/08/2020.  HFrEF  Etiology NICM, TTR Amyloid   ACC/AHAStage D , NYHA Class IV   Inotrope: Started 3/2020 for low output, biventricular heart failure, Milrinone at 0.25mcg/kg/min at home -- dobutamine added early AM 4/10/2020  -NM PYP scan positive  -light chains, SPEP kappa 10 lambda 6.8 ratio 1.5 neg free light chains, and [id number]  probnp 18,000  BIV ICD in place   GDMT at home coreg 3.125mg q12h, no ace/arb/mra--- d/t ckd and hypotension overnight  Venous sat off central line 4/10/2020--- 55   A-fib  -now on amio gtt due to vt   -on warfarin at home  CKD Stage IV   crt with last discharge 3/2020 was 2  uptrending to 3 over last month as outpatient despite decrease in  diuretics and holding antihypertensives  Today's plan:  COVID 19 negative although, febrile, decreased WBC's, high ferritin, CRP, D-dimer  Will need retesting for COVID 19 when ok with COVID strike team   EP recs appreciated   Now on dual inotropes dobutamine and milrinone, cautious use due to arrythmias, will d/c  and attempt to optimize milrinone  Trend lactate 1.4-1.9 downtrending   CVP 3-6 mmHg, hold diuretics, albumin ordered    Cr 3 stable, UO 1 L in 24hrs, net even, fluid bolus given  Continue vyndamax 61mg for treatment of TTR amyloidosis   Keep k above 4 mag above 2  Strict I+O's and daily weights   INR therapuetic 4/9 , holding warfarin for icu status and line placements, will need heparin gtt started if INR less than 2 , stat inr ordered  Continue to discuss palliative options with patient and family     Pt discussed with Bianca KNUTSON.   Thank you for allowing us to participate in the care of your patient. Please do call us should any questions or concerns arise.  Sophie Dumont MD [facility], [facility]   Advanced Heart Failure, Mechanical Circulatory Support, and Transplant Cardiology  Clinical  of Medicine, [employer]

## 2020-09-25 NOTE — TELEPHONE ENCOUNTER
Home care physical therapy called  They have been seeing the pt for 3-4 weeks now and making progress but not back to where she was before  Not able to go up stairs, she also cannot leave the home  Still needs to get her back to [place]  Will send over a new written order to be signed to continue home therapy  Can be done orally as well number to contact is [phone]

## 2020-10-08 DIAGNOSIS — E78.5 HYPERLIPIDEMIA, UNSPECIFIED HYPERLIPIDEMIA TYPE: ICD-10-CM

## 2020-10-08 RX ORDER — CITALOPRAM 20 MG/1
20 TABLET ORAL DAILY
Qty: 90 TABLET | Refills: 1 | Status: SHIPPED | OUTPATIENT
Start: 2020-10-08

## 2020-10-15 ENCOUNTER — TELEPHONE (OUTPATIENT)
Dept: INTERNAL MEDICINE CLINIC | Age: 75
End: 2020-10-15

## 2020-10-19 DIAGNOSIS — M81.0 POSTMENOPAUSAL OSTEOPOROSIS: ICD-10-CM

## 2020-10-19 RX ORDER — ALENDRONATE SODIUM 70 MG/1
TABLET ORAL
Qty: 12 TABLET | Refills: 3 | OUTPATIENT
Start: 2020-10-19

## 2020-10-29 DIAGNOSIS — E66.01 MORBID OBESITY DUE TO EXCESS CALORIES (HCC): ICD-10-CM

## 2020-10-29 DIAGNOSIS — M81.0 POSTMENOPAUSAL OSTEOPOROSIS: ICD-10-CM

## 2020-10-29 LAB
CREAT ?TM UR-SCNC: 52.2 UMOL/L
EXT MICROALBUMIN URINE RANDOM: 3.6
HBA1C MFR BLD HPLC: 4.9 %
MICROALBUMIN/CREAT UR: 69 MG/G{CREAT}

## 2020-10-29 RX ORDER — ALENDRONATE SODIUM 70 MG/1
70 TABLET ORAL
Qty: 12 TABLET | Refills: 3 | Status: SHIPPED | OUTPATIENT
Start: 2020-10-29 | End: 2021-03-05 | Stop reason: ALTCHOICE

## 2020-10-29 RX ORDER — PHENTERMINE HYDROCHLORIDE 37.5 MG/1
37.5 TABLET ORAL DAILY
Qty: 30 TABLET | Refills: 5 | Status: SHIPPED | OUTPATIENT
Start: 2020-10-29 | End: 2021-03-30 | Stop reason: SDUPTHER

## 2020-11-03 DIAGNOSIS — K21.9 GERD WITHOUT ESOPHAGITIS: ICD-10-CM

## 2020-11-03 RX ORDER — OMEPRAZOLE 40 MG/1
CAPSULE, DELAYED RELEASE ORAL
Qty: 180 CAPSULE | Refills: 2 | OUTPATIENT
Start: 2020-11-03

## 2020-11-07 ENCOUNTER — APPOINTMENT (OUTPATIENT)
Dept: RADIOLOGY | Age: 75
End: 2020-11-07
Payer: COMMERCIAL

## 2020-11-07 ENCOUNTER — TRANSCRIBE ORDERS (OUTPATIENT)
Dept: URGENT CARE | Age: 75
End: 2020-11-07

## 2020-11-07 DIAGNOSIS — M79.89 SWELLING OF LEFT HAND: ICD-10-CM

## 2020-11-07 DIAGNOSIS — M79.89 SWELLING OF LEFT HAND: Primary | ICD-10-CM

## 2020-11-07 PROCEDURE — 73120 X-RAY EXAM OF HAND: CPT

## 2020-11-23 LAB
CREAT ?TM UR-SCNC: 76.6 UMOL/L
EXT MICROALBUMIN URINE RANDOM: 4.8
HBA1C MFR BLD HPLC: 5.3 %
MICROALBUMIN/CREAT UR: 62.7 MG/G{CREAT}

## 2020-11-24 ENCOUNTER — OFFICE VISIT (OUTPATIENT)
Dept: INTERNAL MEDICINE CLINIC | Facility: CLINIC | Age: 75
End: 2020-11-24
Payer: COMMERCIAL

## 2020-11-24 VITALS
HEIGHT: 59 IN | BODY MASS INDEX: 46.97 KG/M2 | DIASTOLIC BLOOD PRESSURE: 74 MMHG | WEIGHT: 233 LBS | HEART RATE: 79 BPM | SYSTOLIC BLOOD PRESSURE: 120 MMHG | TEMPERATURE: 97.5 F | OXYGEN SATURATION: 98 %

## 2020-11-24 DIAGNOSIS — I48.0 PAROXYSMAL ATRIAL FIBRILLATION (HCC): ICD-10-CM

## 2020-11-24 DIAGNOSIS — Z23 NEED FOR INFLUENZA VACCINATION: Primary | ICD-10-CM

## 2020-11-24 DIAGNOSIS — N18.4 CKD (CHRONIC KIDNEY DISEASE) STAGE 4, GFR 15-29 ML/MIN (HCC): ICD-10-CM

## 2020-11-24 DIAGNOSIS — E03.9 ACQUIRED HYPOTHYROIDISM: ICD-10-CM

## 2020-11-24 DIAGNOSIS — K21.9 GERD WITHOUT ESOPHAGITIS: ICD-10-CM

## 2020-11-24 DIAGNOSIS — D63.1 ANEMIA ASSOCIATED WITH CHRONIC RENAL FAILURE: ICD-10-CM

## 2020-11-24 DIAGNOSIS — I10 BENIGN ESSENTIAL HYPERTENSION: ICD-10-CM

## 2020-11-24 DIAGNOSIS — L40.9 PSORIASIS: ICD-10-CM

## 2020-11-24 DIAGNOSIS — Z00.00 MEDICARE ANNUAL WELLNESS VISIT, SUBSEQUENT: ICD-10-CM

## 2020-11-24 DIAGNOSIS — M81.0 POSTMENOPAUSAL OSTEOPOROSIS: ICD-10-CM

## 2020-11-24 DIAGNOSIS — E78.00 PURE HYPERCHOLESTEROLEMIA: ICD-10-CM

## 2020-11-24 DIAGNOSIS — E55.9 VITAMIN D DEFICIENCY: ICD-10-CM

## 2020-11-24 DIAGNOSIS — E11.9 TYPE 2 DIABETES MELLITUS WITHOUT COMPLICATION, WITHOUT LONG-TERM CURRENT USE OF INSULIN (HCC): ICD-10-CM

## 2020-11-24 DIAGNOSIS — N18.9 ANEMIA ASSOCIATED WITH CHRONIC RENAL FAILURE: ICD-10-CM
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
## 2020-11-24 PROCEDURE — 3078F DIAST BP <80 MM HG: CPT | Performed by: FAMILY MEDICINE

## 2020-11-24 PROCEDURE — 1160F RVW MEDS BY RX/DR IN RCRD: CPT | Performed by: FAMILY MEDICINE

## 2020-11-24 PROCEDURE — 1170F FXNL STATUS ASSESSED: CPT | Performed by: FAMILY MEDICINE

## 2020-11-24 PROCEDURE — 3725F SCREEN DEPRESSION PERFORMED: CPT | Performed by: FAMILY MEDICINE

## 2020-11-24 PROCEDURE — 1036F TOBACCO NON-USER: CPT | Performed by: FAMILY MEDICINE

## 2020-11-24 PROCEDURE — 3074F SYST BP LT 130 MM HG: CPT | Performed by: FAMILY MEDICINE

## 2020-11-24 PROCEDURE — 1125F AMNT PAIN NOTED PAIN PRSNT: CPT | Performed by: FAMILY MEDICINE

## 2020-11-24 PROCEDURE — 3066F NEPHROPATHY DOC TX: CPT | Performed by: FAMILY MEDICINE

## 2020-11-24 PROCEDURE — 90662 IIV NO PRSV INCREASED AG IM: CPT

## 2020-11-24 PROCEDURE — G0439 PPPS, SUBSEQ VISIT: HCPCS | Performed by: FAMILY MEDICINE

## 2020-11-24 PROCEDURE — 99214 OFFICE O/P EST MOD 30 MIN: CPT | Performed by: FAMILY MEDICINE

## 2020-11-24 PROCEDURE — 3008F BODY MASS INDEX DOCD: CPT | Performed by: FAMILY MEDICINE

## 2020-11-24 PROCEDURE — G0008 ADMIN INFLUENZA VIRUS VAC: HCPCS

## 2020-11-24 RX ORDER — OMEPRAZOLE 40 MG/1
40 CAPSULE, DELAYED RELEASE ORAL 2 TIMES DAILY
Qty: 180 CAPSULE | Refills: 1 | Status: SHIPPED | OUTPATIENT
Start: 2020-11-24 | End: 2021-05-21 | Stop reason: SDUPTHER

## 2021-01-15 ENCOUNTER — TELEPHONE (OUTPATIENT)
Dept: INTERNAL MEDICINE CLINIC | Facility: CLINIC | Age: 76
End: 2021-01-15

## 2021-01-15 NOTE — TELEPHONE ENCOUNTER
[employer] Prior authorization form for medication Phentermine completed and faxed back today at   fax# [phone]    CB# [phone]

## 2021-01-18 LAB — HBA1C MFR BLD HPLC: 5.4 %

## 2021-01-19 ENCOUNTER — TELEPHONE (OUTPATIENT)
Dept: OTHER | Facility: OTHER | Age: 76
End: 2021-01-19

## 2021-01-19 DIAGNOSIS — E87.6 HYPOKALEMIA: Primary | ICD-10-CM

## 2021-01-19 NOTE — TELEPHONE ENCOUNTER
I got a message from [facility] labs regarding pt's critically low potassium of 2 5 and I called and left pt a message on her voicemail regarding her lab results and counseled her to take two extra doses of her potassium chloride 20 mg for a total of 60 mg and to call her PCP ASAP  I also sent a message to her PCP regarding the low potassium

## 2021-01-19 NOTE — TELEPHONE ENCOUNTER
Pt called, no answer, message left for pt to call to confirm that she took extra doses of potassium and for pt to get labwork on 1/22/21

## 2021-01-25 DIAGNOSIS — I50.32 CHRONIC DIASTOLIC CONGESTIVE HEART FAILURE (HCC): ICD-10-CM

## 2021-01-25 RX ORDER — METOLAZONE 2.5 MG/1
TABLET ORAL
Qty: 40 TABLET | Refills: 1 | Status: SHIPPED | OUTPATIENT
Start: 2021-01-25 | End: 2021-02-01

## 2021-01-26 ENCOUNTER — TRANSITIONAL CARE MANAGEMENT (OUTPATIENT)
Dept: INTERNAL MEDICINE CLINIC | Age: 76
End: 2021-01-26

## 2021-01-26 RX ORDER — WARFARIN SODIUM 2.5 MG/1
2.5 TABLET ORAL DAILY
COMMUNITY
Start: 2021-01-25 | End: 2021-05-21 | Stop reason: SDUPTHER

## 2021-01-30 DIAGNOSIS — I50.32 CHRONIC DIASTOLIC CONGESTIVE HEART FAILURE (HCC): ICD-10-CM

## 2021-02-01 RX ORDER — METOLAZONE 2.5 MG/1
TABLET ORAL
Qty: 40 TABLET | Refills: 1 | Status: SHIPPED | OUTPATIENT
Start: 2021-02-01 | End: 2021-05-03

## 2021-02-05 ENCOUNTER — OFFICE VISIT (OUTPATIENT)
Dept: INTERNAL MEDICINE CLINIC | Facility: CLINIC | Age: 76
End: 2021-02-05
Payer: COMMERCIAL

## 2021-02-05 VITALS — WEIGHT: 233 LBS | BODY MASS INDEX: 46.97 KG/M2 | HEIGHT: 59 IN

## 2021-02-05 DIAGNOSIS — D62 ACUTE BLOOD LOSS ANEMIA: ICD-10-CM

## 2021-02-05 DIAGNOSIS — R79.1 SUPRATHERAPEUTIC INR: ICD-10-CM

## 2021-02-05 DIAGNOSIS — E87.6 HYPOKALEMIA: ICD-10-CM

## 2021-02-05 DIAGNOSIS — N18.4 STAGE 4 CHRONIC KIDNEY DISEASE (HCC): ICD-10-CM

## 2021-02-05 DIAGNOSIS — Z76.89 ENCOUNTER FOR SUPPORT AND COORDINATION OF TRANSITION OF CARE: ICD-10-CM

## 2021-02-05 DIAGNOSIS — E44.0 MODERATE PROTEIN-ENERGY MALNUTRITION (HCC): ICD-10-CM

## 2021-02-05 DIAGNOSIS — Z92.89 HISTORY OF BLOOD TRANSFUSION: ICD-10-CM

## 2021-02-05 DIAGNOSIS — J43.2 CENTRILOBULAR EMPHYSEMA (HCC): ICD-10-CM

## 2021-02-05 DIAGNOSIS — K21.9 GERD WITHOUT ESOPHAGITIS: ICD-10-CM

## 2021-02-05 DIAGNOSIS — I48.0 PAROXYSMAL ATRIAL FIBRILLATION (HCC): ICD-10-CM

## 2021-02-05 DIAGNOSIS — K92.1 GASTROINTESTINAL HEMORRHAGE WITH MELENA: ICD-10-CM

## 2021-02-05 DIAGNOSIS — E66.01 MORBID OBESITY DUE TO EXCESS CALORIES (HCC): ICD-10-CM

## 2021-02-05 DIAGNOSIS — Z79.01 ANTICOAGULANT LONG-TERM USE: ICD-10-CM

## 2021-02-05 DIAGNOSIS — L97.919 ULCER OF RIGHT LEG, WITH UNSPECIFIED SEVERITY (HCC): ICD-10-CM

## 2021-02-05 DIAGNOSIS — I50.33 ACUTE ON CHRONIC DIASTOLIC CONGESTIVE HEART FAILURE (HCC): ICD-10-CM

## 2021-02-05 DIAGNOSIS — N18.4 CKD (CHRONIC KIDNEY DISEASE) STAGE 4, GFR 15-29 ML/MIN (HCC): Primary | ICD-10-CM

## 2021-02-05 DIAGNOSIS — E11.9 TYPE 2 DIABETES MELLITUS WITHOUT COMPLICATION, WITHOUT LONG-TERM CURRENT USE OF INSULIN (HCC): ICD-10-CM

## 2021-02-05 DIAGNOSIS — E03.9 ACQUIRED HYPOTHYROIDISM: ICD-10-CM

## 2021-02-05 PROCEDURE — 99495 TRANSJ CARE MGMT MOD F2F 14D: CPT | Performed by: PHYSICIAN ASSISTANT

## 2021-02-05 PROCEDURE — 3066F NEPHROPATHY DOC TX: CPT | Performed by: FAMILY MEDICINE

## 2021-02-05 RX ORDER — POTASSIUM CHLORIDE 20 MEQ/1
20 TABLET, EXTENDED RELEASE ORAL 3 TIMES DAILY
Qty: 90 TABLET | Refills: 1
Start: 2021-02-05

## 2021-02-05 RX ORDER — IRON POLYSACCHARIDE COMPLEX 150 MG
150 CAPSULE ORAL 2 TIMES DAILY
Qty: 180 CAPSULE | Refills: 0
Start: 2021-02-05

## 2021-02-05 NOTE — PROGRESS NOTES
Assessment/Plan:        Problem List Items Addressed This Visit        Digestive    GERD without esophagitis     Stable at this time without symptoms  Taking Prilosec  EGD performed in the hospital         Gastrointestinal hemorrhage with melena     Patient with a history of GI bleed felt secondary to supratherapeutic INR  She had colonoscopy August 2020 an EGD during her January 2021 admission  Advised her to continue to follow with Gastroenterology  Endocrine    Hypothyroidism     TSH slightly decreased with increased T4 while in the hospital   Will check TSH T4 prior to further adjusting her Synthroid dose as she has been stable on this dose for some time  Relevant Orders    TSH, 3rd generation with Free T4 reflex    Type 2 diabetes mellitus without complication, without long-term current use of insulin (HCC)       Lab Results   Component Value Date    HGBA1C 5 4 01/18/2021   Stable at this time no change in medication         Relevant Orders    Comprehensive metabolic panel       Respiratory    Centrilobular emphysema (HCC)       Cardiovascular and Mediastinum    Paroxysmal atrial fibrillation (HCC)     Stable on BB, amiodarone, coumadin for [facility] (currently on hold)  Relevant Medications    iron polysaccharides (Ferrex 150) 150 mg capsule       Genitourinary    CKD (chronic kidney disease) stage 4, GFR 15-29 ml/min (HCC) - Primary    Relevant Orders    Comprehensive metabolic panel       Other    Anticoagulant long-term use     Anticoagulation being managed by Cardiology as above         Relevant Orders    CBC and differential    Morbid obesity due to excess calories ([address] )     Stable will continue to follow         BMI 45 0-49 9, adult ([address] )     Weight improving per patient  Encouraged diet lifestyle modifications         Ulcer of right leg, with unspecified severity ([address] )     Symptoms improving per patient  Home nursing following as well    Continue to monitor legs for any change  He continue with daily weight checks  Moderate protein-energy malnutrition (HCC)     Malnutrition Findings:           BMI Findings: Body mass index is 47 06 kg/m²  Discussed diet  Patient notes blood sugars are better controlled and she is working to lose weight  Will continue to follow  Check CMP to follow albumin level           Acute blood loss anemia     Patient on iron supplementation  Patient received blood transfusion while in hospital   Will repeat CBC  Advised patient to report back immediately if any signs or symptoms of bleeding  Relevant Medications    iron polysaccharides (Ferrex 150) 150 mg capsule    Other Relevant Orders    CBC and differential    Comprehensive metabolic panel    Supratherapeutic INR     Patient admitted with supratherapeutic INR 13 3 while in the hospital   Her INR was 3 7 on January 28th but increased to 7 8 on 2/3/2021  Her cardiologist has already contacted her regarding her most recent INR  Her Coumadin is currently on hold and she has follow-up instructions for repeat INR and new Coumadin dosing early next week  Discussed importance of balanced diet  Patient has not experienced any blood in the stool or dark tarry stools since the hospitalization  To her home lab draw in 3 days         Relevant Orders    CBC and differential    Comprehensive metabolic panel    Encounter for support and coordination of transition of care     Remission of care performed today  Reviewed discharge summary from [facility]   Admission day 1/19  Discharge date 01/25             Other Visit Diagnoses     Acute on chronic diastolic congestive heart failure (HCC)        Relevant Medications    iron polysaccharides (Ferrex 150) 150 mg capsule    Hypokalemia        Relevant Medications    potassium chloride (Klor-Con M20) 20 mEq tablet    Stage 4 chronic kidney disease ([address] )        History of blood transfusion                 Reason for visit is JOSEPH    Encounter provider Jama Gregory PA-C       Provider located at [address] [place] [address] [address] [facility] [id number]      Recent Visits  No visits were found meeting these conditions  Showing recent visits within past 7 days and meeting all other requirements     Today's Visits  Date Type Provider Dept   02/05/21 Office Visit Jama Gregory PA-C [facility]   Showing today's visits and meeting all other requirements     Future Appointments  No visits were found meeting these conditions  Showing future appointments within next 150 days and meeting all other requirements        After connecting through [employer], the patient was identified by name and date of birth  Franc Rockwell was informed that this is a telemedicine visit and that the visit is being conducted through [employer] and patient was informed that this is a secure, HIPAA-compliant platform  She agrees to proceed     My office door was closed  No one else was in the room  She acknowledged consent and understanding of privacy and security of the video platform  The patient has agreed to participate and understands they can discontinue the visit at any time  Patient is aware this is a billable service  Subjective:     Patient ID: Franc Rockwell is a 77 y o  female  Hospital follow-up  She reports that she was not feeling well and became lightheaded and dizzy on the 19th of January  She also reports that on that same day she had dark watery stool and felt very weak  She was directed to the emergency room  While she was there she was told that her INR was elevated  She was admitted to the hospital and underwent a upper endoscopy  Previously she had a colonoscopy August of 2020  She received 2 blood transfusions while in the hospital and her potassium was adjusted    She notes that her water pills remain the same and her leg swelling is improved  She had a previous ulceration on her right leg which is improving and starting to heal   She is wearing her compression stockings which help with her lower extremity edema  She denies any change in her breathing pattern  Denies shortness of breath  Weakness and dizziness improved since the hospitalization  Her iron was increased to twice daily which she has been taking as directed  She has home nursing who comes to the home  She is not getting physical therapy at home but does do exercises previously talked to her  She denies any pain or redness in her calf  She denies any weight change and reports that her weight is much improved from previous visits  She feels that her breathing is stable  She is able to sleep at night  She does use 2 pillows which is unchanged from her baseline  She reports that hospital could not find the source of the bleed  EGD showing no obvious signs of bleed  She was told that it was likely secondary to her elevated INR  She cannot recall anything that she took that produced an increase in her INR  She was working with her cardiologist just prior to her admission to get her Coumadin level adjusted  Since the hospital she had a repeat INR drawn which was 7 8  Her cardiologist contacted her and told her to hold her Coumadin for 4 days  She has followed disorder and has not been taking Coumadin  She is scheduled to resume a new dose early next week  She has not seen any blood in her stool or dark tarry stools since being home  She feels her energy level is improving since the hospital   Her bowel movements are brown and formed  She reports her blood sugars have been controlled while in the hospital   She is taking her diabetes medications as directed  She reports her potassium was adjusted while inpatient as well  Anemia  Presents for follow-up visit   Symptoms include leg swelling (Chronic improved from baseline) and weight loss  There has been no abdominal pain, fever, light-headedness or palpitations  Signs of blood loss that are not present include hematemesis, hematochezia and melena  Diabetes  Pertinent negatives for hypoglycemia include no dizziness  Associated symptoms include fatigue ( improving since hospital) and weight loss  Pertinent negatives for diabetes include no chest pain  Thyroid Problem  Presents for follow-up visit  Symptoms include dry skin, fatigue ( improving since hospital), leg swelling ( chronic improving) and weight loss  Patient reports no constipation, diarrhea, palpitations or weight gain  The symptoms have been stable  Heartburn  She reports no abdominal pain, no chest pain, no coughing, no heartburn ( controlled on medications), no nausea or no wheezing  Associated symptoms include fatigue ( improving since hospital) and weight loss  Pertinent negatives include no melena  Review of Systems   Constitutional: Positive for chills (occasional chills), fatigue ( improving since hospital) and weight loss  Negative for fever and weight gain  Weight currently 212  She does check weight daily  Respiratory: Positive for shortness of breath (chronic unchanges SOB with exhertion  Unchanged for some time  )  Negative for cough and wheezing  Has not felt more SOB at night  Cardiovascular: Negative for chest pain and palpitations  Leg swelling: improving  Gastrointestinal: Negative for abdominal distention, abdominal pain, blood in stool, constipation, diarrhea, heartburn ( controlled on medications), hematemesis, hematochezia, melena, nausea and vomiting  Started Protonix which has helped her stomach   Genitourinary: Negative for dysuria and hematuria  Musculoskeletal: Positive for arthralgias (occasional knee pain)  Negative for back pain  Skin: Negative for rash  bruising in sites where she had labs drawn        Patient notes that she did have several ulcerations in her legs that were weeping when her leg swelling was much worse  She reports that these areas have since healed  No new lesions or ulceration   Neurological: Negative for dizziness, syncope and light-headedness  Psychiatric/Behavioral: Negative for agitation  Objective:    Vitals:    02/05/21 1234   Weight: 106 kg (233 lb)   Height: 4' 11" (1 499 m)       Physical Exam  Constitutional:       General: She is not in acute distress  Appearance: She is not ill-appearing  Comments: Alert seated for virtual visit in no acute distress  Talking complete sentences  No conversational dyspnea   HENT:      Mouth/Throat:      Mouth: Mucous membranes are moist    Pulmonary:      Comments: No conversational dyspnea  Respiratory rate does not appear labored  No coughing or wheezing audible during exam  Abdominal:      Comments: Obese  Musculoskeletal:      Comments: Patient has a difficult time showing lower extremities with phone camera  Neurological:      General: No focal deficit present  Mental Status: She is alert  Comments: No gross focal neurologic deficits noted             Transitional Care Management Review:  Ildefonso Ruiz is a 77 y o  female here for TCM follow up  During the TCM phone call patient stated:    TCM Call (since 1/5/2021)     Date and time call was made  1/26/2021  1:15 PM    Hospital care reviewed  Records reviewed    Patient was hospitialized at  [facility]        Date of Admission  01/19/21    Date of discharge  01/25/21    Diagnosis  supratherapeutic INR, GI Bleed, hypokalemia    Disposition  Home; Home health services    Were the patients medications reviewed and updated  Yes    Current Symptoms  None      TCM Call (since 1/5/2021)     Post hospital issues  Reduced activity; Poor ADL (Activities of Daily Living) performance    Should patient be enrolled in anticoag monitoring? Yes    Scheduled for follow up?   Yes    Did you obtain your prescribed medications  Yes    Do you need help managing your prescriptions or medications  No    Is transportation to your appointment needed  No    I have advised the patient to call PCP with any new or worsening symptoms  [address] members    Support System  Family    The type of support provided  Emotional; Physical    Do you have social support  Yes, quite a bit    Are you recieving any outpatient services  No    Are you recieving home care services  Yes    Types of home care services  Nurse visit    Are you using any community resources  No    Current waiver services  No    Have you fallen in the last 12 months  No    Interperter language line needed  No    Counseling  Patient    Counseling topics  Activities of daily living; Importance of RX compliance; instructions for management          I spent 30 minutes with the patient during this visit      Kimberly Leach PA-C

## 2021-02-05 NOTE — ASSESSMENT & PLAN NOTE
Patient admitted with supratherapeutic INR 13 3 while in the hospital   Her INR was 3 7 on January 28th but increased to 7 8 on 2/3/2021  Her cardiologist has already contacted her regarding her most recent INR  Her Coumadin is currently on hold and she has follow-up instructions for repeat INR and new Coumadin dosing early next week  Discussed importance of balanced diet  Patient has not experienced any blood in the stool or dark tarry stools since the hospitalization    To her home lab draw in 3 days

## 2021-02-05 NOTE — ASSESSMENT & PLAN NOTE
Patient with a history of GI bleed felt secondary to supratherapeutic INR  She had colonoscopy August 2020 an EGD during her January 2021 admission  Advised her to continue to follow with Gastroenterology

## 2021-02-05 NOTE — ASSESSMENT & PLAN NOTE
Patient on iron supplementation  Patient received blood transfusion while in hospital   Will repeat CBC  Advised patient to report back immediately if any signs or symptoms of bleeding

## 2021-02-05 NOTE — ASSESSMENT & PLAN NOTE
TSH slightly decreased with increased T4 while in the hospital   Will check TSH T4 prior to further adjusting her Synthroid dose as she has been stable on this dose for some time

## 2021-02-05 NOTE — ASSESSMENT & PLAN NOTE
Symptoms improving per patient  Home nursing following as well  Continue to monitor legs for any change  He continue with daily weight checks

## 2021-02-05 NOTE — ASSESSMENT & PLAN NOTE
Malnutrition Findings:           BMI Findings: Body mass index is 47 06 kg/m²  Discussed diet  Patient notes blood sugars are better controlled and she is working to lose weight  Will continue to follow    Check CMP to follow albumin level

## 2021-02-05 NOTE — ASSESSMENT & PLAN NOTE
Lab Results   Component Value Date    HGBA1C 5 4 01/18/2021   Stable at this time no change in medication

## 2021-02-05 NOTE — ASSESSMENT & PLAN NOTE
Remission of care performed today  Reviewed discharge summary from [facility]   Admission day 1/19  Discharge date 01/25

## 2021-02-05 NOTE — PATIENT INSTRUCTIONS
GERD without esophagitis  Stable at this time without symptoms  Taking Prilosec  EGD performed in the hospital    Gastrointestinal hemorrhage with melena  Patient with a history of GI bleed felt secondary to supratherapeutic INR  She had colonoscopy August 2020 an EGD during her January 2021 admission  Advised her to continue to follow with Gastroenterology  Type 2 diabetes mellitus without complication, without long-term current use of insulin (HCC)    Lab Results   Component Value Date    HGBA1C 5 4 01/18/2021   Stable at this time no change in medication    Hypothyroidism  TSH slightly decreased with increased T4 while in the hospital   Will check TSH T4 prior to further adjusting her Synthroid dose as she has been stable on this dose for some time  Paroxysmal atrial fibrillation (HCC)  Stable on BB, amiodarone, coumadin for [facility] (currently on hold)  Ulcer of right leg, with unspecified severity ([address] )  Symptoms improving per patient  Home nursing following as well  Continue to monitor legs for any change  He continue with daily weight checks  Supratherapeutic INR  Patient admitted with supratherapeutic INR 13 3 while in the hospital   Her INR was 3 7 on January 28th but increased to 7 8 on 2/3/2021  Her cardiologist has already contacted her regarding her most recent INR  Her Coumadin is currently on hold and she has follow-up instructions for repeat INR and new Coumadin dosing early next week  Discussed importance of balanced diet  Patient has not experienced any blood in the stool or dark tarry stools since the hospitalization  To her home lab draw in 3 days    Morbid obesity due to excess calories ([address] )  Stable will continue to follow    Moderate protein-energy malnutrition ([address] )  Malnutrition Findings:           BMI Findings: Body mass index is 47 06 kg/m²  Discussed diet  Patient notes blood sugars are better controlled and she is working to lose weight    Will continue to follow  Check CMP to follow albumin level      Encounter for support and coordination of transition of care  Remission of care performed today  Reviewed discharge summary from [facility]   Admission day 1/19  Discharge date 01/25  BMI 45 0-49 9, adult ([address] )  Weight improving per patient  Encouraged diet lifestyle modifications    Anticoagulant long-term use  Anticoagulation being managed by Cardiology as above    Acute blood loss anemia  Patient on iron supplementation  Patient received blood transfusion while in hospital   Will repeat CBC  Advised patient to report back immediately if any signs or symptoms of bleeding

## 2021-02-08 ENCOUNTER — TELEPHONE (OUTPATIENT)
Dept: INTERNAL MEDICINE CLINIC | Facility: CLINIC | Age: 76
End: 2021-02-08

## 2021-02-08 NOTE — TELEPHONE ENCOUNTER
Patient called and stated that she had a Home care nurse come today to draw  She stated that she had an appointment with Jian Augustin on Friday and he was to send over the labs that he put in for her to have done to them    When she got there they didn't have the orders      Can we send it to them STAT since she did the home draw on the patient today

## 2021-02-08 NOTE — TELEPHONE ENCOUNTER
I called the patient to interpret this message  She stated that [facility] home draw service was in her home this Am to draw an INR for her cardiologist   She had an appt on Friday with Reese Orozco PAC  He ordered blood work to be done and she was under the impression that he was making arrangements to coordinate the blood draw with her cardiologist's lab orders  The [facility] phlebotomist "lisa extra tubes and needs the orders to be sent over right away  I called [facility] and spoke with Rosa Elena iGron  She confirmed that extra lab tubes were drawn and awaiting orders  She took a verbal order for the tests and she is asking that the paper orders with diagnosis codes be faxed to [phone]  I am attempting to fax the orders to the attnetion of Rosa Elena Giron

## 2021-02-11 DIAGNOSIS — I48.0 PAROXYSMAL ATRIAL FIBRILLATION (HCC): ICD-10-CM

## 2021-02-11 DIAGNOSIS — E03.9 HYPOTHYROIDISM, UNSPECIFIED TYPE: ICD-10-CM

## 2021-02-11 DIAGNOSIS — N18.4 CKD (CHRONIC KIDNEY DISEASE) STAGE 4, GFR 15-29 ML/MIN (HCC): Primary | ICD-10-CM

## 2021-02-11 RX ORDER — LEVOTHYROXINE SODIUM 0.07 MG/1
75 TABLET ORAL DAILY
Qty: 90 TABLET | Refills: 0 | Status: SHIPPED | OUTPATIENT
Start: 2021-02-11 | End: 2021-05-21 | Stop reason: SDUPTHER

## 2021-02-19 ENCOUNTER — TELEPHONE (OUTPATIENT)
Dept: INTERNAL MEDICINE CLINIC | Facility: CLINIC | Age: 76
End: 2021-02-19

## 2021-02-19 NOTE — TELEPHONE ENCOUNTER
----- Message from Maryan Collins sent at 2/18/2021 10:21 AM EST -----  Regarding: Covid vaccine  Patient is unable to make the appt for himself  Please help   Thank you

## 2021-02-19 NOTE — RESULT ENCOUNTER NOTE
Reviewed chart  Does not appear that patient had repeat labs drawn this week  Patient reports that Carlos Nolasco did help to set up the labs for the home draw however no results are in the chart  Patient reports she has been back and forth with her cardiologist who is adjusting her INR and her most recent INR was 8  She reports that she is feeling fatigued lightheaded and dizzy  She denies any blood in the stool however I discussed my concerns with her current symptoms especially with her recent hospitalization with transfusion and worsening of renal function  Patient verbalized understanding and is willing to come into the hospital for further evaluation and treatment for her lightheaded dizziness and CHANTELL on CKD  Discussed options for getting the hospital and patient preferred to be transported via EMS  I personally called 911 and they will be arriving shortly to the patient's home to take her to the hospital   Throughout conversation with the patient patient was alert oriented and  conversational   Her only complaint was that of feeling generalized fatigue and dizziness as well as weakness which has been worsening over the last few days  She will need to be followed up after her hospitalization

## 2021-03-02 ENCOUNTER — TRANSITIONAL CARE MANAGEMENT (OUTPATIENT)
Dept: INTERNAL MEDICINE CLINIC | Age: 76
End: 2021-03-02

## 2021-03-05 ENCOUNTER — OFFICE VISIT (OUTPATIENT)
Dept: INTERNAL MEDICINE CLINIC | Facility: CLINIC | Age: 76
End: 2021-03-05
Payer: COMMERCIAL

## 2021-03-05 VITALS — HEIGHT: 59 IN | WEIGHT: 212 LBS | BODY MASS INDEX: 42.74 KG/M2

## 2021-03-05 DIAGNOSIS — I10 BENIGN ESSENTIAL HYPERTENSION: ICD-10-CM

## 2021-03-05 DIAGNOSIS — Z92.89 HISTORY OF BLOOD TRANSFUSION: ICD-10-CM

## 2021-03-05 DIAGNOSIS — Z71.89 COMPLEX CARE COORDINATION: ICD-10-CM

## 2021-03-05 DIAGNOSIS — R79.1 SUPRATHERAPEUTIC INR: ICD-10-CM

## 2021-03-05 DIAGNOSIS — R53.81 PHYSICAL DECONDITIONING: Primary | ICD-10-CM

## 2021-03-05 DIAGNOSIS — E78.5 HYPERLIPIDEMIA, UNSPECIFIED HYPERLIPIDEMIA TYPE: ICD-10-CM

## 2021-03-05 DIAGNOSIS — N18.4 CKD (CHRONIC KIDNEY DISEASE) STAGE 4, GFR 15-29 ML/MIN (HCC): ICD-10-CM

## 2021-03-05 DIAGNOSIS — K21.9 GERD WITHOUT ESOPHAGITIS: ICD-10-CM

## 2021-03-05 DIAGNOSIS — I48.0 PAROXYSMAL ATRIAL FIBRILLATION (HCC): ICD-10-CM

## 2021-03-05 DIAGNOSIS — E11.9 TYPE 2 DIABETES MELLITUS WITHOUT COMPLICATION, WITHOUT LONG-TERM CURRENT USE OF INSULIN (HCC): ICD-10-CM

## 2021-03-05 DIAGNOSIS — K92.1 MELENA: ICD-10-CM

## 2021-03-05 DIAGNOSIS — L30.4 INTERTRIGO: ICD-10-CM

## 2021-03-05 DIAGNOSIS — D62 ACUTE BLOOD LOSS ANEMIA: ICD-10-CM

## 2021-03-05 DIAGNOSIS — Z76.89 ENCOUNTER FOR SUPPORT AND COORDINATION OF TRANSITION OF CARE: ICD-10-CM

## 2021-03-05 DIAGNOSIS — E03.9 ACQUIRED HYPOTHYROIDISM: ICD-10-CM

## 2021-03-05 PROCEDURE — 99214 OFFICE O/P EST MOD 30 MIN: CPT | Performed by: PHYSICIAN ASSISTANT

## 2021-03-05 PROCEDURE — 3008F BODY MASS INDEX DOCD: CPT | Performed by: FAMILY MEDICINE

## 2021-03-05 RX ORDER — NYSTATIN 100000 U/G
OINTMENT TOPICAL AS NEEDED
Qty: 15 G | Refills: 0
Start: 2021-03-05

## 2021-03-05 NOTE — ASSESSMENT & PLAN NOTE
Status post transfusion while inpatient  Patient had capsule endoscopy that was unrevealing for bleed  Schedule GI follow-up  Will check CBC to follow-up her hospital labs  Discharge hemoglobin was 8 2

## 2021-03-05 NOTE — PROGRESS NOTES
Assessment/Plan:        Problem List Items Addressed This Visit        Digestive    GERD without esophagitis     Continue with Prilosec  No dose change         RESOLVED: Melena     Resolved  Advised patient to schedule GI follow-up         Relevant Orders    CBC and differential       Endocrine    Hypothyroidism     Synthroid recently adjusted  Currently taking 75 mcg daily  Amiodarone recently discontinued  Continue 75 mcg at this time and will repeat TSH in the future  Too close to her recent dose change and amiodarone discontinuation to check TSH at this time  Type 2 diabetes mellitus without complication, without long-term current use of insulin ([facility])       Lab Results   Component Value Date    HGBA1C 5 4 01/18/2021     Stable at this time on medications  Patient has not been checking blood sugar at home  Script given for glucometer and test strips to check her blood sugar  Relevant Orders    Glucometer    Glucometer test strips       Cardiovascular and Mediastinum    Benign essential hypertension     Encouraged patient to check blood pressure at home  No medication changes at this time         Relevant Orders    Basic metabolic panel    Paroxysmal atrial fibrillation [facility])     Patient follows with Cardiology who is managing Coumadin dose  Amiodarone discontinued while in the hospital   Continue metoprolol  Patient denies palpitations  Due to anemia while inpatient will repeat CBC and BMP with her scheduled INR 3/8/2021  Talked with [place] front office staff will fax lab order to her home nurse who draws the blood            Genitourinary    CKD (chronic kidney disease) stage 4, GFR 15-29 ml/min ([facility])     Lab Results   Component Value Date    EGFR 62 08/21/2017    EGFR 61 08/16/2017    EGFR 54 7 07/13/2017    CREATININE 1 70 (H) 11/01/2019    CREATININE 0 93 08/21/2017    CREATININE 0 94 08/16/2017       Recommend nephrology follow-up  Reviewed hospital discharge summary  Advised patient to take Lasix as per her discharge summary  Lasix dose should be 80 mg daily  On Monday Wednesday and Friday patient is able to take an additional Lasix 40 mg if swelling is increased  Continue potassium supplement  Will follow up labs  Relevant Orders    Basic metabolic panel    CBC and differential    Ambulatory referral to Nephrology       Other    HLD (hyperlipidemia)     No changes   Continue Lipitor 80 mg daily         Physical deconditioning - Primary     Referral to physical therapy         Relevant Orders    Ambulatory referral to Physical Therapy    Acute blood loss anemia     Status post transfusion while inpatient  Patient had capsule endoscopy that was unrevealing for bleed  Schedule GI follow-up  Will check CBC to follow-up her hospital labs  Discharge hemoglobin was 8 2  Relevant Orders    CBC and differential    Supratherapeutic INR     Being managed by Cardiology  Amiodarone was discontinued while inpatient as it was thought to be contributing to patient's supratherapeutic INR  Encounter for support and coordination of transition of care     Hospital discharge summary reviewed  Advised patient to schedule Gastroenterology and Cardiology follow-up  Advised her to scheduled Nephrology  Will obtain CBC and BMP with 3/8/2021  [address] [facility] office will fax labs to her home nurse  Recommend physical therapy for physical deconditioning  Referral placed today  Discussed importance of doing stretches and range-of-motion exercises that were taught to her in previous PT sessions as well as while inpatient  Continue to monitor leg swelling and report if any increased  Continue with weight checks  Check blood sugar and blood pressure  Follow-up close in 2 weeks         History of blood transfusion     Continue with iron supplement    Will follow up CBC as discussed above         Complex care coordination     Referred to complex care management team  Relevant Orders    Ambulatory referral to complex care management program      Other Visit Diagnoses     Intertrigo        Relevant Medications    nystatin (MYCOSTATIN) ointment             Reason for visit is JOSEPH/Hospital follow up  Encounter provider Winston Reese PA-C       Provider located at [address] [place] [address] [address] [facility] [id number]      Recent Visits  No visits were found meeting these conditions  Showing recent visits within past 7 days and meeting all other requirements     Today's Visits  Date Type Provider Dept   03/05/21 Office Visit Winston Reese PA-C [facility]   Showing today's visits and meeting all other requirements     Future Appointments  No visits were found meeting these conditions  Showing future appointments within next 150 days and meeting all other requirements        After connecting through [employer], the patient was identified by name and date of birth  Sabine Queen was informed that this is a telemedicine visit and that the visit is being conducted through [employer] and patient was informed that this is a secure, HIPAA-compliant platform  She agrees to proceed     My office door was closed  No one else was in the room  She acknowledged consent and understanding of privacy and security of the video platform  The patient has agreed to participate and understands they can discontinue the visit at any time  Patient is aware this is a billable service  Subjective:     Patient ID: Sabine Queen is a 77 y o  female  Follow up from hospital   Admitted 2/19 and discharged 2/27  She went to ER after she and I spoke on the phone  While inpatient her amiodarone was d/c  She notes that her fatigue has improved somewhat since the hospital   She reports that her Hgb dropped when she was in the hospital and INR was elevated    She was given vit K and FFP to reverse her INR (11 9) and (Hb 4 2)  She was transfused 4 units of blood  Cardio saw her and d/c amoio as it was felt to be the med contributing to high INR  She had capsul endoscopy but the source of the bleed was not determined  She was on heparin prior to procedure and hospital record shows that her Hgb dropped again and she was given an additional unit of PRBC  Since the hospital her stool is brown color  NO black stools  No blood in stool  No blood in urine  Taking coumadin as directed and is followed by cardio coumadin clinic  Discharge INR was 2 3  Post hospital INR 3 2 and she has follow up INR scheduled in 3/8  Current coumadin is 1 25 mg daily per cardiology  She was put back on her water pills which were held while she was in the hospital   Does not get home nursing  She did get some PT in hospital   Weight 213 at home, notes her discharge from hospital weight was 217  Feeling better since hospital   NO CP or palpitations  NO fevers or chills  Exertional SOB unchanged if going up steps, not new per patient  Leg swelling improved  We reviewed her medications which she has with her today individually due to my concerns for polypharmacy with recent hospitalizations and dose adjustments  Metalazone 2 5 mg M/W/F  Lasix 80 mg daily but on (T / Th / Sat can take additional 80 mg if she is having swelling)  Hospital directed her to take 80 mg daily and take 40 mg for breakthrough swelling T/Th/Sat    Potassium 20 mEq by mouth 3 (three) times a day (patient has been taking 4 tablets daily)  AllopurinoL 100 MG tablet daily  Ascorbic acid with juan alberto hips 500 MG tablet daily  Atorvastatin 80 MG tablet daily  CALCITRENE 0 005 % ointment  Citalopram 20 MG tablet daily  Docusate sodium 250 MG capsule  Ferrix 359 mg BID   Folic acid-vit C4-AYM K63 2 5-25-1 mg Tab  JANUVIA 50 MG tablet  levothyroxine 75 MCG tablet  metoprolol 25 MG tablet BID   OCUVITE LUTEIN AND ZEAXANTHIN 60 mg-13 5 mg- 15 mg-2 mg-6 mg Cap  OTEZLA 30 mg Tab  Phentermine 37 5 mg tablet  Repaglinide 0 5 MG tablet  VITAMIN D2 50,000 unit (1 25 mg) capsule    She confirms Aldactone and amiodarone were d/c and that she is no longer taking them  Anemia  Presents for follow-up visit  Symptoms include malaise/fatigue (improving since hospital) and weight loss (attributes to the swelling)  There has been no abdominal pain, anorexia, bruising/bleeding easily (bruises from hospital blood draw sights), fever, light-headedness or palpitations  Leg swelling: stable, at her baseline  She wears compression stockings  Signs of blood loss that are not present include hematemesis, hematochezia and melena  Thyroid Problem  Presents for follow-up visit  Symptoms include fatigue (improving since hospital), leg swelling (improving since hospital) and weight loss (attributes to the swelling)  Patient reports no constipation, depressed mood (taking celexa as directed ), diarrhea, palpitations or weight gain  The symptoms have been stable  GI Problem  The primary symptoms include weight loss (attributes to the swelling), fatigue (improving since hospital) and arthralgias  Primary symptoms do not include fever, abdominal pain, nausea, vomiting, diarrhea, melena, hematemesis, hematochezia or rash  The problem has been gradually improving  The illness does not include chills, anorexia, bloating or constipation  Significant associated medical issues include GERD  Heart Problem  This is a chronic problem  The current episode started more than 1 year ago  The problem has been unchanged  Associated symptoms include arthralgias and fatigue (improving since hospital)  Pertinent negatives include no abdominal pain, anorexia, chest pain, chills, coughing, fever, nausea, rash or vomiting         Review of Systems   Constitutional: Positive for fatigue (improving since hospital), malaise/fatigue (improving since hospital) and weight loss (attributes to the swelling)  Negative for chills, fever and weight gain  Respiratory: Negative for cough, chest tightness, shortness of breath and wheezing  Cardiovascular: Negative for chest pain and palpitations  Leg swelling: improved since hospital    Gastrointestinal: Negative for abdominal pain, anorexia, bloating, constipation, diarrhea, hematemesis, hematochezia, melena, nausea and vomiting  Denies constipation  NO blood in stool   Musculoskeletal: Positive for arthralgias  Skin: Negative for rash  No cuts or breaks in skin  No ulcers or sores  Bruises since hospital improving (lab draw sites)   Neurological: Negative for dizziness, syncope and light-headedness  Hematological: Does not bruise/bleed easily (bruises from hospital blood draw sights)  Psychiatric/Behavioral:        Mood stable at this time on Celexa         Objective:    Vitals:    03/05/21 1010   Weight: 96 2 kg (212 lb)   Height: 4' 11" (1 499 m)       Physical Exam  Constitutional:       Appearance: Normal appearance  She is obese  Comments: Alert conversational   Seated for virtual visit in no acute distress  Ambulating throughout the encounter  Appropriately dressed  HENT:      Head: Normocephalic and atraumatic  Mouth/Throat:      Mouth: Mucous membranes are moist       Comments: Oral mucosa appears moist  Neck:      Comments: Freely moves had throughout encounter  Pulmonary:      Effort: No respiratory distress  Breath sounds: No wheezing  Comments: No respiratory distress  No conversational dyspnea  Talking complete sentences  Very talkative  No audible cough during encounter  Abdominal:      Comments: Obese  Musculoskeletal:      Right lower leg: Edema present  Left lower leg: Edema present  Skin:     Comments: Patient able to allow me to partially visualized lower extremities with camera  Positive lower extremity edema symmetric  No erythema  Unable to categorize due to camera  Neurological:      General: No focal deficit present  Mental Status: She is alert  Comments: No gross focal neurologic deficit   Psychiatric:         Mood and Affect: Mood normal              Transitional Care Management Review:  Kelsey Gold is a 77 y o  female here for TCM follow up  During the TCM phone call patient stated:    TCM Call (since 2/2/2021)     Date and time call was made  3/3/2021 10:42 AM    Hospital care reviewed  Records reviewed    Patient was hospitialized at  [facility]        Date of Admission  02/19/21    Date of discharge  02/27/21    Diagnosis  fatigue, anemia, GI bleed    Disposition  Home    Were the patients medications reviewed and updated  Yes    Current Symptoms  None      TCM Call (since 2/2/2021)     Post hospital issues  Reduced activity; Poor ADL (Activities of Daily Living) performance    Should patient be enrolled in anticoag monitoring? Yes    Scheduled for follow up? Yes    Did you obtain your prescribed medications  Yes    Do you need help managing your prescriptions or medications  No    Is transportation to your appointment needed  No    I have advised the patient to call PCP with any new or worsening symptoms  [facility] members    Support System  Family    The type of support provided  Emotional; Physical    Do you have social support  Yes, quite a bit    Are you recieving any outpatient services  No    Are you recieving home care services  Yes    Types of home care services  Nurse visit    Are you using any community resources  No    Current waiver services  No    Have you fallen in the last 12 months  No    Interperter language line needed  No    Counseling  Patient    Counseling topics  Activities of daily living; Importance of RX compliance; instructions for management          I spent 30 minutes with the patient during this visit      Sumanth Turner PA-C

## 2021-03-05 NOTE — ASSESSMENT & PLAN NOTE
Being managed by Cardiology  Amiodarone was discontinued while inpatient as it was thought to be contributing to patient's supratherapeutic INR

## 2021-03-05 NOTE — ASSESSMENT & PLAN NOTE
Patient follows with Cardiology who is managing Coumadin dose  Amiodarone discontinued while in the hospital   Continue metoprolol  Patient denies palpitations  Due to anemia while inpatient will repeat CBC and BMP with her scheduled INR 3/8/2021    Talked with [place] front office staff will fax lab order to her home nurse who draws the blood

## 2021-03-05 NOTE — ASSESSMENT & PLAN NOTE
Lab Results   Component Value Date    EGFR 62 08/21/2017    EGFR 61 08/16/2017    EGFR 54 7 07/13/2017    CREATININE 1 70 (H) 11/01/2019    CREATININE 0 93 08/21/2017    CREATININE 0 94 08/16/2017       Recommend nephrology follow-up  Reviewed hospital discharge summary  Advised patient to take Lasix as per her discharge summary  Lasix dose should be 80 mg daily  On Monday Wednesday and Friday patient is able to take an additional Lasix 40 mg if swelling is increased  Continue potassium supplement  Will follow up labs

## 2021-03-05 NOTE — ASSESSMENT & PLAN NOTE
Lab Results   Component Value Date    HGBA1C 5 4 01/18/2021     Stable at this time on medications  Patient has not been checking blood sugar at home  Script given for glucometer and test strips to check her blood sugar

## 2021-03-05 NOTE — ASSESSMENT & PLAN NOTE
Synthroid recently adjusted  Currently taking 75 mcg daily  Amiodarone recently discontinued  Continue 75 mcg at this time and will repeat TSH in the future  Too close to her recent dose change and amiodarone discontinuation to check TSH at this time

## 2021-03-05 NOTE — ASSESSMENT & PLAN NOTE
Hospital discharge summary reviewed  Advised patient to schedule Gastroenterology and Cardiology follow-up  Advised her to scheduled Nephrology  Will obtain CBC and BMP with 3/8/2021  [address] [facility] office will fax labs to her home nurse  Recommend physical therapy for physical deconditioning  Referral placed today  Discussed importance of doing stretches and range-of-motion exercises that were taught to her in previous PT sessions as well as while inpatient  Continue to monitor leg swelling and report if any increased  Continue with weight checks  Check blood sugar and blood pressure    Follow-up close in 2 weeks

## 2021-03-05 NOTE — PATIENT INSTRUCTIONS
Melena  Resolved  Advised patient to schedule GI follow-up    GERD without esophagitis  Continue with Prilosec  No dose change    Hypothyroidism  Synthroid recently adjusted  Currently taking 75 mcg daily  Amiodarone recently discontinued  Continue 75 mcg at this time and will repeat TSH in the future  Too close to her recent dose change and amiodarone discontinuation to check TSH at this time  Type 2 diabetes mellitus without complication, without long-term current use of insulin ([facility])    Lab Results   Component Value Date    HGBA1C 5 4 01/18/2021     Stable at this time on medications  Patient has not been checking blood sugar at home  Script given for glucometer and test strips to check her blood sugar  Benign essential hypertension  Encouraged patient to check blood pressure at home  No medication changes at this time    Paroxysmal atrial fibrillation [facility])  Patient follows with Cardiology who is managing Coumadin dose  Amiodarone discontinued while in the hospital   Continue metoprolol  Patient denies palpitations  Due to anemia while inpatient will repeat CBC and BMP with her scheduled INR 3/8/2021  Talked with [place] front office staff will fax lab order to her home nurse who draws the blood    CKD (chronic kidney disease) stage 4, GFR 15-29 ml/min ([facility] )  Lab Results   Component Value Date    EGFR 62 08/21/2017    EGFR 61 08/16/2017    EGFR 54 7 07/13/2017    CREATININE 1 70 (H) 11/01/2019    CREATININE 0 93 08/21/2017    CREATININE 0 94 08/16/2017       Recommend nephrology follow-up  Reviewed hospital discharge summary  Advised patient to take Lasix as per her discharge summary  Lasix dose should be 80 mg daily  On Monday Wednesday and Friday patient is able to take an additional Lasix 40 mg if swelling is increased  Continue potassium supplement  Will follow up labs  Acute blood loss anemia  Status post transfusion while inpatient    Patient had capsule endoscopy that was unrevealing for bleed  Schedule GI follow-up  Will check CBC to follow-up her hospital labs  Discharge hemoglobin was 8 2  Complex care coordination  Referred to complex care management team     Encounter for support and coordination of transition of care  Hospital discharge summary reviewed  Advised patient to schedule Gastroenterology and Cardiology follow-up  Advised her to scheduled Nephrology  Will obtain CBC and BMP with 3/8/2021  [address] [facility] office will fax labs to her home nurse  Recommend physical therapy for physical deconditioning  Referral placed today  Discussed importance of doing stretches and range-of-motion exercises that were taught to her in previous PT sessions as well as while inpatient  Continue to monitor leg swelling and report if any increased  Continue with weight checks  Check blood sugar and blood pressure  Follow-up close in 2 weeks    History of blood transfusion  Continue with iron supplement  Will follow up CBC as discussed above    HLD (hyperlipidemia)  No changes   Continue Lipitor 80 mg daily    Physical deconditioning  Referral to physical therapy    Supratherapeutic INR  Being managed by Cardiology  Amiodarone was discontinued while inpatient as it was thought to be contributing to patient's supratherapeutic INR      Vitamin D deficiency  [facility]

## 2021-03-10 ENCOUNTER — IMMUNIZATIONS (OUTPATIENT)
Dept: FAMILY MEDICINE CLINIC | Facility: HOSPITAL | Age: 76
End: 2021-03-10

## 2021-03-10 DIAGNOSIS — Z23 ENCOUNTER FOR IMMUNIZATION: Primary | ICD-10-CM

## 2021-03-10 PROCEDURE — 0011A SARS-COV-2 / COVID-19 MRNA VACCINE (MODERNA) 100 MCG: CPT

## 2021-03-10 PROCEDURE — 91301 SARS-COV-2 / COVID-19 MRNA VACCINE (MODERNA) 100 MCG: CPT

## 2021-03-12 ENCOUNTER — PATIENT OUTREACH (OUTPATIENT)
Dept: INTERNAL MEDICINE CLINIC | Facility: CLINIC | Age: 76
End: 2021-03-12

## 2021-03-12 NOTE — PROGRESS NOTES
Outpatient Care Management Note:  RE:Called and pt answered but she states that 'people are there for a meeting' after hearing several voices in the background  Will call back later and pt agreeable

## 2021-03-16 ENCOUNTER — TELEPHONE (OUTPATIENT)
Dept: INTERNAL MEDICINE CLINIC | Facility: CLINIC | Age: 76
End: 2021-03-16

## 2021-03-16 ENCOUNTER — PATIENT OUTREACH (OUTPATIENT)
Dept: INTERNAL MEDICINE CLINIC | Facility: CLINIC | Age: 76
End: 2021-03-16

## 2021-03-16 DIAGNOSIS — E11.9 TYPE 2 DIABETES MELLITUS WITHOUT COMPLICATION, WITHOUT LONG-TERM CURRENT USE OF INSULIN (HCC): Primary | ICD-10-CM

## 2021-03-16 NOTE — PROGRESS NOTES
Outpatient Care Management Note:  RE:Spoke with pt to determine her issues and concerns  Her main concern is that she is so bothered by her lymphedema and it has affected her mobility  Pt reports that her goal is to be able to be active again and get in and out of her car to do errands  She verbalizes that her legs feel like weights "up to my butt" and this causes difficulty with going out to get into car, do steps to get to car, getting up inside of car  She is exhausted then by the time she gets in therefore PT ordered as outpt would be too difficult for her  She does have [address] [facility] going into the home at this time  Pt lives with her  and has an 5 yo foster son  She has a biological son who visits daily and lives down the street  She has numerous aches and pains including her back and her knees  Pt reports that there are stairs to go to her bedroom and she comes down in the morning and goes up at night  Her son takes pt's  grocery shopping with a list made out by her and she does the cooking  Pt's  does not drive  Will call [facility] and inquire about Lymphedema therapy and PT since skilled nursing is going in 1x a week

## 2021-03-16 NOTE — TELEPHONE ENCOUNTER
Discussed with Aimee Hanson  Pt is to test blood sugar QD  Please update and send new scripts to provider team to [facility]  Thanks

## 2021-03-16 NOTE — TELEPHONE ENCOUNTER
Jonnathan Seals from [address] [facility] stating that since Matt Girard is using medicare to pay for her glucose meter and test strips they will need a new script put in everything being the same but they need to know how many times a day she needs to check her sugar  They need that indicated on both scripts   Thanks

## 2021-03-16 NOTE — PROGRESS NOTES
Pharmacy just needed to know how many times parul wanted sugars checked, Yadiel stern texted him and he wants once daily   Had to redo scripts

## 2021-03-16 NOTE — PROGRESS NOTES
Outpatient Care Management Note:  RE:[facility] and was connected to [place] nurse and   Discussed pt's complaints and she agrees that pt struggles with mobility in the home r/t her lymphedema  Requested Lymphedema therapy in conjunction with PT as this may help pt become more independent  She will place order as verbal  Also discussed Lymphedema pumps and will follow up in the future to see if this is a DME that therapist an look into for pt

## 2021-03-17 DIAGNOSIS — E11.9 TYPE 2 DIABETES MELLITUS WITHOUT COMPLICATION, WITHOUT LONG-TERM CURRENT USE OF INSULIN (HCC): Primary | ICD-10-CM

## 2021-03-17 RX ORDER — LANCETS
EACH MISCELLANEOUS DAILY
Qty: 100 EACH | Refills: 3 | Status: SHIPPED | OUTPATIENT
Start: 2021-03-17 | End: 2021-03-19 | Stop reason: SDUPTHER

## 2021-03-17 NOTE — TELEPHONE ENCOUNTER
[address] calling to tell us that the meter that was ordered for Mary Dean was discontinued  He wanted to let us know that they went with the guide meter as the insurance will cover that one  They also need a script for lancets sent over and the ones that they recommended was the fastclix lancets   I will place the order and send it to the pool

## 2021-03-19 ENCOUNTER — TELEPHONE (OUTPATIENT)
Dept: INTERNAL MEDICINE CLINIC | Facility: CLINIC | Age: 76
End: 2021-03-19

## 2021-03-19 DIAGNOSIS — E87.6 POTASSIUM SERUM DECREASED: ICD-10-CM

## 2021-03-19 DIAGNOSIS — D72.819 LEUKOPENIA, UNSPECIFIED TYPE: Primary | ICD-10-CM

## 2021-03-19 DIAGNOSIS — E11.9 TYPE 2 DIABETES MELLITUS WITHOUT COMPLICATION, WITHOUT LONG-TERM CURRENT USE OF INSULIN (HCC): ICD-10-CM

## 2021-03-19 RX ORDER — LANCETS
EACH MISCELLANEOUS DAILY
Qty: 100 EACH | Refills: 3 | Status: SHIPPED | OUTPATIENT
Start: 2021-03-19

## 2021-03-19 NOTE — PROGRESS NOTES
Prior Potassium dose was 20 mEq, 3 tablets daily  Recommended she take 4 tablets daily for 3 days then return to 3 tablets daily

## 2021-03-19 NOTE — TELEPHONE ENCOUNTER
Called and spoke with patient's  regarding test results  Labs drawn 3/16/2021 show decreased potassium of 3 4 down from 3 6  Advised patient to take 1 additional potassium chloride tablet for 3 days and then return to her previous dosing  Hemoglobin improved since her discharge  White blood cell count improving  Recommend follow-up BMP and CBC in 1 week time  Orders placed for these labs  Could you please follow-up with the patient early part of next week and see that she received a potassium order  from her   Also please see that home nursing draws the BMP and CBC  Thanks  Jourdan Vernon

## 2021-03-22 ENCOUNTER — TELEPHONE (OUTPATIENT)
Dept: INTERNAL MEDICINE CLINIC | Facility: CLINIC | Age: 76
End: 2021-03-22

## 2021-03-22 DIAGNOSIS — J06.9 VIRAL URI: Primary | ICD-10-CM

## 2021-03-22 NOTE — TELEPHONE ENCOUNTER
LM for the patient to call back to confirm that she got the medication instructions  I called and verified the phone/fax numbers for [facility] care - Orders to be done 3/26/21 faxed to [phone]

## 2021-03-22 NOTE — TELEPHONE ENCOUNTER
Pt & spouse were exposed to covid last week, Sx include cough, runny nose, sore throat  No appts avail today  Can you please place covid testing order?

## 2021-03-23 ENCOUNTER — RA CDI HCC (OUTPATIENT)
Dept: OTHER | Facility: HOSPITAL | Age: 76
End: 2021-03-23

## 2021-03-23 DIAGNOSIS — J06.9 VIRAL URI: ICD-10-CM

## 2021-03-23 PROCEDURE — U0005 INFEC AGEN DETEC AMPLI PROBE: HCPCS | Performed by: FAMILY MEDICINE

## 2021-03-23 PROCEDURE — U0003 INFECTIOUS AGENT DETECTION BY NUCLEIC ACID (DNA OR RNA); SEVERE ACUTE RESPIRATORY SYNDROME CORONAVIRUS 2 (SARS-COV-2) (CORONAVIRUS DISEASE [COVID-19]), AMPLIFIED PROBE TECHNIQUE, MAKING USE OF HIGH THROUGHPUT TECHNOLOGIES AS DESCRIBED BY CMS-2020-01-R: HCPCS | Performed by: FAMILY MEDICINE

## 2021-03-23 NOTE — PROGRESS NOTES
[facility]  coding opportunities             Chart reviewed, (number of) suggestions sent to provider: 6           Patients insurance company: [address]  (Medicare Advantage and Commercial)     Visit status: Patient arrived for their scheduled appointment     Provider never responded to [employer]  coding request     [facility] [employer]  coding oppertunities             Chart reviewed, (number of) suggestions sent to provider: 6        3/30/21 was a telemed visit           E11 22, N18 4 T2DM with stage 4 CKD ([facility] )    E11 622, L97 218 T2DM with other skin ulcer , 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

## 2021-03-24 LAB — SARS-COV-2 RNA RESP QL NAA+PROBE: POSITIVE

## 2021-03-25 ENCOUNTER — TELEPHONE (OUTPATIENT)
Dept: INTERNAL MEDICINE CLINIC | Facility: CLINIC | Age: 76
End: 2021-03-25

## 2021-03-25 NOTE — TELEPHONE ENCOUNTER
The patient called and was informed that her COVID test is positive  Quarantine guidelines were reviewed with the patient  She states that she has "a slight head cold"  No respiratory distress  She will call if she developes fever and will go to the ER for SOB

## 2021-03-30 ENCOUNTER — TELEMEDICINE (OUTPATIENT)
Dept: INTERNAL MEDICINE CLINIC | Facility: CLINIC | Age: 76
End: 2021-03-30
Payer: COMMERCIAL

## 2021-03-30 DIAGNOSIS — I51.9 CARDIAC DISEASE: ICD-10-CM

## 2021-03-30 DIAGNOSIS — J43.2 CENTRILOBULAR EMPHYSEMA (HCC): ICD-10-CM

## 2021-03-30 DIAGNOSIS — I10 BENIGN ESSENTIAL HYPERTENSION: ICD-10-CM

## 2021-03-30 DIAGNOSIS — M81.0 POSTMENOPAUSAL OSTEOPOROSIS: ICD-10-CM

## 2021-03-30 DIAGNOSIS — E11.9 TYPE 2 DIABETES MELLITUS WITHOUT COMPLICATION, WITHOUT LONG-TERM CURRENT USE OF INSULIN (HCC): Primary | ICD-10-CM

## 2021-03-30 DIAGNOSIS — E78.2 MIXED HYPERLIPIDEMIA: ICD-10-CM

## 2021-03-30 DIAGNOSIS — I25.10 CORONARY ARTERY DISEASE INVOLVING NATIVE HEART WITHOUT ANGINA PECTORIS, UNSPECIFIED VESSEL OR LESION TYPE: ICD-10-CM

## 2021-03-30 DIAGNOSIS — I48.0 PAROXYSMAL ATRIAL FIBRILLATION (HCC): ICD-10-CM

## 2021-03-30 DIAGNOSIS — E03.9 ACQUIRED HYPOTHYROIDISM: ICD-10-CM

## 2021-03-30 DIAGNOSIS — D50.8 IRON DEFICIENCY ANEMIA SECONDARY TO INADEQUATE DIETARY IRON INTAKE: ICD-10-CM

## 2021-03-30 DIAGNOSIS — E66.01 MORBID OBESITY DUE TO EXCESS CALORIES (HCC): ICD-10-CM

## 2021-03-30 DIAGNOSIS — I50.32 CHRONIC DIASTOLIC CONGESTIVE HEART FAILURE (HCC): ICD-10-CM

## 2021-03-30 DIAGNOSIS — E55.9 VITAMIN D DEFICIENCY: ICD-10-CM

## 2021-03-30 DIAGNOSIS — E44.0 MODERATE PROTEIN-ENERGY MALNUTRITION (HCC): ICD-10-CM

## 2021-03-30 DIAGNOSIS — Z79.01 ANTICOAGULANT LONG-TERM USE: ICD-10-CM

## 2021-03-30 PROBLEM — R79.1 SUPRATHERAPEUTIC INR: Status: RESOLVED | Noted: 2021-02-05 | Resolved: 2021-03-30

## 2021-03-30 PROBLEM — D62 ACUTE BLOOD LOSS ANEMIA: Status: RESOLVED | Noted: 2021-02-05 | Resolved: 2021-03-30

## 2021-03-30 PROCEDURE — 1036F TOBACCO NON-USER: CPT | Performed by: FAMILY MEDICINE

## 2021-03-30 PROCEDURE — 99214 OFFICE O/P EST MOD 30 MIN: CPT | Performed by: FAMILY MEDICINE

## 2021-03-30 RX ORDER — ERGOCALCIFEROL (VITAMIN D2) 1250 MCG
1 CAPSULE ORAL WEEKLY
Qty: 12 CAPSULE | Refills: 1 | Status: SHIPPED | OUTPATIENT
Start: 2021-03-30

## 2021-03-30 RX ORDER — FUROSEMIDE 80 MG
80 TABLET ORAL DAILY
Qty: 90 TABLET | Refills: 1 | Status: SHIPPED | OUTPATIENT
Start: 2021-03-30

## 2021-03-30 RX ORDER — PHENTERMINE HYDROCHLORIDE 37.5 MG/1
37.5 TABLET ORAL DAILY
Qty: 90 TABLET | Refills: 1 | Status: SHIPPED | OUTPATIENT
Start: 2021-03-30 | End: 2021-04-09 | Stop reason: SDUPTHER

## 2021-03-30 NOTE — PROGRESS NOTES
Virtual Brief Visit    Assessment/Plan:    Problem List Items Addressed This Visit        Endocrine    Hypothyroidism    Relevant Medications    metoprolol tartrate (LOPRESSOR) 25 mg tablet    Other Relevant Orders    TSH, 3rd generation    T4, free    Type 2 diabetes mellitus without complication, without long-term current use of insulin (HCC) - Primary    Relevant Medications    sitaGLIPtin (JANUVIA) 25 mg tablet    Other Relevant Orders    Ambulatory referral to Ophthalmology    Hemoglobin A1C    Comprehensive metabolic panel       Respiratory    Centrilobular emphysema (HCC)       Cardiovascular and Mediastinum    Cardiac disease    Relevant Medications    metoprolol tartrate (LOPRESSOR) 25 mg tablet    CAD (coronary artery disease)    Relevant Medications    metoprolol tartrate (LOPRESSOR) 25 mg tablet    Chronic diastolic congestive heart failure (HCC)    Relevant Medications    furosemide (LASIX) 80 mg tablet    metoprolol tartrate (LOPRESSOR) 25 mg tablet    Benign essential hypertension    Relevant Medications    furosemide (LASIX) 80 mg tablet    metoprolol tartrate (LOPRESSOR) 25 mg tablet    Paroxysmal atrial fibrillation (HCC)    Relevant Medications    metoprolol tartrate (LOPRESSOR) 25 mg tablet       Musculoskeletal and Integument    Postmenopausal osteoporosis    Relevant Orders    DXA bone density spine hip and pelvis       Other    Anticoagulant long-term use    HLD (hyperlipidemia)    Morbid obesity due to excess calories (HCC)    Relevant Medications    phentermine (ADIPEX-P) 37 5 MG tablet    Vitamin D deficiency    Relevant Medications    ergocalciferol (ERGOCALCIFEROL) 1 25 MG (79633 UT) capsule    Other Relevant Orders    Vitamin D 25 hydroxy    DXA bone density spine hip and pelvis    Moderate protein-energy malnutrition (HCC)      Other Visit Diagnoses     Iron deficiency anemia secondary to inadequate dietary iron intake        Relevant Orders    CBC and differential    Ferritin Cont with iron BID, monitor for bleeding  Labs and DEXA due for next visit  Stop prandin and decrease dose to janvia    BMI Counseling: There is no height or weight on file to calculate BMI  The BMI is above normal  Nutrition recommendations include moderation in carbohydrate intake  Exercise recommendations include exercising 3-5 times per week  No pharmacotherapy was ordered  It was my intent to perform this visit via video technology but the patient was not able to do a video connection so the visit was completed via audio telephone only  Reason for visit is   Chief Complaint   Patient presents with    Follow-up     4 month, labs done 3/23/21  questions about one of her medications and her kidney's    health maintenance     DM eye exam, DM foot exam, bmi f/u plan due        Encounter provider Giovanny Polanco DO    Provider located at [address] [place] [id number]    Recent Visits  Date Type Provider Dept   03/25/21 Telephone Giovanny Polanco DO  [facility]   Showing recent visits within past 7 days and meeting all other requirements     Today's Visits  Date Type Provider Dept   03/30/21 [address], DO  [facility]   Showing today's visits and meeting all other requirements     Future Appointments  No visits were found meeting these conditions  Showing future appointments within next 150 days and meeting all other requirements        After connecting through telephone, the patient was identified by name and date of birth  Dicksonlencho Rockwell was informed that this is a telemedicine visit and that the visit is being conducted through telephone  My office door was closed  No one else was in the room     She acknowledged consent and understanding of privacy and security of the platform   The patient has agreed to participate and understands she can discontinue the visit at any time  Patient is aware this is a billable service  Subjective    Chantel Maya is a 77 y o  female  HPI       Diabetes Type II (Follow-Up): The patient states she has been doing well with her Type II Diabetes control since the last visit  Comorbid Illnesses: hyperlipidemia and obesity  Interval Events: 9/13/18: Aic now 8 5, 2/5/19: Aic 6 9 now, may 2019 Aic 5 9 now, FBS 99, March 2020 hemoglobin A1c 5 4 with fasting blood sugar 67, no hypoglycemic symptoms, Nov 2020 Aic 5 8, FBS 68  Symptoms:   Home monitoring: The patient checks her blood sugar sporadically  Glycemic control has been good  the patient reports no symptomatic hypoglycemic episodes  Medications: the patient is adherent with her medication regimen  She denies medication side effects    ,ay 2019 felt well on keto diet and labs are great, stopped 3 weeks bc dr Josefa Gonzales called her but she is not sure why she had to    Nov 2019 stopped KETo diet bc she found it to be hard and  Gained 21 lbs, DM and HLD is still well controlled, feels tired   Nov 2020 Aic 5 8, FBS 68  March 2021 very well controlled DM, not sure if having low BS at home     Gastroesophageal Reflux Disease (Brief): The patient is being seen for an initial evaluation of gastroesophageal reflux disease  Symptoms:  no heartburn  The patient is currently asymptomatic  No associated symptoms are reported  Nov is out of PPI and feeling ok          Hypertension (Follow-Up): The patient presents for follow-up of essential hypertension  The patient states she has been doing well with her blood pressure control since the last visit  She has no significant interval events  Symptoms: The patient is currently asymptomatic       Hyperlipidemia (Follow-Up): The patient states her hyperlipidemia has been stable since the last visit  Comorbid Illnesses: diabetes mellitus and hypertension  She has no significant interval events  Symptoms: compliant with statin and fenofibrate  Labs dhow elevated TG in the 300 range  May 2019 TG now 99 with KETO DIet  Nov 2020 Lipids are well controlled  March 2021 improved TG  compliant with meds and cardio visits        Congestive Heart Failure (Follow-Up): Comorbid Illnesses: Trying to eat more healthy  Cardio is following- sees Dr Genevieve Garsia   Symptoms: stable lower extremity edema,-- stable dyspnea on exertion-- and-- stable fatigue  compliant with medications, nov 2020 taking lasix 80 mg daily, then 80 mg lasix m,w, f  Metolazone the other days with lasix   march 2021 follows with cardio dr Gianfranco Wang, doing well, checks her weight     Arrhythmia (Brief): Symptoms:  no dizziness   Associated symptoms:  taking dig and amiodarone as prescribed- follows with caridology   On coumadin and doing well, managed by cardio, nov 2019 no new issues, nov 2020 no issues               Past Medical History:   Diagnosis Date    Abscess of abdominal wall     RESOLVED:  6/28/16    Acute diastolic congestive heart failure (HCC)     RESOLVED:  8/29/17    Acute on chronic diastolic congestive heart failure ([address] )     RESOLVED:  8/29/17    CHANTELL (acute kidney injury) ([address] )     RESOLVED:  8/29/17    Allergy     Arteriovenous malformation     OF THE GASTROINTESTINAL    Blood coagulation disorder ([address] )     RESOLVED: 6/21/15    CAD (coronary artery disease)     Candidiasis of breast     RESOLVED: 8/29/17    COVID-19 03/23/2021    Diabetes mellitus ([address] )     Diastolic CHF ([address] )     Gastroparesis     RESOLVED: 6/21/15    Generalized anxiety disorder     RESOLVED: 6/21/15    GERD (gastroesophageal reflux disease)     GI (gastrointestinal bleed) 02/2021    Gout     Hyperlipidemia     Hypertension     Lymphedema     RESOLVED: 6/21/15     Mitral valve replaced     2010 - prosthetic    Morbid (severe) obesity due to excess calories ([address] )     RESOLVED: 6/21/15    Psoriasis     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    Sciatica of right side     RESOLVED: 5/15/17    Type 2 diabetes mellitus ([address] )     Weight gain     RESOLVED: 5/15/17       Past Surgical History:   Procedure Laterality Date     SECTION      X 1    COLONOSCOPY N/A 2017    Procedure: COLONOSCOPY;  Surgeon: Deja Peters DO;  Location: [facility];   Service:     MITRAL VALVE REPLACEMENT      TONSILLECTOMY AND ADENOIDECTOMY      LISTED 2 X       Current Outpatient Medications   Medication Sig Dispense Refill    Accu-Chek FastClix Lancets MISC Use daily 100 each 3    allopurinol (ZYLOPRIM) 100 mg tablet Take 1 tablet (100 mg total) by mouth daily 180 tablet 1    atorvastatin (LIPITOR) 80 mg tablet Take 1 tablet (80 mg total) by mouth daily 90 tablet 1    calcipotriene (DOVONOX) 0 005 % ointment Apply topically 2 (two) times a day (Patient taking differently: Apply topically as needed ) 360 g 3    citalopram (CeleXA) 20 mg tablet Take 1 tablet (20 mg total) by mouth daily 90 tablet 1    docusate sodium (COLACE) 100 mg capsule Take 100 mg by mouth as needed       ergocalciferol (ERGOCALCIFEROL) 1 25 MG (83907 UT) capsule Take 1 capsule (50,000 Units total) by mouth once a week 12 capsule 1    fluticasone (FLONASE) 50 mcg/act nasal spray 2 sprays into each nostril daily (Patient taking differently: 2 sprays into each nostril as needed ) 3 Bottle 1    furosemide (LASIX) 80 mg tablet Take 1 tablet (80 mg total) by mouth daily 90 tablet 1    hydrocortisone 2 5 % cream MIX WITH OTC ANTI-FUNGAL MEDICATION AND APPLY TO AFFECTED AREAS TWO TIMES A DAY X 2 WEEKS, AS NEEDED FOR FLARE      iron polysaccharides (Ferrex 150) 150 mg capsule Take 1 capsule (150 mg total) by mouth 2 (two) times a day 180 capsule 0    levothyroxine 75 mcg tablet Take 1 tablet (75 mcg total) by mouth daily 90 tablet 0    metolazone (ZAROXOLYN) 2 5 mg tablet TAKE 1 TABLET BY MOUTH 3 TIMES A WEEK 40 tablet 1    metoprolol tartrate (LOPRESSOR) 25 mg tablet Take 1 tablet (25 mg total) by mouth every 12 (twelve) hours 180 tablet 1    Multiple Vitamins-Minerals (OCUVITE EYE HEALTH FORMULA PO) Take 1 tablet by mouth daily      nystatin (MYCOSTATIN) ointment Apply topically as needed (rash) 15 g 0    nystatin (nystatin) powder Apply 1 application topically daily        omeprazole (PriLOSEC) 40 MG capsule Take 1 capsule (40 mg total) by mouth 2 (two) times a day 180 capsule 1    Otezla 30 MG TABS Take 1 tablet by mouth 2 (two) times a day       phentermine (ADIPEX-P) 37 5 MG tablet Take 1 tablet (37 5 mg total) by mouth daily 90 tablet 1    potassium chloride (Klor-Con M20) 20 mEq tablet Take 1 tablet (20 mEq total) by mouth 3 (three) times a day (Patient taking differently: Take 20 mEq by mouth 4 (four) times a day ) 90 tablet 1    sitaGLIPtin (JANUVIA) 25 mg tablet Take 1 tablet (25 mg total) by mouth daily 90 tablet 1    triamcinolone (KENALOG) 0 1 % cream Apply 1 application topically as needed       Turmeric 500 MG TABS Take 1 capsule by mouth 2 (two) times a day      warfarin (COUMADIN) 2 5 mg tablet Take 2 5 mg by mouth daily Taking half of the 2 5      albuterol (PROAIR HFA) 90 mcg/act inhaler Inhale 2 puffs as needed for wheezing (Patient not taking: Reported on 3/5/2021) 1 Inhaler 0     No current facility-administered medications for this visit  Allergies   Allergen Reactions    Tetanus Toxoid        Review of Systems     Constitutional:  Denies fever or chills   Eyes:  Denies double or blurry vision  HENT:  Denies nasal congestion or sore throat   Respiratory:  Denies cough or shortness of breath or wheezing  Cardiovascular:  Denies palpitations or chest pain  GI:  Denies abdominal pain, nausea, or vomiting, no loose stools  Integument:  Denies rash , no open areas  Neurologic:  Denies headache or focal weakness, no dizziness          There were no vitals filed for this visit        I spent 10 minutes directly with the patient during this visit    VIRTUAL VISIT DISCLAIMER    Cordell Pelletier acknowledges that she has consented to an online visit or consultation  She understands that the online visit is based solely on information provided by her, and that, in the absence of a face-to-face physical evaluation by the physician, the diagnosis she receives is both limited and provisional in terms of accuracy and completeness  This is not intended to replace a full medical face-to-face evaluation by the physician  Cordell Genarotez understands and accepts these terms

## 2021-04-07 ENCOUNTER — PATIENT OUTREACH (OUTPATIENT)
Dept: INTERNAL MEDICINE CLINIC | Facility: CLINIC | Age: 76
End: 2021-04-07

## 2021-04-07 NOTE — PROGRESS NOTES
Outpatient Care Management Note:  RE:Received voicemail back from [facility] [place]  Pt was not evaluated for lymphedema therapy because they had no order or diagnosis of this to move forward at the time  She also reports that availability for therapy is limited  Fax number to [facility] if ordered is [phone]  InAppl[employer]ze message sent to Dr Tai Feldman regarding

## 2021-04-07 NOTE — PROGRESS NOTES
Outpatient Care Management Note:  RE:  Called and spoke with pt who verbalizes "feling optimistic and hopeful" regarding her improvement in her mobility and strength from receiving physical therapy  She is still utilizing her walker but is able to do more things with her range of motion  Pt has not heard anything about lymphedema therapy througth [address] and left a message for Dariela Alanis inquiring about the status update on this  Pt has her booster covid vaccine tomorrow

## 2021-04-08 ENCOUNTER — IMMUNIZATIONS (OUTPATIENT)
Dept: FAMILY MEDICINE CLINIC | Facility: HOSPITAL | Age: 76
End: 2021-04-08

## 2021-04-08 ENCOUNTER — TELEPHONE (OUTPATIENT)
Dept: INTERNAL MEDICINE CLINIC | Age: 76
End: 2021-04-08

## 2021-04-08 DIAGNOSIS — I89.0 LYMPHEDEMA: Primary | ICD-10-CM

## 2021-04-08 DIAGNOSIS — Z23 ENCOUNTER FOR IMMUNIZATION: Primary | ICD-10-CM

## 2021-04-08 PROCEDURE — 0012A SARS-COV-2 / COVID-19 MRNA VACCINE (MODERNA) 100 MCG: CPT

## 2021-04-08 PROCEDURE — 91301 SARS-COV-2 / COVID-19 MRNA VACCINE (MODERNA) 100 MCG: CPT

## 2021-04-08 NOTE — TELEPHONE ENCOUNTER
----- Message from Belle Geiger DO sent at 4/8/2021  2:08 PM EDT -----  Regarding: FW: lymphedema therapy    Please see below, order placed in chart for lymphedema therapy  Can you please fax it for me?    ----- Message -----  From: Dulce Gamez RN  Sent: 4/7/2021  11:19 AM EDT  To: Belle Geiger DO  Subject: lymphedema therapy                               Hi Dr Tai Feldman  Pt referred by Fransisco Otero to me after her discharge last month and her lymphedema was bothersome and a hindrance to moving her legs   She is receiving PT through [facility] therapist and is happy with how she has improved  [facility] does have a lymphedema therapist ,but her availability is limited  I had requested an eval for pt about 3 weeks ago through them and discovered that it hasn't been done because there was "no documentation of pt having lymphedema in records" as per the agency  [place]  No one had notified me back about this  Pt may also benefit from lymphedema pumps once she receives the eval    If you think pt would still benefit from this, please send order for lymphedema therapy evaluation and treat, with lymphedema dx  to [facility] [phone]  Thank you!    Laureen Spatz

## 2021-04-09 DIAGNOSIS — E66.01 MORBID OBESITY DUE TO EXCESS CALORIES (HCC): ICD-10-CM

## 2021-04-09 RX ORDER — PHENTERMINE HYDROCHLORIDE 37.5 MG/1
37.5 TABLET ORAL DAILY
Qty: 30 TABLET | Refills: 3 | Status: SHIPPED | OUTPATIENT
Start: 2021-04-09

## 2021-04-09 NOTE — TELEPHONE ENCOUNTER
Pt called and stated [facility] never received Medication  Pt asked if we could send to [facility] in [place]   Stated she has this issue all the time with [facility] and want to use [facility] from now on for this medication     3/30/2020  8/5/2021

## 2021-05-02 DIAGNOSIS — I50.32 CHRONIC DIASTOLIC CONGESTIVE HEART FAILURE (HCC): ICD-10-CM

## 2021-05-03 RX ORDER — METOLAZONE 2.5 MG/1
TABLET ORAL
Qty: 40 TABLET | Refills: 1 | Status: SHIPPED | OUTPATIENT
Start: 2021-05-03

## 2021-05-07 ENCOUNTER — PATIENT OUTREACH (OUTPATIENT)
Dept: INTERNAL MEDICINE CLINIC | Facility: CLINIC | Age: 76
End: 2021-05-07

## 2021-05-07 DIAGNOSIS — E11.9 TYPE 2 DIABETES MELLITUS WITHOUT COMPLICATION, WITHOUT LONG-TERM CURRENT USE OF INSULIN (HCC): Primary | ICD-10-CM

## 2021-05-07 NOTE — PROGRESS NOTES
Outpatient Care Management Note:  RE:Spoke with staff at [address] [place] [address] location and they do have lymphedema therapist there  Will call pt on Monday to inform of this and provide contact number

## 2021-05-07 NOTE — TELEPHONE ENCOUNTER
----- Message from Mayer Schirmer, RN sent at 5/7/2021  2:34 PM EDT -----  Regarding: Glucometer testing strips  Hello  Pt requesting refill of testing strips for her Accu check Aneta plus glucometer  She is going to find out if she has to use her home delivery through insurance but until then requested that order be sent to [facility] [place]

## 2021-05-07 NOTE — PROGRESS NOTES
Outpatient Care Management Note:  RE:Spoke with pt about her Lymphedema and she still has [facility] coming in for PT including strengthening  However, there is no availability for Lymphedema therapy in home  Pt will need to see Lymphedema specialist for PT once completed home strengthening and offered to look into for pt  She did get a small stool to help her get into her car, but she did state that the weight of her legs is still a concern  We then discussed glucose checking and she has run out of strips  Message sent regarding to PCP clinical staff pool  She also was given a glucometer from a friend and would prefer to have 2 of them one for upstairs and one for downstairs  Discussed having her call insurance to determine if she should use their home delivery or purchase strips at local pharmacy  Pt's donated glucometer is [employer] brand  She has been weighing daily and reports todays weight was 190 lbs  Will follow up with her on Monday regarding info she obtains about strips

## 2021-05-09 DIAGNOSIS — K21.9 GERD WITHOUT ESOPHAGITIS: ICD-10-CM

## 2021-05-10 ENCOUNTER — PATIENT OUTREACH (OUTPATIENT)
Dept: INTERNAL MEDICINE CLINIC | Facility: CLINIC | Age: 76
End: 2021-05-10

## 2021-05-10 RX ORDER — OMEPRAZOLE 40 MG/1
CAPSULE, DELAYED RELEASE ORAL
Qty: 180 CAPSULE | Refills: 1 | OUTPATIENT
Start: 2021-05-10

## 2021-05-10 NOTE — PROGRESS NOTES
Outpatient Care Management Note:  RE:Message left requesting a return call back about Lymphedema therapy and PT locations

## 2021-05-11 ENCOUNTER — PATIENT OUTREACH (OUTPATIENT)
Dept: FAMILY MEDICINE CLINIC | Facility: CLINIC | Age: 76
End: 2021-05-11

## 2021-05-11 NOTE — PROGRESS NOTES
Outpatient Care Management Note:  RE:Provided pt with contact number for [facility] PT location for Lymphedema therapy once she completes home PT  Also discussed lymphedema compression pumps and wraps for the legs to inform pt, so that she may ask questions  She was appreciative of the information

## 2021-05-21 DIAGNOSIS — E03.9 HYPOTHYROIDISM, UNSPECIFIED TYPE: ICD-10-CM

## 2021-05-21 DIAGNOSIS — N18.30 STAGE 3 CHRONIC KIDNEY DISEASE (HCC): ICD-10-CM

## 2021-05-21 DIAGNOSIS — I48.0 PAROXYSMAL ATRIAL FIBRILLATION (HCC): Primary | ICD-10-CM

## 2021-05-21 DIAGNOSIS — K21.9 GERD WITHOUT ESOPHAGITIS: ICD-10-CM

## 2021-05-21 DIAGNOSIS — E78.5 HYPERLIPIDEMIA, UNSPECIFIED HYPERLIPIDEMIA TYPE: ICD-10-CM

## 2021-05-24 DIAGNOSIS — I48.0 PAROXYSMAL ATRIAL FIBRILLATION (HCC): ICD-10-CM

## 2021-05-24 RX ORDER — ALLOPURINOL 100 MG/1
100 TABLET ORAL DAILY
Qty: 30 TABLET | Refills: 5 | OUTPATIENT
Start: 2021-05-24

## 2021-05-24 RX ORDER — ATORVASTATIN CALCIUM 80 MG/1
80 TABLET, FILM COATED ORAL DAILY
Qty: 90 TABLET | Refills: 1 | Status: SHIPPED | OUTPATIENT
Start: 2021-05-24

## 2021-05-24 RX ORDER — WARFARIN SODIUM 2.5 MG/1
2.5 TABLET ORAL DAILY
Qty: 90 TABLET | Refills: 1 | OUTPATIENT
Start: 2021-05-24

## 2021-05-24 RX ORDER — OMEPRAZOLE 40 MG/1
40 CAPSULE, DELAYED RELEASE ORAL 2 TIMES DAILY
Qty: 180 CAPSULE | Refills: 1 | Status: SHIPPED | OUTPATIENT
Start: 2021-05-24

## 2021-05-24 RX ORDER — LEVOTHYROXINE SODIUM 0.07 MG/1
75 TABLET ORAL DAILY
Qty: 90 TABLET | Refills: 1 | Status: SHIPPED | OUTPATIENT
Start: 2021-05-24

## 2021-05-24 RX ORDER — WARFARIN SODIUM 2.5 MG/1
2.5 TABLET ORAL DAILY
Qty: 90 TABLET | Refills: 1 | Status: SHIPPED | OUTPATIENT
Start: 2021-05-24

## 2023-11-17 NOTE — TELEPHONE ENCOUNTER
Please contact our refill line for refills  Detail Level: Zone Initiate Treatment: Gentamicin ointment - apply twice daily for two weeks Render In Strict Bullet Format?: No

## (undated) DEVICE — THE EXACTO COLD SNARE IS INTENDED TO BE USED WITHOUT DIATHERMIC ENERGY FOR THE ENDOSCOPIC RESECTION OF POLYP TISSUE IN THE GASTROINTESTINAL TRACT.: Brand: EXACTO